# Patient Record
Sex: MALE | Race: WHITE | Employment: OTHER | URBAN - METROPOLITAN AREA
[De-identification: names, ages, dates, MRNs, and addresses within clinical notes are randomized per-mention and may not be internally consistent; named-entity substitution may affect disease eponyms.]

---

## 2016-09-19 LAB
CONTROL: NORMAL
HEMOCCULT STL QL: NORMAL

## 2017-01-11 PROBLEM — E55.9 VITAMIN D DEFICIENCY: Status: ACTIVE | Noted: 2017-01-11

## 2017-02-02 ENCOUNTER — TELEPHONE (OUTPATIENT)
Dept: INTERNAL MEDICINE CLINIC | Age: 77
End: 2017-02-02

## 2017-02-02 DIAGNOSIS — Z12.11 COLON CANCER SCREENING: ICD-10-CM

## 2017-02-02 PROCEDURE — 82274 ASSAY TEST FOR BLOOD FECAL: CPT | Performed by: INTERNAL MEDICINE

## 2017-02-08 PROBLEM — Z51.0 ENCOUNTER FOR ANTINEOPLASTIC RADIATION THERAPY: Status: ACTIVE | Noted: 2017-02-08

## 2017-03-09 RX ORDER — EZETIMIBE 10 MG/1
TABLET ORAL
Qty: 30 TABLET | Refills: 5 | Status: SHIPPED | OUTPATIENT
Start: 2017-03-09 | End: 2017-09-01 | Stop reason: SDUPTHER

## 2017-03-09 RX ORDER — ESCITALOPRAM OXALATE 10 MG/1
TABLET ORAL
Qty: 30 TABLET | OUTPATIENT
Start: 2017-03-09

## 2017-03-09 RX ORDER — ESCITALOPRAM OXALATE 10 MG/1
TABLET ORAL
Qty: 30 TABLET | Refills: 3 | Status: SHIPPED | OUTPATIENT
Start: 2017-03-09 | End: 2017-07-02 | Stop reason: SDUPTHER

## 2017-03-13 RX ORDER — ATORVASTATIN CALCIUM 80 MG/1
TABLET, FILM COATED ORAL
Qty: 30 TABLET | Refills: 5 | Status: SHIPPED | OUTPATIENT
Start: 2017-03-13 | End: 2017-09-01 | Stop reason: SDUPTHER

## 2017-03-20 ENCOUNTER — TELEPHONE (OUTPATIENT)
Dept: INTERNAL MEDICINE CLINIC | Age: 77
End: 2017-03-20

## 2017-03-20 ENCOUNTER — OFFICE VISIT (OUTPATIENT)
Dept: INTERNAL MEDICINE CLINIC | Age: 77
End: 2017-03-20

## 2017-03-20 VITALS
BODY MASS INDEX: 28.92 KG/M2 | DIASTOLIC BLOOD PRESSURE: 70 MMHG | SYSTOLIC BLOOD PRESSURE: 130 MMHG | HEIGHT: 70 IN | WEIGHT: 202 LBS | HEART RATE: 52 BPM

## 2017-03-20 DIAGNOSIS — F32.9 REACTIVE DEPRESSION: ICD-10-CM

## 2017-03-20 DIAGNOSIS — I10 ESSENTIAL HYPERTENSION: Primary | ICD-10-CM

## 2017-03-20 PROCEDURE — 4040F PNEUMOC VAC/ADMIN/RCVD: CPT | Performed by: INTERNAL MEDICINE

## 2017-03-20 PROCEDURE — G8427 DOCREV CUR MEDS BY ELIG CLIN: HCPCS | Performed by: INTERNAL MEDICINE

## 2017-03-20 PROCEDURE — 99213 OFFICE O/P EST LOW 20 MIN: CPT | Performed by: INTERNAL MEDICINE

## 2017-03-20 PROCEDURE — 1036F TOBACCO NON-USER: CPT | Performed by: INTERNAL MEDICINE

## 2017-03-20 PROCEDURE — 1123F ACP DISCUSS/DSCN MKR DOCD: CPT | Performed by: INTERNAL MEDICINE

## 2017-03-20 PROCEDURE — 3017F COLORECTAL CA SCREEN DOC REV: CPT | Performed by: INTERNAL MEDICINE

## 2017-03-20 PROCEDURE — G8420 CALC BMI NORM PARAMETERS: HCPCS | Performed by: INTERNAL MEDICINE

## 2017-03-20 PROCEDURE — G8484 FLU IMMUNIZE NO ADMIN: HCPCS | Performed by: INTERNAL MEDICINE

## 2017-03-20 ASSESSMENT — ENCOUNTER SYMPTOMS
GASTROINTESTINAL NEGATIVE: 1
TROUBLE SWALLOWING: 0
WHEEZING: 0
SHORTNESS OF BREATH: 0
CHEST TIGHTNESS: 0
SORE THROAT: 0
COUGH: 0

## 2017-04-19 PROBLEM — Z51.0 ENCOUNTER FOR ANTINEOPLASTIC RADIATION THERAPY: Status: RESOLVED | Noted: 2017-02-08 | Resolved: 2017-04-19

## 2017-06-26 ENCOUNTER — OFFICE VISIT (OUTPATIENT)
Dept: INTERNAL MEDICINE CLINIC | Age: 77
End: 2017-06-26

## 2017-06-26 VITALS
HEART RATE: 54 BPM | BODY MASS INDEX: 29.71 KG/M2 | WEIGHT: 200.6 LBS | DIASTOLIC BLOOD PRESSURE: 76 MMHG | HEIGHT: 69 IN | SYSTOLIC BLOOD PRESSURE: 130 MMHG | RESPIRATION RATE: 16 BRPM | OXYGEN SATURATION: 97 %

## 2017-06-26 DIAGNOSIS — M25.552 LEFT HIP PAIN: ICD-10-CM

## 2017-06-26 DIAGNOSIS — I10 ESSENTIAL HYPERTENSION: Primary | ICD-10-CM

## 2017-06-26 DIAGNOSIS — I25.10 CORONARY ARTERY DISEASE INVOLVING NATIVE CORONARY ARTERY OF NATIVE HEART WITHOUT ANGINA PECTORIS: ICD-10-CM

## 2017-06-26 DIAGNOSIS — E78.5 HYPERLIPIDEMIA LDL GOAL <70: ICD-10-CM

## 2017-06-26 DIAGNOSIS — G89.29 CHRONIC LEFT-SIDED LOW BACK PAIN WITHOUT SCIATICA: ICD-10-CM

## 2017-06-26 DIAGNOSIS — F32.9 REACTIVE DEPRESSION: ICD-10-CM

## 2017-06-26 DIAGNOSIS — M54.50 CHRONIC LEFT-SIDED LOW BACK PAIN WITHOUT SCIATICA: ICD-10-CM

## 2017-06-26 LAB
ALBUMIN SERPL-MCNC: 4.2 G/DL (ref 3.4–5)
ALP BLD-CCNC: 60 U/L (ref 40–129)
ALT SERPL-CCNC: 19 U/L (ref 10–40)
ANION GAP SERPL CALCULATED.3IONS-SCNC: 14 MMOL/L (ref 3–16)
AST SERPL-CCNC: 22 U/L (ref 15–37)
BASOPHILS ABSOLUTE: 0 K/UL (ref 0–0.2)
BASOPHILS RELATIVE PERCENT: 0.4 %
BILIRUB SERPL-MCNC: 0.5 MG/DL (ref 0–1)
BILIRUBIN DIRECT: <0.2 MG/DL (ref 0–0.3)
BILIRUBIN, INDIRECT: NORMAL MG/DL (ref 0–1)
BUN BLDV-MCNC: 25 MG/DL (ref 7–20)
CALCIUM SERPL-MCNC: 8.8 MG/DL (ref 8.3–10.6)
CHLORIDE BLD-SCNC: 102 MMOL/L (ref 99–110)
CHOLESTEROL, TOTAL: 147 MG/DL (ref 0–199)
CO2: 26 MMOL/L (ref 21–32)
CREAT SERPL-MCNC: 1.1 MG/DL (ref 0.8–1.3)
EOSINOPHILS ABSOLUTE: 0.2 K/UL (ref 0–0.6)
EOSINOPHILS RELATIVE PERCENT: 3.9 %
GFR AFRICAN AMERICAN: >60
GFR NON-AFRICAN AMERICAN: >60
GLUCOSE BLD-MCNC: 84 MG/DL (ref 70–99)
HCT VFR BLD CALC: 39.2 % (ref 40.5–52.5)
HDLC SERPL-MCNC: 47 MG/DL (ref 40–60)
HEMOGLOBIN: 13.4 G/DL (ref 13.5–17.5)
LDL CHOLESTEROL CALCULATED: 71 MG/DL
LYMPHOCYTES ABSOLUTE: 0.5 K/UL (ref 1–5.1)
LYMPHOCYTES RELATIVE PERCENT: 11.4 %
MCH RBC QN AUTO: 31.1 PG (ref 26–34)
MCHC RBC AUTO-ENTMCNC: 34.2 G/DL (ref 31–36)
MCV RBC AUTO: 90.9 FL (ref 80–100)
MONOCYTES ABSOLUTE: 0.7 K/UL (ref 0–1.3)
MONOCYTES RELATIVE PERCENT: 16.5 %
NEUTROPHILS ABSOLUTE: 2.8 K/UL (ref 1.7–7.7)
NEUTROPHILS RELATIVE PERCENT: 67.8 %
PDW BLD-RTO: 13.9 % (ref 12.4–15.4)
PLATELET # BLD: 132 K/UL (ref 135–450)
PMV BLD AUTO: 8.5 FL (ref 5–10.5)
POTASSIUM SERPL-SCNC: 4.4 MMOL/L (ref 3.5–5.1)
RBC # BLD: 4.32 M/UL (ref 4.2–5.9)
SODIUM BLD-SCNC: 142 MMOL/L (ref 136–145)
TOTAL PROTEIN: 6.9 G/DL (ref 6.4–8.2)
TRIGL SERPL-MCNC: 144 MG/DL (ref 0–150)
VLDLC SERPL CALC-MCNC: 29 MG/DL
WBC # BLD: 4.1 K/UL (ref 4–11)

## 2017-06-26 PROCEDURE — G8427 DOCREV CUR MEDS BY ELIG CLIN: HCPCS | Performed by: INTERNAL MEDICINE

## 2017-06-26 PROCEDURE — 1036F TOBACCO NON-USER: CPT | Performed by: INTERNAL MEDICINE

## 2017-06-26 PROCEDURE — 36415 COLL VENOUS BLD VENIPUNCTURE: CPT | Performed by: INTERNAL MEDICINE

## 2017-06-26 PROCEDURE — G8419 CALC BMI OUT NRM PARAM NOF/U: HCPCS | Performed by: INTERNAL MEDICINE

## 2017-06-26 PROCEDURE — 4040F PNEUMOC VAC/ADMIN/RCVD: CPT | Performed by: INTERNAL MEDICINE

## 2017-06-26 PROCEDURE — 3017F COLORECTAL CA SCREEN DOC REV: CPT | Performed by: INTERNAL MEDICINE

## 2017-06-26 PROCEDURE — 99214 OFFICE O/P EST MOD 30 MIN: CPT | Performed by: INTERNAL MEDICINE

## 2017-06-26 PROCEDURE — G8598 ASA/ANTIPLAT THER USED: HCPCS | Performed by: INTERNAL MEDICINE

## 2017-06-26 PROCEDURE — 1123F ACP DISCUSS/DSCN MKR DOCD: CPT | Performed by: INTERNAL MEDICINE

## 2017-06-26 RX ORDER — DICLOFENAC SODIUM 75 MG/1
75 TABLET, DELAYED RELEASE ORAL 2 TIMES DAILY
Qty: 60 TABLET | Refills: 0 | Status: SHIPPED | OUTPATIENT
Start: 2017-06-26 | End: 2017-07-10 | Stop reason: SDUPTHER

## 2017-06-26 ASSESSMENT — ENCOUNTER SYMPTOMS
COUGH: 0
SHORTNESS OF BREATH: 0
CHEST TIGHTNESS: 0
GASTROINTESTINAL NEGATIVE: 1

## 2017-06-27 ENCOUNTER — HOSPITAL ENCOUNTER (OUTPATIENT)
Dept: OTHER | Age: 77
Discharge: HOME | End: 2017-06-27
Attending: INTERNAL MEDICINE | Admitting: INTERNAL MEDICINE

## 2017-06-27 DIAGNOSIS — M25.552 LEFT HIP PAIN: ICD-10-CM

## 2017-06-27 DIAGNOSIS — G89.29 CHRONIC LEFT-SIDED LOW BACK PAIN WITHOUT SCIATICA: ICD-10-CM

## 2017-06-27 DIAGNOSIS — M54.50 CHRONIC LEFT-SIDED LOW BACK PAIN WITHOUT SCIATICA: ICD-10-CM

## 2017-07-03 RX ORDER — ESCITALOPRAM OXALATE 10 MG/1
TABLET ORAL
Qty: 30 TABLET | Refills: 5 | Status: SHIPPED | OUTPATIENT
Start: 2017-07-03 | End: 2017-12-29 | Stop reason: SDUPTHER

## 2017-07-10 ENCOUNTER — TELEPHONE (OUTPATIENT)
Dept: INTERNAL MEDICINE CLINIC | Age: 77
End: 2017-07-10

## 2017-07-10 DIAGNOSIS — M25.552 LEFT HIP PAIN: Primary | ICD-10-CM

## 2017-07-10 RX ORDER — DICLOFENAC SODIUM 75 MG/1
75 TABLET, DELAYED RELEASE ORAL PRN
Qty: 30 TABLET | Refills: 1 | Status: SHIPPED | OUTPATIENT
Start: 2017-07-10 | End: 2017-11-20 | Stop reason: SDUPTHER

## 2017-07-10 RX ORDER — ESCITALOPRAM OXALATE 10 MG/1
TABLET ORAL
Qty: 30 TABLET | Refills: 5 | Status: CANCELLED | OUTPATIENT
Start: 2017-07-10

## 2017-09-01 RX ORDER — EZETIMIBE 10 MG/1
TABLET ORAL
Qty: 30 TABLET | Refills: 5 | Status: SHIPPED | OUTPATIENT
Start: 2017-09-01 | End: 2018-03-12 | Stop reason: SDUPTHER

## 2017-09-01 RX ORDER — ATORVASTATIN CALCIUM 80 MG/1
TABLET, FILM COATED ORAL
Qty: 30 TABLET | Refills: 5 | Status: SHIPPED | OUTPATIENT
Start: 2017-09-01 | End: 2018-03-12 | Stop reason: SDUPTHER

## 2017-10-20 ENCOUNTER — OFFICE VISIT (OUTPATIENT)
Dept: INTERNAL MEDICINE CLINIC | Age: 77
End: 2017-10-20

## 2017-10-20 VITALS
WEIGHT: 203.4 LBS | HEIGHT: 69 IN | HEART RATE: 59 BPM | RESPIRATION RATE: 16 BRPM | SYSTOLIC BLOOD PRESSURE: 130 MMHG | DIASTOLIC BLOOD PRESSURE: 70 MMHG | OXYGEN SATURATION: 96 % | BODY MASS INDEX: 30.13 KG/M2

## 2017-10-20 DIAGNOSIS — I25.10 CORONARY ARTERY DISEASE INVOLVING NATIVE CORONARY ARTERY OF NATIVE HEART WITHOUT ANGINA PECTORIS: ICD-10-CM

## 2017-10-20 DIAGNOSIS — I10 ESSENTIAL HYPERTENSION: Primary | ICD-10-CM

## 2017-10-20 PROCEDURE — G8484 FLU IMMUNIZE NO ADMIN: HCPCS | Performed by: INTERNAL MEDICINE

## 2017-10-20 PROCEDURE — G8427 DOCREV CUR MEDS BY ELIG CLIN: HCPCS | Performed by: INTERNAL MEDICINE

## 2017-10-20 PROCEDURE — G8417 CALC BMI ABV UP PARAM F/U: HCPCS | Performed by: INTERNAL MEDICINE

## 2017-10-20 PROCEDURE — 1036F TOBACCO NON-USER: CPT | Performed by: INTERNAL MEDICINE

## 2017-10-20 PROCEDURE — 1123F ACP DISCUSS/DSCN MKR DOCD: CPT | Performed by: INTERNAL MEDICINE

## 2017-10-20 PROCEDURE — 4040F PNEUMOC VAC/ADMIN/RCVD: CPT | Performed by: INTERNAL MEDICINE

## 2017-10-20 PROCEDURE — G8598 ASA/ANTIPLAT THER USED: HCPCS | Performed by: INTERNAL MEDICINE

## 2017-10-20 PROCEDURE — 99213 OFFICE O/P EST LOW 20 MIN: CPT | Performed by: INTERNAL MEDICINE

## 2017-10-20 ASSESSMENT — ENCOUNTER SYMPTOMS
GASTROINTESTINAL NEGATIVE: 1
SHORTNESS OF BREATH: 0
WHEEZING: 0
TROUBLE SWALLOWING: 0
COUGH: 0
CHEST TIGHTNESS: 0

## 2017-11-06 ENCOUNTER — TELEPHONE (OUTPATIENT)
Dept: INTERNAL MEDICINE CLINIC | Age: 77
End: 2017-11-06

## 2017-11-06 RX ORDER — DICLOFENAC SODIUM 75 MG/1
75 TABLET, DELAYED RELEASE ORAL PRN
Qty: 30 TABLET | Status: CANCELLED | OUTPATIENT
Start: 2017-11-06

## 2017-11-06 NOTE — TELEPHONE ENCOUNTER
Ok to take after eating daily bit needs t monitor his kidney and liver function and watch for nay symptoms of GI upset. nausea pain burning and change in stool color etc

## 2017-11-21 ENCOUNTER — OFFICE VISIT (OUTPATIENT)
Dept: CARDIOLOGY CLINIC | Age: 77
End: 2017-11-21

## 2017-11-21 VITALS
HEART RATE: 51 BPM | DIASTOLIC BLOOD PRESSURE: 70 MMHG | SYSTOLIC BLOOD PRESSURE: 126 MMHG | BODY MASS INDEX: 29.72 KG/M2 | WEIGHT: 207.6 LBS | HEIGHT: 70 IN | OXYGEN SATURATION: 99 %

## 2017-11-21 DIAGNOSIS — I10 ESSENTIAL HYPERTENSION: ICD-10-CM

## 2017-11-21 DIAGNOSIS — E78.5 HYPERLIPIDEMIA LDL GOAL <70: ICD-10-CM

## 2017-11-21 DIAGNOSIS — C61 PROSTATE CARCINOMA (HCC): ICD-10-CM

## 2017-11-21 DIAGNOSIS — Z95.1 S/P CABG (CORONARY ARTERY BYPASS GRAFT): Primary | ICD-10-CM

## 2017-11-21 PROCEDURE — 93000 ELECTROCARDIOGRAM COMPLETE: CPT | Performed by: INTERNAL MEDICINE

## 2017-11-21 PROCEDURE — 1123F ACP DISCUSS/DSCN MKR DOCD: CPT | Performed by: INTERNAL MEDICINE

## 2017-11-21 PROCEDURE — 4040F PNEUMOC VAC/ADMIN/RCVD: CPT | Performed by: INTERNAL MEDICINE

## 2017-11-21 PROCEDURE — G8427 DOCREV CUR MEDS BY ELIG CLIN: HCPCS | Performed by: INTERNAL MEDICINE

## 2017-11-21 PROCEDURE — 1036F TOBACCO NON-USER: CPT | Performed by: INTERNAL MEDICINE

## 2017-11-21 PROCEDURE — G8484 FLU IMMUNIZE NO ADMIN: HCPCS | Performed by: INTERNAL MEDICINE

## 2017-11-21 PROCEDURE — 99214 OFFICE O/P EST MOD 30 MIN: CPT | Performed by: INTERNAL MEDICINE

## 2017-11-21 PROCEDURE — G8417 CALC BMI ABV UP PARAM F/U: HCPCS | Performed by: INTERNAL MEDICINE

## 2017-11-21 PROCEDURE — G8598 ASA/ANTIPLAT THER USED: HCPCS | Performed by: INTERNAL MEDICINE

## 2017-11-21 RX ORDER — DICLOFENAC SODIUM 75 MG/1
75 TABLET, DELAYED RELEASE ORAL PRN
Qty: 30 TABLET | Refills: 1 | Status: SHIPPED | OUTPATIENT
Start: 2017-11-21 | End: 2018-04-25

## 2017-11-21 RX ORDER — FOLIC ACID 0.8 MG
1 TABLET ORAL NIGHTLY
COMMUNITY

## 2017-11-21 NOTE — PROGRESS NOTES
also has an 80% mid lesion. 3. Normal left ventricular size and systolic function. Ejection fraction is 60%. ASSESSMENT AND PLAN:      CAD S/P CABGX4 12/30/15  Doing well  No angina  On Statin and ASA    Hypertension  Well controlled  Continue risk factor modifications    Hyperlipidemia  LDL goal <70   LDL 71 (6/26/17)  Continue statin and     Prostate CA  Managed by Dr. Yg Vicente    Follow up in 1 year. Alexander KIRK  11/21/2017

## 2017-12-29 RX ORDER — ESCITALOPRAM OXALATE 10 MG/1
TABLET ORAL
Qty: 30 TABLET | Refills: 3 | Status: SHIPPED | OUTPATIENT
Start: 2017-12-29 | End: 2018-04-25 | Stop reason: SDUPTHER

## 2018-01-10 ENCOUNTER — OFFICE VISIT (OUTPATIENT)
Dept: INTERNAL MEDICINE CLINIC | Age: 78
End: 2018-01-10

## 2018-01-10 VITALS — OXYGEN SATURATION: 97 % | HEART RATE: 70 BPM | SYSTOLIC BLOOD PRESSURE: 130 MMHG | DIASTOLIC BLOOD PRESSURE: 70 MMHG

## 2018-01-10 DIAGNOSIS — M25.552 LEFT HIP PAIN: Primary | ICD-10-CM

## 2018-01-10 DIAGNOSIS — R60.0 EDEMA OF RIGHT LOWER EXTREMITY: ICD-10-CM

## 2018-01-10 PROCEDURE — G8599 NO ASA/ANTIPLAT THER USE RNG: HCPCS | Performed by: INTERNAL MEDICINE

## 2018-01-10 PROCEDURE — G8484 FLU IMMUNIZE NO ADMIN: HCPCS | Performed by: INTERNAL MEDICINE

## 2018-01-10 PROCEDURE — G8428 CUR MEDS NOT DOCUMENT: HCPCS | Performed by: INTERNAL MEDICINE

## 2018-01-10 PROCEDURE — 1123F ACP DISCUSS/DSCN MKR DOCD: CPT | Performed by: INTERNAL MEDICINE

## 2018-01-10 PROCEDURE — 99213 OFFICE O/P EST LOW 20 MIN: CPT | Performed by: INTERNAL MEDICINE

## 2018-01-10 PROCEDURE — 1036F TOBACCO NON-USER: CPT | Performed by: INTERNAL MEDICINE

## 2018-01-10 PROCEDURE — 4040F PNEUMOC VAC/ADMIN/RCVD: CPT | Performed by: INTERNAL MEDICINE

## 2018-01-10 PROCEDURE — G8417 CALC BMI ABV UP PARAM F/U: HCPCS | Performed by: INTERNAL MEDICINE

## 2018-01-10 NOTE — PROGRESS NOTES
Subjective:      Patient ID: Jude Frazier is a 69 y.o. male. HPI  Diclofenac helped his pain initially and now taking once daily   Still has toothache like pain left hip ,hurts when walking and at nights  Has no back pain   Right leg is swollen and seems more during day and less at nights x 1 week and ha sno pain in leg   Swelling goes away when he gets up in am  Review of Systems    Objective:   Physical Exam   Constitutional: He is oriented to person, place, and time. No distress. Abdominal: Soft. Bowel sounds are normal. He exhibits no distension and no mass. There is no hepatosplenomegaly. There is no tenderness. Neurological: He is alert and oriented to person, place, and time. Nursing note and vitals reviewed. Has full rom left hip and has no tenderness and xray in 6/17 was negative for arthritis of hip  Has 1+ edema of right leg and thigh medially and is slightly tender in this area  Has no calf tenderness and tika's is negative  Assessment:      Edema of RLE r/.o dvt    Left hip pain   Plan: Will arrange for venous scan of right leg  See DR. Zeng for evaluation of left hip pain

## 2018-01-11 ENCOUNTER — HOSPITAL ENCOUNTER (OUTPATIENT)
Dept: VASCULAR LAB | Age: 78
Discharge: HOME | End: 2018-01-11
Attending: INTERNAL MEDICINE | Admitting: INTERNAL MEDICINE

## 2018-01-11 DIAGNOSIS — R60.0 LOCALIZED EDEMA: ICD-10-CM

## 2018-01-24 ENCOUNTER — OFFICE VISIT (OUTPATIENT)
Dept: INTERNAL MEDICINE CLINIC | Age: 78
End: 2018-01-24

## 2018-01-24 VITALS
BODY MASS INDEX: 29.78 KG/M2 | HEIGHT: 70 IN | RESPIRATION RATE: 16 BRPM | HEART RATE: 52 BPM | OXYGEN SATURATION: 98 % | DIASTOLIC BLOOD PRESSURE: 80 MMHG | SYSTOLIC BLOOD PRESSURE: 120 MMHG | WEIGHT: 208 LBS

## 2018-01-24 DIAGNOSIS — F32.9 REACTIVE DEPRESSION: ICD-10-CM

## 2018-01-24 DIAGNOSIS — I25.10 CORONARY ARTERY DISEASE INVOLVING NATIVE CORONARY ARTERY OF NATIVE HEART WITHOUT ANGINA PECTORIS: ICD-10-CM

## 2018-01-24 DIAGNOSIS — I10 ESSENTIAL HYPERTENSION: Primary | ICD-10-CM

## 2018-01-24 LAB
ANION GAP SERPL CALCULATED.3IONS-SCNC: 11 MMOL/L (ref 3–16)
BASOPHILS ABSOLUTE: 0 K/UL (ref 0–0.2)
BASOPHILS RELATIVE PERCENT: 0.5 %
BUN BLDV-MCNC: 29 MG/DL (ref 7–20)
CALCIUM SERPL-MCNC: 9.1 MG/DL (ref 8.3–10.6)
CHLORIDE BLD-SCNC: 102 MMOL/L (ref 99–110)
CO2: 28 MMOL/L (ref 21–32)
CREAT SERPL-MCNC: 1.1 MG/DL (ref 0.8–1.3)
EOSINOPHILS ABSOLUTE: 0.2 K/UL (ref 0–0.6)
EOSINOPHILS RELATIVE PERCENT: 3.5 %
GFR AFRICAN AMERICAN: >60
GFR NON-AFRICAN AMERICAN: >60
GLUCOSE BLD-MCNC: 94 MG/DL (ref 70–99)
HCT VFR BLD CALC: 38.6 % (ref 40.5–52.5)
HEMOGLOBIN: 13.1 G/DL (ref 13.5–17.5)
LYMPHOCYTES ABSOLUTE: 0.8 K/UL (ref 1–5.1)
LYMPHOCYTES RELATIVE PERCENT: 13.3 %
MCH RBC QN AUTO: 30.6 PG (ref 26–34)
MCHC RBC AUTO-ENTMCNC: 33.9 G/DL (ref 31–36)
MCV RBC AUTO: 90.3 FL (ref 80–100)
MONOCYTES ABSOLUTE: 0.5 K/UL (ref 0–1.3)
MONOCYTES RELATIVE PERCENT: 8.9 %
NEUTROPHILS ABSOLUTE: 4.4 K/UL (ref 1.7–7.7)
NEUTROPHILS RELATIVE PERCENT: 73.8 %
PDW BLD-RTO: 14.2 % (ref 12.4–15.4)
PLATELET # BLD: 147 K/UL (ref 135–450)
PMV BLD AUTO: 8.4 FL (ref 5–10.5)
POTASSIUM SERPL-SCNC: 4.9 MMOL/L (ref 3.5–5.1)
RBC # BLD: 4.27 M/UL (ref 4.2–5.9)
SODIUM BLD-SCNC: 141 MMOL/L (ref 136–145)
WBC # BLD: 6 K/UL (ref 4–11)

## 2018-01-24 PROCEDURE — 1123F ACP DISCUSS/DSCN MKR DOCD: CPT | Performed by: INTERNAL MEDICINE

## 2018-01-24 PROCEDURE — G8599 NO ASA/ANTIPLAT THER USE RNG: HCPCS | Performed by: INTERNAL MEDICINE

## 2018-01-24 PROCEDURE — 99213 OFFICE O/P EST LOW 20 MIN: CPT | Performed by: INTERNAL MEDICINE

## 2018-01-24 PROCEDURE — 1036F TOBACCO NON-USER: CPT | Performed by: INTERNAL MEDICINE

## 2018-01-24 PROCEDURE — 4040F PNEUMOC VAC/ADMIN/RCVD: CPT | Performed by: INTERNAL MEDICINE

## 2018-01-24 PROCEDURE — G8484 FLU IMMUNIZE NO ADMIN: HCPCS | Performed by: INTERNAL MEDICINE

## 2018-01-24 PROCEDURE — 36415 COLL VENOUS BLD VENIPUNCTURE: CPT | Performed by: INTERNAL MEDICINE

## 2018-01-24 PROCEDURE — G8427 DOCREV CUR MEDS BY ELIG CLIN: HCPCS | Performed by: INTERNAL MEDICINE

## 2018-01-24 PROCEDURE — G8417 CALC BMI ABV UP PARAM F/U: HCPCS | Performed by: INTERNAL MEDICINE

## 2018-01-24 ASSESSMENT — ENCOUNTER SYMPTOMS
CHEST TIGHTNESS: 0
SHORTNESS OF BREATH: 0
GASTROINTESTINAL NEGATIVE: 1
TROUBLE SWALLOWING: 0
COUGH: 0
WHEEZING: 0

## 2018-01-24 NOTE — PATIENT INSTRUCTIONS
Continue current medications  Does not want colonoscopy done  Advised hip bursitis exercises  See in  3 months

## 2018-01-25 ENCOUNTER — TELEPHONE (OUTPATIENT)
Dept: INTERNAL MEDICINE CLINIC | Age: 78
End: 2018-01-25

## 2018-01-25 NOTE — TELEPHONE ENCOUNTER
PLease correct AVS. It says patient is not taking Prosteon (from Dr. Bandar Serna) but he is still taking it for a while.

## 2018-01-26 ENCOUNTER — OFFICE VISIT (OUTPATIENT)
Dept: ORTHOPEDIC SURGERY | Age: 78
End: 2018-01-26

## 2018-01-26 VITALS
HEIGHT: 70 IN | RESPIRATION RATE: 16 BRPM | HEART RATE: 62 BPM | WEIGHT: 208 LBS | DIASTOLIC BLOOD PRESSURE: 69 MMHG | BODY MASS INDEX: 29.78 KG/M2 | SYSTOLIC BLOOD PRESSURE: 132 MMHG

## 2018-01-26 DIAGNOSIS — M25.552 HIP PAIN, LEFT: ICD-10-CM

## 2018-01-26 DIAGNOSIS — M70.62 GREATER TROCHANTERIC BURSITIS OF LEFT HIP: Primary | ICD-10-CM

## 2018-01-26 PROCEDURE — G8428 CUR MEDS NOT DOCUMENT: HCPCS | Performed by: ORTHOPAEDIC SURGERY

## 2018-01-26 PROCEDURE — G8417 CALC BMI ABV UP PARAM F/U: HCPCS | Performed by: ORTHOPAEDIC SURGERY

## 2018-01-26 PROCEDURE — G8484 FLU IMMUNIZE NO ADMIN: HCPCS | Performed by: ORTHOPAEDIC SURGERY

## 2018-01-26 PROCEDURE — 73502 X-RAY EXAM HIP UNI 2-3 VIEWS: CPT | Performed by: ORTHOPAEDIC SURGERY

## 2018-01-26 PROCEDURE — 4040F PNEUMOC VAC/ADMIN/RCVD: CPT | Performed by: ORTHOPAEDIC SURGERY

## 2018-01-26 PROCEDURE — 20610 DRAIN/INJ JOINT/BURSA W/O US: CPT | Performed by: ORTHOPAEDIC SURGERY

## 2018-01-26 PROCEDURE — 99203 OFFICE O/P NEW LOW 30 MIN: CPT | Performed by: ORTHOPAEDIC SURGERY

## 2018-03-12 RX ORDER — EZETIMIBE 10 MG/1
TABLET ORAL
Qty: 30 TABLET | Refills: 5 | Status: SHIPPED | OUTPATIENT
Start: 2018-03-12 | End: 2018-09-06 | Stop reason: SDUPTHER

## 2018-03-12 RX ORDER — ATORVASTATIN CALCIUM 80 MG/1
TABLET, FILM COATED ORAL
Qty: 30 TABLET | Refills: 5 | Status: SHIPPED | OUTPATIENT
Start: 2018-03-12 | End: 2018-09-06 | Stop reason: SDUPTHER

## 2018-03-19 ENCOUNTER — TELEPHONE (OUTPATIENT)
Dept: ORTHOPEDIC SURGERY | Age: 78
End: 2018-03-19

## 2018-03-19 DIAGNOSIS — M70.62 GREATER TROCHANTERIC BURSITIS OF LEFT HIP: Primary | ICD-10-CM

## 2018-03-19 RX ORDER — NAPROXEN 500 MG/1
500 TABLET ORAL 2 TIMES DAILY WITH MEALS
Qty: 60 TABLET | Refills: 0 | Status: SHIPPED | OUTPATIENT
Start: 2018-03-19 | End: 2019-12-04

## 2018-03-19 NOTE — TELEPHONE ENCOUNTER
Called and spoke to Zenaida. Explained to them that per [facility] note to start PT and Naprosyn PRN. PT ordered and in EPIC and Rx for naprosyn sent to pharmacy.

## 2018-03-23 ENCOUNTER — HOSPITAL ENCOUNTER (OUTPATIENT)
Dept: PHYSICAL THERAPY | Age: 78
Discharge: HOME | End: 2018-03-31
Admitting: ORTHOPAEDIC SURGERY

## 2018-03-26 ENCOUNTER — HOSPITAL ENCOUNTER (OUTPATIENT)
Dept: OTHER | Age: 78
Discharge: HOME | End: 2018-04-02
Attending: ORTHOPAEDIC SURGERY | Admitting: ORTHOPAEDIC SURGERY

## 2018-03-27 ENCOUNTER — HOSPITAL ENCOUNTER (OUTPATIENT)
Dept: PHYSICAL THERAPY | Age: 78
Discharge: HOME | End: 2018-03-28
Admitting: ORTHOPAEDIC SURGERY

## 2018-03-30 ENCOUNTER — HOSPITAL ENCOUNTER (OUTPATIENT)
Dept: PHYSICAL THERAPY | Age: 78
Discharge: HOME | End: 2018-03-31
Admitting: ORTHOPAEDIC SURGERY

## 2018-04-01 ENCOUNTER — HOSPITAL ENCOUNTER (OUTPATIENT)
Dept: OTHER | Age: 78
Discharge: HOME | End: 2018-04-20
Attending: ORTHOPAEDIC SURGERY | Admitting: ORTHOPAEDIC SURGERY

## 2018-04-02 ENCOUNTER — HOSPITAL ENCOUNTER (OUTPATIENT)
Dept: PHYSICAL THERAPY | Age: 78
Discharge: HOME | End: 2018-04-03
Admitting: ORTHOPAEDIC SURGERY

## 2018-04-02 NOTE — FLOWSHEET NOTE
Physical Therapy Daily Treatment Note  Date:  2018    Patient Name:  Vinicius Bishop    :  1940  MRN: [id number]    Restrictions/Precautions:    No sig fall risk  Pertinent Medical History: Additional Pertinent Hx: prostate CA,  CAD, GERD, HLD, HTN, reactive depression,  shingles. CABG    Medical/Treatment Diagnosis Information:  · Diagnosis: left greater trochanteric bursitis  · Treatment Diagnosis: Decreased functional mobility 2/2 left hip pain    Insurance/Certification information:  PT Insurance Information: Medicare  Physician Information:  Referring Practitioner: Niko Mcintyre of care signed (Y/N):  Sent at eval    Visit# / total visits:    Pain level: 0/10     G-Code (if applicable):visit 1  61  CI       History of Injury:  left hip pain on and off for years. This winter it got so bad he could not walk. To MD  received cortisone shot, had great relief,  In March began coming back,  Given naproxyn and referred to PT    Subjective:   No pain at present. Able to sleep on L side now. Objective:   TTP left ITB, greater trochanter,  + left Yan, tight piriformis, HS  3/30 -  No TTP left greater trochanter -  ITB remains tender    Exercise/Equipment Resistance/Repetitions Other comments        US 1.5 w/cm2 50%` Left greater troch  8 min sidely with pillow        STM to ITB 10 min Reports less tender   Piriformis stretch 1 min R/L each      ITB stretch in right sl Left leg extended over table   1 min Reviewed proper hip position with pt. HSS 20 sec x 3L      CP 12  Min post      HEP itb stm, stretches per above                Other Therapeutic Activities:  Pt was educated on PT POC, Diagnosis, Prognosis, pathomechanics as well as frequency and duration of scheduling future physical therapy appointments. Time was also taken on this day to answer all patient questions and participation in PT. Reviewed appointment policy in detail with patient and patient verbalized understanding.      Home Exercise Program:  Patient instructed in the following for HEP:   Patient verbalized/demonstrated understanding and was issued written HEP. Timed Code Treatment Minutes: 35      Total Treatment Minutes: 35    Treatment/Activity Tolerance:  [x] Patient tolerated treatment well [] Patient limited by fatigue  [] Patient limited by pain  [] Patient limited by other medical complications  [] Other:     Prognosis: [x] Good [] Fair  [] Poor    Goals:    Short term goals  Time Frame for Short term goals: 2 weeks  Short term goal 1: Indep with HEP of exercise and STM      Long term goals  Time Frame for Long term goals : Discharge  Long term goal 1: Decrease pain to 0-1 on VAS to allow pt to sleep and perform ADL wtihout restriction  Long term goal 2: Normalize LE flexibility to allow normal household activity including sitting, walking and lifting without restriction. Patient Requires Follow-up: [x] Yes  [] No    Plan:   [] Continue per plan of care [] Alter current plan (see comments)  [x] Plan of care initiated [] Hold pending MD visit [] Discharge    Plan for Next Session:  Asess itb,  If pt is painfree decrease numbers of visits.     Electronically signed by:  Rafa Beck,

## 2018-04-06 ENCOUNTER — HOSPITAL ENCOUNTER (OUTPATIENT)
Dept: PHYSICAL THERAPY | Age: 78
Discharge: HOME | End: 2018-04-07
Admitting: ORTHOPAEDIC SURGERY

## 2018-04-06 NOTE — FLOWSHEET NOTE
Physical Therapy Daily Treatment Note  Date:  2018    Patient Name:  Vandana King    :  1940  MRN: [id number]    Restrictions/Precautions:    No sig fall risk  Pertinent Medical History: Additional Pertinent Hx: prostate CA,  CAD, GERD, HLD, HTN, reactive depression,  shingles. CABG    Medical/Treatment Diagnosis Information:  · Diagnosis: left greater trochanteric bursitis  · Treatment Diagnosis: Decreased functional mobility 2/2 left hip pain    Insurance/Certification information:  PT Insurance Information: Medicare  Physician Information:  Referring Practitioner: Kenny Perez of care signed (Y/N):  Sent at eval    Visit# / total visits:     Pain level: 0/10     G-Code (if applicable):visit 1  61  CI       History of Injury:  left hip pain on and off for years. This winter it got so bad he could not walk. To MD  received cortisone shot, had great relief,  In March began coming back,  Given naproxyn and referred to PT    Subjective:   No pain at present. Able to sleep on L side now. . No limitations or pain    Objective:   No TTP ITB or greater trochanger. Exercise/Equipment Resistance/Repetitions Other comments        US 1.5 w/cm2 50%` Left greater troch  8 min sidely with pillow        STM to ITB 10 min Reports less tender   Piriformis stretch 1 min R/L each      ITB stretch in right sl Left leg extended over table   1 min Reviewed proper hip position with pt. HSS 20 sec x 3L        Other Therapeutic Activities:  Pt was educated on PT POC, Diagnosis, Prognosis, pathomechanics as well as frequency and duration of scheduling future physical therapy appointments. Time was also taken on this day to answer all patient questions and participation in PT. Reviewed appointment policy in detail with patient and patient verbalized understanding.      Home Exercise Program:  Patient instructed in the following for HEP:   Patient verbalized/demonstrated understanding and was issued written

## 2018-04-25 ENCOUNTER — OFFICE VISIT (OUTPATIENT)
Dept: INTERNAL MEDICINE CLINIC | Age: 78
End: 2018-04-25

## 2018-04-25 VITALS
HEART RATE: 55 BPM | BODY MASS INDEX: 29.63 KG/M2 | RESPIRATION RATE: 16 BRPM | SYSTOLIC BLOOD PRESSURE: 130 MMHG | HEIGHT: 70 IN | DIASTOLIC BLOOD PRESSURE: 70 MMHG | WEIGHT: 207 LBS | OXYGEN SATURATION: 98 %

## 2018-04-25 DIAGNOSIS — I10 ESSENTIAL HYPERTENSION: ICD-10-CM

## 2018-04-25 DIAGNOSIS — I25.10 CORONARY ARTERY DISEASE INVOLVING NATIVE CORONARY ARTERY OF NATIVE HEART WITHOUT ANGINA PECTORIS: Primary | ICD-10-CM

## 2018-04-25 PROCEDURE — G8427 DOCREV CUR MEDS BY ELIG CLIN: HCPCS | Performed by: INTERNAL MEDICINE

## 2018-04-25 PROCEDURE — 1036F TOBACCO NON-USER: CPT | Performed by: INTERNAL MEDICINE

## 2018-04-25 PROCEDURE — G8417 CALC BMI ABV UP PARAM F/U: HCPCS | Performed by: INTERNAL MEDICINE

## 2018-04-25 PROCEDURE — 99213 OFFICE O/P EST LOW 20 MIN: CPT | Performed by: INTERNAL MEDICINE

## 2018-04-25 PROCEDURE — 1123F ACP DISCUSS/DSCN MKR DOCD: CPT | Performed by: INTERNAL MEDICINE

## 2018-04-25 PROCEDURE — 4040F PNEUMOC VAC/ADMIN/RCVD: CPT | Performed by: INTERNAL MEDICINE

## 2018-04-25 PROCEDURE — G8599 NO ASA/ANTIPLAT THER USE RNG: HCPCS | Performed by: INTERNAL MEDICINE

## 2018-04-25 RX ORDER — ESCITALOPRAM OXALATE 10 MG/1
TABLET ORAL
Qty: 30 TABLET | Refills: 3 | Status: SHIPPED | OUTPATIENT
Start: 2018-04-25 | End: 2018-08-21 | Stop reason: SDUPTHER

## 2018-04-25 RX ORDER — VITAMIN B COMPLEX
1 CAPSULE ORAL DAILY
COMMUNITY
End: 2019-02-15

## 2018-04-25 ASSESSMENT — ENCOUNTER SYMPTOMS
COUGH: 0
CHEST TIGHTNESS: 0
SHORTNESS OF BREATH: 0
WHEEZING: 0
TROUBLE SWALLOWING: 0
GASTROINTESTINAL NEGATIVE: 1

## 2018-06-14 ENCOUNTER — TELEPHONE (OUTPATIENT)
Dept: INTERNAL MEDICINE CLINIC | Age: 78
End: 2018-06-14

## 2018-07-27 ENCOUNTER — OFFICE VISIT (OUTPATIENT)
Dept: INTERNAL MEDICINE CLINIC | Age: 78
End: 2018-07-27

## 2018-07-27 VITALS
DIASTOLIC BLOOD PRESSURE: 70 MMHG | BODY MASS INDEX: 29.89 KG/M2 | HEART RATE: 50 BPM | RESPIRATION RATE: 16 BRPM | OXYGEN SATURATION: 96 % | SYSTOLIC BLOOD PRESSURE: 120 MMHG | HEIGHT: 70 IN | WEIGHT: 208.8 LBS

## 2018-07-27 DIAGNOSIS — M25.561 ACUTE PAIN OF RIGHT KNEE: ICD-10-CM

## 2018-07-27 DIAGNOSIS — I25.10 CORONARY ARTERY DISEASE INVOLVING NATIVE CORONARY ARTERY OF NATIVE HEART WITHOUT ANGINA PECTORIS: ICD-10-CM

## 2018-07-27 DIAGNOSIS — I10 ESSENTIAL HYPERTENSION: Primary | ICD-10-CM

## 2018-07-27 PROCEDURE — 1123F ACP DISCUSS/DSCN MKR DOCD: CPT | Performed by: INTERNAL MEDICINE

## 2018-07-27 PROCEDURE — G8599 NO ASA/ANTIPLAT THER USE RNG: HCPCS | Performed by: INTERNAL MEDICINE

## 2018-07-27 PROCEDURE — G8417 CALC BMI ABV UP PARAM F/U: HCPCS | Performed by: INTERNAL MEDICINE

## 2018-07-27 PROCEDURE — 4040F PNEUMOC VAC/ADMIN/RCVD: CPT | Performed by: INTERNAL MEDICINE

## 2018-07-27 PROCEDURE — 1101F PT FALLS ASSESS-DOCD LE1/YR: CPT | Performed by: INTERNAL MEDICINE

## 2018-07-27 PROCEDURE — 3288F FALL RISK ASSESSMENT DOCD: CPT | Performed by: INTERNAL MEDICINE

## 2018-07-27 PROCEDURE — G8427 DOCREV CUR MEDS BY ELIG CLIN: HCPCS | Performed by: INTERNAL MEDICINE

## 2018-07-27 PROCEDURE — G8510 SCR DEP NEG, NO PLAN REQD: HCPCS | Performed by: INTERNAL MEDICINE

## 2018-07-27 PROCEDURE — 99214 OFFICE O/P EST MOD 30 MIN: CPT | Performed by: INTERNAL MEDICINE

## 2018-07-27 PROCEDURE — 1036F TOBACCO NON-USER: CPT | Performed by: INTERNAL MEDICINE

## 2018-07-27 ASSESSMENT — ENCOUNTER SYMPTOMS
GASTROINTESTINAL NEGATIVE: 1
WHEEZING: 0
CHEST TIGHTNESS: 0
COUGH: 0
SHORTNESS OF BREATH: 0

## 2018-07-27 ASSESSMENT — PATIENT HEALTH QUESTIONNAIRE - PHQ9
2. FEELING DOWN, DEPRESSED OR HOPELESS: 1
SUM OF ALL RESPONSES TO PHQ9 QUESTIONS 1 & 2: 1
1. LITTLE INTEREST OR PLEASURE IN DOING THINGS: 0
SUM OF ALL RESPONSES TO PHQ QUESTIONS 1-9: 1

## 2018-08-21 RX ORDER — ESCITALOPRAM OXALATE 10 MG/1
TABLET ORAL
Qty: 30 TABLET | Refills: 5 | Status: SHIPPED | OUTPATIENT
Start: 2018-08-21 | End: 2018-10-15 | Stop reason: SDUPTHER

## 2018-09-06 RX ORDER — ATORVASTATIN CALCIUM 80 MG/1
TABLET, FILM COATED ORAL
Qty: 30 TABLET | Refills: 5 | Status: SHIPPED | OUTPATIENT
Start: 2018-09-06 | End: 2019-03-07 | Stop reason: SDUPTHER

## 2018-09-06 RX ORDER — EZETIMIBE 10 MG/1
TABLET ORAL
Qty: 30 TABLET | Refills: 5 | Status: SHIPPED | OUTPATIENT
Start: 2018-09-06 | End: 2019-03-07 | Stop reason: SDUPTHER

## 2018-09-18 ENCOUNTER — TELEPHONE (OUTPATIENT)
Dept: INTERNAL MEDICINE CLINIC | Age: 78
End: 2018-09-18

## 2018-10-03 ENCOUNTER — OFFICE VISIT (OUTPATIENT)
Dept: ORTHOPEDIC SURGERY | Age: 78
End: 2018-10-03
Payer: MEDICARE

## 2018-10-03 VITALS
BODY MASS INDEX: 29.78 KG/M2 | DIASTOLIC BLOOD PRESSURE: 77 MMHG | SYSTOLIC BLOOD PRESSURE: 139 MMHG | WEIGHT: 208 LBS | HEART RATE: 57 BPM | HEIGHT: 70 IN

## 2018-10-03 DIAGNOSIS — M25.561 CHRONIC PAIN OF BOTH KNEES: ICD-10-CM

## 2018-10-03 DIAGNOSIS — M17.12 PRIMARY OSTEOARTHRITIS OF LEFT KNEE: Primary | ICD-10-CM

## 2018-10-03 DIAGNOSIS — G89.29 CHRONIC PAIN OF BOTH KNEES: ICD-10-CM

## 2018-10-03 DIAGNOSIS — M17.11 PRIMARY OSTEOARTHRITIS OF RIGHT KNEE: ICD-10-CM

## 2018-10-03 DIAGNOSIS — M25.562 CHRONIC PAIN OF BOTH KNEES: ICD-10-CM

## 2018-10-03 PROCEDURE — 20610 DRAIN/INJ JOINT/BURSA W/O US: CPT | Performed by: ORTHOPAEDIC SURGERY

## 2018-10-03 PROCEDURE — G8599 NO ASA/ANTIPLAT THER USE RNG: HCPCS | Performed by: ORTHOPAEDIC SURGERY

## 2018-10-03 PROCEDURE — 4040F PNEUMOC VAC/ADMIN/RCVD: CPT | Performed by: ORTHOPAEDIC SURGERY

## 2018-10-03 PROCEDURE — 99214 OFFICE O/P EST MOD 30 MIN: CPT | Performed by: ORTHOPAEDIC SURGERY

## 2018-10-03 PROCEDURE — 1123F ACP DISCUSS/DSCN MKR DOCD: CPT | Performed by: ORTHOPAEDIC SURGERY

## 2018-10-03 PROCEDURE — G8484 FLU IMMUNIZE NO ADMIN: HCPCS | Performed by: ORTHOPAEDIC SURGERY

## 2018-10-03 PROCEDURE — G8417 CALC BMI ABV UP PARAM F/U: HCPCS | Performed by: ORTHOPAEDIC SURGERY

## 2018-10-03 PROCEDURE — G8428 CUR MEDS NOT DOCUMENT: HCPCS | Performed by: ORTHOPAEDIC SURGERY

## 2018-10-03 PROCEDURE — 1101F PT FALLS ASSESS-DOCD LE1/YR: CPT | Performed by: ORTHOPAEDIC SURGERY

## 2018-10-03 PROCEDURE — 1036F TOBACCO NON-USER: CPT | Performed by: ORTHOPAEDIC SURGERY

## 2018-10-05 PROBLEM — M17.11 PRIMARY OSTEOARTHRITIS OF RIGHT KNEE: Status: ACTIVE | Noted: 2018-10-05

## 2018-10-05 PROBLEM — M17.12 PRIMARY OSTEOARTHRITIS OF LEFT KNEE: Status: ACTIVE | Noted: 2018-10-05

## 2018-10-15 RX ORDER — ESCITALOPRAM OXALATE 10 MG/1
TABLET ORAL
Qty: 90 TABLET | Refills: 1 | Status: SHIPPED | OUTPATIENT
Start: 2018-10-15 | End: 2018-11-02 | Stop reason: DRUGHIGH

## 2018-11-02 ENCOUNTER — OFFICE VISIT (OUTPATIENT)
Dept: INTERNAL MEDICINE CLINIC | Age: 78
End: 2018-11-02
Payer: MEDICARE

## 2018-11-02 VITALS
BODY MASS INDEX: 29.35 KG/M2 | HEART RATE: 57 BPM | DIASTOLIC BLOOD PRESSURE: 68 MMHG | OXYGEN SATURATION: 97 % | WEIGHT: 205 LBS | SYSTOLIC BLOOD PRESSURE: 138 MMHG | RESPIRATION RATE: 16 BRPM | HEIGHT: 70 IN

## 2018-11-02 DIAGNOSIS — I10 ESSENTIAL HYPERTENSION: Primary | ICD-10-CM

## 2018-11-02 DIAGNOSIS — F32.9 REACTIVE DEPRESSION: ICD-10-CM

## 2018-11-02 DIAGNOSIS — E78.5 HYPERLIPIDEMIA LDL GOAL <70: ICD-10-CM

## 2018-11-02 DIAGNOSIS — I25.10 CORONARY ARTERY DISEASE INVOLVING NATIVE CORONARY ARTERY OF NATIVE HEART WITHOUT ANGINA PECTORIS: ICD-10-CM

## 2018-11-02 PROCEDURE — 1036F TOBACCO NON-USER: CPT | Performed by: INTERNAL MEDICINE

## 2018-11-02 PROCEDURE — G8417 CALC BMI ABV UP PARAM F/U: HCPCS | Performed by: INTERNAL MEDICINE

## 2018-11-02 PROCEDURE — 99214 OFFICE O/P EST MOD 30 MIN: CPT | Performed by: INTERNAL MEDICINE

## 2018-11-02 PROCEDURE — G8427 DOCREV CUR MEDS BY ELIG CLIN: HCPCS | Performed by: INTERNAL MEDICINE

## 2018-11-02 PROCEDURE — 1123F ACP DISCUSS/DSCN MKR DOCD: CPT | Performed by: INTERNAL MEDICINE

## 2018-11-02 PROCEDURE — G8599 NO ASA/ANTIPLAT THER USE RNG: HCPCS | Performed by: INTERNAL MEDICINE

## 2018-11-02 PROCEDURE — 1101F PT FALLS ASSESS-DOCD LE1/YR: CPT | Performed by: INTERNAL MEDICINE

## 2018-11-02 PROCEDURE — 4040F PNEUMOC VAC/ADMIN/RCVD: CPT | Performed by: INTERNAL MEDICINE

## 2018-11-02 PROCEDURE — G8482 FLU IMMUNIZE ORDER/ADMIN: HCPCS | Performed by: INTERNAL MEDICINE

## 2018-11-02 RX ORDER — ESCITALOPRAM OXALATE 20 MG/1
20 TABLET ORAL DAILY
Qty: 90 TABLET | Refills: 1 | Status: SHIPPED | OUTPATIENT
Start: 2018-11-02 | End: 2019-04-29 | Stop reason: SDUPTHER

## 2018-11-02 ASSESSMENT — ENCOUNTER SYMPTOMS
COUGH: 0
GASTROINTESTINAL NEGATIVE: 1
SHORTNESS OF BREATH: 0
WHEEZING: 0
CHEST TIGHTNESS: 0

## 2018-11-06 ENCOUNTER — NURSE ONLY (OUTPATIENT)
Dept: INTERNAL MEDICINE CLINIC | Age: 78
End: 2018-11-06
Payer: MEDICARE

## 2018-11-06 DIAGNOSIS — I10 ESSENTIAL HYPERTENSION: ICD-10-CM

## 2018-11-06 DIAGNOSIS — E78.5 HYPERLIPIDEMIA LDL GOAL <70: ICD-10-CM

## 2018-11-06 LAB
ALBUMIN SERPL-MCNC: 4.3 G/DL (ref 3.4–5)
ALP BLD-CCNC: 72 U/L (ref 40–129)
ALT SERPL-CCNC: 18 U/L (ref 10–40)
ANION GAP SERPL CALCULATED.3IONS-SCNC: 11 MMOL/L (ref 3–16)
AST SERPL-CCNC: 21 U/L (ref 15–37)
BASOPHILS ABSOLUTE: 0 K/UL (ref 0–0.2)
BASOPHILS RELATIVE PERCENT: 0.5 %
BILIRUB SERPL-MCNC: 0.4 MG/DL (ref 0–1)
BILIRUBIN DIRECT: <0.2 MG/DL (ref 0–0.3)
BILIRUBIN, INDIRECT: NORMAL MG/DL (ref 0–1)
BUN BLDV-MCNC: 18 MG/DL (ref 7–20)
CALCIUM SERPL-MCNC: 9.2 MG/DL (ref 8.3–10.6)
CHLORIDE BLD-SCNC: 106 MMOL/L (ref 99–110)
CHOLESTEROL, TOTAL: 147 MG/DL (ref 0–199)
CO2: 28 MMOL/L (ref 21–32)
CREAT SERPL-MCNC: 1.2 MG/DL (ref 0.8–1.3)
EOSINOPHILS ABSOLUTE: 0.2 K/UL (ref 0–0.6)
EOSINOPHILS RELATIVE PERCENT: 2.7 %
GFR AFRICAN AMERICAN: >60
GFR NON-AFRICAN AMERICAN: 59
GLUCOSE BLD-MCNC: 85 MG/DL (ref 70–99)
HCT VFR BLD CALC: 43.1 % (ref 40.5–52.5)
HDLC SERPL-MCNC: 46 MG/DL (ref 40–60)
HEMOGLOBIN: 14.7 G/DL (ref 13.5–17.5)
LDL CHOLESTEROL CALCULATED: 82 MG/DL
LYMPHOCYTES ABSOLUTE: 1 K/UL (ref 1–5.1)
LYMPHOCYTES RELATIVE PERCENT: 17.8 %
MCH RBC QN AUTO: 30.7 PG (ref 26–34)
MCHC RBC AUTO-ENTMCNC: 34 G/DL (ref 31–36)
MCV RBC AUTO: 90.2 FL (ref 80–100)
MONOCYTES ABSOLUTE: 0.6 K/UL (ref 0–1.3)
MONOCYTES RELATIVE PERCENT: 10.5 %
NEUTROPHILS ABSOLUTE: 3.9 K/UL (ref 1.7–7.7)
NEUTROPHILS RELATIVE PERCENT: 68.5 %
PDW BLD-RTO: 14.5 % (ref 12.4–15.4)
PLATELET # BLD: 172 K/UL (ref 135–450)
PMV BLD AUTO: 8 FL (ref 5–10.5)
POTASSIUM SERPL-SCNC: 4.4 MMOL/L (ref 3.5–5.1)
RBC # BLD: 4.78 M/UL (ref 4.2–5.9)
SODIUM BLD-SCNC: 145 MMOL/L (ref 136–145)
TOTAL PROTEIN: 6.8 G/DL (ref 6.4–8.2)
TRIGL SERPL-MCNC: 96 MG/DL (ref 0–150)
VLDLC SERPL CALC-MCNC: 19 MG/DL
WBC # BLD: 5.7 K/UL (ref 4–11)

## 2018-11-06 PROCEDURE — 36415 COLL VENOUS BLD VENIPUNCTURE: CPT | Performed by: INTERNAL MEDICINE

## 2019-01-02 ENCOUNTER — OFFICE VISIT (OUTPATIENT)
Dept: ORTHOPEDIC SURGERY | Age: 79
End: 2019-01-02
Payer: MEDICARE

## 2019-01-02 VITALS — HEIGHT: 70 IN | RESPIRATION RATE: 16 BRPM | BODY MASS INDEX: 29.35 KG/M2 | WEIGHT: 205 LBS

## 2019-01-02 DIAGNOSIS — M17.11 PRIMARY OSTEOARTHRITIS OF RIGHT KNEE: Primary | ICD-10-CM

## 2019-01-02 DIAGNOSIS — M17.12 PRIMARY OSTEOARTHRITIS OF LEFT KNEE: ICD-10-CM

## 2019-01-02 PROCEDURE — 1101F PT FALLS ASSESS-DOCD LE1/YR: CPT | Performed by: NURSE PRACTITIONER

## 2019-01-02 PROCEDURE — 99213 OFFICE O/P EST LOW 20 MIN: CPT | Performed by: NURSE PRACTITIONER

## 2019-01-02 PROCEDURE — G8417 CALC BMI ABV UP PARAM F/U: HCPCS | Performed by: NURSE PRACTITIONER

## 2019-01-02 PROCEDURE — 1036F TOBACCO NON-USER: CPT | Performed by: NURSE PRACTITIONER

## 2019-01-02 PROCEDURE — G8599 NO ASA/ANTIPLAT THER USE RNG: HCPCS | Performed by: NURSE PRACTITIONER

## 2019-01-02 PROCEDURE — G8427 DOCREV CUR MEDS BY ELIG CLIN: HCPCS | Performed by: NURSE PRACTITIONER

## 2019-01-02 PROCEDURE — 1123F ACP DISCUSS/DSCN MKR DOCD: CPT | Performed by: NURSE PRACTITIONER

## 2019-01-02 PROCEDURE — 20610 DRAIN/INJ JOINT/BURSA W/O US: CPT | Performed by: NURSE PRACTITIONER

## 2019-01-02 PROCEDURE — G8482 FLU IMMUNIZE ORDER/ADMIN: HCPCS | Performed by: NURSE PRACTITIONER

## 2019-01-02 PROCEDURE — 4040F PNEUMOC VAC/ADMIN/RCVD: CPT | Performed by: NURSE PRACTITIONER

## 2019-01-03 ENCOUNTER — OFFICE VISIT (OUTPATIENT)
Dept: CARDIOLOGY CLINIC | Age: 79
End: 2019-01-03
Payer: MEDICARE

## 2019-01-03 VITALS
DIASTOLIC BLOOD PRESSURE: 64 MMHG | SYSTOLIC BLOOD PRESSURE: 130 MMHG | WEIGHT: 209 LBS | BODY MASS INDEX: 29.92 KG/M2 | HEIGHT: 70 IN | OXYGEN SATURATION: 97 % | HEART RATE: 65 BPM

## 2019-01-03 DIAGNOSIS — E78.5 HYPERLIPIDEMIA LDL GOAL <70: ICD-10-CM

## 2019-01-03 DIAGNOSIS — I10 ESSENTIAL HYPERTENSION: ICD-10-CM

## 2019-01-03 DIAGNOSIS — I25.10 CORONARY ARTERY DISEASE INVOLVING NATIVE CORONARY ARTERY OF NATIVE HEART WITHOUT ANGINA PECTORIS: Primary | ICD-10-CM

## 2019-01-03 PROCEDURE — 1036F TOBACCO NON-USER: CPT | Performed by: INTERNAL MEDICINE

## 2019-01-03 PROCEDURE — 1101F PT FALLS ASSESS-DOCD LE1/YR: CPT | Performed by: INTERNAL MEDICINE

## 2019-01-03 PROCEDURE — 1123F ACP DISCUSS/DSCN MKR DOCD: CPT | Performed by: INTERNAL MEDICINE

## 2019-01-03 PROCEDURE — G8482 FLU IMMUNIZE ORDER/ADMIN: HCPCS | Performed by: INTERNAL MEDICINE

## 2019-01-03 PROCEDURE — G8599 NO ASA/ANTIPLAT THER USE RNG: HCPCS | Performed by: INTERNAL MEDICINE

## 2019-01-03 PROCEDURE — 93000 ELECTROCARDIOGRAM COMPLETE: CPT | Performed by: INTERNAL MEDICINE

## 2019-01-03 PROCEDURE — 99214 OFFICE O/P EST MOD 30 MIN: CPT | Performed by: INTERNAL MEDICINE

## 2019-01-03 PROCEDURE — G8427 DOCREV CUR MEDS BY ELIG CLIN: HCPCS | Performed by: INTERNAL MEDICINE

## 2019-01-03 PROCEDURE — 4040F PNEUMOC VAC/ADMIN/RCVD: CPT | Performed by: INTERNAL MEDICINE

## 2019-01-03 PROCEDURE — G8417 CALC BMI ABV UP PARAM F/U: HCPCS | Performed by: INTERNAL MEDICINE

## 2019-02-15 ENCOUNTER — OFFICE VISIT (OUTPATIENT)
Dept: INTERNAL MEDICINE CLINIC | Age: 79
End: 2019-02-15
Payer: MEDICARE

## 2019-02-15 VITALS
HEIGHT: 70 IN | HEART RATE: 59 BPM | SYSTOLIC BLOOD PRESSURE: 130 MMHG | BODY MASS INDEX: 29.49 KG/M2 | DIASTOLIC BLOOD PRESSURE: 70 MMHG | WEIGHT: 206 LBS | RESPIRATION RATE: 16 BRPM | OXYGEN SATURATION: 99 %

## 2019-02-15 DIAGNOSIS — I10 ESSENTIAL HYPERTENSION: Primary | ICD-10-CM

## 2019-02-15 DIAGNOSIS — F32.9 REACTIVE DEPRESSION: ICD-10-CM

## 2019-02-15 PROCEDURE — G8427 DOCREV CUR MEDS BY ELIG CLIN: HCPCS | Performed by: INTERNAL MEDICINE

## 2019-02-15 PROCEDURE — 1036F TOBACCO NON-USER: CPT | Performed by: INTERNAL MEDICINE

## 2019-02-15 PROCEDURE — 1123F ACP DISCUSS/DSCN MKR DOCD: CPT | Performed by: INTERNAL MEDICINE

## 2019-02-15 PROCEDURE — G8482 FLU IMMUNIZE ORDER/ADMIN: HCPCS | Performed by: INTERNAL MEDICINE

## 2019-02-15 PROCEDURE — G8417 CALC BMI ABV UP PARAM F/U: HCPCS | Performed by: INTERNAL MEDICINE

## 2019-02-15 PROCEDURE — 99213 OFFICE O/P EST LOW 20 MIN: CPT | Performed by: INTERNAL MEDICINE

## 2019-02-15 PROCEDURE — 1101F PT FALLS ASSESS-DOCD LE1/YR: CPT | Performed by: INTERNAL MEDICINE

## 2019-02-15 PROCEDURE — G8599 NO ASA/ANTIPLAT THER USE RNG: HCPCS | Performed by: INTERNAL MEDICINE

## 2019-02-15 PROCEDURE — 4040F PNEUMOC VAC/ADMIN/RCVD: CPT | Performed by: INTERNAL MEDICINE

## 2019-02-15 ASSESSMENT — ENCOUNTER SYMPTOMS
GASTROINTESTINAL NEGATIVE: 1
SHORTNESS OF BREATH: 0
COUGH: 0
WHEEZING: 0
TROUBLE SWALLOWING: 0
CHEST TIGHTNESS: 0

## 2019-03-08 RX ORDER — ATORVASTATIN CALCIUM 80 MG/1
TABLET, FILM COATED ORAL
Qty: 30 TABLET | Refills: 5 | Status: SHIPPED | OUTPATIENT
Start: 2019-03-08 | End: 2019-09-03 | Stop reason: SDUPTHER

## 2019-03-08 RX ORDER — EZETIMIBE 10 MG/1
TABLET ORAL
Qty: 30 TABLET | Refills: 5 | Status: SHIPPED | OUTPATIENT
Start: 2019-03-08 | End: 2019-09-03 | Stop reason: SDUPTHER

## 2019-04-10 ENCOUNTER — OFFICE VISIT (OUTPATIENT)
Dept: ORTHOPEDIC SURGERY | Age: 79
End: 2019-04-10
Payer: MEDICARE

## 2019-04-10 VITALS
HEIGHT: 70 IN | WEIGHT: 206 LBS | DIASTOLIC BLOOD PRESSURE: 87 MMHG | HEART RATE: 57 BPM | BODY MASS INDEX: 29.49 KG/M2 | SYSTOLIC BLOOD PRESSURE: 159 MMHG

## 2019-04-10 DIAGNOSIS — M70.62 GREATER TROCHANTERIC BURSITIS OF LEFT HIP: ICD-10-CM

## 2019-04-10 DIAGNOSIS — M17.12 PRIMARY OSTEOARTHRITIS OF LEFT KNEE: ICD-10-CM

## 2019-04-10 DIAGNOSIS — M17.11 PRIMARY OSTEOARTHRITIS OF RIGHT KNEE: Primary | ICD-10-CM

## 2019-04-10 PROCEDURE — 4040F PNEUMOC VAC/ADMIN/RCVD: CPT | Performed by: ORTHOPAEDIC SURGERY

## 2019-04-10 PROCEDURE — G8599 NO ASA/ANTIPLAT THER USE RNG: HCPCS | Performed by: ORTHOPAEDIC SURGERY

## 2019-04-10 PROCEDURE — 20610 DRAIN/INJ JOINT/BURSA W/O US: CPT | Performed by: ORTHOPAEDIC SURGERY

## 2019-04-10 PROCEDURE — 1036F TOBACCO NON-USER: CPT | Performed by: ORTHOPAEDIC SURGERY

## 2019-04-10 PROCEDURE — G8427 DOCREV CUR MEDS BY ELIG CLIN: HCPCS | Performed by: ORTHOPAEDIC SURGERY

## 2019-04-10 PROCEDURE — G8417 CALC BMI ABV UP PARAM F/U: HCPCS | Performed by: ORTHOPAEDIC SURGERY

## 2019-04-10 PROCEDURE — 99213 OFFICE O/P EST LOW 20 MIN: CPT | Performed by: ORTHOPAEDIC SURGERY

## 2019-04-10 PROCEDURE — 1123F ACP DISCUSS/DSCN MKR DOCD: CPT | Performed by: ORTHOPAEDIC SURGERY

## 2019-04-14 NOTE — PROGRESS NOTES
CHIEF COMPLAINT: Bilateral knee pain/osteoarthritis. HISTORY:  Mr. Gina Gracia 69 y.o.  male presents today for f/u evaluation of bilateral knee pain which started to worsen over the years. He had bilateral knees cortisone injection on 1/2/2019 with good improvement. He is complaining of achy pain. The left knee pops and catches intermittently, every week or so and improves on its own. Rates pain a 3/10 VAS. Pain is increase with standing and walking and decrease with rest. Pain is sharp early in the morning with first few steps, dull achy pain by the end of the day. Alleviating factors: rest. No radiation and no numbness and tingling sensation. Treatment to date has been Aleve with good improvement. 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 He is unable to take NSAIDs for a long period of time. No other complaint. No h/o trauma or gout. He is well known to me for left hip bursitis and had a cortisone injection on 1/26/2018 with good improvement. He has been physical therapy stretching and strengthening exercises with improvement.     Past Medical History:   Diagnosis Date    Cancer [facility]) 11/2016    Prostates CA    Coronary artery disease 12/28/15    multi vessel CAD    GERD (gastroesophageal reflux disease)     History of blood transfusion     s/p left nephrectomy age 2years old    Hyperlipidemia LDL goal <70     Hypertension     Primary osteoarthritis of right knee 10/5/2018    Reactive depression 6/8/2016    Shingles 2012    torso, top of head       Past Surgical History:   Procedure Laterality Date    APPENDECTOMY      CARPAL TUNNEL RELEASE Bilateral     CORONARY ARTERY BYPASS GRAFT      ENDOSCOPY, COLON, DIAGNOSTIC      EGD with dilatation    EYE SURGERY Bilateral     catacts, bilateral and repeat    HEMORRHOID SURGERY      KIDNEY REMOVAL  @ 1944    pt thinks left kidney for disease    SHOULDER SURGERY Left     rotator cuff       Social History Socioeconomic History    Marital status:      Spouse name: Not on file    Number of children: Not on file    Years of education: Not on file    Highest education level: Not on file   Occupational History    Not on file   Social Needs    Financial resource strain: Not on file    Food insecurity:     Worry: Not on file     Inability: Not on file    Transportation needs:     Medical: Not on file     Non-medical: Not on file   Tobacco Use    Smoking status: Never Smoker    Smokeless tobacco: Never Used   Substance and Sexual Activity    Alcohol use: No    Drug use: No    Sexual activity: Not Currently   Lifestyle    Physical activity:     Days per week: Not on file     Minutes per session: Not on file    Stress: Not on file   Relationships    Social connections:     Talks on phone: Not on file     Gets together: Not on file     Attends Tenriism service: Not on file     Active member of club or organization: Not on file     Attends meetings of clubs or organizations: Not on file     Relationship status: Not on file    Intimate partner violence:     Fear of current or ex partner: Not on file     Emotionally abused: Not on file     Physically abused: Not on file     Forced sexual activity: Not on file   Other Topics Concern    Not on file   Social History Narrative    Not on file       Family History   Problem Relation Age of Onset    Other Mother     High Blood Pressure Mother     Heart Disease Father        Current Outpatient Medications on File Prior to Visit   Medication Sig Dispense Refill    ezetimibe (ZETIA) 10 MG tablet TAKE 1 TABLET BY MOUTH DAILY 30 tablet 5    atorvastatin (LIPITOR) 80 MG tablet TAKE 1 TABLET BY MOUTH DAILY 30 tablet 5    escitalopram (LEXAPRO) 20 MG tablet Take 1 tablet by mouth daily 90 tablet 1    Methylsulfonylmethane (MSM PO) Take by mouth      Cholecalciferol (VITAMIN D PO) Take 125 mcg by mouth      naproxen (NAPROSYN) 500 MG tablet Take 1 tablet by mouth 2 times daily (with meals) 60 tablet 0    Magnesium 500 MG CAPS Take by mouth daily      vitamin E 400 UNIT capsule Take 400 Units by mouth daily      aspirin 81 MG EC tablet Take 1 tablet by mouth daily 30 tablet 3    magnesium citrate solution Take by mouth once      Misc Natural Products (GLUCOSAMINE CHONDROITIN MSM PO) Take by mouth      docusate sodium (COLACE, DULCOLAX) 100 MG CAPS Take 100 mg by mouth 2 times daily as needed for Constipation 60 capsule 0    Omega-3 Fatty Acids (FISH OIL PO) Take by mouth      esomeprazole Magnesium (NEXIUM) 20 MG PACK Take 20 mg by mouth 2 times daily (before meals)        No current facility-administered medications on file prior to visit. Pertinent items are noted in HPI  Review of systems reviewed from Patient History Form dated on 1/26/2018 and available in the patient's chart under the Media tab. No change noted. PHYSICAL EXAMINATION:  Mr. Neymar Springer is a very pleasant 69 y.o.  male who presents today in no acute distress, awake, alert, and oriented. He is well dressed, nourished and  groomed. Patient with normal affect. Height is  5' 10\" (1.778 m), weight is 206 lb (93.4 kg), Body mass index is 29.56 kg/m². Resting respiratory rate is 16. Examination of the gait, showed that the patient walks heel-toe with a non-antalgic gait and no limp.  Examination of both knees showing full ROM, bilateral mild crepitus, tenderness on medial joint line, stable to varus and valgus stress. He has intact sensation and good pedal pulses. He has good strength in 2 planes, and has mild tenderness on deep palpation over the medial joint line. Knee reflex 1+ bilaterally. IMAGING:  Xray 3 views of the bilaterally knee was obtained 10/3/2018 in the office and reviewed. These demonstrate mild degenerative changes with narrowing of the joint space in the medial joint space compartment, subchondral sclerosis, and marginal osteophytosis. IMPRESSION: Bilateral knee DJD. PLAN: I discussed with the patient the treatment options including both surgical and non-surgical treatment. We recommended Quad exercises and stretching of the calf and hamstrings which was taught to the patient today. He will take NSAIDS PRN but to try to take as little as possible d/t h/o left nephrectomy. I believe he will benefit from cortisone injection bilateral knee, and he agreed to have. PROCEDURE:    With the patient's permission, and under sterile condition, the bilateral knee was prepared and draped with alcohol and injected with a mixture of 1 mL Kenalog 40mg and 4 mL of 1% lidocaine through the medial parapatellar port area. The patient tolerated the procedure well. A band-aid was applied and the patient was advised to ice the knee for 15-20 minutes to relieve any injection site related pain. He reported a good improvement immediatly after the injection. F/u in 3 months PRN, PT if needed. He understands that this may need surgery if the pain did not to resolve.        Darius Aggarwal MD

## 2019-04-30 RX ORDER — ESCITALOPRAM OXALATE 20 MG/1
20 TABLET ORAL DAILY
Qty: 90 TABLET | Refills: 0 | Status: SHIPPED | OUTPATIENT
Start: 2019-04-30 | End: 2019-07-28 | Stop reason: SDUPTHER

## 2019-05-16 ENCOUNTER — TELEPHONE (OUTPATIENT)
Dept: INTERNAL MEDICINE CLINIC | Age: 79
End: 2019-05-16

## 2019-05-16 NOTE — TELEPHONE ENCOUNTER
Attempted to contact patient on 5/16/2019. Result: left message on the patient's voicemail asking patient to return my call. Pre-Visit planning not completed.

## 2019-05-17 ENCOUNTER — OFFICE VISIT (OUTPATIENT)
Dept: INTERNAL MEDICINE CLINIC | Age: 79
End: 2019-05-17
Payer: MEDICARE

## 2019-05-17 VITALS
SYSTOLIC BLOOD PRESSURE: 110 MMHG | HEIGHT: 70 IN | DIASTOLIC BLOOD PRESSURE: 60 MMHG | HEART RATE: 98 BPM | RESPIRATION RATE: 16 BRPM | TEMPERATURE: 98.8 F | OXYGEN SATURATION: 98 % | WEIGHT: 197.2 LBS | BODY MASS INDEX: 28.23 KG/M2

## 2019-05-17 DIAGNOSIS — F32.9 REACTIVE DEPRESSION: ICD-10-CM

## 2019-05-17 DIAGNOSIS — R53.82 CHRONIC FATIGUE: ICD-10-CM

## 2019-05-17 DIAGNOSIS — I10 ESSENTIAL HYPERTENSION: Primary | ICD-10-CM

## 2019-05-17 LAB
T4 FREE: 1.1 NG/DL (ref 0.9–1.8)
TSH SERPL DL<=0.05 MIU/L-ACNC: 4.89 UIU/ML (ref 0.27–4.2)

## 2019-05-17 PROCEDURE — 4040F PNEUMOC VAC/ADMIN/RCVD: CPT | Performed by: INTERNAL MEDICINE

## 2019-05-17 PROCEDURE — 99213 OFFICE O/P EST LOW 20 MIN: CPT | Performed by: INTERNAL MEDICINE

## 2019-05-17 PROCEDURE — G8417 CALC BMI ABV UP PARAM F/U: HCPCS | Performed by: INTERNAL MEDICINE

## 2019-05-17 PROCEDURE — G8599 NO ASA/ANTIPLAT THER USE RNG: HCPCS | Performed by: INTERNAL MEDICINE

## 2019-05-17 PROCEDURE — 1123F ACP DISCUSS/DSCN MKR DOCD: CPT | Performed by: INTERNAL MEDICINE

## 2019-05-17 PROCEDURE — G8427 DOCREV CUR MEDS BY ELIG CLIN: HCPCS | Performed by: INTERNAL MEDICINE

## 2019-05-17 PROCEDURE — 1036F TOBACCO NON-USER: CPT | Performed by: INTERNAL MEDICINE

## 2019-05-17 PROCEDURE — 36415 COLL VENOUS BLD VENIPUNCTURE: CPT | Performed by: INTERNAL MEDICINE

## 2019-05-17 ASSESSMENT — ENCOUNTER SYMPTOMS
SHORTNESS OF BREATH: 0
CHEST TIGHTNESS: 0
GASTROINTESTINAL NEGATIVE: 1
COUGH: 0
WHEEZING: 0

## 2019-05-17 NOTE — PROGRESS NOTES
Subjective:      Patient ID: Rubin Hi is a 69 y.o. male. HPI  Feels tired and get 9-10 hours sleep nightly and feels sleepy during day and does not have lot of snoring now and used to do more before. Was once treated for hypothyroid when in [facility] for 4 years and not taken any since  Has no other symptoms has seasonal allergy symptoms and headaches with them  Not like hot or cold temps  Has no other cp gi or gu symptoms  Review of Systems   Constitutional: Negative for activity change, appetite change and unexpected weight change. Respiratory: Negative for cough, chest tightness, shortness of breath and wheezing. Cardiovascular: Negative for chest pain, palpitations and leg swelling. Gastrointestinal: Negative. Genitourinary: Negative. Neurological: Negative for dizziness, light-headedness and headaches. Psychiatric/Behavioral: Negative for dysphoric mood and sleep disturbance. The patient is not hyperactive. Objective:   Physical Exam   Constitutional: He is oriented to person, place, and time. No distress. HENT:   Nose: Nose normal.   Mouth/Throat: Oropharynx is clear and moist. No oropharyngeal exudate. Eyes: Pupils are equal, round, and reactive to light. Conjunctivae and EOM are normal. No scleral icterus. Neck: No JVD present. No thyromegaly present. Cardiovascular: Normal rate, regular rhythm, normal heart sounds and intact distal pulses. Exam reveals no gallop. No murmur heard. Pulmonary/Chest: Breath sounds normal. No respiratory distress. He has no wheezes. He has no rales. Musculoskeletal: He exhibits no edema. Lymphadenopathy:     He has no cervical adenopathy. Neurological: He is alert and oriented to person, place, and time. Psychiatric: He has a normal mood and affect. His behavior is normal. Judgment and thought content normal.   Nursing note and vitals reviewed.       Assessment:      htn controlled  Depression controlled    Chronic fatigue gasper be from sleep apnea and underlying hypothyroid state  Plan:      Offered sleep studies and not enthusiastic about it -he does Not think it is a problem    Will check thyroid tests  Continue current medications'  See in  3 months    Gwen Olguin MD

## 2019-05-17 NOTE — PATIENT INSTRUCTIONS
Offered sleep studies and not enthusiastic about it -he does Not think it is a problem    Will check thyroid tests  Continue current medications'  See in  3 months

## 2019-07-10 ENCOUNTER — OFFICE VISIT (OUTPATIENT)
Dept: ORTHOPEDIC SURGERY | Age: 79
End: 2019-07-10
Payer: MEDICARE

## 2019-07-10 VITALS — BODY MASS INDEX: 28.2 KG/M2 | RESPIRATION RATE: 16 BRPM | HEIGHT: 70 IN | HEART RATE: 60 BPM | WEIGHT: 197 LBS

## 2019-07-10 DIAGNOSIS — M17.11 PRIMARY OSTEOARTHRITIS OF RIGHT KNEE: Primary | ICD-10-CM

## 2019-07-10 DIAGNOSIS — M17.12 PRIMARY OSTEOARTHRITIS OF LEFT KNEE: ICD-10-CM

## 2019-07-10 PROCEDURE — 99213 OFFICE O/P EST LOW 20 MIN: CPT | Performed by: ORTHOPAEDIC SURGERY

## 2019-07-10 PROCEDURE — 20610 DRAIN/INJ JOINT/BURSA W/O US: CPT | Performed by: ORTHOPAEDIC SURGERY

## 2019-07-10 PROCEDURE — 1123F ACP DISCUSS/DSCN MKR DOCD: CPT | Performed by: ORTHOPAEDIC SURGERY

## 2019-07-10 PROCEDURE — G8427 DOCREV CUR MEDS BY ELIG CLIN: HCPCS | Performed by: ORTHOPAEDIC SURGERY

## 2019-07-10 PROCEDURE — 4040F PNEUMOC VAC/ADMIN/RCVD: CPT | Performed by: ORTHOPAEDIC SURGERY

## 2019-07-10 PROCEDURE — 1036F TOBACCO NON-USER: CPT | Performed by: ORTHOPAEDIC SURGERY

## 2019-07-10 PROCEDURE — G8599 NO ASA/ANTIPLAT THER USE RNG: HCPCS | Performed by: ORTHOPAEDIC SURGERY

## 2019-07-10 PROCEDURE — G8417 CALC BMI ABV UP PARAM F/U: HCPCS | Performed by: ORTHOPAEDIC SURGERY

## 2019-07-10 NOTE — PROGRESS NOTES
CHIEF COMPLAINT: Bilateral knee pain/osteoarthritis. HISTORY:  Mr. Omar Shelley 69 y.o.  male presents today for f/u evaluation of bilateral knee pain which started to worsen over the years. He had bilateral knees cortisone injection on 4/1/2019 with good improvement until 2 weeks ago. He is complaining of achy pain. The left knee pops and catches intermittently, every week or so and improves on its own. Rates pain a 3/10 VAS. Pain is increase with standing and walking and decrease with rest. Pain is sharp early in the morning with first few steps, dull achy pain by the end of the day. Alleviating factors: rest. No radiation and no numbness and tingling sensation. Treatment to date has been Aleve with good improvement. 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 He is unable to take NSAIDs for a long period of time. No other complaint. No h/o trauma or gout. He is well known to me for left hip bursitis and had a cortisone injection on 1/26/2018 with good improvement. He has been physical therapy stretching and strengthening exercises with improvement.     Past Medical History:   Diagnosis Date    Cancer [facility]) 11/2016    Prostates CA    Coronary artery disease 12/28/15    multi vessel CAD    GERD (gastroesophageal reflux disease)     History of blood transfusion     s/p left nephrectomy age 2years old    Hyperlipidemia LDL goal <70     Hypertension     Primary osteoarthritis of right knee 10/5/2018    Reactive depression 6/8/2016    Shingles 2012    torso, top of head       Past Surgical History:   Procedure Laterality Date    APPENDECTOMY      CARPAL TUNNEL RELEASE Bilateral     CORONARY ARTERY BYPASS GRAFT      ENDOSCOPY, COLON, DIAGNOSTIC      EGD with dilatation    EYE SURGERY Bilateral     catacts, bilateral and repeat    HEMORRHOID SURGERY      KIDNEY REMOVAL  @ 1944    pt thinks left kidney for disease    SHOULDER SURGERY Left     rotator cuff       Social mouth daily 30 tablet 3    Misc Natural Products (GLUCOSAMINE CHONDROITIN MSM PO) Take by mouth      docusate sodium (COLACE, DULCOLAX) 100 MG CAPS Take 100 mg by mouth 2 times daily as needed for Constipation 60 capsule 0    Omega-3 Fatty Acids (FISH OIL PO) Take by mouth      esomeprazole Magnesium (NEXIUM) 20 MG PACK Take 20 mg by mouth 2 times daily (before meals)       Methylsulfonylmethane (MSM PO) Take by mouth      naproxen (NAPROSYN) 500 MG tablet Take 1 tablet by mouth 2 times daily (with meals) 60 tablet 0    vitamin E 400 UNIT capsule Take 400 Units by mouth daily      magnesium citrate solution Take by mouth once       No current facility-administered medications on file prior to visit. Pertinent items are noted in HPI  Review of systems reviewed from Patient History Form dated on 1/26/2018 and available in the patient's chart under the Media tab. No change noted. PHYSICAL EXAMINATION:  Mr. Cornell Sahu is a very pleasant 69 y.o.  male who presents today in no acute distress, awake, alert, and oriented. He is well dressed, nourished and  groomed. Patient with normal affect. Height is  5' 10\" (1.778 m), weight is 197 lb (89.4 kg), Body mass index is 28.27 kg/m². Resting respiratory rate is 16. Examination of the gait, showed that the patient walks heel-toe with a non-antalgic gait and no limp.  Examination of both knees showing full ROM, bilateral mild crepitus, tenderness on medial joint line, stable to varus and valgus stress. He has intact sensation and good pedal pulses. He has good strength in 2 planes, and has mild tenderness on deep palpation over the medial joint line. Knee reflex 1+ bilaterally. IMAGING:  Xray 3 views of the bilaterally knee was obtained 10/3/2018 in the office and reviewed. These demonstrate mild degenerative changes with narrowing of the joint space in the medial joint space compartment, subchondral sclerosis, and marginal osteophytosis.

## 2019-07-15 ENCOUNTER — TELEPHONE (OUTPATIENT)
Dept: ORTHOPEDIC SURGERY | Age: 79
End: 2019-07-15

## 2019-07-29 RX ORDER — ESCITALOPRAM OXALATE 20 MG/1
20 TABLET ORAL DAILY
Qty: 90 TABLET | Refills: 1 | Status: SHIPPED | OUTPATIENT
Start: 2019-07-29 | End: 2020-01-27

## 2019-08-02 ENCOUNTER — OFFICE VISIT (OUTPATIENT)
Dept: ORTHOPEDIC SURGERY | Age: 79
End: 2019-08-02
Payer: MEDICARE

## 2019-08-02 VITALS — RESPIRATION RATE: 16 BRPM | WEIGHT: 197 LBS | BODY MASS INDEX: 28.2 KG/M2 | HEIGHT: 70 IN

## 2019-08-02 DIAGNOSIS — M17.12 PRIMARY OSTEOARTHRITIS OF LEFT KNEE: Primary | ICD-10-CM

## 2019-08-02 PROCEDURE — 1036F TOBACCO NON-USER: CPT | Performed by: ORTHOPAEDIC SURGERY

## 2019-08-02 PROCEDURE — 1123F ACP DISCUSS/DSCN MKR DOCD: CPT | Performed by: ORTHOPAEDIC SURGERY

## 2019-08-02 PROCEDURE — 4040F PNEUMOC VAC/ADMIN/RCVD: CPT | Performed by: ORTHOPAEDIC SURGERY

## 2019-08-02 PROCEDURE — 99213 OFFICE O/P EST LOW 20 MIN: CPT | Performed by: ORTHOPAEDIC SURGERY

## 2019-08-02 PROCEDURE — G8417 CALC BMI ABV UP PARAM F/U: HCPCS | Performed by: ORTHOPAEDIC SURGERY

## 2019-08-02 PROCEDURE — G8427 DOCREV CUR MEDS BY ELIG CLIN: HCPCS | Performed by: ORTHOPAEDIC SURGERY

## 2019-08-02 PROCEDURE — G8599 NO ASA/ANTIPLAT THER USE RNG: HCPCS | Performed by: ORTHOPAEDIC SURGERY

## 2019-08-02 RX ORDER — MELOXICAM 7.5 MG/1
7.5 TABLET ORAL DAILY
Qty: 30 TABLET | Refills: 0 | Status: SHIPPED | OUTPATIENT
Start: 2019-08-02 | End: 2019-09-03 | Stop reason: ALTCHOICE

## 2019-08-04 NOTE — PROGRESS NOTES
tablet Take 1 tablet by mouth 2 times daily (with meals) 60 tablet 0    Magnesium 500 MG CAPS Take by mouth daily      vitamin E 400 UNIT capsule Take 400 Units by mouth daily      aspirin 81 MG EC tablet Take 1 tablet by mouth daily 30 tablet 3    magnesium citrate solution Take by mouth once      Misc Natural Products (GLUCOSAMINE CHONDROITIN MSM PO) Take by mouth      docusate sodium (COLACE, DULCOLAX) 100 MG CAPS Take 100 mg by mouth 2 times daily as needed for Constipation 60 capsule 0    Omega-3 Fatty Acids (FISH OIL PO) Take by mouth      esomeprazole Magnesium (NEXIUM) 20 MG PACK Take 20 mg by mouth 2 times daily (before meals)        No current facility-administered medications on file prior to visit. Pertinent items are noted in HPI  Review of systems reviewed from Patient History Form dated on 1/26/2018 and available in the patient's chart under the Media tab. No change noted. PHYSICAL EXAMINATION:  Mr. Shyam Salinas is a very pleasant 67 y.o.  male who presents today in no acute distress, awake, alert, and oriented. He is well dressed, nourished and  groomed. Patient with normal affect. Height is  5' 10\" (1.778 m), weight is 197 lb (89.4 kg), Body mass index is 28.27 kg/m². Resting respiratory rate is 16. Examination of the gait, showed that the patient walks heel-toe with a non-antalgic gait and no limp.  Examination of both knees showing full ROM, bilateral mild crepitus, tenderness on medial joint line, stable to varus and valgus stress. He has intact sensation and good pedal pulses. He has good strength in 2 planes, and has mild tenderness on deep palpation over the medial joint line. Knee reflex 1+ bilaterally. IMAGING:  Xray 3 views of the left knee taken today in the office, and bilaterally knee 3 views was obtained 10/3/2018 in the office and reviewed.   These demonstrate mild degenerative changes with narrowing of the joint space in the medial joint space compartment, subchondral sclerosis, and marginal osteophytosis. IMPRESSION: Bilateral knee DJD. PLAN: I discussed with the patient the treatment options including both surgical and non-surgical treatment. We recommended Quad exercises and stretching of the calf and hamstrings which was taught to the patient today. He will take NSAIDS PRN but to try to take as little as possible d/t h/o left nephrectomy. I believe he will benefit from Mobic. F/u in 3 months PRN, PT if needed. He understands that this may need surgery if the pain did not to resolve.        Marva Pelaez MD

## 2019-08-30 ENCOUNTER — OFFICE VISIT (OUTPATIENT)
Dept: ORTHOPEDIC SURGERY | Age: 79
End: 2019-08-30
Payer: MEDICARE

## 2019-08-30 VITALS — HEIGHT: 70 IN | BODY MASS INDEX: 28.2 KG/M2 | WEIGHT: 197 LBS

## 2019-08-30 DIAGNOSIS — M17.11 PRIMARY OSTEOARTHRITIS OF RIGHT KNEE: ICD-10-CM

## 2019-08-30 DIAGNOSIS — M17.12 PRIMARY OSTEOARTHRITIS OF LEFT KNEE: Primary | ICD-10-CM

## 2019-08-30 PROCEDURE — 1036F TOBACCO NON-USER: CPT | Performed by: ORTHOPAEDIC SURGERY

## 2019-08-30 PROCEDURE — 1123F ACP DISCUSS/DSCN MKR DOCD: CPT | Performed by: ORTHOPAEDIC SURGERY

## 2019-08-30 PROCEDURE — G8427 DOCREV CUR MEDS BY ELIG CLIN: HCPCS | Performed by: ORTHOPAEDIC SURGERY

## 2019-08-30 PROCEDURE — 20610 DRAIN/INJ JOINT/BURSA W/O US: CPT | Performed by: ORTHOPAEDIC SURGERY

## 2019-08-30 PROCEDURE — G8599 NO ASA/ANTIPLAT THER USE RNG: HCPCS | Performed by: ORTHOPAEDIC SURGERY

## 2019-08-30 PROCEDURE — 4040F PNEUMOC VAC/ADMIN/RCVD: CPT | Performed by: ORTHOPAEDIC SURGERY

## 2019-08-30 PROCEDURE — 99214 OFFICE O/P EST MOD 30 MIN: CPT | Performed by: ORTHOPAEDIC SURGERY

## 2019-08-30 PROCEDURE — G8417 CALC BMI ABV UP PARAM F/U: HCPCS | Performed by: ORTHOPAEDIC SURGERY

## 2019-08-30 NOTE — PROGRESS NOTES
office, and bilaterally knee 3 views was obtained 10/3/2018 in the office and reviewed. These demonstrate mild degenerative changes with narrowing of the joint space in the medial joint space compartment, subchondral sclerosis, and marginal osteophytosis. IMPRESSION: Bilateral knee DJD. PLAN: I discussed with the patient the treatment options including both surgical and non-surgical treatment. We recommended Quad exercises and stretching of the calf and hamstrings which was taught to the patient today. He will take NSAIDS PRN but to try to take as little as possible d/t h/o left nephrectomy. I believe he will benefit from Mobic. I discussed with him that I think he would benefit from Synvisc injections and he agreed to proceed. PROCEDURE:    With the patient's permission, and under sterile condition, the left knee was prepared and draped with alcohol and injected with Synvisc through the medial parapatellar port area. The patient tolerated the procedure well. A band-aid was applied and the patient was advised to ice the knee for 15-20 minutes to relieve any injection site related pain. He reported a good improvement immediatly after the injection. F/u in 1 week for Synvisc #2.       Jenna Guaman MD

## 2019-09-03 ENCOUNTER — OFFICE VISIT (OUTPATIENT)
Dept: INTERNAL MEDICINE CLINIC | Age: 79
End: 2019-09-03
Payer: MEDICARE

## 2019-09-03 VITALS
BODY MASS INDEX: 27.92 KG/M2 | SYSTOLIC BLOOD PRESSURE: 130 MMHG | DIASTOLIC BLOOD PRESSURE: 70 MMHG | HEART RATE: 62 BPM | HEIGHT: 70 IN | OXYGEN SATURATION: 95 % | WEIGHT: 195 LBS

## 2019-09-03 DIAGNOSIS — I25.10 CORONARY ARTERY DISEASE INVOLVING NATIVE CORONARY ARTERY OF NATIVE HEART WITHOUT ANGINA PECTORIS: ICD-10-CM

## 2019-09-03 DIAGNOSIS — I10 ESSENTIAL HYPERTENSION: Primary | ICD-10-CM

## 2019-09-03 DIAGNOSIS — F32.9 REACTIVE DEPRESSION: ICD-10-CM

## 2019-09-03 PROCEDURE — 1036F TOBACCO NON-USER: CPT | Performed by: INTERNAL MEDICINE

## 2019-09-03 PROCEDURE — G8427 DOCREV CUR MEDS BY ELIG CLIN: HCPCS | Performed by: INTERNAL MEDICINE

## 2019-09-03 PROCEDURE — 1123F ACP DISCUSS/DSCN MKR DOCD: CPT | Performed by: INTERNAL MEDICINE

## 2019-09-03 PROCEDURE — 99213 OFFICE O/P EST LOW 20 MIN: CPT | Performed by: INTERNAL MEDICINE

## 2019-09-03 PROCEDURE — 3288F FALL RISK ASSESSMENT DOCD: CPT | Performed by: INTERNAL MEDICINE

## 2019-09-03 PROCEDURE — G8417 CALC BMI ABV UP PARAM F/U: HCPCS | Performed by: INTERNAL MEDICINE

## 2019-09-03 PROCEDURE — 4040F PNEUMOC VAC/ADMIN/RCVD: CPT | Performed by: INTERNAL MEDICINE

## 2019-09-03 PROCEDURE — G8599 NO ASA/ANTIPLAT THER USE RNG: HCPCS | Performed by: INTERNAL MEDICINE

## 2019-09-03 RX ORDER — ATORVASTATIN CALCIUM 80 MG/1
TABLET, FILM COATED ORAL
Qty: 30 TABLET | Refills: 5 | Status: SHIPPED | OUTPATIENT
Start: 2019-09-03 | End: 2020-03-03

## 2019-09-03 RX ORDER — EZETIMIBE 10 MG/1
TABLET ORAL
Qty: 30 TABLET | Refills: 5 | Status: SHIPPED | OUTPATIENT
Start: 2019-09-03 | End: 2020-03-03

## 2019-09-03 ASSESSMENT — ENCOUNTER SYMPTOMS
WHEEZING: 0
CHEST TIGHTNESS: 0
SHORTNESS OF BREATH: 0
GASTROINTESTINAL NEGATIVE: 1
COUGH: 0

## 2019-09-03 NOTE — TELEPHONE ENCOUNTER
Last ov 08/30/2019  Future Appointments   Date Time Provider Leonie Silva   9/3/2019  1:15 PM Shilpa Falcon [facility]   9/6/2019 10:00 AM MD Pankaj Partida [facility]   9/13/2019 10:15 AM MD Pankaj Godoy [facility]   10/11/2019 10:30 AM MD Pankaj Partida [facility]   1/2/2020 10:00 AM Jing Hernandez MD [facility]

## 2019-09-06 ENCOUNTER — OFFICE VISIT (OUTPATIENT)
Dept: ORTHOPEDIC SURGERY | Age: 79
End: 2019-09-06
Payer: MEDICARE

## 2019-09-06 VITALS — HEIGHT: 70 IN | WEIGHT: 195 LBS | BODY MASS INDEX: 27.92 KG/M2

## 2019-09-06 DIAGNOSIS — M17.12 PRIMARY OSTEOARTHRITIS OF LEFT KNEE: Primary | ICD-10-CM

## 2019-09-06 PROCEDURE — 20610 DRAIN/INJ JOINT/BURSA W/O US: CPT | Performed by: ORTHOPAEDIC SURGERY

## 2019-09-06 PROCEDURE — 1123F ACP DISCUSS/DSCN MKR DOCD: CPT | Performed by: ORTHOPAEDIC SURGERY

## 2019-09-06 PROCEDURE — 99213 OFFICE O/P EST LOW 20 MIN: CPT | Performed by: ORTHOPAEDIC SURGERY

## 2019-09-06 PROCEDURE — G8427 DOCREV CUR MEDS BY ELIG CLIN: HCPCS | Performed by: ORTHOPAEDIC SURGERY

## 2019-09-06 PROCEDURE — G8599 NO ASA/ANTIPLAT THER USE RNG: HCPCS | Performed by: ORTHOPAEDIC SURGERY

## 2019-09-06 PROCEDURE — 1036F TOBACCO NON-USER: CPT | Performed by: ORTHOPAEDIC SURGERY

## 2019-09-06 PROCEDURE — 4040F PNEUMOC VAC/ADMIN/RCVD: CPT | Performed by: ORTHOPAEDIC SURGERY

## 2019-09-06 PROCEDURE — G8417 CALC BMI ABV UP PARAM F/U: HCPCS | Performed by: ORTHOPAEDIC SURGERY

## 2019-09-13 ENCOUNTER — OFFICE VISIT (OUTPATIENT)
Dept: ORTHOPEDIC SURGERY | Age: 79
End: 2019-09-13
Payer: MEDICARE

## 2019-09-13 VITALS — WEIGHT: 195 LBS | HEIGHT: 70 IN | BODY MASS INDEX: 27.92 KG/M2

## 2019-09-13 DIAGNOSIS — M17.12 PRIMARY OSTEOARTHRITIS OF LEFT KNEE: Primary | ICD-10-CM

## 2019-09-13 PROCEDURE — 4040F PNEUMOC VAC/ADMIN/RCVD: CPT | Performed by: ORTHOPAEDIC SURGERY

## 2019-09-13 PROCEDURE — 20610 DRAIN/INJ JOINT/BURSA W/O US: CPT | Performed by: ORTHOPAEDIC SURGERY

## 2019-09-13 PROCEDURE — 99213 OFFICE O/P EST LOW 20 MIN: CPT | Performed by: ORTHOPAEDIC SURGERY

## 2019-09-13 PROCEDURE — 1036F TOBACCO NON-USER: CPT | Performed by: ORTHOPAEDIC SURGERY

## 2019-09-13 PROCEDURE — G8428 CUR MEDS NOT DOCUMENT: HCPCS | Performed by: ORTHOPAEDIC SURGERY

## 2019-09-13 PROCEDURE — G8599 NO ASA/ANTIPLAT THER USE RNG: HCPCS | Performed by: ORTHOPAEDIC SURGERY

## 2019-09-13 PROCEDURE — G8417 CALC BMI ABV UP PARAM F/U: HCPCS | Performed by: ORTHOPAEDIC SURGERY

## 2019-09-13 PROCEDURE — 1123F ACP DISCUSS/DSCN MKR DOCD: CPT | Performed by: ORTHOPAEDIC SURGERY

## 2019-09-14 NOTE — PROGRESS NOTES
CHIEF COMPLAINT: Bilateral knee pain/osteoarthritis. HISTORY:  Mr. Matt Curran 67 y.o.  male presents today for f/u evaluation of left knee pain which started to worsen over the years. He had bilateral knees cortisone injection on 7/10/2019 with good improvement right side but not the left knee. He reported good improvement after the Synvisc injection. He is complaining of achy pain. The left knee pops and catches intermittently, every week or so and improves on its own. Rates pain a 2/10 VAS. Pain is increase with standing and walking and decrease with rest. Pain is sharp early in the morning with first few steps, dull achy pain by the end of the day. Alleviating factors: rest. No radiation and no numbness and tingling sensation. Treatment to date has been Aleve with good improvement. 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 He is unable to take NSAIDs for a long period of time. No other complaint. No h/o trauma or gout. He is well known to me for left hip bursitis and had a cortisone injection on 1/26/2018 with good improvement. He has been physical therapy stretching and strengthening exercises with improvement.     Past Medical History:   Diagnosis Date    Cancer [facility]) 11/2016    Prostates CA    Coronary artery disease 12/28/15    multi vessel CAD    GERD (gastroesophageal reflux disease)     History of blood transfusion     s/p left nephrectomy age 2years old    Hyperlipidemia LDL goal <70     Hypertension     Primary osteoarthritis of right knee 10/5/2018    Reactive depression 6/8/2016    Shingles 2012    torso, top of head       Past Surgical History:   Procedure Laterality Date    APPENDECTOMY      CARPAL TUNNEL RELEASE Bilateral     CORONARY ARTERY BYPASS GRAFT      ENDOSCOPY, COLON, DIAGNOSTIC      EGD with dilatation    EYE SURGERY Bilateral     catacts, bilateral and repeat    HEMORRHOID SURGERY      KIDNEY REMOVAL  @ 1944    pt thinks left kidney for disease    SHOULDER SURGERY Left     rotator cuff       Social History     Socioeconomic History    Marital status:      Spouse name: Not on file    Number of children: Not on file    Years of education: Not on file    Highest education level: Not on file   Occupational History    Not on file   Social Needs    Financial resource strain: Not on file    Food insecurity:     Worry: Not on file     Inability: Not on file    Transportation needs:     Medical: Not on file     Non-medical: Not on file   Tobacco Use    Smoking status: Never Smoker    Smokeless tobacco: Never Used   Substance and Sexual Activity    Alcohol use: No    Drug use: No    Sexual activity: Not Currently   Lifestyle    Physical activity:     Days per week: Not on file     Minutes per session: Not on file    Stress: Not on file   Relationships    Social connections:     Talks on phone: Not on file     Gets together: Not on file     Attends Gnosticism service: Not on file     Active member of club or organization: Not on file     Attends meetings of clubs or organizations: Not on file     Relationship status: Not on file    Intimate partner violence:     Fear of current or ex partner: Not on file     Emotionally abused: Not on file     Physically abused: Not on file     Forced sexual activity: Not on file   Other Topics Concern    Not on file   Social History Narrative    Not on file       Family History   Problem Relation Age of Onset    Other Mother     High Blood Pressure Mother     Heart Disease Father        Current Outpatient Medications on File Prior to Visit   Medication Sig Dispense Refill    atorvastatin (LIPITOR) 80 MG tablet TAKE 1 TABLET BY MOUTH DAILY 30 tablet 5    ezetimibe (ZETIA) 10 MG tablet TAKE 1 TABLET BY MOUTH DAILY 30 tablet 5    escitalopram (LEXAPRO) 20 MG tablet TAKE 1 TABLET BY MOUTH DAILY 90 tablet 1    Cholecalciferol (VITAMIN D PO) Take 125 mcg by mouth      naproxen (NAPROSYN) 500 MG discussed with the patient the treatment options including both surgical and non-surgical treatment. We recommended Quad exercises and stretching of the calf and hamstrings which was taught to the patient today. He will take NSAIDS PRN but to try to take as little as possible d/t h/o left nephrectomy. I believe he will benefit from Mobic. I discussed with him that I think he would benefit from Synvisc injections and he agreed to proceed. PROCEDURE:    With the patient's permission, and under sterile condition, the left knee was prepared and draped with alcohol and injected with the 3rd Synvisc through the medial parapatellar port area. The patient tolerated the procedure well. A band-aid was applied and the patient was advised to ice the knee for 15-20 minutes to relieve any injection site related pain. He reported a good improvement immediatly after the injection. F/u in 3-4 months.       Daphnie Rosado MD

## 2019-09-14 NOTE — PROGRESS NOTES
SURGERY      KIDNEY REMOVAL  @ 66    pt thinks left kidney for disease    SHOULDER SURGERY Left     rotator cuff       Social History     Socioeconomic History    Marital status:      Spouse name: Not on file    Number of children: Not on file    Years of education: Not on file    Highest education level: Not on file   Occupational History    Not on file   Social Needs    Financial resource strain: Not on file    Food insecurity:     Worry: Not on file     Inability: Not on file    Transportation needs:     Medical: Not on file     Non-medical: Not on file   Tobacco Use    Smoking status: Never Smoker    Smokeless tobacco: Never Used   Substance and Sexual Activity    Alcohol use: No    Drug use: No    Sexual activity: Not Currently   Lifestyle    Physical activity:     Days per week: Not on file     Minutes per session: Not on file    Stress: Not on file   Relationships    Social connections:     Talks on phone: Not on file     Gets together: Not on file     Attends Restoration service: Not on file     Active member of club or organization: Not on file     Attends meetings of clubs or organizations: Not on file     Relationship status: Not on file    Intimate partner violence:     Fear of current or ex partner: Not on file     Emotionally abused: Not on file     Physically abused: Not on file     Forced sexual activity: Not on file   Other Topics Concern    Not on file   Social History Narrative    Not on file       Family History   Problem Relation Age of Onset    Other Mother     High Blood Pressure Mother     Heart Disease Father        Current Outpatient Medications on File Prior to Visit   Medication Sig Dispense Refill    atorvastatin (LIPITOR) 80 MG tablet TAKE 1 TABLET BY MOUTH DAILY 30 tablet 5    ezetimibe (ZETIA) 10 MG tablet TAKE 1 TABLET BY MOUTH DAILY 30 tablet 5    escitalopram (LEXAPRO) 20 MG tablet TAKE 1 TABLET BY MOUTH DAILY 90 tablet 1    Cholecalciferol

## 2019-09-17 ENCOUNTER — NURSE ONLY (OUTPATIENT)
Dept: INTERNAL MEDICINE CLINIC | Age: 79
End: 2019-09-17
Payer: MEDICARE

## 2019-09-17 DIAGNOSIS — Z12.11 ENCOUNTER FOR FIT (FECAL IMMUNOCHEMICAL TEST) SCREENING: Primary | ICD-10-CM

## 2019-09-17 LAB
CONTROL: NORMAL
HEMOCCULT STL QL: POSITIVE

## 2019-09-17 PROCEDURE — 82274 ASSAY TEST FOR BLOOD FECAL: CPT | Performed by: INTERNAL MEDICINE

## 2019-09-18 DIAGNOSIS — K92.1 BLOOD IN STOOL: Primary | ICD-10-CM

## 2019-10-01 ENCOUNTER — TELEPHONE (OUTPATIENT)
Dept: CARDIOLOGY CLINIC | Age: 79
End: 2019-10-01

## 2019-10-04 RX ORDER — MULTIVIT-MIN/IRON/FOLIC ACID/K 18-600-40
1 CAPSULE ORAL DAILY
COMMUNITY

## 2019-10-07 ENCOUNTER — ANESTHESIA EVENT (OUTPATIENT)
Dept: ENDOSCOPY | Age: 79
End: 2019-10-07
Payer: MEDICARE

## 2019-10-08 ENCOUNTER — ANESTHESIA (OUTPATIENT)
Dept: ENDOSCOPY | Age: 79
End: 2019-10-08
Payer: MEDICARE

## 2019-10-08 ENCOUNTER — HOSPITAL ENCOUNTER (OUTPATIENT)
Age: 79
Setting detail: OUTPATIENT SURGERY
Discharge: HOME | End: 2019-10-08
Attending: INTERNAL MEDICINE | Admitting: INTERNAL MEDICINE
Payer: MEDICARE

## 2019-10-08 VITALS
DIASTOLIC BLOOD PRESSURE: 76 MMHG | SYSTOLIC BLOOD PRESSURE: 156 MMHG | OXYGEN SATURATION: 97 % | RESPIRATION RATE: 14 BRPM

## 2019-10-08 VITALS
TEMPERATURE: 96.9 F | DIASTOLIC BLOOD PRESSURE: 62 MMHG | SYSTOLIC BLOOD PRESSURE: 144 MMHG | OXYGEN SATURATION: 98 % | HEART RATE: 47 BPM | HEIGHT: 70 IN | RESPIRATION RATE: 20 BRPM | BODY MASS INDEX: 27.92 KG/M2 | WEIGHT: 195 LBS

## 2019-10-08 DIAGNOSIS — K92.1 BLOOD IN STOOL: ICD-10-CM

## 2019-10-08 PROCEDURE — 3609010600 HC COLONOSCOPY POLYPECTOMY SNARE/COLD BIOPSY: Performed by: INTERNAL MEDICINE

## 2019-10-08 PROCEDURE — 3700000001 HC ADD 15 MINUTES (ANESTHESIA): Performed by: INTERNAL MEDICINE

## 2019-10-08 PROCEDURE — 88305 TISSUE EXAM BY PATHOLOGIST: CPT

## 2019-10-08 PROCEDURE — 7100000011 HC PHASE II RECOVERY - ADDTL 15 MIN: Performed by: INTERNAL MEDICINE

## 2019-10-08 PROCEDURE — 2500000003 HC RX 250 WO HCPCS: Performed by: NURSE ANESTHETIST, CERTIFIED REGISTERED

## 2019-10-08 PROCEDURE — 3700000000 HC ANESTHESIA ATTENDED CARE: Performed by: INTERNAL MEDICINE

## 2019-10-08 PROCEDURE — 7100000010 HC PHASE II RECOVERY - FIRST 15 MIN: Performed by: INTERNAL MEDICINE

## 2019-10-08 PROCEDURE — 7100000000 HC PACU RECOVERY - FIRST 15 MIN: Performed by: INTERNAL MEDICINE

## 2019-10-08 PROCEDURE — 2580000003 HC RX 258: Performed by: ANESTHESIOLOGY

## 2019-10-08 PROCEDURE — 2709999900 HC NON-CHARGEABLE SUPPLY: Performed by: INTERNAL MEDICINE

## 2019-10-08 PROCEDURE — 7100000001 HC PACU RECOVERY - ADDTL 15 MIN: Performed by: INTERNAL MEDICINE

## 2019-10-08 PROCEDURE — 6360000002 HC RX W HCPCS: Performed by: NURSE ANESTHETIST, CERTIFIED REGISTERED

## 2019-10-08 PROCEDURE — 2580000003 HC RX 258: Performed by: NURSE ANESTHETIST, CERTIFIED REGISTERED

## 2019-10-08 RX ORDER — PROPOFOL 10 MG/ML
INJECTION, EMULSION INTRAVENOUS PRN
Status: DISCONTINUED | OUTPATIENT
Start: 2019-10-08 | End: 2019-10-08 | Stop reason: SDUPTHER

## 2019-10-08 RX ORDER — SODIUM CHLORIDE 0.9 % (FLUSH) 0.9 %
10 SYRINGE (ML) INJECTION EVERY 12 HOURS SCHEDULED
Status: DISCONTINUED | OUTPATIENT
Start: 2019-10-08 | End: 2019-10-08 | Stop reason: HOSPADM

## 2019-10-08 RX ORDER — PROPOFOL 10 MG/ML
INJECTION, EMULSION INTRAVENOUS CONTINUOUS PRN
Status: DISCONTINUED | OUTPATIENT
Start: 2019-10-08 | End: 2019-10-08 | Stop reason: SDUPTHER

## 2019-10-08 RX ORDER — ONDANSETRON 2 MG/ML
4 INJECTION INTRAMUSCULAR; INTRAVENOUS
Status: DISCONTINUED | OUTPATIENT
Start: 2019-10-08 | End: 2019-10-08 | Stop reason: HOSPADM

## 2019-10-08 RX ORDER — SODIUM CHLORIDE 9 MG/ML
INJECTION, SOLUTION INTRAVENOUS CONTINUOUS PRN
Status: DISCONTINUED | OUTPATIENT
Start: 2019-10-08 | End: 2019-10-08 | Stop reason: SDUPTHER

## 2019-10-08 RX ORDER — LIDOCAINE HYDROCHLORIDE 20 MG/ML
INJECTION, SOLUTION EPIDURAL; INFILTRATION; INTRACAUDAL; PERINEURAL PRN
Status: DISCONTINUED | OUTPATIENT
Start: 2019-10-08 | End: 2019-10-08 | Stop reason: SDUPTHER

## 2019-10-08 RX ORDER — SODIUM CHLORIDE 0.9 % (FLUSH) 0.9 %
10 SYRINGE (ML) INJECTION PRN
Status: DISCONTINUED | OUTPATIENT
Start: 2019-10-08 | End: 2019-10-08 | Stop reason: HOSPADM

## 2019-10-08 RX ORDER — SODIUM CHLORIDE 9 MG/ML
INJECTION, SOLUTION INTRAVENOUS CONTINUOUS
Status: DISCONTINUED | OUTPATIENT
Start: 2019-10-08 | End: 2019-10-08 | Stop reason: HOSPADM

## 2019-10-08 RX ADMIN — PROPOFOL 150 MCG/KG/MIN: 10 INJECTION, EMULSION INTRAVENOUS at 07:30

## 2019-10-08 RX ADMIN — SODIUM CHLORIDE: 9 INJECTION, SOLUTION INTRAVENOUS at 07:23

## 2019-10-08 RX ADMIN — SODIUM CHLORIDE: 9 INJECTION, SOLUTION INTRAVENOUS at 06:49

## 2019-10-08 RX ADMIN — PROPOFOL 130 MG: 10 INJECTION, EMULSION INTRAVENOUS at 07:30

## 2019-10-08 RX ADMIN — LIDOCAINE HYDROCHLORIDE 100 MG: 20 INJECTION, SOLUTION EPIDURAL; INFILTRATION; INTRACAUDAL; PERINEURAL at 07:30

## 2019-10-08 ASSESSMENT — PULMONARY FUNCTION TESTS
PIF_VALUE: 0
PIF_VALUE: 1
PIF_VALUE: 0
PIF_VALUE: 0
PIF_VALUE: 1
PIF_VALUE: 1
PIF_VALUE: 0
PIF_VALUE: 1
PIF_VALUE: 0

## 2019-10-08 ASSESSMENT — ENCOUNTER SYMPTOMS: SHORTNESS OF BREATH: 0

## 2019-10-08 ASSESSMENT — PAIN SCALES - GENERAL
PAINLEVEL_OUTOF10: 0
PAINLEVEL_OUTOF10: 0

## 2019-10-08 ASSESSMENT — PAIN - FUNCTIONAL ASSESSMENT: PAIN_FUNCTIONAL_ASSESSMENT: 0-10

## 2019-10-11 ENCOUNTER — OFFICE VISIT (OUTPATIENT)
Dept: ORTHOPEDIC SURGERY | Age: 79
End: 2019-10-11
Payer: MEDICARE

## 2019-10-11 VITALS
DIASTOLIC BLOOD PRESSURE: 77 MMHG | SYSTOLIC BLOOD PRESSURE: 130 MMHG | BODY MASS INDEX: 27.06 KG/M2 | HEART RATE: 60 BPM | HEIGHT: 70 IN | WEIGHT: 189 LBS

## 2019-10-11 DIAGNOSIS — M17.11 PRIMARY OSTEOARTHRITIS OF RIGHT KNEE: ICD-10-CM

## 2019-10-11 DIAGNOSIS — M17.12 PRIMARY OSTEOARTHRITIS OF LEFT KNEE: Primary | ICD-10-CM

## 2019-10-11 PROCEDURE — G8599 NO ASA/ANTIPLAT THER USE RNG: HCPCS | Performed by: ORTHOPAEDIC SURGERY

## 2019-10-11 PROCEDURE — G8417 CALC BMI ABV UP PARAM F/U: HCPCS | Performed by: ORTHOPAEDIC SURGERY

## 2019-10-11 PROCEDURE — 4040F PNEUMOC VAC/ADMIN/RCVD: CPT | Performed by: ORTHOPAEDIC SURGERY

## 2019-10-11 PROCEDURE — G8484 FLU IMMUNIZE NO ADMIN: HCPCS | Performed by: ORTHOPAEDIC SURGERY

## 2019-10-11 PROCEDURE — 99214 OFFICE O/P EST MOD 30 MIN: CPT | Performed by: ORTHOPAEDIC SURGERY

## 2019-10-11 PROCEDURE — 1123F ACP DISCUSS/DSCN MKR DOCD: CPT | Performed by: ORTHOPAEDIC SURGERY

## 2019-10-11 PROCEDURE — 1036F TOBACCO NON-USER: CPT | Performed by: ORTHOPAEDIC SURGERY

## 2019-10-11 PROCEDURE — G8428 CUR MEDS NOT DOCUMENT: HCPCS | Performed by: ORTHOPAEDIC SURGERY

## 2019-10-11 PROCEDURE — 20610 DRAIN/INJ JOINT/BURSA W/O US: CPT | Performed by: ORTHOPAEDIC SURGERY

## 2019-11-12 ENCOUNTER — HOSPITAL ENCOUNTER (OUTPATIENT)
Dept: PHYSICAL THERAPY | Age: 79
Setting detail: THERAPIES SERIES
Discharge: HOME | End: 2019-11-12
Payer: MEDICARE

## 2019-11-12 PROCEDURE — 97035 APP MDLTY 1+ULTRASOUND EA 15: CPT

## 2019-11-12 PROCEDURE — 97161 PT EVAL LOW COMPLEX 20 MIN: CPT

## 2019-11-12 PROCEDURE — 97110 THERAPEUTIC EXERCISES: CPT

## 2019-11-14 ENCOUNTER — HOSPITAL ENCOUNTER (OUTPATIENT)
Dept: PHYSICAL THERAPY | Age: 79
Setting detail: THERAPIES SERIES
Discharge: HOME | End: 2019-11-14
Payer: MEDICARE

## 2019-11-14 PROCEDURE — 97035 APP MDLTY 1+ULTRASOUND EA 15: CPT

## 2019-11-14 PROCEDURE — 97110 THERAPEUTIC EXERCISES: CPT

## 2019-11-15 ENCOUNTER — HOSPITAL ENCOUNTER (OUTPATIENT)
Dept: PHYSICAL THERAPY | Age: 79
Setting detail: THERAPIES SERIES
Discharge: HOME | End: 2019-11-15
Payer: MEDICARE

## 2019-11-19 ENCOUNTER — HOSPITAL ENCOUNTER (OUTPATIENT)
Dept: PHYSICAL THERAPY | Age: 79
Setting detail: THERAPIES SERIES
Discharge: HOME | End: 2019-11-19
Payer: MEDICARE

## 2019-11-19 PROCEDURE — 97110 THERAPEUTIC EXERCISES: CPT

## 2019-11-19 PROCEDURE — 97035 APP MDLTY 1+ULTRASOUND EA 15: CPT

## 2019-11-20 ENCOUNTER — TELEPHONE (OUTPATIENT)
Dept: INTERNAL MEDICINE CLINIC | Age: 79
End: 2019-11-20

## 2019-11-20 DIAGNOSIS — E78.5 HYPERLIPIDEMIA LDL GOAL <70: ICD-10-CM

## 2019-11-20 DIAGNOSIS — I10 ESSENTIAL HYPERTENSION: Primary | ICD-10-CM

## 2019-11-21 ENCOUNTER — HOSPITAL ENCOUNTER (OUTPATIENT)
Dept: PHYSICAL THERAPY | Age: 79
Setting detail: THERAPIES SERIES
Discharge: HOME | End: 2019-11-21
Payer: MEDICARE

## 2019-11-21 ENCOUNTER — NURSE ONLY (OUTPATIENT)
Dept: INTERNAL MEDICINE CLINIC | Age: 79
End: 2019-11-21
Payer: MEDICARE

## 2019-11-21 DIAGNOSIS — E78.5 HYPERLIPIDEMIA LDL GOAL <70: ICD-10-CM

## 2019-11-21 DIAGNOSIS — I10 ESSENTIAL HYPERTENSION: ICD-10-CM

## 2019-11-21 LAB
ALBUMIN SERPL-MCNC: 4.2 G/DL (ref 3.4–5)
ALP BLD-CCNC: 94 U/L (ref 40–129)
ALT SERPL-CCNC: 16 U/L (ref 10–40)
ANION GAP SERPL CALCULATED.3IONS-SCNC: 13 MMOL/L (ref 3–16)
AST SERPL-CCNC: 19 U/L (ref 15–37)
BASOPHILS ABSOLUTE: 0 K/UL (ref 0–0.2)
BASOPHILS RELATIVE PERCENT: 0.5 %
BILIRUB SERPL-MCNC: 0.5 MG/DL (ref 0–1)
BILIRUBIN DIRECT: <0.2 MG/DL (ref 0–0.3)
BILIRUBIN, INDIRECT: NORMAL MG/DL (ref 0–1)
BUN BLDV-MCNC: 18 MG/DL (ref 7–20)
CALCIUM SERPL-MCNC: 9.1 MG/DL (ref 8.3–10.6)
CHLORIDE BLD-SCNC: 105 MMOL/L (ref 99–110)
CHOLESTEROL, TOTAL: 145 MG/DL (ref 0–199)
CO2: 24 MMOL/L (ref 21–32)
CREAT SERPL-MCNC: 1.1 MG/DL (ref 0.8–1.3)
EOSINOPHILS ABSOLUTE: 0.1 K/UL (ref 0–0.6)
EOSINOPHILS RELATIVE PERCENT: 2.1 %
GFR AFRICAN AMERICAN: >60
GFR NON-AFRICAN AMERICAN: >60
GLUCOSE BLD-MCNC: 78 MG/DL (ref 70–99)
HCT VFR BLD CALC: 43.6 % (ref 40.5–52.5)
HDLC SERPL-MCNC: 45 MG/DL (ref 40–60)
HEMOGLOBIN: 14.6 G/DL (ref 13.5–17.5)
LDL CHOLESTEROL CALCULATED: 71 MG/DL
LYMPHOCYTES ABSOLUTE: 0.9 K/UL (ref 1–5.1)
LYMPHOCYTES RELATIVE PERCENT: 15.8 %
MCH RBC QN AUTO: 30.8 PG (ref 26–34)
MCHC RBC AUTO-ENTMCNC: 33.6 G/DL (ref 31–36)
MCV RBC AUTO: 91.9 FL (ref 80–100)
MONOCYTES ABSOLUTE: 0.6 K/UL (ref 0–1.3)
MONOCYTES RELATIVE PERCENT: 9.9 %
NEUTROPHILS ABSOLUTE: 4.1 K/UL (ref 1.7–7.7)
NEUTROPHILS RELATIVE PERCENT: 71.7 %
PDW BLD-RTO: 14.1 % (ref 12.4–15.4)
PLATELET # BLD: 189 K/UL (ref 135–450)
PMV BLD AUTO: 8.7 FL (ref 5–10.5)
POTASSIUM SERPL-SCNC: 4.2 MMOL/L (ref 3.5–5.1)
RBC # BLD: 4.74 M/UL (ref 4.2–5.9)
SODIUM BLD-SCNC: 142 MMOL/L (ref 136–145)
T4 FREE: 1.1 NG/DL (ref 0.9–1.8)
TOTAL PROTEIN: 6.6 G/DL (ref 6.4–8.2)
TRIGL SERPL-MCNC: 147 MG/DL (ref 0–150)
TSH REFLEX: 6.96 UIU/ML (ref 0.27–4.2)
VLDLC SERPL CALC-MCNC: 29 MG/DL
WBC # BLD: 5.8 K/UL (ref 4–11)

## 2019-11-21 PROCEDURE — 36415 COLL VENOUS BLD VENIPUNCTURE: CPT | Performed by: INTERNAL MEDICINE

## 2019-11-21 PROCEDURE — 97110 THERAPEUTIC EXERCISES: CPT

## 2019-11-21 PROCEDURE — 97035 APP MDLTY 1+ULTRASOUND EA 15: CPT

## 2019-11-21 RX ORDER — LEVOTHYROXINE SODIUM 0.05 MG/1
50 TABLET ORAL DAILY
Qty: 90 TABLET | Refills: 1 | Status: SHIPPED | OUTPATIENT
Start: 2019-11-21 | End: 2020-05-15

## 2019-11-22 ENCOUNTER — HOSPITAL ENCOUNTER (OUTPATIENT)
Dept: PHYSICAL THERAPY | Age: 79
Setting detail: THERAPIES SERIES
Discharge: HOME | End: 2019-11-22
Payer: MEDICARE

## 2019-11-22 PROCEDURE — 97035 APP MDLTY 1+ULTRASOUND EA 15: CPT

## 2019-11-22 PROCEDURE — 97110 THERAPEUTIC EXERCISES: CPT

## 2019-11-25 ENCOUNTER — HOSPITAL ENCOUNTER (OUTPATIENT)
Dept: PHYSICAL THERAPY | Age: 79
Setting detail: THERAPIES SERIES
Discharge: HOME | End: 2019-11-25
Payer: MEDICARE

## 2019-11-25 PROCEDURE — 97110 THERAPEUTIC EXERCISES: CPT

## 2019-11-26 ENCOUNTER — HOSPITAL ENCOUNTER (OUTPATIENT)
Dept: PHYSICAL THERAPY | Age: 79
Setting detail: THERAPIES SERIES
Discharge: HOME | End: 2019-11-26
Payer: MEDICARE

## 2019-11-26 PROCEDURE — 97035 APP MDLTY 1+ULTRASOUND EA 15: CPT

## 2019-11-26 PROCEDURE — 97110 THERAPEUTIC EXERCISES: CPT

## 2019-12-03 ENCOUNTER — HOSPITAL ENCOUNTER (OUTPATIENT)
Dept: PHYSICAL THERAPY | Age: 79
Setting detail: THERAPIES SERIES
Discharge: HOME | End: 2019-12-03
Payer: MEDICARE

## 2019-12-03 PROCEDURE — 97035 APP MDLTY 1+ULTRASOUND EA 15: CPT

## 2019-12-03 PROCEDURE — 97110 THERAPEUTIC EXERCISES: CPT

## 2019-12-04 ENCOUNTER — OFFICE VISIT (OUTPATIENT)
Dept: INTERNAL MEDICINE CLINIC | Age: 79
End: 2019-12-04
Payer: MEDICARE

## 2019-12-04 VITALS
BODY MASS INDEX: 27.92 KG/M2 | WEIGHT: 195 LBS | HEART RATE: 62 BPM | DIASTOLIC BLOOD PRESSURE: 70 MMHG | SYSTOLIC BLOOD PRESSURE: 130 MMHG | HEIGHT: 70 IN | OXYGEN SATURATION: 94 %

## 2019-12-04 DIAGNOSIS — I10 ESSENTIAL HYPERTENSION: Primary | ICD-10-CM

## 2019-12-04 DIAGNOSIS — K21.9 GASTROESOPHAGEAL REFLUX DISEASE WITHOUT ESOPHAGITIS: ICD-10-CM

## 2019-12-04 DIAGNOSIS — I25.10 CORONARY ARTERY DISEASE INVOLVING NATIVE CORONARY ARTERY OF NATIVE HEART WITHOUT ANGINA PECTORIS: ICD-10-CM

## 2019-12-04 DIAGNOSIS — E03.9 ACQUIRED HYPOTHYROIDISM: ICD-10-CM

## 2019-12-04 PROCEDURE — 4040F PNEUMOC VAC/ADMIN/RCVD: CPT | Performed by: INTERNAL MEDICINE

## 2019-12-04 PROCEDURE — G8427 DOCREV CUR MEDS BY ELIG CLIN: HCPCS | Performed by: INTERNAL MEDICINE

## 2019-12-04 PROCEDURE — G8417 CALC BMI ABV UP PARAM F/U: HCPCS | Performed by: INTERNAL MEDICINE

## 2019-12-04 PROCEDURE — 1123F ACP DISCUSS/DSCN MKR DOCD: CPT | Performed by: INTERNAL MEDICINE

## 2019-12-04 PROCEDURE — G8599 NO ASA/ANTIPLAT THER USE RNG: HCPCS | Performed by: INTERNAL MEDICINE

## 2019-12-04 PROCEDURE — G8482 FLU IMMUNIZE ORDER/ADMIN: HCPCS | Performed by: INTERNAL MEDICINE

## 2019-12-04 PROCEDURE — 1036F TOBACCO NON-USER: CPT | Performed by: INTERNAL MEDICINE

## 2019-12-04 PROCEDURE — 99213 OFFICE O/P EST LOW 20 MIN: CPT | Performed by: INTERNAL MEDICINE

## 2019-12-05 ENCOUNTER — HOSPITAL ENCOUNTER (OUTPATIENT)
Dept: PHYSICAL THERAPY | Age: 79
Setting detail: THERAPIES SERIES
Discharge: HOME | End: 2019-12-05
Payer: MEDICARE

## 2019-12-05 PROCEDURE — 97110 THERAPEUTIC EXERCISES: CPT

## 2019-12-05 PROCEDURE — 97035 APP MDLTY 1+ULTRASOUND EA 15: CPT

## 2019-12-06 ENCOUNTER — HOSPITAL ENCOUNTER (OUTPATIENT)
Dept: PHYSICAL THERAPY | Age: 79
Setting detail: THERAPIES SERIES
Discharge: HOME | End: 2019-12-06
Payer: MEDICARE

## 2019-12-06 PROCEDURE — 97110 THERAPEUTIC EXERCISES: CPT

## 2019-12-06 PROCEDURE — 97035 APP MDLTY 1+ULTRASOUND EA 15: CPT

## 2019-12-10 ENCOUNTER — HOSPITAL ENCOUNTER (OUTPATIENT)
Dept: PHYSICAL THERAPY | Age: 79
Setting detail: THERAPIES SERIES
Discharge: HOME | End: 2019-12-10
Payer: MEDICARE

## 2019-12-10 PROCEDURE — 97035 APP MDLTY 1+ULTRASOUND EA 15: CPT

## 2019-12-10 PROCEDURE — 97110 THERAPEUTIC EXERCISES: CPT

## 2019-12-20 ENCOUNTER — OFFICE VISIT (OUTPATIENT)
Dept: ORTHOPEDIC SURGERY | Age: 79
End: 2019-12-20
Payer: MEDICARE

## 2019-12-20 VITALS — BODY MASS INDEX: 27.92 KG/M2 | HEART RATE: 60 BPM | WEIGHT: 195 LBS | RESPIRATION RATE: 16 BRPM | HEIGHT: 70 IN

## 2019-12-20 DIAGNOSIS — M17.12 PRIMARY OSTEOARTHRITIS OF LEFT KNEE: Primary | ICD-10-CM

## 2019-12-20 PROCEDURE — 4040F PNEUMOC VAC/ADMIN/RCVD: CPT | Performed by: ORTHOPAEDIC SURGERY

## 2019-12-20 PROCEDURE — G8427 DOCREV CUR MEDS BY ELIG CLIN: HCPCS | Performed by: ORTHOPAEDIC SURGERY

## 2019-12-20 PROCEDURE — G8417 CALC BMI ABV UP PARAM F/U: HCPCS | Performed by: ORTHOPAEDIC SURGERY

## 2019-12-20 PROCEDURE — G8482 FLU IMMUNIZE ORDER/ADMIN: HCPCS | Performed by: ORTHOPAEDIC SURGERY

## 2019-12-20 PROCEDURE — G8599 NO ASA/ANTIPLAT THER USE RNG: HCPCS | Performed by: ORTHOPAEDIC SURGERY

## 2019-12-20 PROCEDURE — 99213 OFFICE O/P EST LOW 20 MIN: CPT | Performed by: ORTHOPAEDIC SURGERY

## 2019-12-20 PROCEDURE — 1123F ACP DISCUSS/DSCN MKR DOCD: CPT | Performed by: ORTHOPAEDIC SURGERY

## 2019-12-20 PROCEDURE — 1036F TOBACCO NON-USER: CPT | Performed by: ORTHOPAEDIC SURGERY

## 2020-01-02 ENCOUNTER — OFFICE VISIT (OUTPATIENT)
Dept: CARDIOLOGY CLINIC | Age: 80
End: 2020-01-02
Payer: MEDICARE

## 2020-01-02 VITALS
HEART RATE: 55 BPM | OXYGEN SATURATION: 98 % | HEIGHT: 70 IN | DIASTOLIC BLOOD PRESSURE: 62 MMHG | WEIGHT: 196 LBS | BODY MASS INDEX: 28.06 KG/M2 | SYSTOLIC BLOOD PRESSURE: 140 MMHG

## 2020-01-02 PROCEDURE — G8427 DOCREV CUR MEDS BY ELIG CLIN: HCPCS | Performed by: INTERNAL MEDICINE

## 2020-01-02 PROCEDURE — 99213 OFFICE O/P EST LOW 20 MIN: CPT | Performed by: INTERNAL MEDICINE

## 2020-01-02 PROCEDURE — 1123F ACP DISCUSS/DSCN MKR DOCD: CPT | Performed by: INTERNAL MEDICINE

## 2020-01-02 PROCEDURE — 93000 ELECTROCARDIOGRAM COMPLETE: CPT | Performed by: INTERNAL MEDICINE

## 2020-01-02 PROCEDURE — 4040F PNEUMOC VAC/ADMIN/RCVD: CPT | Performed by: INTERNAL MEDICINE

## 2020-01-02 PROCEDURE — G8482 FLU IMMUNIZE ORDER/ADMIN: HCPCS | Performed by: INTERNAL MEDICINE

## 2020-01-02 PROCEDURE — G8417 CALC BMI ABV UP PARAM F/U: HCPCS | Performed by: INTERNAL MEDICINE

## 2020-01-02 PROCEDURE — 1036F TOBACCO NON-USER: CPT | Performed by: INTERNAL MEDICINE

## 2020-01-02 RX ORDER — LISINOPRIL 20 MG/1
20 TABLET ORAL DAILY
Qty: 30 TABLET | Refills: 11 | Status: SHIPPED
Start: 2020-01-02 | End: 2020-01-02 | Stop reason: SDUPTHER

## 2020-01-02 RX ORDER — LISINOPRIL 20 MG/1
20 TABLET ORAL DAILY
Qty: 30 TABLET | Refills: 11 | Status: SHIPPED | OUTPATIENT
Start: 2020-01-02 | End: 2020-01-02 | Stop reason: SDUPTHER

## 2020-01-02 RX ORDER — LISINOPRIL 5 MG/1
5 TABLET ORAL DAILY
Qty: 30 TABLET | Refills: 11 | Status: SHIPPED | OUTPATIENT
Start: 2020-01-02 | End: 2020-01-02 | Stop reason: DRUGHIGH

## 2020-01-02 NOTE — PROGRESS NOTES
60%.       ASSESSMENT AND PLAN:      Preop risk assessment   No active chest pain in daily life  Had Bypass 5 yrs ago  No changes on ECG  May proceed with knee surgery    CAD S/P CABGX4 12/30/15  Doing well  No reports of angina   On Statin and ASA  No BB due to bradycardia     Hypertension  Slightly elevated  Will add Lisinopril 20 mg daily     Hyperlipidemia  LDL goal <70   LDL 71 (11/2019)   Continue statin therapy     Prostate CA  Managed by Dr. Marisela Talamantes    Follow up in 1 year. Christine Jeffries M.D  1/2/2020     This note was scribed in the presence of Dr. Christine Jeffries MD by Tripp Haynes    Physician Attestation:  The scribes documentation has been prepared under my direction and personally reviewed by me in its entirety. I confirm that the note above accurately reflects all work, treatment, procedures, and medical decision making performed by me.

## 2020-01-03 RX ORDER — LISINOPRIL 20 MG/1
20 TABLET ORAL DAILY
Qty: 30 TABLET | Refills: 11 | Status: SHIPPED | OUTPATIENT
Start: 2020-01-03 | End: 2020-02-26

## 2020-01-13 ENCOUNTER — OFFICE VISIT (OUTPATIENT)
Dept: ORTHOPEDIC SURGERY | Age: 80
End: 2020-01-13
Payer: MEDICARE

## 2020-01-13 ENCOUNTER — TELEPHONE (OUTPATIENT)
Dept: ORTHOPEDIC SURGERY | Age: 80
End: 2020-01-13

## 2020-01-13 VITALS
HEART RATE: 59 BPM | TEMPERATURE: 97.3 F | BODY MASS INDEX: 28.06 KG/M2 | DIASTOLIC BLOOD PRESSURE: 62 MMHG | SYSTOLIC BLOOD PRESSURE: 105 MMHG | HEIGHT: 70 IN | WEIGHT: 196 LBS

## 2020-01-13 DIAGNOSIS — M17.12 PRIMARY OSTEOARTHRITIS OF LEFT KNEE: ICD-10-CM

## 2020-01-13 LAB
ALBUMIN SERPL-MCNC: 4.4 G/DL (ref 3.4–5)
APTT: 32.6 SEC (ref 24.2–36.2)
BILIRUBIN URINE: NEGATIVE
BLOOD, URINE: NEGATIVE
CLARITY: CLEAR
COLOR: YELLOW
EPITHELIAL CELLS, UA: 0 /HPF (ref 0–5)
GLUCOSE URINE: NEGATIVE MG/DL
HYALINE CASTS: 0 /LPF (ref 0–8)
INR BLD: 1 (ref 0.86–1.14)
KETONES, URINE: NEGATIVE MG/DL
LEUKOCYTE ESTERASE, URINE: NEGATIVE
MICROSCOPIC EXAMINATION: YES
NITRITE, URINE: NEGATIVE
PH UA: 5.5 (ref 5–8)
PROTEIN UA: 30 MG/DL
PROTHROMBIN TIME: 11.6 SEC (ref 10–13.2)
RBC UA: 1 /HPF (ref 0–4)
SPECIFIC GRAVITY UA: 1.02 (ref 1–1.03)
TRANSFERRIN: 250 MG/DL (ref 200–360)
URINE REFLEX TO CULTURE: ABNORMAL
URINE TYPE: ABNORMAL
UROBILINOGEN, URINE: 0.2 E.U./DL
VITAMIN D 25-HYDROXY: 43.4 NG/ML
WBC UA: 1 /HPF (ref 0–5)

## 2020-01-13 PROCEDURE — G8427 DOCREV CUR MEDS BY ELIG CLIN: HCPCS | Performed by: ORTHOPAEDIC SURGERY

## 2020-01-13 PROCEDURE — 1036F TOBACCO NON-USER: CPT | Performed by: ORTHOPAEDIC SURGERY

## 2020-01-13 PROCEDURE — 1123F ACP DISCUSS/DSCN MKR DOCD: CPT | Performed by: ORTHOPAEDIC SURGERY

## 2020-01-13 PROCEDURE — 99214 OFFICE O/P EST MOD 30 MIN: CPT | Performed by: ORTHOPAEDIC SURGERY

## 2020-01-13 PROCEDURE — G8482 FLU IMMUNIZE ORDER/ADMIN: HCPCS | Performed by: ORTHOPAEDIC SURGERY

## 2020-01-13 PROCEDURE — 4040F PNEUMOC VAC/ADMIN/RCVD: CPT | Performed by: ORTHOPAEDIC SURGERY

## 2020-01-13 PROCEDURE — G8417 CALC BMI ABV UP PARAM F/U: HCPCS | Performed by: ORTHOPAEDIC SURGERY

## 2020-01-13 PROCEDURE — 20610 DRAIN/INJ JOINT/BURSA W/O US: CPT | Performed by: ORTHOPAEDIC SURGERY

## 2020-01-13 NOTE — PROGRESS NOTES
RAPT  RISK ASSESSMENT and PREDICTION TOOL    Name: Nicholas Eastman  YOB: 1940  Surgeon: Dr Ro Loyd         Value Score    1). What is your age group? 50 - 65 years  = 2      66 - 75 years = 1     > 75 years = 0       Your score = 0   2). Gender? Male = 2     Female = 1       Your score = 2   3). How far on average can you walk? Two blocks or more (+/- rest) = 2    (a block is 200 ysxcee=244 ft)  1 - 2 blocks (+/- rest) = 1     Housebound (most of time) = 0       Your score = 2   4). Which gait aid do you use? None = 2    (more often than not) Single-point stick = 1     Crutches/walker = 0       Your score = 2   5). Do you use community supports? None or one per week = 1    (home help, meals on wheels, district nursing) Two or more per week = 0       Your score = 1   6). Will you live with someone who can care for you after your operation? yes = 3     no = 0       Your score = 3    Your Total Score (out of 12) = 10       Key: Destination at discharge from acute care predicted by score.   Score < 6  = extended inpatient rehabilitation  Score 6 - 9  = additional intervention to discharge directly home (Rehab in the home)  Score > 9  = directly home      Patient's Preference Prediction Score Agreed destination   home 10 yes   Nicholas Eastman  Date: 1/13/20

## 2020-01-13 NOTE — LETTER
[facility]  Surgery Scheduling Form:  [address]:                                                                                                                Patient Name:  Fara Mcdonald  Patient :  1940   Patient SS#:      Patient Phone:  [phone] (home)                            Patient Address:  [address] [place] [address]    PCP:  Martin Way MD  Insurance:    Payor/Plan Subscr  Sex Relation Sub. Ins. ID Effective Group Num   1. Lelia E 1940 Male  [id number] 1/1/15                                    [address] [address]   2. [address] 1940 Male  [id number] 1/1/15 [id number]                                   [address]     DIAGNOSIS & PROCEDURE:                                                                                              Diagnosis:     Left arthritis knee   M17.12  Operation:  Left Total Knee Replacement robotic assisted  Location:  [place] [address]  Surgeon:  Kaern Blue MD    SCHEDULING INFORMATION:                                                                                         .  Surgeon's Scheduling Instruction:  elective    Requested Date:  3/11/20 OR Time:   Patient Arrival Time:      OR Time Required:  2 hours  Anesthesia:  Choice  Equipment:  Styrker ROSEMARIE, standard  Mini C-Arm:  No   Standard C-Arm:  No  Status:  Same day admit  PAT Required:  Yes  Comments: NO femoral nerve block   ALLERGIES:Demerol hcl [meperidine]; Pcn [penicillins]; and Tramadol                     Hong Daily MD      20 11:04 AM  BILLING INFORMATION:                                                                                                     Procedure:       CPT Code Modifier            With Rosa Yates, [address]  H&P AT:       PCP  JAMA Amezcua 94 During shared medical appointments, you will hear about other participants health issues and personal information. As a matter of trust, it is your duty to keep everything you hear confidential.  Nothing that identifies a participant in any way (including job, ethnicity, Rastafarian, etc.) can be shared outside of this group setting. I have read and agree to the following statements:        · I agree to meet with a group of patients and my doctor. I understand that I have the choice to be seen by my physician in this group or individually and that I may leave the group visit anytime I feel uncomfortable. · I understand that discussions will occur regarding individual identifiable health information during the shared medical appointment and I will maintain the confidentiality of [facility] health information or other information heard during the appointment. · I am committed to maintaining this confidentiality even if I am no longer participating in shared medical appointments. · I understand that the information I share with the physician and staff will be heard by the other participants and there is a possibility that the information disclosed in the shared medical appointment may be re-disclosed by other participants. I have been notified of this potential disclosure and I voluntarily agree to participate in the shared medical appointment. · I know that I dont have to share any personal information with the group or health care providers unless, I choose to do so. · Like any doctors appointment, I agree to be responsible for the bill and / or co-payment associated with this physician appointment. Shared Medical Appointment              THIS SUPPLEMENTAL NOTICE SUPPLEMENTS AND DOES NOT REPLACE THE [facility] CONSENT, PATIENT RESPONSIBILITY AND NOTICE OF PRIVACY PRACTICE.         Juan José Rivera    1940 Patients Signature: ____________________________________________________         DATE: ____/____/___      Rojas Pillow / Support Persons Signature (if applicable) _________________________     DATE: __/__/__    **Each person will be asked to sign this commitment before each Shared Medical Appointment. Thank You ! SURGERY SCHEDULING TIMES                                                                                                                                                      Juan José Rivera                                                                                       1940                                                                           SURGERY DATE: ___/___/___                                                                      SURGERY TIME:  ___:___ AM/PM                                                                      ARRIVAL TIME:   ___:___  AM/PM                                                                                                                        **PLEASE REPORT TO THE                                                       INFORMATION DESK IN THE MAIN LOBBY                                                          OF THE HOSPITAL.         [facility]                           JET INFO     PT NAME:  Deonan Rosado              Patient Phone:  [phone] (home)                                           1940    PCP:  PCP:  Radha Gu MD                APPT DATE:  ___/___/___      DATE:  20        H&P __________    LABS: _________                      NEEDED:  __________    EKG:   _________                     NEEDED:  __________      JET DATE:   20      SURG DATE:   3/11/20

## 2020-01-14 LAB
ESTIMATED AVERAGE GLUCOSE: 102.5 MG/DL
HBA1C MFR BLD: 5.2 %

## 2020-01-16 ENCOUNTER — HOSPITAL ENCOUNTER (OUTPATIENT)
Dept: PHYSICAL THERAPY | Age: 80
Setting detail: THERAPIES SERIES
Discharge: HOME | End: 2020-01-16
Payer: MEDICARE

## 2020-01-16 PROCEDURE — 97161 PT EVAL LOW COMPLEX 20 MIN: CPT

## 2020-01-16 PROCEDURE — 97530 THERAPEUTIC ACTIVITIES: CPT

## 2020-01-16 NOTE — PROGRESS NOTES
TOTAL JOINT - PREHAB ASSESSMENT FORM    Date:  2020    Patient Name:  Bernadette Barrientos    :  1940  CNJ:[id number]    Restrictions/Precautions: Position Activity Restriction  Other position/activity restrictions: low fall risk     Pertinent Medical History: Additional Pertinent Hx: CAD, HTN, prostate cancer in remisison, CT sx B, L shoulder sx     Medical/Treatment Diagnosis Information:  · Diagnosis: OA L knee; prehab    · Treatment Diagnosis: L knee pain, dec strength    Insurance/Certification information:  PT Insurance Information: Medicare  Physician Information:  Referring Practitioner: Dr. Ash Bonds:    [] Total Hip Replacement [] Right  [] Left  DOS: 3/11/20  [] Not yet scheduled  [x] Total Knee Replacement [] Right  [x] Left    [x] Prehab Eval  [] Prehab D/C  [] Post - OP Visit             Weeks from sx [] Post-op D/C    SUBJECTIVE:    Chart Reviewed: Yes  Patient assessed for rehabilitation services?: Yes  Referral Date : 20  Onset Date: 10/01/19  Subjective  Subjective: Pt notes knees began hurting worse in October. Had PT through November which helped some, but pain gradually returned. Pt notes L knee pain 7-8/10 with all ADLS. He is currently scheduled for L TKR 3/11/20. DME ASSESSMENT:   Current available equipment:    [] Std. Maximino Millard       [] Rolling walker      [] 4 wheeled walker     [] Ashutosh [place]     [x] Straight cane     [] Crutches   [] Reacher            [] Sock Aid              [] Shower chair            [] Leg          [] Long handle shoe horn   [] Other:     Equipment needed at discharge from hospital:   [] Std. Walker       [x] Rolling walker      [] 4 wheeled walker     [] Ashutosh      [] Straight cane     [] Crutches   [x] Reacher            [x] Sock Aid              [x] Shower chair            [x] Leg          [x] Long handle shoe horn   [] Other:                     SOCIAL ASSESSMENT:  1.  Will you live with someone who can care for you after surgery? [x] Yes  [] No  2. Where is the bathroom located in your post-surgery place of residence? [x] 1st Floor [] 2nd Floor  3. Where is the bedroom located in your post-surgery place of residence? [x] 1st Floor [] 2nd Floor  4. Do you use community supports (home help, meals-on-wheels, district nurse)? [x] None or 1 per week  [] 2 or more per week  5. How many stairs will you have to climb to get in to your place of residence? [x] Less than 5  [] More than 5  6. Have you had a fall in the past year? [] Yes  [x] No     Notes:  Pt live in a ranch style house and has a chair lift on the basement stairs, so he doesn't have to do any stairs. Available PT Visits:  Not limited      FUNCTIONAL ASSESSMENT:   1. Do you use ambulatory aids? [] None [x] Single Point Cane  [] Crutches/Walker/Wheelchair  2. How far on average can you walk? [] 2 Blocks or more  [x] 1-2 Blocks  [] House bound most of the time  3. What is your physical activity level? [] Highly Active [] Active  [x] Somewhat active  [] Sedentary     Knee AROM (degrees) R L   Flexion (in supine) 125 125   Extension (use \"-\" to denote deficit) (long sitting, resting) 0 0     MMT (lbs) R L   Knee Extension (peak in seated) 30 28   Hip Abduction (peak in side lying) 30 30     Functional Testing (shoes on) Results   Timed Up and Go (TUG)                  (rounded seconds) 13   30 Second Sit-Stand Test                  (completed repetitions) 10   6 Minute Walk Test  (meters) 85     Standing Balance  (shoes on, rounded to the nearest seconds, 10 second max)     1. Stand with your feet side by side:   Time: 10          (sec)  2. Place the instep of one foot so it      Is touching the big toe of the surgical  Time:10      Foot (surgical foot in back)     (sec)     3. Place surgical foot behind so the toes  Time: 10      Are touching the heal of the other foot.   (sec)    4.  Stand on surgical foot    Time:10          (sec) FILL IN THIS BOX FOR PREHAB PATIENTS ONLY              [x] Home  SOCIAL RISK:            [x] Low      [] Medium     [] High       EXPECTED DISCHARGE  [] Home w/ Home Health Care   FUNCTIONAL RISK:   [x] Low      [] Medium     [] High                DISPOSITION:         [] Home w/ Outpatient PT                                                                                                                      [] SNF / Inpatient Rehab       ASSESSMENT:     Conditions Requiring Skilled Therapeutic Intervention  Body structures, Functions, Activity limitations: Decreased functional mobility , Decreased high-level IADLs, Decreased ADL status, Decreased endurance, Decreased strength, Increased pain  Assessment: prior level of function: pt able to walk the dog, stand for craft shows without inc pain: DX: OA B knee (L>R)  Treatment Diagnosis: L knee pain, dec strength  Prognosis: Good  Decision Making: Low Complexity  History: see PMH  Exam: see eval  Clinical Presentation: stable   REQUIRES PT FOLLOW UP: No(hep issued )  Decision Making: Decision Making: Low Complexity    Goals:    Short term goals  Time Frame for Short term goals: 1 visit   Short term goal 1: pt independent with hep         Plan:  Plan  Times per week: 1 session for PREHAB measurements and hep  Current Treatment Recommendations: Home Exercise Program      Signature:  Micky Boyer, [address]

## 2020-01-16 NOTE — FLOWSHEET NOTE
strength, flexibility, endurance, ROM for improvements with self-care, mobility, lifting and ambulation. Neuromuscular Re-Education  [] (84431) Provided verbal/tactile cueing for activities to restore or maintain balance, coordination, kinesthetic sense, posture, motor skill, proprioception for self-care, mobility, lifting, and ambulation. Therapeutic Activities:    [x] (70477) Provided verbal/tactile cueing to address functional limitations related to loss of mobility, strength, balance, and coordination. Gait Training:  [] (12859) Provided training and instruction to the patient for proper postural muscle recruitment and positioning with ambulation re-education     Home Exercise Program:    [x] (14901) Reviewed/Progressed HEP activities related to strengthening, flexibility, endurance, ROM for functional self-care, mobility, lifting and ambulation   [] (25680) Reviewed/Progressed HEP activities related to improving balance, coordination, kinesthetic sense, posture, motor skill, proprioception for self-care, mobility, lifting, and ambulation      Manual Treatments:  MFR / STM / Oscillations-Mobs:  G-I, II, III, IV / Manipulation / MLD  [] (49694) Provided manual therapy to mobilize  soft tissue/joints/fluid for the purpose of modulating pain, promoting relaxation, increasing ROM, reducing/eliminating soft tissue swelling/inflammation/restriction, improving soft tissue extensibility and allowing for proper ROM for normal function with self- care, mobility, lifting and ambulation.       Timed Code Treatment Minutes: 15   Total Treatment Minutes: 30     [x] EVAL (LOW) 53553   [] EVAL (MOD) 02429   [] EVAL (HIGH) 65019   [] RE-EVAL   [] TE (12512) x     [] Aquatic (30189) x  [] NMR (98954)   x  [] Aquatic Group (66004) x  [] Manual (59868) x    [] Ultrasound (59892) x  [x] TA (15010) x 1  [] Mech Traction (12646)  [] Ionto (98180)           [] ES (un) (11116):   [] Vasopump (38067) [] Other: Assessment:  [] Patient tolerated treatment well [] Patient limited by fatigue  [x] Patient limited by pain  [] Patient limited by other medical complications  [] Other:     Prognosis: [x] Good [] Fair  [] Poor    Goals:    Short term goals  Time Frame for Short term goals: 1 visit   Short term goal 1: pt independent with hep             Patient Requires Follow-up: [x] Yes  [] No    Plan:   [] Continue per plan of care [] Alter current plan (see comments)  [] Plan of care initiated [] Hold pending MD visit [x] Discharge    Plan for Next Session:  D/c with hep     Electronically signed by:  Ambika Pearce [address] [place] [address]

## 2020-01-16 NOTE — PROGRESS NOTES
Physical Therapy  Initial Assessment  Date: 2020  Patient Name: Sudarshan Murillo  MRN: [id number]  : 1940     Treatment Diagnosis: L knee pain, dec strength    Restrictions  Position Activity Restriction  Other position/activity restrictions: low fall risk     Subjective   General  Chart Reviewed: Yes  Patient assessed for rehabilitation services?: Yes  Additional Pertinent Hx: CAD, HTN, prostate cancer in remisison, CT sx B, L shoulder sx   Family / Caregiver Present: No  Referring Practitioner: Dr. Amelie Nascimento  Referral Date : 20  Diagnosis: OA L knee; prehab    General Comment  Comments: icing as needed; Aleve when pain is extreme   PT Visit Information  Onset Date: 10/01/19  PT Insurance Information: Medicare  Total # of Visits Approved: 1  Total # of Visits to Date: 1  Subjective  Subjective: Pt notes knees began hurting worse in October. Had of PT through November which helped some, but pain gradually returned. Pt notes L knee pain 7-8/10 with all ADLS. He is currently scheduled for L TKR 3/11/20. 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

## 2020-01-20 NOTE — PROGRESS NOTES
Patient is a 62-year-old male we follow for bilateral knee arthritis. He has had significant amount of preoperative conservative management of his left knee including physical therapy anti-inflammatories and both cortisone and Visco supplementation injections. None of these have given him any long-term relief and he is here for further discussion and possible options of surgery. Patient has known bilateral degenerative osteoarthritis with degenerative findings seen on x-rays in the past.  Complains of pain and loss of range of motion of both right and left knee left knee being worse. He has had no history of injury or surgery to the left knee. The patient is not diabetic does not smoke and currently is not taking any oral narcotics. The patient has neck and back problems along with a very positive family history for degenerative arthritis. This patient has had open heart surgery and I believe he is not on any obvious blood thinners at this point in time except for low-dose aspirin. Patient Active Problem List   Diagnosis    Chest pain on exertion    Chest pain    Essential hypertension    S/P CABG (coronary artery bypass graft)    Hyperlipidemia LDL goal <70    Reactive depression    Prostate carcinoma ([facility] [address]    Vitamin D deficiency    Left hip pain    Chronic left-sided low back pain without sciatica    Coronary artery disease    Edema of right lower extremity    Greater trochanteric bursitis of left hip    Acute pain of right knee    Primary osteoarthritis of right knee    Primary osteoarthritis of left knee    Chronic fatigue    Acquired hypothyroidism    Gastroesophageal reflux disease without esophagitis     Prior to Visit Medications    Medication Sig Taking?  Authorizing Provider   lisinopril (PRINIVIL;ZESTRIL) 20 MG tablet Take 1 tablet by mouth daily  Cara Gonzalez MD   levothyroxine (SYNTHROID) 50 MCG tablet Take 1 tablet by mouth daily  Delma Mata MD   Cholecalciferol (VITAMIN D) 2000 units CAPS capsule Take 1 capsule by mouth daily  Historical Provider, MD   atorvastatin (LIPITOR) 80 MG tablet TAKE 1 TABLET BY MOUTH DAILY  Patient taking differently: Take 80 mg by mouth nightly   Isabela Martinez MD   ezetimibe (ZETIA) 10 MG tablet TAKE 1 TABLET BY MOUTH DAILY  Patient taking differently: Take 10 mg by mouth nightly   Isabela Martinez MD   escitalopram (LEXAPRO) 20 MG tablet TAKE 1 TABLET BY MOUTH DAILY  Isabela Martinez MD   Magnesium 500 MG CAPS Take 1 capsule by mouth nightly   Historical Provider, MD   aspirin 81 MG EC tablet Take 1 tablet by mouth daily  Raina Willard MD   Misc Natural Products (GLUCOSAMINE CHONDROITIN MSM PO) Take 1 tablet by mouth daily   Historical Provider, MD   docusate sodium (COLACE, DULCOLAX) 100 MG CAPS Take 100 mg by mouth 2 times daily as needed for Constipation  Bailee Librado, APRN - CNP   esomeprazole Magnesium (NEXIUM) 20 MG PACK Take 20 mg by mouth 2 times daily (before meals)   Historical Provider, MD     Physical examination 71-year-old male oriented x3 temperature is 97.3. Examination of his back shows reduced range of motion but no specific signs of tenderness or gross instability or sepsis. Motor exam to the left lower extremity shows quadriceps hamstrings hip abductors abductors foot plantar dorsiflexors are intact 4-5 over 5. Sensation and perfusion are intact to the left foot and ankle. There is no numbness or tingling noted in the left lower extremity. Deep tendon reflexes are 0 at the knee and 0 at the ankle in the left lower extremity. No other obvious cutaneous lesions lymphedema or cellulitic processes are noted in the left lower extremity. Examination of the right knee shows obvious swelling tenderness medially and fullness posterior. Loss of full extension by a few degrees flexion to 125-130 degrees is seen. Examination of the left knee shows medial joint line tenderness also fullness and tenderness posteriorly.   He

## 2020-01-27 RX ORDER — ESCITALOPRAM OXALATE 20 MG/1
20 TABLET ORAL DAILY
Qty: 90 TABLET | Refills: 1 | Status: SHIPPED | OUTPATIENT
Start: 2020-01-27 | End: 2020-06-08

## 2020-02-19 ENCOUNTER — HOSPITAL ENCOUNTER (OUTPATIENT)
Dept: CT IMAGING | Age: 80
Discharge: HOME | End: 2020-02-19
Payer: MEDICARE

## 2020-02-19 PROCEDURE — 73700 CT LOWER EXTREMITY W/O DYE: CPT

## 2020-02-24 ENCOUNTER — PREP FOR PROCEDURE (OUTPATIENT)
Dept: ORTHOPEDICS UNIT | Age: 80
End: 2020-02-24

## 2020-02-25 ENCOUNTER — TELEPHONE (OUTPATIENT)
Dept: ORTHOPEDIC SURGERY | Age: 80
End: 2020-02-25

## 2020-02-25 ENCOUNTER — PREP FOR PROCEDURE (OUTPATIENT)
Dept: ORTHOPEDIC SURGERY | Age: 80
End: 2020-02-25

## 2020-02-25 ENCOUNTER — HOSPITAL ENCOUNTER (OUTPATIENT)
Dept: PREADMISSION TESTING | Age: 80
Discharge: HOME | End: 2020-02-29
Payer: MEDICARE

## 2020-02-25 LAB
ABO/RH: NORMAL
ALBUMIN SERPL-MCNC: 4.4 G/DL (ref 3.4–5)
ANION GAP SERPL CALCULATED.3IONS-SCNC: 10 MMOL/L (ref 3–16)
ANTIBODY SCREEN: NORMAL
APTT: 32.4 SEC (ref 24.2–36.2)
BASOPHILS ABSOLUTE: 0 K/UL (ref 0–0.2)
BASOPHILS RELATIVE PERCENT: 0.5 %
BILIRUBIN URINE: NEGATIVE
BLOOD, URINE: NEGATIVE
BUN BLDV-MCNC: 31 MG/DL (ref 7–20)
CALCIUM SERPL-MCNC: 9.5 MG/DL (ref 8.3–10.6)
CHLORIDE BLD-SCNC: 104 MMOL/L (ref 99–110)
CLARITY: CLEAR
CO2: 26 MMOL/L (ref 21–32)
COLOR: YELLOW
CREAT SERPL-MCNC: 1.5 MG/DL (ref 0.8–1.3)
EOSINOPHILS ABSOLUTE: 0.1 K/UL (ref 0–0.6)
EOSINOPHILS RELATIVE PERCENT: 2.5 %
ESTIMATED AVERAGE GLUCOSE: 99.7 MG/DL
GFR AFRICAN AMERICAN: 55
GFR NON-AFRICAN AMERICAN: 45
GLUCOSE BLD-MCNC: 86 MG/DL (ref 70–99)
GLUCOSE URINE: NEGATIVE MG/DL
HBA1C MFR BLD: 5.1 %
HCT VFR BLD CALC: 44.2 % (ref 40.5–52.5)
HEMOGLOBIN: 15 G/DL (ref 13.5–17.5)
INR BLD: 0.99 (ref 0.86–1.14)
KETONES, URINE: NEGATIVE MG/DL
LEUKOCYTE ESTERASE, URINE: NEGATIVE
LYMPHOCYTES ABSOLUTE: 0.8 K/UL (ref 1–5.1)
LYMPHOCYTES RELATIVE PERCENT: 14 %
MCH RBC QN AUTO: 30.7 PG (ref 26–34)
MCHC RBC AUTO-ENTMCNC: 34 G/DL (ref 31–36)
MCV RBC AUTO: 90.2 FL (ref 80–100)
MICROSCOPIC EXAMINATION: NORMAL
MONOCYTES ABSOLUTE: 0.6 K/UL (ref 0–1.3)
MONOCYTES RELATIVE PERCENT: 10.7 %
NEUTROPHILS ABSOLUTE: 4.2 K/UL (ref 1.7–7.7)
NEUTROPHILS RELATIVE PERCENT: 72.3 %
NITRITE, URINE: NEGATIVE
PDW BLD-RTO: 13.8 % (ref 12.4–15.4)
PH UA: 5.5 (ref 5–8)
PLATELET # BLD: 154 K/UL (ref 135–450)
PMV BLD AUTO: 8.4 FL (ref 5–10.5)
POTASSIUM SERPL-SCNC: 5.1 MMOL/L (ref 3.5–5.1)
PREALBUMIN: 33.9 MG/DL (ref 20–40)
PROTEIN UA: NEGATIVE MG/DL
PROTHROMBIN TIME: 11.5 SEC (ref 10–13.2)
RBC # BLD: 4.89 M/UL (ref 4.2–5.9)
SEDIMENTATION RATE, ERYTHROCYTE: 11 MM/HR (ref 0–20)
SODIUM BLD-SCNC: 140 MMOL/L (ref 136–145)
SPECIFIC GRAVITY UA: 1.02 (ref 1–1.03)
URINE REFLEX TO CULTURE: NORMAL
URINE TYPE: NORMAL
UROBILINOGEN, URINE: 0.2 E.U./DL
VITAMIN D 25-HYDROXY: 44.7 NG/ML
WBC # BLD: 5.9 K/UL (ref 4–11)

## 2020-02-25 PROCEDURE — 85025 COMPLETE CBC W/AUTO DIFF WBC: CPT

## 2020-02-25 PROCEDURE — 86850 RBC ANTIBODY SCREEN: CPT

## 2020-02-25 PROCEDURE — 80048 BASIC METABOLIC PNL TOTAL CA: CPT

## 2020-02-25 PROCEDURE — 84134 ASSAY OF PREALBUMIN: CPT

## 2020-02-25 PROCEDURE — 82306 VITAMIN D 25 HYDROXY: CPT

## 2020-02-25 PROCEDURE — 82040 ASSAY OF SERUM ALBUMIN: CPT

## 2020-02-25 PROCEDURE — 85610 PROTHROMBIN TIME: CPT

## 2020-02-25 PROCEDURE — 81003 URINALYSIS AUTO W/O SCOPE: CPT

## 2020-02-25 PROCEDURE — 87641 MR-STAPH DNA AMP PROBE: CPT

## 2020-02-25 PROCEDURE — 83036 HEMOGLOBIN GLYCOSYLATED A1C: CPT

## 2020-02-25 PROCEDURE — 86901 BLOOD TYPING SEROLOGIC RH(D): CPT

## 2020-02-25 PROCEDURE — 86900 BLOOD TYPING SEROLOGIC ABO: CPT

## 2020-02-25 PROCEDURE — 85730 THROMBOPLASTIN TIME PARTIAL: CPT

## 2020-02-25 PROCEDURE — 85652 RBC SED RATE AUTOMATED: CPT

## 2020-02-25 RX ORDER — OXYCODONE HYDROCHLORIDE 5 MG/1
10 TABLET ORAL ONCE
Status: CANCELLED | OUTPATIENT
Start: 2020-02-25 | End: 2020-02-25

## 2020-02-25 RX ORDER — CLINDAMYCIN PHOSPHATE 900 MG/50ML
900 INJECTION INTRAVENOUS
Status: CANCELLED | OUTPATIENT
Start: 2020-02-25 | End: 2020-02-25

## 2020-02-25 RX ORDER — CELECOXIB 200 MG/1
200 CAPSULE ORAL ONCE
Status: CANCELLED | OUTPATIENT
Start: 2020-02-25 | End: 2020-02-25

## 2020-02-25 NOTE — PROGRESS NOTES
Pt. Attended JET class on 2/25/2020. Pt. Verified surgery for Total Knee replacement and received patient information and educational JET folder. Interviews completed by PT, OT, Case management and PAT. Labs and Tests completed as ordered/necessary. Pt. Given instructions on Pre-operative Showering techniques and the use of anti-septic 3 days before surgery. Anti-septic bottle given to patient to take home. Pt. States no further questions or concerns.

## 2020-02-26 ENCOUNTER — OFFICE VISIT (OUTPATIENT)
Dept: INTERNAL MEDICINE CLINIC | Age: 80
End: 2020-02-26
Payer: MEDICARE

## 2020-02-26 ENCOUNTER — TELEPHONE (OUTPATIENT)
Dept: ORTHOPEDIC SURGERY | Age: 80
End: 2020-02-26

## 2020-02-26 VITALS
SYSTOLIC BLOOD PRESSURE: 120 MMHG | BODY MASS INDEX: 28.95 KG/M2 | RESPIRATION RATE: 10 BRPM | HEIGHT: 68 IN | WEIGHT: 191 LBS | DIASTOLIC BLOOD PRESSURE: 66 MMHG | OXYGEN SATURATION: 98 % | HEART RATE: 58 BPM | TEMPERATURE: 98.5 F

## 2020-02-26 PROBLEM — Z01.818 PREOP EXAMINATION: Status: ACTIVE | Noted: 2020-02-26

## 2020-02-26 LAB
MRSA SCREEN RT-PCR: ABNORMAL
ORGANISM: ABNORMAL

## 2020-02-26 PROCEDURE — G8417 CALC BMI ABV UP PARAM F/U: HCPCS | Performed by: INTERNAL MEDICINE

## 2020-02-26 PROCEDURE — G8427 DOCREV CUR MEDS BY ELIG CLIN: HCPCS | Performed by: INTERNAL MEDICINE

## 2020-02-26 PROCEDURE — 99214 OFFICE O/P EST MOD 30 MIN: CPT | Performed by: INTERNAL MEDICINE

## 2020-02-26 PROCEDURE — G8482 FLU IMMUNIZE ORDER/ADMIN: HCPCS | Performed by: INTERNAL MEDICINE

## 2020-02-26 RX ORDER — LISINOPRIL 10 MG/1
10 TABLET ORAL DAILY
Qty: 30 TABLET | Refills: 5 | Status: SHIPPED | OUTPATIENT
Start: 2020-02-26 | End: 2020-02-26

## 2020-02-26 RX ORDER — LISINOPRIL 10 MG/1
10 TABLET ORAL DAILY
Qty: 90 TABLET | Refills: 1 | Status: SHIPPED | OUTPATIENT
Start: 2020-02-26 | End: 2020-09-30

## 2020-02-26 RX ORDER — LISINOPRIL 20 MG/1
10 TABLET ORAL DAILY
Qty: 1 TABLET | Refills: 0
Start: 2020-02-26 | End: 2020-02-26 | Stop reason: DRUGHIGH

## 2020-02-26 ASSESSMENT — ENCOUNTER SYMPTOMS
COUGH: 0
RHINORRHEA: 0
CHEST TIGHTNESS: 0
TROUBLE SWALLOWING: 0
SHORTNESS OF BREATH: 0
WHEEZING: 0
BACK PAIN: 0
SORE THROAT: 0
GASTROINTESTINAL NEGATIVE: 1

## 2020-02-27 ENCOUNTER — TELEPHONE (OUTPATIENT)
Dept: ORTHOPEDIC SURGERY | Age: 80
End: 2020-02-27

## 2020-02-27 ENCOUNTER — TELEPHONE (OUTPATIENT)
Dept: ORTHOPEDICS UNIT | Age: 80
End: 2020-02-27

## 2020-02-27 NOTE — TELEPHONE ENCOUNTER
Attempted to contact patient. Left voicemail for patient stating purpose and call back number.    Teddy Noel  Orthopedic Nurse Navigator  Phone number: [phone]  Future Appointments   Date Time Provider Leonie Silva   3/5/2020  1:00 PM MD Alok Gonsales   3/6/2020 10:15 AM Shilpa Corbett   3/11/2020  8:35 AM MD Alok Gonsales   3/26/2020 10:15 AM MD Alok Gonsales       Electronically signed by Ramana José RN on 2/27/2020 at 2:49 PM

## 2020-03-03 RX ORDER — EZETIMIBE 10 MG/1
TABLET ORAL
Qty: 30 TABLET | Refills: 5 | Status: SHIPPED | OUTPATIENT
Start: 2020-03-03 | End: 2020-09-08

## 2020-03-03 RX ORDER — ATORVASTATIN CALCIUM 80 MG/1
TABLET, FILM COATED ORAL
Qty: 30 TABLET | Refills: 5 | Status: SHIPPED | OUTPATIENT
Start: 2020-03-03 | End: 2020-09-01

## 2020-03-03 NOTE — PROGRESS NOTES
ORTHOPAEDIC NURSE NAVIGATOR SUMMARY NOTE      Anticipated Date of Surgery: 3/11/20    Using [facility]? No, Are they Registered:  no   If No, why not? Pt Declined    Attended Pre-Op Education Class: yes   If No, why not? PCP: Opal Stratton MD   Phone #: [phone]    Date of PCP Visit for H&P:2/26/20 and 3/6/20    Any Noted Concerns from PCP prior to surgery:  No   If Yes, what concerns?:        IS THE PATIENT IN A PAIN MANAGEMENT PROGRAM?:   No         Review of Past Medical History Reveals History of:      Critical Lab Values:   Hgb/Hct:   Hemoglobin (g/dL)   Date Value   02/25/2020 15.0   /  Hematocrit (%)   Date Value   02/25/2020 44.2      HgbA1C:    Lab Results   Component Value Date    LABA1C 5.1 02/25/2020    LABA1C 5.2 01/13/2020    LABA1C 5.0 12/28/2015      Albumin:    Lab Results   Component Value Date    LABALBU 4.4 02/25/2020      BUN/Cr:   BUN (mg/dL)   Date Value   02/25/2020 31 (H)   /  CREATININE (mg/dL)   Date Value   02/25/2020 1.5 (H)      BMI:    BMI Readings from Last 1 Encounters:   02/26/20 29.47 kg/m²        Coronary Artery Disease/HTN/CHF History: Yes  CAD, bypass, HTN    Cardiologist: Dr Angel Humphries    Cardiac Clearance Necessary: Yes    Date of Cardiac Clearance Appt: 1/2/20    On Plavix? No,  If YES, when will they stop taking?     Final Cardiac Recommendations:may proceed with knee surgery   -On any anticoagulation-aspirin 81mg daily       Diabetes History: No    Most Recent HgbA1C: 5.2    PCP or Endocrine Recommendations: N/A    Nutritionist/Dietician Consult Scheduled: N/A    Final Plan For Diabetic Control: N/A   Pulmonary: COPD/Emphysema/ Use of home oxygen: no     Alcohol use:no           DVT Risk Stratification:  Unknown      Vascular Consult Ordered:  NA    Date of Vascular Appt:     Hematology/Oncology Consult Ordered:  NA - prostate     Date of Hematology/Oncology Appt:     Final Recommendation For DVT Prophylaxis:   -Smoking history or use of estrogen-no         BMI

## 2020-03-03 NOTE — TELEPHONE ENCOUNTER
Last ov 2/26/20  Future Appointments   Date Time Provider Leonie Hacketti   3/5/2020  1:00 PM MD Amor Mitchell [facility]   3/6/2020 10:15 AM Justin Mason [facility]   3/11/2020  8:35 AM MD Amor Mitchell [facility]   3/26/2020 10:15 AM MD Amor Mitchell [facility]

## 2020-03-05 ENCOUNTER — OFFICE VISIT (OUTPATIENT)
Dept: ORTHOPEDIC SURGERY | Age: 80
End: 2020-03-05

## 2020-03-05 PROCEDURE — 99999 PR OFFICE/OUTPT VISIT,PROCEDURE ONLY: CPT | Performed by: ORTHOPAEDIC SURGERY

## 2020-03-06 ENCOUNTER — OFFICE VISIT (OUTPATIENT)
Dept: INTERNAL MEDICINE CLINIC | Age: 80
End: 2020-03-06
Payer: MEDICARE

## 2020-03-06 VITALS
TEMPERATURE: 97.7 F | HEIGHT: 68 IN | OXYGEN SATURATION: 96 % | BODY MASS INDEX: 29.1 KG/M2 | HEART RATE: 60 BPM | RESPIRATION RATE: 16 BRPM | DIASTOLIC BLOOD PRESSURE: 70 MMHG | SYSTOLIC BLOOD PRESSURE: 122 MMHG | WEIGHT: 192 LBS

## 2020-03-06 LAB
ANION GAP SERPL CALCULATED.3IONS-SCNC: 13 MMOL/L (ref 3–16)
BUN BLDV-MCNC: 21 MG/DL (ref 7–20)
CALCIUM SERPL-MCNC: 8.9 MG/DL (ref 8.3–10.6)
CHLORIDE BLD-SCNC: 108 MMOL/L (ref 99–110)
CO2: 24 MMOL/L (ref 21–32)
CREAT SERPL-MCNC: 1.3 MG/DL (ref 0.8–1.3)
GFR AFRICAN AMERICAN: >60
GFR NON-AFRICAN AMERICAN: 53
GLUCOSE BLD-MCNC: 92 MG/DL (ref 70–99)
POTASSIUM SERPL-SCNC: 4.3 MMOL/L (ref 3.5–5.1)
SODIUM BLD-SCNC: 145 MMOL/L (ref 136–145)

## 2020-03-06 PROCEDURE — 36415 COLL VENOUS BLD VENIPUNCTURE: CPT | Performed by: INTERNAL MEDICINE

## 2020-03-06 PROCEDURE — 1036F TOBACCO NON-USER: CPT | Performed by: INTERNAL MEDICINE

## 2020-03-06 PROCEDURE — G8427 DOCREV CUR MEDS BY ELIG CLIN: HCPCS | Performed by: INTERNAL MEDICINE

## 2020-03-06 PROCEDURE — 4040F PNEUMOC VAC/ADMIN/RCVD: CPT | Performed by: INTERNAL MEDICINE

## 2020-03-06 PROCEDURE — G8482 FLU IMMUNIZE ORDER/ADMIN: HCPCS | Performed by: INTERNAL MEDICINE

## 2020-03-06 PROCEDURE — G8417 CALC BMI ABV UP PARAM F/U: HCPCS | Performed by: INTERNAL MEDICINE

## 2020-03-06 PROCEDURE — 99213 OFFICE O/P EST LOW 20 MIN: CPT | Performed by: INTERNAL MEDICINE

## 2020-03-06 PROCEDURE — 1123F ACP DISCUSS/DSCN MKR DOCD: CPT | Performed by: INTERNAL MEDICINE

## 2020-03-06 ASSESSMENT — ENCOUNTER SYMPTOMS
CHEST TIGHTNESS: 0
COUGH: 0
TROUBLE SWALLOWING: 0
WHEEZING: 0
SHORTNESS OF BREATH: 0
GASTROINTESTINAL NEGATIVE: 1

## 2020-03-09 RX ORDER — POLYETHYLENE GLYCOL 3350 17 G/17G
17 POWDER, FOR SOLUTION ORAL DAILY PRN
COMMUNITY
End: 2022-09-28

## 2020-03-09 NOTE — PROGRESS NOTES
C-Difficile admission screening and protocol:     * Admitted with diarrhea?no     *Prior history of C-Diff. In last 3 months? no     *Antibiotic use in the past 6-8 weeks? no     *Prior hospitalization or nursing home in the last month?    no

## 2020-03-10 ENCOUNTER — ANESTHESIA EVENT (OUTPATIENT)
Dept: OPERATING ROOM | Age: 80
DRG: 470 | End: 2020-03-10
Payer: MEDICARE

## 2020-03-11 ENCOUNTER — HOSPITAL ENCOUNTER (INPATIENT)
Age: 80
LOS: 1 days | Discharge: HOME HEALTH | DRG: 470 | End: 2020-03-12
Attending: ORTHOPAEDIC SURGERY | Admitting: ORTHOPAEDIC SURGERY
Payer: MEDICARE

## 2020-03-11 ENCOUNTER — APPOINTMENT (OUTPATIENT)
Dept: GENERAL RADIOLOGY | Age: 80
DRG: 470 | End: 2020-03-11
Attending: ORTHOPAEDIC SURGERY
Payer: MEDICARE

## 2020-03-11 ENCOUNTER — ANESTHESIA (OUTPATIENT)
Dept: OPERATING ROOM | Age: 80
DRG: 470 | End: 2020-03-11
Payer: MEDICARE

## 2020-03-11 VITALS
TEMPERATURE: 98.6 F | RESPIRATION RATE: 1 BRPM | SYSTOLIC BLOOD PRESSURE: 106 MMHG | DIASTOLIC BLOOD PRESSURE: 52 MMHG | OXYGEN SATURATION: 94 %

## 2020-03-11 PROBLEM — M17.12 ARTHRITIS OF LEFT KNEE: Status: ACTIVE | Noted: 2020-03-11

## 2020-03-11 LAB
ABO/RH: NORMAL
ANTIBODY SCREEN: NORMAL
GLUCOSE BLD-MCNC: 203 MG/DL (ref 70–99)
GLUCOSE BLD-MCNC: 83 MG/DL (ref 70–99)
MRSA SCREEN RT-PCR: NORMAL
PERFORMED ON: ABNORMAL
PERFORMED ON: NORMAL

## 2020-03-11 PROCEDURE — 3700000001 HC ADD 15 MINUTES (ANESTHESIA): Performed by: ORTHOPAEDIC SURGERY

## 2020-03-11 PROCEDURE — 6370000000 HC RX 637 (ALT 250 FOR IP): Performed by: ORTHOPAEDIC SURGERY

## 2020-03-11 PROCEDURE — 2580000003 HC RX 258: Performed by: ANESTHESIOLOGY

## 2020-03-11 PROCEDURE — 6360000002 HC RX W HCPCS: Performed by: ORTHOPAEDIC SURGERY

## 2020-03-11 PROCEDURE — 97116 GAIT TRAINING THERAPY: CPT

## 2020-03-11 PROCEDURE — C1713 ANCHOR/SCREW BN/BN,TIS/BN: HCPCS | Performed by: ORTHOPAEDIC SURGERY

## 2020-03-11 PROCEDURE — 2709999900 HC NON-CHARGEABLE SUPPLY: Performed by: ORTHOPAEDIC SURGERY

## 2020-03-11 PROCEDURE — C1776 JOINT DEVICE (IMPLANTABLE): HCPCS | Performed by: ORTHOPAEDIC SURGERY

## 2020-03-11 PROCEDURE — 86850 RBC ANTIBODY SCREEN: CPT

## 2020-03-11 PROCEDURE — 3700000000 HC ANESTHESIA ATTENDED CARE: Performed by: ORTHOPAEDIC SURGERY

## 2020-03-11 PROCEDURE — 97530 THERAPEUTIC ACTIVITIES: CPT

## 2020-03-11 PROCEDURE — 6360000002 HC RX W HCPCS: Performed by: NURSE ANESTHETIST, CERTIFIED REGISTERED

## 2020-03-11 PROCEDURE — 2580000003 HC RX 258: Performed by: ORTHOPAEDIC SURGERY

## 2020-03-11 PROCEDURE — 73560 X-RAY EXAM OF KNEE 1 OR 2: CPT

## 2020-03-11 PROCEDURE — 7100000001 HC PACU RECOVERY - ADDTL 15 MIN: Performed by: ORTHOPAEDIC SURGERY

## 2020-03-11 PROCEDURE — 0SRD0JA REPLACEMENT OF LEFT KNEE JOINT WITH SYNTHETIC SUBSTITUTE, UNCEMENTED, OPEN APPROACH: ICD-10-PCS | Performed by: ORTHOPAEDIC SURGERY

## 2020-03-11 PROCEDURE — 88305 TISSUE EXAM BY PATHOLOGIST: CPT

## 2020-03-11 PROCEDURE — 97161 PT EVAL LOW COMPLEX 20 MIN: CPT

## 2020-03-11 PROCEDURE — 86900 BLOOD TYPING SEROLOGIC ABO: CPT

## 2020-03-11 PROCEDURE — 88311 DECALCIFY TISSUE: CPT

## 2020-03-11 PROCEDURE — 2500000003 HC RX 250 WO HCPCS: Performed by: ORTHOPAEDIC SURGERY

## 2020-03-11 PROCEDURE — 87641 MR-STAPH DNA AMP PROBE: CPT

## 2020-03-11 PROCEDURE — 1200000000 HC SEMI PRIVATE

## 2020-03-11 PROCEDURE — 8E0Y0CZ ROBOTIC ASSISTED PROCEDURE OF LOWER EXTREMITY, OPEN APPROACH: ICD-10-PCS | Performed by: ORTHOPAEDIC SURGERY

## 2020-03-11 PROCEDURE — 3600000005 HC SURGERY LEVEL 5 BASE: Performed by: ORTHOPAEDIC SURGERY

## 2020-03-11 PROCEDURE — 2500000003 HC RX 250 WO HCPCS: Performed by: NURSE ANESTHETIST, CERTIFIED REGISTERED

## 2020-03-11 PROCEDURE — 3600000015 HC SURGERY LEVEL 5 ADDTL 15MIN: Performed by: ORTHOPAEDIC SURGERY

## 2020-03-11 PROCEDURE — 86901 BLOOD TYPING SEROLOGIC RH(D): CPT

## 2020-03-11 PROCEDURE — 7100000000 HC PACU RECOVERY - FIRST 15 MIN: Performed by: ORTHOPAEDIC SURGERY

## 2020-03-11 PROCEDURE — 2720000010 HC SURG SUPPLY STERILE: Performed by: ORTHOPAEDIC SURGERY

## 2020-03-11 DEVICE — IMPLANT PAT DIA36MM THK10MM X3 SYMMETRIC TRIATHLON: Type: IMPLANTABLE DEVICE | Site: KNEE | Status: FUNCTIONAL

## 2020-03-11 DEVICE — IMPLANTABLE DEVICE: Type: IMPLANTABLE DEVICE | Site: KNEE | Status: FUNCTIONAL

## 2020-03-11 DEVICE — CEMENT BNE 40GM HI VISC RADPQ FOR REV SURG: Type: IMPLANTABLE DEVICE | Site: KNEE | Status: FUNCTIONAL

## 2020-03-11 DEVICE — INSERT TIB CS 5 12 MM ARTC POST KNEE BEAR TECHNOLOGY X3: Type: IMPLANTABLE DEVICE | Site: KNEE | Status: FUNCTIONAL

## 2020-03-11 DEVICE — BASEPLATE TIB SZ 5 KNEE TRITANIUM CEM PRI LO PROF TRIATHLON: Type: IMPLANTABLE DEVICE | Site: KNEE | Status: FUNCTIONAL

## 2020-03-11 RX ORDER — LIDOCAINE HYDROCHLORIDE 20 MG/ML
INJECTION, SOLUTION EPIDURAL; INFILTRATION; INTRACAUDAL; PERINEURAL PRN
Status: DISCONTINUED | OUTPATIENT
Start: 2020-03-11 | End: 2020-03-11 | Stop reason: SDUPTHER

## 2020-03-11 RX ORDER — ATORVASTATIN CALCIUM 80 MG/1
80 TABLET, FILM COATED ORAL NIGHTLY
Status: DISCONTINUED | OUTPATIENT
Start: 2020-03-11 | End: 2020-03-12 | Stop reason: HOSPADM

## 2020-03-11 RX ORDER — ONDANSETRON 2 MG/ML
INJECTION INTRAMUSCULAR; INTRAVENOUS PRN
Status: DISCONTINUED | OUTPATIENT
Start: 2020-03-11 | End: 2020-03-11 | Stop reason: SDUPTHER

## 2020-03-11 RX ORDER — OXYCODONE HYDROCHLORIDE AND ACETAMINOPHEN 5; 325 MG/1; MG/1
1 TABLET ORAL PRN
Status: DISCONTINUED | OUTPATIENT
Start: 2020-03-11 | End: 2020-03-11 | Stop reason: HOSPADM

## 2020-03-11 RX ORDER — SODIUM CHLORIDE 0.9 % (FLUSH) 0.9 %
10 SYRINGE (ML) INJECTION PRN
Status: DISCONTINUED | OUTPATIENT
Start: 2020-03-11 | End: 2020-03-12 | Stop reason: HOSPADM

## 2020-03-11 RX ORDER — DEXTROSE MONOHYDRATE 50 MG/ML
100 INJECTION, SOLUTION INTRAVENOUS PRN
Status: DISCONTINUED | OUTPATIENT
Start: 2020-03-11 | End: 2020-03-12 | Stop reason: HOSPADM

## 2020-03-11 RX ORDER — ACETAMINOPHEN 325 MG/1
650 TABLET ORAL EVERY 4 HOURS PRN
Status: DISCONTINUED | OUTPATIENT
Start: 2020-03-11 | End: 2020-03-12 | Stop reason: HOSPADM

## 2020-03-11 RX ORDER — SODIUM CHLORIDE 0.9 % (FLUSH) 0.9 %
10 SYRINGE (ML) INJECTION PRN
Status: DISCONTINUED | OUTPATIENT
Start: 2020-03-11 | End: 2020-03-11 | Stop reason: HOSPADM

## 2020-03-11 RX ORDER — BUPIVACAINE HYDROCHLORIDE 7.5 MG/ML
INJECTION, SOLUTION INTRASPINAL PRN
Status: DISCONTINUED | OUTPATIENT
Start: 2020-03-11 | End: 2020-03-11 | Stop reason: SDUPTHER

## 2020-03-11 RX ORDER — PROPOFOL 10 MG/ML
INJECTION, EMULSION INTRAVENOUS CONTINUOUS PRN
Status: DISCONTINUED | OUTPATIENT
Start: 2020-03-11 | End: 2020-03-11 | Stop reason: SDUPTHER

## 2020-03-11 RX ORDER — SENNA AND DOCUSATE SODIUM 50; 8.6 MG/1; MG/1
1 TABLET, FILM COATED ORAL 2 TIMES DAILY
Status: DISCONTINUED | OUTPATIENT
Start: 2020-03-11 | End: 2020-03-12 | Stop reason: HOSPADM

## 2020-03-11 RX ORDER — ESOMEPRAZOLE MAGNESIUM 20 MG/1
20 FOR SUSPENSION ORAL
Status: DISCONTINUED | OUTPATIENT
Start: 2020-03-11 | End: 2020-03-11 | Stop reason: CLARIF

## 2020-03-11 RX ORDER — FENTANYL CITRATE 50 UG/ML
INJECTION, SOLUTION INTRAMUSCULAR; INTRAVENOUS PRN
Status: DISCONTINUED | OUTPATIENT
Start: 2020-03-11 | End: 2020-03-11 | Stop reason: SDUPTHER

## 2020-03-11 RX ORDER — OXYCODONE HYDROCHLORIDE AND ACETAMINOPHEN 5; 325 MG/1; MG/1
2 TABLET ORAL PRN
Status: DISCONTINUED | OUTPATIENT
Start: 2020-03-11 | End: 2020-03-11 | Stop reason: HOSPADM

## 2020-03-11 RX ORDER — FENTANYL CITRATE 50 UG/ML
25 INJECTION, SOLUTION INTRAMUSCULAR; INTRAVENOUS EVERY 5 MIN PRN
Status: DISCONTINUED | OUTPATIENT
Start: 2020-03-11 | End: 2020-03-11 | Stop reason: HOSPADM

## 2020-03-11 RX ORDER — ESCITALOPRAM OXALATE 10 MG/1
20 TABLET ORAL DAILY
Status: DISCONTINUED | OUTPATIENT
Start: 2020-03-12 | End: 2020-03-12 | Stop reason: HOSPADM

## 2020-03-11 RX ORDER — DEXAMETHASONE SODIUM PHOSPHATE 4 MG/ML
INJECTION, SOLUTION INTRA-ARTICULAR; INTRALESIONAL; INTRAMUSCULAR; INTRAVENOUS; SOFT TISSUE PRN
Status: DISCONTINUED | OUTPATIENT
Start: 2020-03-11 | End: 2020-03-11 | Stop reason: SDUPTHER

## 2020-03-11 RX ORDER — CELECOXIB 200 MG/1
200 CAPSULE ORAL ONCE
Status: COMPLETED | OUTPATIENT
Start: 2020-03-11 | End: 2020-03-11

## 2020-03-11 RX ORDER — SODIUM CHLORIDE 450 MG/100ML
INJECTION, SOLUTION INTRAVENOUS CONTINUOUS
Status: DISCONTINUED | OUTPATIENT
Start: 2020-03-11 | End: 2020-03-12 | Stop reason: HOSPADM

## 2020-03-11 RX ORDER — DEXTROSE MONOHYDRATE 25 G/50ML
12.5 INJECTION, SOLUTION INTRAVENOUS PRN
Status: DISCONTINUED | OUTPATIENT
Start: 2020-03-11 | End: 2020-03-12 | Stop reason: HOSPADM

## 2020-03-11 RX ORDER — OXYCODONE HYDROCHLORIDE 5 MG/1
10 TABLET ORAL ONCE
Status: COMPLETED | OUTPATIENT
Start: 2020-03-11 | End: 2020-03-11

## 2020-03-11 RX ORDER — CLINDAMYCIN PHOSPHATE 900 MG/50ML
900 INJECTION INTRAVENOUS
Status: COMPLETED | OUTPATIENT
Start: 2020-03-11 | End: 2020-03-11

## 2020-03-11 RX ORDER — TRANEXAMIC ACID 100 MG/ML
INJECTION, SOLUTION INTRAVENOUS
Status: COMPLETED | OUTPATIENT
Start: 2020-03-11 | End: 2020-03-11

## 2020-03-11 RX ORDER — OXYCODONE HYDROCHLORIDE 5 MG/1
5 TABLET ORAL EVERY 4 HOURS PRN
Status: DISCONTINUED | OUTPATIENT
Start: 2020-03-11 | End: 2020-03-12 | Stop reason: HOSPADM

## 2020-03-11 RX ORDER — EZETIMIBE 10 MG/1
10 TABLET ORAL NIGHTLY
Status: DISCONTINUED | OUTPATIENT
Start: 2020-03-11 | End: 2020-03-12 | Stop reason: HOSPADM

## 2020-03-11 RX ORDER — PANTOPRAZOLE SODIUM 40 MG/1
40 TABLET, DELAYED RELEASE ORAL
Status: DISCONTINUED | OUTPATIENT
Start: 2020-03-11 | End: 2020-03-12 | Stop reason: HOSPADM

## 2020-03-11 RX ORDER — POLYETHYLENE GLYCOL 3350 17 G/17G
17 POWDER, FOR SOLUTION ORAL DAILY PRN
Status: DISCONTINUED | OUTPATIENT
Start: 2020-03-11 | End: 2020-03-12 | Stop reason: HOSPADM

## 2020-03-11 RX ORDER — SODIUM CHLORIDE 0.9 % (FLUSH) 0.9 %
10 SYRINGE (ML) INJECTION EVERY 12 HOURS SCHEDULED
Status: DISCONTINUED | OUTPATIENT
Start: 2020-03-11 | End: 2020-03-12 | Stop reason: HOSPADM

## 2020-03-11 RX ORDER — EPHEDRINE SULFATE/0.9% NACL/PF 50 MG/5 ML
SYRINGE (ML) INTRAVENOUS PRN
Status: DISCONTINUED | OUTPATIENT
Start: 2020-03-11 | End: 2020-03-11 | Stop reason: SDUPTHER

## 2020-03-11 RX ORDER — ONDANSETRON 2 MG/ML
4 INJECTION INTRAMUSCULAR; INTRAVENOUS
Status: DISCONTINUED | OUTPATIENT
Start: 2020-03-11 | End: 2020-03-11 | Stop reason: HOSPADM

## 2020-03-11 RX ORDER — OXYCODONE HYDROCHLORIDE 10 MG/1
10 TABLET ORAL EVERY 4 HOURS PRN
Status: DISCONTINUED | OUTPATIENT
Start: 2020-03-11 | End: 2020-03-12 | Stop reason: HOSPADM

## 2020-03-11 RX ORDER — ONDANSETRON 2 MG/ML
4 INJECTION INTRAMUSCULAR; INTRAVENOUS EVERY 6 HOURS PRN
Status: DISCONTINUED | OUTPATIENT
Start: 2020-03-11 | End: 2020-03-12 | Stop reason: HOSPADM

## 2020-03-11 RX ORDER — DOCUSATE SODIUM 100 MG/1
100 CAPSULE, LIQUID FILLED ORAL 2 TIMES DAILY PRN
Status: DISCONTINUED | OUTPATIENT
Start: 2020-03-11 | End: 2020-03-12 | Stop reason: HOSPADM

## 2020-03-11 RX ORDER — LEVOTHYROXINE SODIUM 0.05 MG/1
50 TABLET ORAL DAILY
Status: DISCONTINUED | OUTPATIENT
Start: 2020-03-12 | End: 2020-03-12 | Stop reason: HOSPADM

## 2020-03-11 RX ORDER — SODIUM CHLORIDE 0.9 % (FLUSH) 0.9 %
10 SYRINGE (ML) INJECTION EVERY 12 HOURS SCHEDULED
Status: DISCONTINUED | OUTPATIENT
Start: 2020-03-11 | End: 2020-03-11 | Stop reason: HOSPADM

## 2020-03-11 RX ORDER — PROPOFOL 10 MG/ML
INJECTION, EMULSION INTRAVENOUS PRN
Status: DISCONTINUED | OUTPATIENT
Start: 2020-03-11 | End: 2020-03-11 | Stop reason: SDUPTHER

## 2020-03-11 RX ORDER — INSULIN GLARGINE 100 [IU]/ML
0.25 INJECTION, SOLUTION SUBCUTANEOUS NIGHTLY
Status: DISCONTINUED | OUTPATIENT
Start: 2020-03-11 | End: 2020-03-11

## 2020-03-11 RX ORDER — VANCOMYCIN HYDROCHLORIDE 1 G/20ML
INJECTION, POWDER, LYOPHILIZED, FOR SOLUTION INTRAVENOUS
Status: COMPLETED | OUTPATIENT
Start: 2020-03-11 | End: 2020-03-11

## 2020-03-11 RX ORDER — NICOTINE POLACRILEX 4 MG
15 LOZENGE BUCCAL PRN
Status: DISCONTINUED | OUTPATIENT
Start: 2020-03-11 | End: 2020-03-12 | Stop reason: HOSPADM

## 2020-03-11 RX ORDER — CEFAZOLIN SODIUM 1 G/3ML
INJECTION, POWDER, FOR SOLUTION INTRAMUSCULAR; INTRAVENOUS PRN
Status: DISCONTINUED | OUTPATIENT
Start: 2020-03-11 | End: 2020-03-11 | Stop reason: SDUPTHER

## 2020-03-11 RX ORDER — LISINOPRIL 10 MG/1
10 TABLET ORAL NIGHTLY
Status: DISCONTINUED | OUTPATIENT
Start: 2020-03-11 | End: 2020-03-12 | Stop reason: HOSPADM

## 2020-03-11 RX ORDER — SODIUM CHLORIDE 9 MG/ML
INJECTION, SOLUTION INTRAVENOUS CONTINUOUS
Status: DISCONTINUED | OUTPATIENT
Start: 2020-03-11 | End: 2020-03-11

## 2020-03-11 RX ORDER — MORPHINE SULFATE 2 MG/ML
2 INJECTION, SOLUTION INTRAMUSCULAR; INTRAVENOUS
Status: DISCONTINUED | OUTPATIENT
Start: 2020-03-11 | End: 2020-03-12 | Stop reason: HOSPADM

## 2020-03-11 RX ADMIN — LIDOCAINE HYDROCHLORIDE 100 MG: 20 INJECTION, SOLUTION EPIDURAL; INFILTRATION; INTRACAUDAL; PERINEURAL at 11:19

## 2020-03-11 RX ADMIN — EZETIMIBE 10 MG: 10 TABLET ORAL at 21:45

## 2020-03-11 RX ADMIN — PROPOFOL 150 MCG/KG/MIN: 10 INJECTION, EMULSION INTRAVENOUS at 11:19

## 2020-03-11 RX ADMIN — CLINDAMYCIN PHOSPHATE 900 MG: 18 INJECTION, SOLUTION INTRAMUSCULAR; INTRAVENOUS at 11:08

## 2020-03-11 RX ADMIN — Medication 10 MG: at 11:45

## 2020-03-11 RX ADMIN — CELECOXIB 200 MG: 200 CAPSULE ORAL at 09:30

## 2020-03-11 RX ADMIN — Medication 1500 MG: at 09:30

## 2020-03-11 RX ADMIN — FENTANYL CITRATE 35 MCG: 50 INJECTION INTRAMUSCULAR; INTRAVENOUS at 11:49

## 2020-03-11 RX ADMIN — OXYCODONE HYDROCHLORIDE 10 MG: 10 TABLET ORAL at 16:30

## 2020-03-11 RX ADMIN — CEFAZOLIN SODIUM 2 MG: 1 INJECTION, POWDER, FOR SOLUTION INTRAMUSCULAR; INTRAVENOUS at 11:25

## 2020-03-11 RX ADMIN — DEXAMETHASONE SODIUM PHOSPHATE 4 MG: 4 INJECTION, SOLUTION INTRAMUSCULAR; INTRAVENOUS at 11:20

## 2020-03-11 RX ADMIN — ONDANSETRON 4 MG: 2 INJECTION INTRAMUSCULAR; INTRAVENOUS at 12:19

## 2020-03-11 RX ADMIN — SODIUM CHLORIDE: 4.5 INJECTION, SOLUTION INTRAVENOUS at 16:31

## 2020-03-11 RX ADMIN — PANTOPRAZOLE SODIUM 40 MG: 40 TABLET, DELAYED RELEASE ORAL at 16:30

## 2020-03-11 RX ADMIN — Medication 5 MG: at 12:06

## 2020-03-11 RX ADMIN — Medication 15 MG: at 11:26

## 2020-03-11 RX ADMIN — PROPOFOL 100 MG: 10 INJECTION, EMULSION INTRAVENOUS at 11:19

## 2020-03-11 RX ADMIN — FENTANYL CITRATE 25 MCG: 50 INJECTION INTRAMUSCULAR; INTRAVENOUS at 11:32

## 2020-03-11 RX ADMIN — OXYCODONE 10 MG: 5 TABLET ORAL at 09:30

## 2020-03-11 RX ADMIN — Medication 10 MG: at 12:29

## 2020-03-11 RX ADMIN — BUPIVACAINE HYDROCHLORIDE IN DEXTROSE 1.5 ML: 7.5 INJECTION, SOLUTION SUBARACHNOID at 11:16

## 2020-03-11 RX ADMIN — SENNOSIDES AND DOCUSATE SODIUM 1 TABLET: 8.6; 5 TABLET ORAL at 21:45

## 2020-03-11 RX ADMIN — FENTANYL CITRATE 25 MCG: 50 INJECTION INTRAMUSCULAR; INTRAVENOUS at 11:44

## 2020-03-11 RX ADMIN — INSULIN LISPRO 1 UNITS: 100 INJECTION, SOLUTION INTRAVENOUS; SUBCUTANEOUS at 21:46

## 2020-03-11 RX ADMIN — Medication 10 MG: at 11:55

## 2020-03-11 RX ADMIN — LISINOPRIL 10 MG: 10 TABLET ORAL at 21:45

## 2020-03-11 RX ADMIN — CEFAZOLIN 2 G: 10 INJECTION, POWDER, FOR SOLUTION INTRAVENOUS; PARENTERAL at 19:40

## 2020-03-11 RX ADMIN — ONDANSETRON 4 MG: 2 INJECTION INTRAMUSCULAR; INTRAVENOUS at 16:56

## 2020-03-11 RX ADMIN — MUPIROCIN: 20 OINTMENT TOPICAL at 21:49

## 2020-03-11 RX ADMIN — SODIUM CHLORIDE: 9 INJECTION, SOLUTION INTRAVENOUS at 09:30

## 2020-03-11 RX ADMIN — ATORVASTATIN CALCIUM 80 MG: 80 TABLET, FILM COATED ORAL at 21:45

## 2020-03-11 RX ADMIN — FENTANYL CITRATE 15 MCG: 50 INJECTION INTRAMUSCULAR; INTRAVENOUS at 11:16

## 2020-03-11 ASSESSMENT — PULMONARY FUNCTION TESTS
PIF_VALUE: 0
PIF_VALUE: 0
PIF_VALUE: 1
PIF_VALUE: 0
PIF_VALUE: 1
PIF_VALUE: 1
PIF_VALUE: 0
PIF_VALUE: 1
PIF_VALUE: 0
PIF_VALUE: 1
PIF_VALUE: 1
PIF_VALUE: 0
PIF_VALUE: 0
PIF_VALUE: 1
PIF_VALUE: 0
PIF_VALUE: 1
PIF_VALUE: 0
PIF_VALUE: 1
PIF_VALUE: 1
PIF_VALUE: 0
PIF_VALUE: 1
PIF_VALUE: 0
PIF_VALUE: 0
PIF_VALUE: 1
PIF_VALUE: 1
PIF_VALUE: 0
PIF_VALUE: 1
PIF_VALUE: 0
PIF_VALUE: 1
PIF_VALUE: 0
PIF_VALUE: 1
PIF_VALUE: 0
PIF_VALUE: 1
PIF_VALUE: 0
PIF_VALUE: 1
PIF_VALUE: 1
PIF_VALUE: 0
PIF_VALUE: 1
PIF_VALUE: 0
PIF_VALUE: 0
PIF_VALUE: 1
PIF_VALUE: 0
PIF_VALUE: 1
PIF_VALUE: 1
PIF_VALUE: 0
PIF_VALUE: 1
PIF_VALUE: 0
PIF_VALUE: 1
PIF_VALUE: 0
PIF_VALUE: 1
PIF_VALUE: 0
PIF_VALUE: 1

## 2020-03-11 ASSESSMENT — PAIN SCALES - GENERAL
PAINLEVEL_OUTOF10: 7
PAINLEVEL_OUTOF10: 0
PAINLEVEL_OUTOF10: 6
PAINLEVEL_OUTOF10: 0
PAINLEVEL_OUTOF10: 6

## 2020-03-11 ASSESSMENT — PAIN DESCRIPTION - FREQUENCY
FREQUENCY: CONTINUOUS

## 2020-03-11 ASSESSMENT — PAIN DESCRIPTION - ORIENTATION
ORIENTATION: LEFT
ORIENTATION: LEFT
ORIENTATION: POSTERIOR

## 2020-03-11 ASSESSMENT — PAIN DESCRIPTION - DESCRIPTORS
DESCRIPTORS: ACHING;CONSTANT
DESCRIPTORS: CONSTANT
DESCRIPTORS: ACHING;CONSTANT

## 2020-03-11 ASSESSMENT — PAIN DESCRIPTION - LOCATION
LOCATION: KNEE
LOCATION: KNEE
LOCATION: NECK

## 2020-03-11 ASSESSMENT — PAIN DESCRIPTION - PAIN TYPE
TYPE: SURGICAL PAIN
TYPE: SURGICAL PAIN

## 2020-03-11 ASSESSMENT — PAIN DESCRIPTION - ONSET
ONSET: ON-GOING
ONSET: ON-GOING

## 2020-03-11 ASSESSMENT — ENCOUNTER SYMPTOMS: SHORTNESS OF BREATH: 0

## 2020-03-11 ASSESSMENT — PAIN DESCRIPTION - PROGRESSION
CLINICAL_PROGRESSION: NOT CHANGED
CLINICAL_PROGRESSION: NOT CHANGED

## 2020-03-11 ASSESSMENT — PAIN - FUNCTIONAL ASSESSMENT: PAIN_FUNCTIONAL_ASSESSMENT: 0-10

## 2020-03-11 NOTE — PROGRESS NOTES
Pt arrived to floor from PACU at 1530 via bed. Pt oriented to room, call light, policies and procedures, the menu and ordering. Call light within reach. Bed in lowest position, bed alarm on, and wheels locked. Pt verbalized understanding. No complaints, questions or concerns at this time.  Electronically signed by Chrissie Sotelo RN on 3/11/2020 at 5:03 PM

## 2020-03-11 NOTE — ANESTHESIA PRE PROCEDURE
Department of Anesthesiology  Preprocedure Note       Name:  Sonia Bradley   Age:  67 y.o.  :  1940                                          MRN:  [id number]         Date:  3/11/2020      Surgeon: Rosenane Ceja):  Crystal Gama MD    Procedure: ROBOTIC ASSISTED LEFT TOTAL KNEE REPLACEMENT (Left )    Medications prior to admission:   Prior to Admission medications    Medication Sig Start Date End Date Taking? Authorizing Provider   polyethylene glycol (GLYCOLAX) packet Take 17 g by mouth daily as needed for Constipation    Historical Provider, MD   atorvastatin (LIPITOR) 80 MG tablet TAKE 1 TABLET BY MOUTH DAILY  Patient taking differently: nightly  3/3/20   Destiney Hughes MD   ezetimibe (Lidia Gaston) 10 MG tablet TAKE 1 TABLET BY MOUTH DAILY  Patient taking differently: nightly  3/3/20   Destiney Hughes MD   lisinopril (PRINIVIL;ZESTRIL) 10 MG tablet TAKE 1 TABLET BY MOUTH DAILY  Patient taking differently: Take 10 mg by mouth nightly  20   Destiney Hughes MD   mupirocin [facility] NASAL) 2 % nasal ointment Take by Nasal route 2 times daily.  20   Crystal Gama MD   escitalopram (LEXAPRO) 20 MG tablet TAKE 1 TABLET BY MOUTH DAILY  Patient taking differently: Take 10 mg by mouth 2 times daily  20   Destiney Hughes MD   levothyroxine (SYNTHROID) 50 MCG tablet Take 1 tablet by mouth daily 19   Destiney Hughes MD   Cholecalciferol (VITAMIN D) 2000 units CAPS capsule Take 1 capsule by mouth daily    Historical Provider, MD   Magnesium 500 MG CAPS Take 1 capsule by mouth nightly     Historical Provider, MD   aspirin 81 MG EC tablet Take 1 tablet by mouth daily 16   Teddy García MD   Misc Natural Products (GLUCOSAMINE CHONDROITIN MSM PO) Take 1 tablet by mouth daily     Historical Provider, MD   docusate sodium (COLACE, DULCOLAX) 100 MG CAPS Take 100 mg by mouth 2 times daily as needed for Constipation 16   Angel Phillips, APRN - CNP   esomeprazole Magnesium (NEXIUM) 20 MG PACK Take 20 mg by mouth 2 times daily (before meals)     Historical Provider, MD       Current medications:    No current facility-administered medications for this visit. No current outpatient medications on file. Facility-Administered Medications Ordered in Other Visits   Medication Dose Route Frequency Provider Last Rate Last Dose    0.9 % sodium chloride infusion   Intravenous Continuous Stacey Quevedo MD        sodium chloride flush 0.9 % injection 10 mL  10 mL Intravenous 2 times per day Stacey Quevedo MD        sodium chloride flush 0.9 % injection 10 mL  10 mL Intravenous PRN Stacey Quevedo MD        celecoxib (CELEBREX) capsule 200 mg  200 mg Oral Once Jenni Mchugh MD        clindamycin (CLEOCIN) 900 mg in dextrose 5 % 50 mL IVPB  900 mg Intravenous On Call to OR Jenni Mchugh MD        oxyCODONE (ROXICODONE) immediate release tablet 10 mg  10 mg Oral Once Jenni Mchugh MD        vancomycin (VANCOCIN) 1500 mg in dextrose 5 % 250 mL IVPB  1,500 mg Intravenous On Call to 73 Tonye Eleazar Adams MD           Allergies: Allergies   Allergen Reactions    Demerol Hcl [Meperidine]      Uncontrollable shaking    Pcn [Penicillins]      Unknown.  As child @ 2years old \"almost killed me\"    Tramadol Nausea And Vomiting       Problem List:    Patient Active Problem List   Diagnosis Code    Chest pain on exertion R07.9    Chest pain R07.9    Essential hypertension I10    S/P CABG (coronary artery bypass graft) Z95.1    Hyperlipidemia LDL goal <70 E78.5    Reactive depression F32.9    Prostate carcinoma ([facility] [address] C61    Vitamin D deficiency E55.9    Left hip pain M25.552    Chronic left-sided low back pain without sciatica M54.5, G89.29    Coronary artery disease I25.10    Edema of right lower extremity R60.0    Greater trochanteric bursitis of left hip M70.62    Acute pain of right knee M25.561    Primary osteoarthritis of right knee M17.11    Primary osteoarthritis of left knee M17.12    Chronic fatigue R53.82    Acquired hypothyroidism E03.9    Gastroesophageal reflux disease without esophagitis K21.9    Preop examination Z01.818       Past Medical History:        Diagnosis Date    Anxiety     Cancer ([address] 11/2016    Prostates CA    Coronary artery disease 12/28/15    multi vessel CAD    GERD (gastroesophageal reflux disease)     History of blood transfusion     s/p left nephrectomy age 2years old    Hyperlipidemia LDL goal <70     Hypertension     IBS (irritable bowel syndrome)     Migraine     Primary osteoarthritis of right knee 10/5/2018    Reactive depression 6/8/2016    Shingles 2012    torso, top of head    Thyroid disease     Wears dentures        Past Surgical History:        Procedure Laterality Date    APPENDECTOMY      CARPAL TUNNEL RELEASE Bilateral     COLONOSCOPY N/A 10/8/2019    COLONOSCOPY POLYPECTOMY SNARE/COLD BIOPSY performed by Pradeep Pineda MD at [address]  2015    x`s 4 vessel    ENDOSCOPY, COLON, DIAGNOSTIC      EGD with dilatation    EYE SURGERY Bilateral     catacts, bilateral and repeat    HEMORRHOID SURGERY      KIDNEY REMOVAL  @ 1944    pt thinks left kidney for disease    SHOULDER SURGERY Left     rotator cuff       Social History:    Social History     Tobacco Use    Smoking status: Never Smoker    Smokeless tobacco: Never Used   Substance Use Topics    Alcohol use: No                                Counseling given: Not Answered      Vital Signs (Current): There were no vitals filed for this visit.                                            BP Readings from Last 3 Encounters:   03/11/20 (!) 139/102   03/06/20 122/70   02/26/20 120/66       NPO Status:                                                                                 BMI:   Wt Readings from Last 3 Encounters:   03/11/20 188 lb 15 oz (85.7 kg)   03/06/20 192 lb (87.1 kg)   02/26/20 191 lb (86.6 kg)     There is no height or weight on file to calculate BMI.    CBC:   Lab Results   Component Value Date    WBC 5.9 02/25/2020    RBC 4.89 02/25/2020    HGB 15.0 02/25/2020    HCT 44.2 02/25/2020    MCV 90.2 02/25/2020    RDW 13.8 02/25/2020     02/25/2020       CMP:   Lab Results   Component Value Date     03/06/2020    K 4.3 03/06/2020     03/06/2020    CO2 24 03/06/2020    BUN 21 03/06/2020    CREATININE 1.3 03/06/2020    GFRAA >60 03/06/2020    LABGLOM 53 03/06/2020    GLUCOSE 92 03/06/2020    PROT 6.6 11/21/2019    CALCIUM 8.9 03/06/2020    BILITOT 0.5 11/21/2019    ALKPHOS 94 11/21/2019    AST 19 11/21/2019    ALT 16 11/21/2019       POC Tests: No results for input(s): POCGLU, POCNA, POCK, POCCL, POCBUN, POCHEMO, POCHCT in the last 72 hours.     Coags:   Lab Results   Component Value Date    PROTIME 11.5 02/25/2020    INR 0.99 02/25/2020    APTT 32.4 02/25/2020       HCG (If Applicable): No results found for: Bryce Constantino, HCG, HCGQUANT     ABGs:   Lab Results   Component Value Date    PHART 7.416 12/30/2015    PO2ART 92 12/30/2015    YFB3TFF 38 12/30/2015    VRT5CPZ 24.7 12/30/2015    BEART 0 12/30/2015    H5YVPAGD 97 12/30/2015        Type & Screen (If Applicable):  No results found for: [facility]    Anesthesia Evaluation  Patient summary reviewed no history of anesthetic complications:   Airway: Mallampati: II  TM distance: >3 FB   Neck ROM: full  Mouth opening: > = 3 FB Dental:    (+) upper dentures and lower dentures      Pulmonary: breath sounds clear to auscultation      (-) COPD, asthma, shortness of breath, recent URI and sleep apnea                           Cardiovascular:    (+) hypertension:, CAD:, CABG/stent:, hyperlipidemia    (-) murmur      Rhythm: regular  Rate: normal                    Neuro/Psych:   (+) headaches:, psychiatric history:depression/anxiety    (-) seizures, neuromuscular disease and TIA           GI/Hepatic/Renal:   (+) GERD: well controlled, renal disease (one kidney):

## 2020-03-11 NOTE — PROGRESS NOTES
Static: Fair(with RW)  Standing - Dynamic: Fair(with RW)      Plan   Plan  Times per week: 2-3  Current Treatment Recommendations: Functional Mobility Training, Transfer Training, Stair training, Safety Education & Training, Gait Training, Pain Management  Safety Devices  Type of devices: Nurse notified, Chair alarm in place, Patient at risk for falls, Call light within reach, Gait belt, Left in chair    AM-PAC Score  AM-PAC Inpatient Mobility Raw Score : 19 (03/11/20 1719)  AM-PAC Inpatient T-Scale Score : 45.44 (03/11/20 1719)  Mobility Inpatient CMS 0-100% Score: 41.77 (03/11/20 1719)  Mobility Inpatient CMS G-Code Modifier : CK (03/11/20 1719)     Goals  Short term goals  Time Frame for Short term goals: until d/c   Short term goal 1: Pt will perform all bed mobility with supervision  Short term goal 2: Pt will perform all transfers with SBA with RW  Short term goal 3: Pt will ambulate 48' with SBA with RW  Patient Goals   Patient goals : \"to return home\"      Therapy Time   Individual Concurrent Group Co-treatment   Time In 1630         Time Out 1710         Minutes 40            [address] [facility], Student Physical Therapy  I attest that I was present for and made a skilled & mindful clinical judgement during the evaluation and/or treatment of this patient on 3/11/2020  Electronically signed by Paige Bassett, PT [id number] on 3/11/2020 at 5:56 PM

## 2020-03-11 NOTE — BRIEF OP NOTE
Brief Postoperative Note  ______________________________________________________________    Patient: Zhang Ramirez  YOB: 1940  MRN: [id number]  Date of Procedure: 3/11/2020    Pre-Op Diagnosis: LEFT ARTHRITIS KNEE    Post-Op Diagnosis: Same       Procedure(s):  ROBOTIC ASSISTED LEFT TOTAL KNEE REPLACEMENT    Anesthesia: Monitor Anesthesia Care, Spinal    Surgeon(s):  Millicent Robert MD    Assistant: Francisco    Estimated Blood Loss (mL): less than 50     Complications: None    Specimens:   ID Type Source Tests Collected by Time Destination   A : A) BONE LEFT KNEE Bone Joint, Knee SURGICAL PATHOLOGY Millicent Robert MD 3/11/2020 1147        Implants:  Implant Name Type Inv.  Item Serial No.  Lot No. LRB No. Used   CEMENT BONE R 1X40 US Cement CEMENT BONE R 1X40 US  [employer] [id number] Left 2   IMPL KNEE PATELLA Knee IMPL KNEE PATELLA  CHASITY: ORTHOPAEDICS [id number] Left 1   IMPL KNEE TIB BASEPLT PRIME SZ 5 Knee IMPL KNEE TIB BASEPLT PRIME SZ 5  [employer]: ORTHOPAEDICS [id number] Left 1   IMPL KNEE FEM COMP CMNTD SZ 5 Knee IMPL KNEE FEM COMP CMNTD SZ 5  [employer]: ORTHOPAEDICS [id number] Left 1   IMPL KNEE TIB INSRT POSTR SZ 5 12MM Knee IMPL KNEE TIB INSRT POSTR SZ 5 12MM  [employer]: ORTHOPAEDICS [id number] Left 1         Drains: * No LDAs found *    Findings: OA L knee    Millicent Robert MD  Date: 3/11/2020  Time: 12:28 PM

## 2020-03-11 NOTE — PROGRESS NOTES
4 Eyes Admission Assessment     I agree as the admission nurse that 2 RN's have performed a thorough Head to Toe Skin Assessment on the patient. ALL assessment sites listed below have been assessed on admission. Areas assessed by both nurses:   [x]   Head, Face, and Ears   [x]   Shoulders, Back, and Chest  [x]   Arms, Elbows, and Hands   [x]   Coccyx, Sacrum, and Ischum  [x]   Legs, Feet, and Heels        Does the Patient have Skin Breakdown?   No          Robin Prevention initiated:  NA   Wound Care Orders initiated:  NA      WOC nurse consulted for Pressure Injury (Stage 3,4, Unstageable, DTI, NWPT, and Complex wounds):  NA      Nurse 1 eSignature: Electronically signed by Sandy Crowe RN on 3/11/20 at 4:53 PM EDT    **SHARE this note so that the co-signing nurse is able to place an eSignature**    Nurse 2 eSignature: Electronically signed by Chi Ayala RN on 3/11/20 at 4:54 PM EDT

## 2020-03-11 NOTE — PROGRESS NOTES
Pt alert and oriented, BP elevated. Pt denies pain while resting in bed. Pt's wife at bedside. Pt will remove dentures when OR team comes, belongings and ice bucket will go to PACU. Full ROM and sensation reported in BLE.

## 2020-03-11 NOTE — H&P
Preoperative H&P Update     The patient's History and Physical in the medical record dated 3/11/20 was reviewed by me today. I reviewed the HPI, medications, allergies, reason for surgery, diagnosis and treatment plan and there has been no change. The patient was evaluated by me today. Prior to Visit Medications    Medication Sig Taking? Authorizing Provider   polyethylene glycol (GLYCOLAX) packet Take 17 g by mouth daily as needed for Constipation Yes Historical Provider, MD   atorvastatin (LIPITOR) 80 MG tablet TAKE 1 TABLET BY MOUTH DAILY  Patient taking differently: nightly  Yes Uzair Olivier MD   ezetimibe (ZETIA) 10 MG tablet TAKE 1 TABLET BY MOUTH DAILY  Patient taking differently: nightly  Yes Uzair Olivier MD   lisinopril (PRINIVIL;ZESTRIL) 10 MG tablet TAKE 1 TABLET BY MOUTH DAILY  Patient taking differently: Take 10 mg by mouth nightly  Yes Uzair Olivier MD   mupirocin [facility] NASAL) 2 % nasal ointment Take by Nasal route 2 times daily.  Yes Jalil Vernon MD   escitalopram (LEXAPRO) 20 MG tablet TAKE 1 TABLET BY MOUTH DAILY  Patient taking differently: Take 10 mg by mouth 2 times daily  Yes Uzair Olivier MD   levothyroxine (SYNTHROID) 50 MCG tablet Take 1 tablet by mouth daily Yes Uzair Olivier MD   Cholecalciferol (VITAMIN D) 2000 units CAPS capsule Take 1 capsule by mouth daily Yes Historical Provider, MD   Magnesium 500 MG CAPS Take 1 capsule by mouth nightly  Yes Historical Provider, MD   aspirin 81 MG EC tablet Take 1 tablet by mouth daily Yes Dawna Monteiro MD   Misc Natural Products (GLUCOSAMINE CHONDROITIN MSM PO) Take 1 tablet by mouth daily  Yes Historical Provider, MD   docusate sodium (COLACE, DULCOLAX) 100 MG CAPS Take 100 mg by mouth 2 times daily as needed for Constipation Yes Montana Bae, APRN - CNP   esomeprazole Magnesium (NEXIUM) 20 MG PACK Take 20 mg by mouth 2 times daily (before meals)  Yes Historical Provider, MD        Physical exam findings for this

## 2020-03-12 VITALS
OXYGEN SATURATION: 99 % | DIASTOLIC BLOOD PRESSURE: 59 MMHG | HEART RATE: 60 BPM | WEIGHT: 191.8 LBS | BODY MASS INDEX: 29.07 KG/M2 | RESPIRATION RATE: 16 BRPM | SYSTOLIC BLOOD PRESSURE: 125 MMHG | TEMPERATURE: 98.6 F | HEIGHT: 68 IN

## 2020-03-12 LAB
ESTIMATED AVERAGE GLUCOSE: 102.5 MG/DL
GLUCOSE BLD-MCNC: 116 MG/DL (ref 70–99)
GLUCOSE BLD-MCNC: 132 MG/DL (ref 70–99)
HBA1C MFR BLD: 5.2 %
HCT VFR BLD CALC: 34 % (ref 40.5–52.5)
HEMOGLOBIN: 11.7 G/DL (ref 13.5–17.5)
PERFORMED ON: ABNORMAL
PERFORMED ON: ABNORMAL

## 2020-03-12 PROCEDURE — 85014 HEMATOCRIT: CPT

## 2020-03-12 PROCEDURE — 83036 HEMOGLOBIN GLYCOSYLATED A1C: CPT

## 2020-03-12 PROCEDURE — 36415 COLL VENOUS BLD VENIPUNCTURE: CPT

## 2020-03-12 PROCEDURE — 85018 HEMOGLOBIN: CPT

## 2020-03-12 PROCEDURE — 97116 GAIT TRAINING THERAPY: CPT

## 2020-03-12 PROCEDURE — 6370000000 HC RX 637 (ALT 250 FOR IP): Performed by: ORTHOPAEDIC SURGERY

## 2020-03-12 PROCEDURE — 97535 SELF CARE MNGMENT TRAINING: CPT

## 2020-03-12 PROCEDURE — 94760 N-INVAS EAR/PLS OXIMETRY 1: CPT

## 2020-03-12 PROCEDURE — 6360000002 HC RX W HCPCS: Performed by: ORTHOPAEDIC SURGERY

## 2020-03-12 PROCEDURE — 97530 THERAPEUTIC ACTIVITIES: CPT

## 2020-03-12 PROCEDURE — 2580000003 HC RX 258: Performed by: ORTHOPAEDIC SURGERY

## 2020-03-12 PROCEDURE — 97166 OT EVAL MOD COMPLEX 45 MIN: CPT

## 2020-03-12 PROCEDURE — 99232 SBSQ HOSP IP/OBS MODERATE 35: CPT | Performed by: INTERNAL MEDICINE

## 2020-03-12 PROCEDURE — 97110 THERAPEUTIC EXERCISES: CPT

## 2020-03-12 RX ORDER — ASPIRIN 81 MG/1
81 TABLET ORAL DAILY
Qty: 28 TABLET | Refills: 3 | Status: SHIPPED | OUTPATIENT
Start: 2020-03-12 | End: 2020-09-09

## 2020-03-12 RX ORDER — OXYCODONE HYDROCHLORIDE 5 MG/1
5 TABLET ORAL EVERY 4 HOURS PRN
Qty: 30 TABLET | Refills: 0 | Status: SHIPPED | OUTPATIENT
Start: 2020-03-12 | End: 2020-03-18 | Stop reason: SDUPTHER

## 2020-03-12 RX ADMIN — CEFAZOLIN 2 G: 10 INJECTION, POWDER, FOR SOLUTION INTRAVENOUS; PARENTERAL at 03:14

## 2020-03-12 RX ADMIN — OXYCODONE HYDROCHLORIDE 10 MG: 10 TABLET ORAL at 08:38

## 2020-03-12 RX ADMIN — ENOXAPARIN SODIUM 30 MG: 30 INJECTION SUBCUTANEOUS at 08:37

## 2020-03-12 RX ADMIN — SENNOSIDES AND DOCUSATE SODIUM 1 TABLET: 8.6; 5 TABLET ORAL at 08:38

## 2020-03-12 RX ADMIN — ESCITALOPRAM OXALATE 20 MG: 10 TABLET ORAL at 08:37

## 2020-03-12 RX ADMIN — Medication 10 ML: at 08:39

## 2020-03-12 RX ADMIN — OXYCODONE HYDROCHLORIDE 10 MG: 10 TABLET ORAL at 03:14

## 2020-03-12 RX ADMIN — PANTOPRAZOLE SODIUM 40 MG: 40 TABLET, DELAYED RELEASE ORAL at 06:45

## 2020-03-12 RX ADMIN — LEVOTHYROXINE SODIUM 50 MCG: 50 TABLET ORAL at 06:45

## 2020-03-12 ASSESSMENT — PAIN DESCRIPTION - LOCATION
LOCATION: KNEE

## 2020-03-12 ASSESSMENT — PAIN DESCRIPTION - PAIN TYPE
TYPE: SURGICAL PAIN

## 2020-03-12 ASSESSMENT — PAIN DESCRIPTION - ORIENTATION
ORIENTATION: LEFT

## 2020-03-12 ASSESSMENT — PAIN DESCRIPTION - FREQUENCY
FREQUENCY: INTERMITTENT

## 2020-03-12 ASSESSMENT — PAIN SCALES - GENERAL
PAINLEVEL_OUTOF10: 0
PAINLEVEL_OUTOF10: 3
PAINLEVEL_OUTOF10: 8
PAINLEVEL_OUTOF10: 7

## 2020-03-12 ASSESSMENT — PAIN DESCRIPTION - ONSET
ONSET: PROGRESSIVE
ONSET: ON-GOING
ONSET: ON-GOING

## 2020-03-12 ASSESSMENT — PAIN DESCRIPTION - PROGRESSION
CLINICAL_PROGRESSION: GRADUALLY IMPROVING
CLINICAL_PROGRESSION: GRADUALLY WORSENING

## 2020-03-12 ASSESSMENT — ENCOUNTER SYMPTOMS
SHORTNESS OF BREATH: 0
WHEEZING: 0
TROUBLE SWALLOWING: 0
ABDOMINAL PAIN: 0
NAUSEA: 0
COUGH: 0
VOMITING: 0
CHEST TIGHTNESS: 0

## 2020-03-12 ASSESSMENT — PAIN DESCRIPTION - DESCRIPTORS
DESCRIPTORS: ACHING

## 2020-03-12 ASSESSMENT — PAIN - FUNCTIONAL ASSESSMENT
PAIN_FUNCTIONAL_ASSESSMENT: PREVENTS OR INTERFERES SOME ACTIVE ACTIVITIES AND ADLS
PAIN_FUNCTIONAL_ASSESSMENT: PREVENTS OR INTERFERES SOME ACTIVE ACTIVITIES AND ADLS

## 2020-03-12 NOTE — PROGRESS NOTES
CLINICAL PHARMACY NOTE: MEDS TO tu.nr Select Patient?: No  Total # of Prescriptions Filled: 2   The following medications were delivered to the patient:  Current Discharge Medication List      START taking these medications    Details   oxyCODONE (ROXICODONE) 5 MG immediate release tablet Take 1 tablet by mouth every 4 hours as needed for Pain for up to 7 days.   Qty: 30 tablet, Refills: 0    Comments: Reduce doses taken as pain becomes manageable  Associated Diagnoses: Arthritis of left knee         ·   · Aspirin 81mg  Total # of Interventions Completed: 1  Time Spent (min): 30    Additional Documentation:

## 2020-03-12 NOTE — PROGRESS NOTES
Pt resting quietly in bed with eyes closed, resp easy and unlabored. IVF infusing without difficulty per orders. No s/s of acute distress noted at this time. Appears comfortable. Call light is within reach. Will continue to monitor.

## 2020-03-12 NOTE — PLAN OF CARE
Problem: Falls - Risk of:  Goal: Will remain free from falls  Description: Will remain free from falls  3/12/2020 0739 by Kathy Riggs RN  Outcome: Ongoing  Note: Patient remains free from falls during this shift. Fall precautions remain in place. Patient educated on the need to implement call light use prior to ambulation. Will continue to monitor and assess. Problem: Falls - Risk of:  Goal: Absence of physical injury  Description: Absence of physical injury  3/12/2020 0739 by Kathy Riggs RN  Outcome: Ongoing  Note: Patient remains free from physical harm during this shift. Will continue to monitor and assess patient. Problem: Cardiac:  Goal: Ability to maintain vital signs within normal range will improve  Description: Ability to maintain vital signs within normal range will improve  3/12/2020 0739 by Kathy Riggs RN  Outcome: Ongoing  Note: VSS during this shift. Will continue to monitor and assess      Problem: Discharge Planning:  Goal: Discharged to appropriate level of care  Description: Discharged to appropriate level of care  3/12/2020 0739 by Kathy Riggs RN  Outcome: Ongoing  Note: Patient anticipates d/c home today. Will continue to update patient with information regarding d/c this shift      Problem: Mobility - Impaired:  Goal: Mobility will improve  Description: Mobility will improve  3/12/2020 0739 by Kathy Riggs RN  Outcome: Ongoing  Note: Patient ambulates x1 with a walker during this shift. Will continue to promote ambulation      Problem: Infection - Surgical Site:  Goal: Will show no infection signs and symptoms  Description: Will show no infection signs and symptoms  3/12/2020 0739 by Kathy Riggs RN  Outcome: Ongoing  Note: Patient remains free from new signs and symptoms of infection during this shift. Infection prevention measures are in place. Will continue to monitor for alterations in patient condition throughout the shift.         Problem: Pain - Acute:  Goal: Pain level will decrease  Description: Pain level will decrease  3/12/2020 0739 by Chris Murray RN  Outcome: Ongoing  Note: Pain managed with pharmacologic and non-pharmacologic interventions during this shift. Will continue to monitor and assess for needs and change in patient condition.

## 2020-03-12 NOTE — PROGRESS NOTES
Pt is resting in bed quietly. Assessment completed and charted in flow sheet. PM medication given without any difficulty, see EMAR. All questions and concerns answered. No other needs voiced at this time. Call light is within reach. Will continue to monitor.    Electronically signed by Bonny Morel RN on 3/12/2020 at 6:03 AM

## 2020-03-12 NOTE — PROGRESS NOTES
Tenderness: There is no abdominal tenderness. Musculoskeletal:         General: No swelling. Right lower leg: No edema. Left lower leg: No edema. Lymphadenopathy:      Cervical: No cervical adenopathy. Neurological:      Mental Status: He is alert and oriented to person, place, and time.            CBC:   Recent Labs     03/12/20  0531   HGB 11.7*           Assessment/Plan:    Active Hospital Problems    Diagnosis Date Noted    Arthritis of left knee [M17.12] 03/11/2020    Gastroesophageal reflux disease without esophagitis [K21.9]-no symptoms 12/04/2019    Reactive depression [F32.9]-stable 06/08/2016    Essential hypertension [I10]-controlled     Coronary artery disease [I25.10]-has no anginal symptoms 12/28/2015   Encouraged tot take pain medications  Continue current medications  May be going home today        Electronically signed by Mary He MD on 3/12/2020 at 8:07 AM

## 2020-03-12 NOTE — PROGRESS NOTES
Occupational Therapy   Occupational Therapy Initial Assessment  Date: 3/12/2020   Patient Name: Leigh Smith  MRN: [id number]     : 1940    Date of Service: 3/12/2020    Assessment: Pt is 67 y.o. M who presents with arthritis of L knee. Pt is s/p L TKA and is WBAT. PTA pt lives with wife in one story house with stair lift to enter. Pt reports independence in self-care tasks, shares homemaking responsibilities, and functional mobility with cane. Currently pt presents with ROM/strength in [facility] for self-care tasks and transfers. Pt completed bed mobility with SBA and sit <> stand transfers CGA. Pt completed functional mobility with RW with CGA - tolerated only short distance d/t nausea/sweating. Pt required mod/max A for LB ADLs and SBA for UB ADLs. Anticipate pt to require mod A for LB ADLs. Anticipate pt safe to d/c home with / supervision/assist from wife and home health OT. Discharge Recommendations:  24 hour supervision or assist, Home with Home health OT  OT Equipment Recommendations  Equipment Needed: Yes  Mobility Devices: [place]: [address]: LEVEL 3 SAFETY     - Initial home health evaluation to occur within 24-48 hours, in patient home   - Therapy evaluations in home within 24-48 hours of discharge; including DME and home safety   - Frontload therapy 5 days, then 3x a week   - Therapy to evaluate if patient has [address] needs for personal care   -  evaluation within 24-48 hours, includes evaluation of resources and insurance to determine [place], [place], LTC, and Medicaid options       Assessment   Performance deficits / Impairments: Decreased strength;Decreased functional mobility ; Decreased endurance;Decreased ADL status; Decreased balance  Assessment: Pt is 67 y.o. M who presents with arthritis of L knee. Pt is s/p L TKA and is WBAT. PTA pt lives with wife in one story house with stair lift to enter.  Pt reports independence in self-care tasks, shares homemaking responsibilities, and functional mobility with cane. Currently pt presents with ROM/strength in [facility] for self-care tasks and transfers. Pt completed bed mobility with SBA and sit <> stand transfers CGA. Pt completed functional mobility with RW with CGA - tolerated only short distance d/t nausea/sweating. Pt required mod/max A for LB ADLs and SBA for UB ADLs. Anticipate pt to require mod A for LB ADLs. Anticipate pt safe to d/c home with 24/7 supervision/assist from wife and home health OT. Prognosis: Fair;Good  Decision Making: Medium Complexity  History: PMH: CAD, CABG, Kidney removal   Exam: ADLs, transfers, func mob, bed mob  Assistance / Modification: CGA/min A for mobility, min/mod A for ADLs   OT Education: OT Role;Precautions;Plan of Care;ADL Adaptive Strategies;Transfer Training  Barriers to Learning: Pain   REQUIRES OT FOLLOW UP: Yes  Activity Tolerance  Activity Tolerance: Patient limited by pain; Patient limited by fatigue  Activity Tolerance: Pt with significant pain at this time - required increased time with all activity, rest breaks d/t nausea and heavy sweating. Reports he has not really had anything to eat but also has no appetite. Safety Devices  Safety Devices in place: Yes  Type of devices: Left in chair;Call light within reach;Gait belt;Nurse notified           Patient Diagnosis(es): The encounter diagnosis was Arthritis of left knee. has a past medical history of Anxiety, Cancer ([facility] [address] Coronary artery disease, GERD (gastroesophageal reflux disease), History of blood transfusion, Hyperlipidemia LDL goal <70, Hypertension, IBS (irritable bowel syndrome), Migraine, Primary osteoarthritis of right knee, Reactive depression, Shingles, Thyroid disease, and Wears dentures. has a past surgical history that includes Kidney removal (@ 1944); Appendectomy; shoulder surgery (Left); Carpal tunnel release (Bilateral); Hemorrhoid surgery;  Endoscopy, colon, diagnostic; Coronary artery bypass graft (2015); eye surgery (Bilateral); and Colonoscopy (N/A, 10/8/2019). Restrictions  Restrictions/Precautions  Restrictions/Precautions: Weight Bearing, Fall Risk  Lower Extremity Weight Bearing Restrictions  Left Lower Extremity Weight Bearing: Weight Bearing As Tolerated    Subjective   General  Chart Reviewed: Yes  Patient assessed for rehabilitation services?: Yes  Additional Pertinent Hx: Pt is 67 y.o. M who presents with arthritis of L knee. Pt is s/p L TKA and is WBAT. PMH: CAD, CABG, Kidney removal   Family / Caregiver Present: Yes(Wife)  Referring Practitioner: Heinz Najjar, MD  Diagnosis: Arthritis of L knee  Subjective  Subjective: Pt met bedside, agreeable for therapy evaluation and mobility however reporting significant pain, little sleep, and no appetite.    Patient Currently in Pain: (Pt sweating and wincing in significant pain with movement)  Pain Assessment  Pain Assessment: 0-10  Pain Level: 7  Patient's Stated Pain Goal: No pain  Pain Type: Surgical pain  Pain Location: Knee  Pain Orientation: Left  Pain Descriptors: Aching  Pain Frequency: Intermittent  Pain Onset: Progressive  Clinical Progression: Gradually worsening  Functional Pain Assessment: Prevents or interferes some active activities and ADLs  Non-Pharmaceutical Pain Intervention(s): Rest;Cold applied  Multiple Pain Sites: No  Vital Signs  Level of Consciousness: Alert  Patient Currently in Pain: (Pt sweating and wincing in significant pain with movement)     Social/Functional History  Social/Functional History  Lives With: Spouse  Type of Home: House  Home Layout: One level, Laundry in basement, Able to Live on Main level with bedroom/bathroom  Home Access: (chair lift from basement (havent used yet))  Bathroom Shower/Tub: Tub/Shower unit, Doors, Shower chair with back  [employer]: Standard(vanity nearby)  [employer]: Grab bars in shower, Hand-held shower, Grab bars around [facility] Out 0930         Minutes 40         Timed Code Treatment Minutes: 25 Minutes(15 min eval )     If pt is discharged prior to next OT session, this note will serve as the discharge summary.     Elly Alegre, BCM/O#[id number]

## 2020-03-12 NOTE — PLAN OF CARE
Problem: Falls - Risk of:  Goal: Will remain free from falls  Description: Will remain free from falls  3/12/2020 0335 by Ruby Curling, RN  Outcome: Ongoing  3/11/2020 1703 by Karoline Kincaid RN  Outcome: Ongoing  Note: Fall risk assessment completed. Fall precautions in place. Call light within reach. Pt educated on calling for assistance before getting up. Walkway free of clutter. Will continue to monitor. Goal: Absence of physical injury  Description: Absence of physical injury  3/12/2020 0335 by Ruby Curling, RN  Outcome: Ongoing  3/11/2020 1703 by Karoline Kincaid RN  Outcome: Ongoing  Note: Pt is free of injury. No injury noted. Fall precautions in place. Call light within reach. Will monitor. Problem: Cardiac:  Goal: Ability to maintain vital signs within normal range will improve  Description: Ability to maintain vital signs within normal range will improve  3/12/2020 0335 by Ruby Curling, RN  Outcome: Ongoing  3/11/2020 1703 by Karoline Kincaid RN  Outcome: Ongoing  Note: Pt maintaining vital signs within normal limits. Will monitor patient, labs and vitals. Problem: Discharge Planning:  Goal: Discharged to appropriate level of care  Description: Discharged to appropriate level of care  3/12/2020 0335 by Ruby Curling, RN  Outcome: Ongoing  3/11/2020 1703 by Karoline Kincaid RN  Outcome: Ongoing  Note: Pt will be discharged to appropriate level of care. Plan is to discharge to home. Continues with PT/OT. SW and medical team are following. Problem: Mobility - Impaired:  Goal: Mobility will improve  Description: Mobility will improve  3/12/2020 0335 by Ruby Curling, RN  Outcome: Ongoing  3/11/2020 1703 by Karoline Kincaid RN  Outcome: Ongoing  Note: Pt mobility is limited r/t being POD #0. PT and OT working with patient.       Problem: Infection - Surgical Site:  Goal: Will show no infection signs and symptoms  Description: Will show no infection signs and symptoms  3/12/2020 0335 by Ruby Curling, RN  Outcome: Ongoing  3/11/2020 1703 by Sandy Crowe RN  Outcome: Ongoing  Note: Pt shows no signs or symptoms of infection. Will monitor patient, labs, vitals and dressing. Problem: Pain - Acute:  Goal: Pain level will decrease  Description: Pain level will decrease  3/12/2020 0335 by Louis Hoffman RN  Outcome: Ongoing  3/11/2020 1703 by Sandy Crowe RN  Outcome: Ongoing  Note: Pt assessed for pain. Pt in pain and assessed with 0-10 pain rating scale. Pt given prescribed analgesic for pain. (See eMar) Pt satisfied with pain relief thus far. Will reassess and continue to monitor.

## 2020-03-12 NOTE — OP NOTE
[address] [place]                                 OPERATIVE REPORT    PATIENT NAME: Rolf Sherwood                       :        1940  MED REC NO:   [id number]                          ROOM:       3126  ACCOUNT NO:   [de-identified]                           ADMIT DATE: 2020  PROVIDER:     Mary Jo Fair MD      DATE OF PROCEDURE:  2020    PREOPERATIVE DIAGNOSIS:  Tricompartmental degenerative osteoarthritis of  the left knee. POSTOPERATIVE DIAGNOSIS:  Tricompartmental degenerative osteoarthritis  of the left knee. OPERATION PERFORMED:  Left robotic-assisted total knee arthroplasty. SURGEON:  Mary Jo Fair MD    ASSISTANT:  NEHA Veras    ESTIMATED BLOOD LOSS:  Less than 50 mL at the time of surgery. INDICATIONS:  The patient is a 67-year-old male who presented with  degenerative osteoarthritis of the left knee. He was treated  conservatively, however, none of these conservative managements gave him  any long-term relief. He is here for total knee replacement. OPERATIVE PROCEDURE:  The patient was brought to the operating room. Once anesthetic was obtained and intravenous antibiotics delivered, his  left foot and leg were prepped and draped in sterile fashion. The leg  was exsanguinated. Tourniquet was placed to 300 mmHg on the thigh. Anterior incision was made. Skin and subcutaneous tissue were divided  down to the extensor mechanism. Medial parapatellar arthrotomy was  performed. The patella was everted, measured, and cut to accept a 36-mm  patella. The knee then was fully exposed and two distal femoral pins, two  proximal tibial pins along with their robotic aerials were placed. A  distal tracker for the femur and a proximal tracker for the tibia were  also placed.   The knee then was registered robotically with the third  handheld aerial.  Once this registration of the knee was complete then,  the knee was taken through a range of motion and stressed. This showed  that he had significant tightness in varus. The implant was virtually  manipulated on the screen to balance the flexion and extension gaps. Once this was done then, the robotic cutting arm was brought into the  field and in the order of posterior condylar cut, anterior condylar cut  of the femur, anterior chamfer cut of the femur, proximal tibial cut,  posterior chamfer cut, and distal femoral condylar cut were made at that  time. All bony fragments were removed. The knee then was reconstructed  to accept a #5 tibia, #5 femur cruciate-retaining, 36-mm patella and a  12-mm tibial tray insert. The knee came to full extension, excellent  flexion, good overall stability, and the patella tracked well. Lateral  release was not performed. All trial implants were removed. The ends of the bones were irrigated  off and dried. Cement was mixed on the back table. Then, in the order  of tibia, femur and patella, the #5 tibia, the #5 femur, and then the  36-mm all-poly three-peg patella were cemented into place. After the  cement had hardened, a trial reduction with the 12-mm tibial tray insert  and then the real 12 was placed. The knee came to full extension,  excellent flexion, good overall stability, and the patella tracked well. The knee was irrigated out. Hemostasis was obtained. The knee was  injected with 100 mL of ropivacaine, morphine, and methylprednisolone. It was bathed for 5 minutes with 3 gm of tranexamic acid and then  finally lavaged with aqueous iodine. There was again 1 gm of vancomycin  placed in the intra-articular space secondary to this patient being MRSA  positive. The wound was closed in layers. The patient tolerated the  procedure well. A dressing was applied and the patient was brought to  recovery room in stable condition.         Ant Perry MD    D: 03/11/2020 12:37:39

## 2020-03-12 NOTE — DISCHARGE INSTR - COC
after surgery    Patient's personal belongings (please select all that are sent with patient):  Glasses, Dentures upper and lower    RN SIGNATURE:  Electronically signed by Pamela Meadows RN on 3/12/20 at 8:58 AM EDT    PHYSICIAN SECTION    Prognosis: Good    Condition at Discharge: Stable    Rehab Potential (if transferring to Rehab): Good    Physician Certification: I certify the above orders, information, and transfer of Eulalio Curiel is necessary for the continuing treatment of the diagnosis listed and that he requires {Admit to Appropriate Level of Care:81110} for {GREATER/LESS:774274642} 30 days. Update Admission H&P: No change in H&P    PHYSICIAN SIGNATURE:  {Esignature:[id number]}    CASE MANAGEMENT/SOCIAL WORK SECTION    Inpatient Status Date: ***    isinger Readmission Risk Assessment Score:    Discharging to Facility/ Agency   · Name:  [facility]    · Address: [address] [place] [address], [address] [place] [address], [place] [address]  · Phone: [phone]  · Fax: [phone]    / signature: {Esignature:[id number]}    Followup with Dr Hermes Enriquez 2 weeks after surgery in office   [phone]    DISCHARGE INSTRUCTIONS FOR TOTAL KNEE REPLACEMENT  Activity:   Elevate your leg if swelling occurs in your ankle. Use elastic wraps/hose until swelling decreases.  Continue the exercise program as prescribed by physical therapists.  Take frequent walks.  Use walker, crutches, or cane with weight bearing instructions as indicated by the physical therapists.  Take rest periods often. Elevate leg during rest period. Wound Care:   Cover the wound with a sterile gauze dressing and change daily as long as there is drainage.  Do not scrub wound. Pat it dry with a soft towel.  Dont apply any lotions or creams to your wound.  Check the incision every day for redness, swelling, or increase in drainage. Diet:   You can resume your normal diet.   There are no limits on your diet due to your surgery.  Pain pills and activity changes may lead to constipation. To prevent this, use prune juice or bran cereals liberally. You may need to use a laxative such as Dulcolax, Senokot, or Milk of Magnesia.  Drink plenty of fluids. Medications:   Take pain pills if needed to maintain comfort.  Never drive while taking pain medicine.  Avoid over the counter medications until checking with your doctor.  Resume previous medications as instructed by your doctor. You will be on aspirin or Eliquis  for 14 days only. Stay off other anti-inflammatory medications (except Celebrex)  Call Your Doctor If:  [place] Amado You have increased pain not controlled by medications.  Excessive swelling in your ankle.  You develop numbness, tingling, or decreased movement.  You have a fever greater than 100 degrees for a day or over 101 degrees at any one time.  Your wound becomes more reddened, starts draining, or opens.  If you fall. You have any questions about your recovery. Inform your family doctor/dentist or any other doctor who cares for you in the future that you have a joint replacement. You may need antibiotics for dental or surgical procedures if there is any evidence of infection present. ? If you have required the use of insulin to control your blood sugar after surgery, follow up with your family doctor. ? Call your surgeons office to schedule your appointment to be seen after surgery. ? Make your appointment to continue physical therapy per doctors orders. ?  Smoking cessation assistance can be obtained from your family doctor or by calling [place] @ [phone]    _______________________________   _____   _______________________  ____                Patient Signature              Date      Witness                               Date

## 2020-03-13 ENCOUNTER — TELEPHONE (OUTPATIENT)
Dept: PHARMACY | Facility: CLINIC | Age: 80
End: 2020-03-13

## 2020-03-13 PROCEDURE — 1111F DSCHRG MED/CURRENT MED MERGE: CPT

## 2020-03-18 ENCOUNTER — TELEPHONE (OUTPATIENT)
Dept: ORTHOPEDICS UNIT | Age: 80
End: 2020-03-18

## 2020-03-18 ENCOUNTER — TELEPHONE (OUTPATIENT)
Dept: ORTHOPEDIC SURGERY | Age: 80
End: 2020-03-18

## 2020-03-18 RX ORDER — OXYCODONE HYDROCHLORIDE 5 MG/1
5 TABLET ORAL EVERY 4 HOURS PRN
Qty: 30 TABLET | Refills: 0 | Status: SHIPPED | OUTPATIENT
Start: 2020-03-18 | End: 2020-05-12 | Stop reason: SDUPTHER

## 2020-03-18 NOTE — TELEPHONE ENCOUNTER
Spoke with patient regarding post discharge from hospital.    Incision status: No drainage, odor, or redness noted per patient    Edema/Swelling/Teds: edema improving, wearing teds    Pain level and status: \"moderate\" , tolerable with pain medication    Use of pain medications: yes ; Patient stated they are taking their pain medication oxycodone as prescribed. Use of ice therapy: yes     Blood thinner: aspirin ; Verified with patient that they are taking their anticoagulant as prescribed twice a day. Bowels: no constipation currently, educated patient to use laxative prn, patient verbalized understanding. Home Care Agency active: yes ; Claims already worked with occupational and physical therapists . Outpatient therapy: n/a    Do you have all of your medications: yes    Changes in medications: no    Ortho Vitals: n/a      No other questions/concerns at this time. Encouraged patient to call Orthopedic Nurse [id number] Ellyn Huber or Orthopedic office if has any questions/concerns.       Follow up appointments:    Future Appointments   Date Time Provider Leonie Silva   3/26/2020 10:15 AM Uzair Echeverria MD [facility]   3/30/2020  9:30 AM [place] Freistatt, PT [facility] [place] [facility]   4/1/2020 10:30 AM Tarry Hardy, PTA [facility] [place] [facility]   4/3/2020  9:30 AM [place] Freistatt, PT [facility] [place] [facility]   4/6/2020 10:30 AM [place] Freistatt, PT [facility] [place] [facility]   4/8/2020  9:30 AM Tarry Hardy, PTA [facility] [place] [facility]   4/9/2020 11:00 AM Cash Nevarez, PTA [facility] [place] [facility]   4/13/2020 11:00 AM [place] Freistatt, PT [facility] [place] [facility]   4/15/2020 10:00 AM Tarry Hardy, PTA [facility] [place] [facility]   4/17/2020 11:00 AM [place] Freistatt, PT [facility] [place] [facility]   4/20/2020 10:30 AM Tarry Hardy, PTA [facility] [place] [facility]   4/22/2020 10:30 AM Tarry Hardy, PTA [facility] [place] [facility]   4/24/2020 10:00 AM [place] Freistatt, PT [facility] [place] [facility]   6/8/2020 11:00 AM Alberto Echeverria MD [facility]     Electronically signed by Yesica Felton RN on 3/18/2020 at 2:18 PM

## 2020-03-24 ENCOUNTER — TELEPHONE (OUTPATIENT)
Dept: ORTHOPEDIC SURGERY | Age: 80
End: 2020-03-24

## 2020-03-24 RX ORDER — TIZANIDINE 4 MG/1
4 TABLET ORAL 3 TIMES DAILY
Qty: 60 TABLET | Refills: 0 | Status: SHIPPED | OUTPATIENT
Start: 2020-03-24 | End: 2020-05-12

## 2020-03-26 ENCOUNTER — OFFICE VISIT (OUTPATIENT)
Dept: ORTHOPEDIC SURGERY | Age: 80
End: 2020-03-26

## 2020-03-26 VITALS — TEMPERATURE: 97.9 F

## 2020-03-26 PROCEDURE — 99024 POSTOP FOLLOW-UP VISIT: CPT | Performed by: ORTHOPAEDIC SURGERY

## 2020-03-26 NOTE — PROGRESS NOTES
This dictation was done with Xoopit dictation and may contain mechanical errors related to translation.   Temperature 97.9 °F (36.6 °C), temperature source Temporal.

## 2020-03-27 PROBLEM — Z01.818 PREOP EXAMINATION: Status: RESOLVED | Noted: 2020-02-26 | Resolved: 2020-03-27

## 2020-03-27 NOTE — PROGRESS NOTES
Patient is a 52-year-old male status post left total knee arthroplasty performed 3/11/2020. He is here for his first postoperative evaluation. Patient is complaining of pain but feels like he is doing well. Physical examination 52-year-old male oriented x3 temperature is 97.9. Examination of the left knee shows a stable healing anterior wound. No obvious signs of deep sepsis or cellulitis noted. Range of motion -5 degrees of full extension to 85 degrees of flexion with no signs of instability noted. Distal neurovascular examinations intact to the left foot and ankle. X-rays obtained AP lateral and patellofemoral view of the left knee show status post cemented left total knee arthroplasty. Stable overall orientation and alignment with no signs of obvious instability or acute failure noted. Good patellofemoral mechanics are seen and no other obvious fractures tumors or dislocations are seen on these x-rays. Impression 52-year-old male stable status post total knee arthroplasty. Plan follow-up in 2 weeks with either a virtual appointment or in office appointment. This will be for our range of motion and wound check.

## 2020-03-30 ENCOUNTER — HOSPITAL ENCOUNTER (OUTPATIENT)
Dept: PHYSICAL THERAPY | Age: 80
Setting detail: THERAPIES SERIES
End: 2020-03-30
Payer: MEDICARE

## 2020-03-31 ENCOUNTER — HOSPITAL ENCOUNTER (OUTPATIENT)
Dept: PHYSICAL THERAPY | Age: 80
Setting detail: THERAPIES SERIES
Discharge: HOME | End: 2020-03-31
Payer: MEDICARE

## 2020-03-31 PROCEDURE — 97110 THERAPEUTIC EXERCISES: CPT

## 2020-03-31 PROCEDURE — 97161 PT EVAL LOW COMPLEX 20 MIN: CPT

## 2020-03-31 PROCEDURE — G0283 ELEC STIM OTHER THAN WOUND: HCPCS

## 2020-03-31 ASSESSMENT — PAIN DESCRIPTION - ORIENTATION: ORIENTATION: LEFT

## 2020-03-31 ASSESSMENT — PAIN - FUNCTIONAL ASSESSMENT: PAIN_FUNCTIONAL_ASSESSMENT: PREVENTS OR INTERFERES WITH ALL ACTIVE AND SOME PASSIVE ACTIVITIES

## 2020-03-31 ASSESSMENT — PAIN DESCRIPTION - LOCATION: LOCATION: KNEE

## 2020-03-31 ASSESSMENT — PAIN DESCRIPTION - DESCRIPTORS: DESCRIPTORS: ACHING;SORE;THROBBING

## 2020-03-31 NOTE — PROGRESS NOTES
Physical Therapy  Initial Assessment  Date: 3/31/2020  Patient Name: Radha Andre  MRN: [id number]  : 1940     Treatment Diagnosis: Decreased mobilty, strength    Restrictions  Restrictions/Precautions  Restrictions/Precautions: Fall Risk(low: denies any history of frequent falls. )    Subjective   General  Chart Reviewed: Yes  Patient assessed for rehabilitation services?: Yes  Additional Pertinent Hx: CAD, HTN, prostate cancer in remisison, CT sx B, L shoulder sx   Referring Practitioner: Ana Velarde MD  Referral Date : 20  Diagnosis: L TKR  PT Visit Information  Onset Date: 20  PT Insurance Information: Medicare  Total # of Visits Approved: 1  Total # of Visits to Date: 18  Subjective  Subjective: Patient underwent L TKR on  without complications. Patient was discharged to home with home PT. Home PT endend last week. Patient notes he is walking with a rolling walker at this time. Pain Screening  Patient Currently in Pain: Yes  Pain Assessment  Pain Assessment: 0-10  Pain Level: (Pain 0/10 at rest, 4-5/10 with activity)  Patient's Stated Pain Goal: No pain(Pt goal: \"get back to normal\")  Pain Location: Knee  Pain Orientation: Left  Pain Descriptors: Aching;Sore; Throbbing  Functional Pain Assessment: Prevents or interferes with all active and some passive activities  Non-Pharmaceutical Pain Intervention(s): Rest;Cold applied  Vital Signs  Patient Currently in Pain: Yes    Objective     Observation/Palpation  Palpation: min TTP medial joint line  Observation: Patient ambulates with rolling walker in clinic. Patient demonstrates bent knee posture with minimal knee motion during gait.    Edema: moderate levels throughout L LE  Scar: incision healing with no signs of infection    PROM LLE (degrees)  LLE PROM: (L knee PROM: +12-82)  AROM LLE (degrees)  LLE AROM : (L knee AROM: +16-80)    Strength RLE  Strength RLE: WNL  Strength LLE  Strength LLE: Exception  L Hip Flexion: 3/5  L Hip ABduction: 3/5  L Knee Flexion: 3/5  L Knee Extension: 3-/5  L Ankle Dorsiflexion: 4/5  L Ankle Plantar Flexion: 3+/5     Additional Measures  Flexibility: L hamstrings/gastroc= mod tightness noted  Sensation  Overall Sensation Status: WNL    Assessment   Conditions Requiring Skilled Therapeutic Intervention  Body structures, Functions, Activity limitations: Decreased functional mobility ; Decreased high-level IADLs;Decreased ADL status; Decreased endurance;Decreased strength; Increased pain;Decreased ROM  Assessment: Patient reports to PT with s/s consistent with L TKR post-op recovery which are limiting functional activities and would benefit from skilled PT at this time. 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)  Treatment Diagnosis: Decreased mobilty, strength  Prognosis: Excellent  Decision Making: Low Complexity  REQUIRES PT FOLLOW UP: Yes  Activity Tolerance  Activity Tolerance: Patient limited by pain         Plan   Plan  Times per week: 2-3x/week  Plan weeks: 6 weeks  Current Treatment Recommendations: Strengthening, ROM, Gait Training, Pain Management, Balance Training, Modalities, Manual Therapy - Soft Tissue Mobilization, Home Exercise Program    OutComes Score  LEFS Total Score: 26 (03/31/20 1038)    Goals  Short term goals  Time Frame for Short term goals: 3 weeks  Short term goal 1: Patient will be independent with HEP for prevention of reinjury. Short term goal 2: Increase knee PROM to +8-110 for ease with tranfers. Short term goal 3: Patient will report 30% improvement with PT for ease with ADLs. Long term goals  Time Frame for Long term goals : at discharge  Long term goal 1: Increase knee PROM to +5-115 for ease with transfers. Long term goal 2: Increase LE strength to 4+/5 for ease with stairs. Long term goal 3: Patient will report 70% improvement with pain for ease with ADLs. Long term goal 4: Patient will ambulate in community without AD demonstrating good gait pattern.    Patient Goals Patient goals : \"get back to normal\"     Therapy Time   Individual   Time In 1000   Time Out 1100   Minutes 60   Timed Code Treatment Minutes: 101 Marianne Carrasco, [address] [facility] , OMT-C,  [id number]

## 2020-04-01 ENCOUNTER — APPOINTMENT (OUTPATIENT)
Dept: PHYSICAL THERAPY | Age: 80
End: 2020-04-01
Payer: MEDICARE

## 2020-04-02 ENCOUNTER — HOSPITAL ENCOUNTER (OUTPATIENT)
Dept: PHYSICAL THERAPY | Age: 80
Setting detail: THERAPIES SERIES
Discharge: HOME | End: 2020-04-02
Payer: MEDICARE

## 2020-04-02 PROCEDURE — G0283 ELEC STIM OTHER THAN WOUND: HCPCS

## 2020-04-02 PROCEDURE — 97110 THERAPEUTIC EXERCISES: CPT

## 2020-04-02 NOTE — FLOWSHEET NOTE
transfers. Long term goal 2: Increase LE strength to 4+/5 for ease with stairs. Long term goal 3: Patient will report 70% improvement with pain for ease with ADLs. Long term goal 4: Patient will ambulate in community without AD demonstrating good gait pattern. Patient Requires Follow-up: [x] Yes  [] No    Plan:   [x] Continue per plan of care [] Alter current plan (see comments)  [] Plan of care initiated [] Hold pending MD visit [] Discharge    Plan for Next Session:  Add above as stated.  PUSH ROM    Electronically signed by:  Brenda Bundy, [address] [facility], OMT-C, [id number]

## 2020-04-03 ENCOUNTER — APPOINTMENT (OUTPATIENT)
Dept: PHYSICAL THERAPY | Age: 80
End: 2020-04-03
Payer: MEDICARE

## 2020-04-06 ENCOUNTER — HOSPITAL ENCOUNTER (OUTPATIENT)
Dept: PHYSICAL THERAPY | Age: 80
Setting detail: THERAPIES SERIES
Discharge: HOME | End: 2020-04-06
Payer: MEDICARE

## 2020-04-06 ENCOUNTER — APPOINTMENT (OUTPATIENT)
Dept: PHYSICAL THERAPY | Age: 80
End: 2020-04-06
Payer: MEDICARE

## 2020-04-06 PROCEDURE — G0283 ELEC STIM OTHER THAN WOUND: HCPCS

## 2020-04-06 PROCEDURE — 97110 THERAPEUTIC EXERCISES: CPT

## 2020-04-06 NOTE — FLOWSHEET NOTE
instruction to the patient for proper postural muscle recruitment and positioning with ambulation re-education     Home Exercise Program:    [x] (02478) Reviewed/Progressed HEP activities related to strengthening, flexibility, endurance, ROM for functional self-care, mobility, lifting and ambulation   [] (37344) Reviewed/Progressed HEP activities related to improving balance, coordination, kinesthetic sense, posture, motor skill, proprioception for self-care, mobility, lifting, and ambulation      Manual Treatments:  MFR / STM / Oscillations-Mobs:  G-I, II, III, IV / Manipulation / MLD  [] (84344) Provided manual therapy to mobilize  soft tissue/joints/fluid for the purpose of modulating pain, promoting relaxation, increasing ROM, reducing/eliminating soft tissue swelling/inflammation/restriction, improving soft tissue extensibility and allowing for proper ROM for normal function with self- care, mobility, lifting and ambulation. Timed Code Treatment Minutes: 45   Total Treatment Minutes: 60     [] EVAL (LOW) 21760   [] EVAL (MOD) 06103   [] EVAL (HIGH) 09619   [] RE-EVAL   [x] TE (17764) x 3  [] Aquatic (03550) x  [] NMR (93102)   x  [] Aquatic Group (45605) x  [] Manual (10456) x    [] Ultrasound (12237) x  [] TA (15886) x  [] Mech Traction (32477)  [] Ionto (63387)           [x] ES (un) (73211):   [] Vasopump (23040)  [] Other:      Assessment:  [] Patient tolerated treatment well [] Patient limited by fatigue  [x] Patient limited by pain  [] Patient limited by other medical complications  [] Other:     Prognosis: [x] Good [] Fair  [] Poor    Goals:    Short term goals  Time Frame for Short term goals: 3 weeks  Short term goal 1: Patient will be independent with HEP for prevention of reinjury. Short term goal 2: Increase knee PROM to +8-110 for ease with tranfers. Short term goal 3: Patient will report 30% improvement with PT for ease with ADLs.        Long term goals  Time Frame for Long term goals : at

## 2020-04-07 ENCOUNTER — APPOINTMENT (OUTPATIENT)
Dept: PHYSICAL THERAPY | Age: 80
End: 2020-04-07
Payer: MEDICARE

## 2020-04-08 ENCOUNTER — APPOINTMENT (OUTPATIENT)
Dept: PHYSICAL THERAPY | Age: 80
End: 2020-04-08
Payer: MEDICARE

## 2020-04-09 ENCOUNTER — HOSPITAL ENCOUNTER (OUTPATIENT)
Dept: PHYSICAL THERAPY | Age: 80
Setting detail: THERAPIES SERIES
Discharge: HOME | End: 2020-04-09
Payer: MEDICARE

## 2020-04-09 ENCOUNTER — APPOINTMENT (OUTPATIENT)
Dept: PHYSICAL THERAPY | Age: 80
End: 2020-04-09
Payer: MEDICARE

## 2020-04-09 ENCOUNTER — VIRTUAL VISIT (OUTPATIENT)
Dept: ORTHOPEDIC SURGERY | Age: 80
End: 2020-04-09

## 2020-04-09 PROCEDURE — G0283 ELEC STIM OTHER THAN WOUND: HCPCS

## 2020-04-09 PROCEDURE — 97110 THERAPEUTIC EXERCISES: CPT

## 2020-04-09 PROCEDURE — 99024 POSTOP FOLLOW-UP VISIT: CPT | Performed by: ORTHOPAEDIC SURGERY

## 2020-04-09 NOTE — PROGRESS NOTES
if this call serves to triage the patient into an appointment for the relevant concern      Sarah Mcneil

## 2020-04-09 NOTE — FLOWSHEET NOTE
Physical Therapy Daily Treatment Note  Date:  2020    Patient Name:  Dylon Montoya    :  1940  MRN: [id number]    Restrictions/Precautions: Fall Risk(low: denies any history of frequent falls. )    Pertinent Medical History:  CAD, HTN, prostate cancer in remisison, CT sx B, L shoulder sx     Medical/Treatment Diagnosis Information:  · Diagnosis: L TKR  · Treatment Diagnosis: Decreased mobilty, strength    Insurance/Certification information:  PT Insurance Information: Medicare  Physician Information:  Referring Practitioner: Rebecca Bassett MD  Plan of care signed (Y/N):  Sent for cosign 3/31 (received)    Visit# / total visits:    Pain level: 1-2/10     Functional Outcomes Measure: at eval  Test: LEFS  Score: 26    History of Injury:  Patient underwent L TKR on 3/63/08 without complications. Patient was discharged to home with home PT. Home PT endend last week. Patient notes he is walking with a rolling walker at this time. Subjective:  Spoke with MD on the phone this morning, he seemed pleased with his progress at this point, being 4 weeks post -op. Objective:   Observation:   · Palpation: min TTP medial joint line  · Observation: Patient ambulates with rolling walker in clinic. Patient demonstrates bent knee posture with minimal knee motion during gait.    · Edema: moderate levels throughout L LE  · Scar: incision healing with no signs of infection   Test measurements:    ROM:  Date           Knee Flexion       Knee Extension    Active Passive Active Passive    Eval 3/31 80 82 Lacking 16  degrees Lacking 12 degrees            Strength:  Date Hip flexion Hip abduction Quad Hamstring Ankle DF Ankle PF   Eval 3/31 3/5 3/5 3-/5 3/5 4/5 3+/5                Exercises:  Exercise/Equipment Resistance/Repetitions Other comments     L TKR on 3/11/20   Rec Bike    x5 mins   Partial ROM - work towards full ROM   HS stool stretch Standing 3x20 sec     Knee flexion stretch on ERMI On/off x 3 min gastroc incline 3x20 sec     Mini squats     Stand HR     Stand hip abd                  ext 0# 10x L/R  0# 10x L/R    Step up fwd               lateral Step without riser 10x L  Step without riser x 10 L    Step down Step without riser x 10     SLB L  add   Seated FAQ 10# 1x10 L >R    Seated tband HS curls [place] 2x10 L    Leg press 40# 2 x 10 L>R              Long sit knee ext stretch 3x20 sec     SLR flexion 0# 1x10 L Try inc reps/add wt   SAQ 0# 2x10 L Try add 1#   heelslides with strap for overpressure  Used blue strap   extentionater      X 3 min  Pt controlled    Knee PROM flex/ext 10x each  4/6  -5 - 96   Pat mobs  X 10 each    IFC with CP x15 mins L knee      Other Therapeutic Activities:  Pt was educated on PT POC, Diagnosis, Prognosis, pathomechanics as well as frequency and duration of scheduling future physical therapy appointments. Time was also taken on this day to answer all patient questions and participation in PT. Reviewed appointment policy in detail with patient and patient verbalized understanding. Home Exercise Program: Patient was instructed in the following for HEP: knee flex step stretch, stand HS stretch, gastroc incline, mini squats, stand HR, stand hip abd, FAQ, SLR flex, SL abd, bridge, abd, long sit ext stretch, prone knee ext stretch, and supine ext stretch. Patient verbalized/demonstrated understanding and was issued written handout. Charges: Therapeutic Exercise:  [x] (58308) Provided verbal/tactile cueing for activities to restore or maintain strength, flexibility, endurance, ROM for improvements with self-care, mobility, lifting and ambulation. Neuromuscular Re-Education  [] (16644) Provided verbal/tactile cueing for activities to restore or maintain balance, coordination, kinesthetic sense, posture, motor skill, proprioception for self-care, mobility, lifting, and ambulation.      Therapeutic Activities:    [] (57616) Provided verbal/tactile cueing to address functional limitations related to loss of mobility, strength, balance, and coordination. Gait Training:  [] (71385) Provided training and instruction to the patient for proper postural muscle recruitment and positioning with ambulation re-education     Home Exercise Program:    [x] (54012) Reviewed/Progressed HEP activities related to strengthening, flexibility, endurance, ROM for functional self-care, mobility, lifting and ambulation   [] (00526) Reviewed/Progressed HEP activities related to improving balance, coordination, kinesthetic sense, posture, motor skill, proprioception for self-care, mobility, lifting, and ambulation      Manual Treatments:  MFR / STM / Oscillations-Mobs:  G-I, II, III, IV / Manipulation / MLD  [] (75271) Provided manual therapy to mobilize  soft tissue/joints/fluid for the purpose of modulating pain, promoting relaxation, increasing ROM, reducing/eliminating soft tissue swelling/inflammation/restriction, improving soft tissue extensibility and allowing for proper ROM for normal function with self- care, mobility, lifting and ambulation. Timed Code Treatment Minutes: 45   Total Treatment Minutes: 60     [] EVAL (LOW) 66291   [] EVAL (MOD) 25018   [] EVAL (HIGH) 42079   [] RE-EVAL   [x] TE (20971) x 3  [] Aquatic (69680) x  [] NMR (40583)   x  [] Aquatic Group (05701) x  [] Manual (80484) x    [] Ultrasound (83794) x  [] TA (32832) x  [] Mech Traction (37197)  [] Ionto (14067)           [x] ES (un) (23053):   [] Vasopump (90714)  [] Other:      Assessment:  [] Patient tolerated treatment well [] Patient limited by fatigue  [x] Patient limited by pain  [] Patient limited by other medical complications  [] Other:     Prognosis: [x] Good [] Fair  [] Poor    Goals:    Short term goals  Time Frame for Short term goals: 3 weeks  Short term goal 1: Patient will be independent with HEP for prevention of reinjury. Short term goal 2:  Increase knee PROM to +8-110 for ease with cristiano. Short term goal 3: Patient will report 30% improvement with PT for ease with ADLs. Long term goals  Time Frame for Long term goals : at discharge  Long term goal 1: Increase knee PROM to +5-115 for ease with transfers. Long term goal 2: Increase LE strength to 4+/5 for ease with stairs. Long term goal 3: Patient will report 70% improvement with pain for ease with ADLs. Long term goal 4: Patient will ambulate in community without AD demonstrating good gait pattern. Patient Requires Follow-up: [x] Yes  [] No    Plan:   [x] Continue per plan of care [] Alter current plan (see comments)  [] Plan of care initiated [] Hold pending MD visit [] Discharge    Plan for Next Session:  Add above as stated.  PUSH ROM    Electronically signed by:  Radha Finch, ,[id number]

## 2020-04-13 ENCOUNTER — APPOINTMENT (OUTPATIENT)
Dept: PHYSICAL THERAPY | Age: 80
End: 2020-04-13
Payer: MEDICARE

## 2020-04-13 ENCOUNTER — HOSPITAL ENCOUNTER (OUTPATIENT)
Dept: PHYSICAL THERAPY | Age: 80
Setting detail: THERAPIES SERIES
Discharge: HOME | End: 2020-04-13
Payer: MEDICARE

## 2020-04-13 PROCEDURE — G0283 ELEC STIM OTHER THAN WOUND: HCPCS

## 2020-04-13 PROCEDURE — 97110 THERAPEUTIC EXERCISES: CPT

## 2020-04-13 NOTE — FLOWSHEET NOTE
stretch Standing 3x20 sec     Knee flexion stretch on ERMI On/off x 3 min 4/13 ROM 90   gastroc incline 3x20 sec     Mini squats     Stand HR     Stand hip abd                  ext 1# 10x L/R  1# 10x L/R    Step up fwd               lateral Step without riser 10x L  Step without riser x 10 L Try 6\" with all steps    Step down Step without riser x 10     SLB L 3 x 10 sec     Seated FAQ 10# 1x10 L >R    Seated HS curls 20# 1 x 10 L>R    Leg press 40# 2 x 10 L>R         Seated AP knee mobs 2 x 10 with distraction    Long sit knee ext stretch 3x20 sec     SLR flexion 0# 2x10 L    SAQ 1# 2x10 L    heelslides with strap for overpressure  Used blue strap   extentionater      X 3-4 min  Pt controlled    Knee PROM flex/ext 10x each  4/6  -3 - 99   Pat mobs  X 10 each    IFC with CP Ankle propped up on half bolster for 2-3 min during estim set up prep    x15 mins L knee      Other Therapeutic Activities:  Pt was educated on PT POC, Diagnosis, Prognosis, pathomechanics as well as frequency and duration of scheduling future physical therapy appointments. Time was also taken on this day to answer all patient questions and participation in PT. Reviewed appointment policy in detail with patient and patient verbalized understanding. Home Exercise Program: Patient was instructed in the following for HEP: knee flex step stretch, stand HS stretch, gastroc incline, mini squats, stand HR, stand hip abd, FAQ, SLR flex, SL abd, bridge, abd, long sit ext stretch, prone knee ext stretch, and supine ext stretch. Patient verbalized/demonstrated understanding and was issued written handout. Charges: Therapeutic Exercise:  [x] (50818) Provided verbal/tactile cueing for activities to restore or maintain strength, flexibility, endurance, ROM for improvements with self-care, mobility, lifting and ambulation.     Neuromuscular Re-Education  [] (20246) Provided verbal/tactile cueing for activities to restore or maintain balance, goals  Time Frame for Short term goals: 3 weeks  Short term goal 1: Patient will be independent with HEP for prevention of reinjury. Short term goal 2: Increase knee PROM to +8-110 for ease with tranfers. Short term goal 3: Patient will report 30% improvement with PT for ease with ADLs. Long term goals  Time Frame for Long term goals : at discharge  Long term goal 1: Increase knee PROM to +5-115 for ease with transfers. Long term goal 2: Increase LE strength to 4+/5 for ease with stairs. Long term goal 3: Patient will report 70% improvement with pain for ease with ADLs. Long term goal 4: Patient will ambulate in community without AD demonstrating good gait pattern. Patient Requires Follow-up: [x] Yes  [] No    Plan:   [x] Continue per plan of care [] Alter current plan (see comments)  [] Plan of care initiated [] Hold pending MD visit [] Discharge    Plan for Next Session:  Add above as stated.  PUSH ROM    Electronically signed by:  MELLISA Arana,[id number]

## 2020-04-15 ENCOUNTER — APPOINTMENT (OUTPATIENT)
Dept: PHYSICAL THERAPY | Age: 80
End: 2020-04-15
Payer: MEDICARE

## 2020-04-16 ENCOUNTER — HOSPITAL ENCOUNTER (OUTPATIENT)
Dept: PHYSICAL THERAPY | Age: 80
Setting detail: THERAPIES SERIES
Discharge: HOME | End: 2020-04-16
Payer: MEDICARE

## 2020-04-16 PROCEDURE — G0283 ELEC STIM OTHER THAN WOUND: HCPCS

## 2020-04-16 PROCEDURE — 97110 THERAPEUTIC EXERCISES: CPT

## 2020-04-16 NOTE — FLOWSHEET NOTE
Physical Therapy Daily Treatment Note  Date:  2020    Patient Name:  Ashlee Bales    :  1940  MRN: [id number]    Restrictions/Precautions: Fall Risk(low: denies any history of frequent falls. )    Pertinent Medical History:  CAD, HTN, prostate cancer in remisison, CT sx B, L shoulder sx     Medical/Treatment Diagnosis Information:  · Diagnosis: L TKR  · Treatment Diagnosis: Decreased mobilty, strength    Insurance/Certification information:  PT Insurance Information: Medicare  Physician Information:  Referring Practitioner: Sara Ashley MD  Plan of care signed (Y/N):  Sent for cosign 3/31 (received)    Visit# / total visits:    Pain level: 1/10     Functional Outcomes Measure: at eval  Test: LEFS  Score: 26    History of Injury:  Patient underwent L TKR on 3/06/48 without complications. Patient was discharged to home with home PT. Home PT endend last week. Patient notes he is walking with a rolling walker at this time. Subjective:  Pt notes felt really good yesterday, so he took a 45 min walk and was doing more steps than usual, but he had a lot of pain keeping him from sleep last night. Pain is only \"annoying\" today. Objective:   Observation:   · Palpation: min TTP medial joint line  · Observation: Patient ambulates with rolling walker in clinic. Patient demonstrates bent knee posture with minimal knee motion during gait.    · Edema: moderate levels throughout L LE  · Scar: incision healing with no signs of infection   Test measurements:    ROM:  Date           Knee Flexion       Knee Extension    Active Passive Active Passive    Eval 3/31 80 82 Lacking 16  degrees Lacking 12 degrees            Strength:  Date Hip flexion Hip abduction Quad Hamstring Ankle DF Ankle PF   Eval 3/31 35 3/5 3-/5 3/5 4/5 3+/5                Exercises:  Exercise/Equipment Resistance/Repetitions Other comments     L TKR on 3/11/20   Rec Bike #14    x5 mins   Partial ROM --> full ROM   HS stool stretch Standing 3x20 sec     Knee flexion stretch on ERMI On/off x 3 min 4/13 ROM 90 (encourage longer holding)   gastroc incline 3x20 sec     Mini squats     Stand HR     Stand hip abd                  ext 1# 10x L/R  1# 10x L/R    Step up fwd               lateral Step with 1 riser 10x L  Step with 1 riser x 10 L    Step down Step with 1 riser x 10     SLB L 3 x 10 sec     Seated FAQ 10# 1x10 L >R In reps   Seated HS curls 20# 3 x 10 L>R Inc wt to 25 or 30#   Leg press 40# 2 x 10 L>R         Seated AP knee mobs 2 x 10 with distraction    Long sit knee ext stretch 3x20 sec     SLR flexion 0# 2x10 L    SAQ 1# 2x10 L    heelslides with strap for overpressure  Used blue strap   extentionater      X 3-4 min  Pt controlled    Knee PROM flex/ext 10x each  4/13  -3 - 99   Pat mobs  X 10 each    IFC with CP Ankle propped up on half bolster for 2-3 min during estim set up prep    x15 mins L knee      Other Therapeutic Activities:  Pt was educated on PT POC, Diagnosis, Prognosis, pathomechanics as well as frequency and duration of scheduling future physical therapy appointments. Time was also taken on this day to answer all patient questions and participation in PT. Reviewed appointment policy in detail with patient and patient verbalized understanding. Home Exercise Program: Patient was instructed in the following for HEP: knee flex step stretch, stand HS stretch, gastroc incline, mini squats, stand HR, stand hip abd, FAQ, SLR flex, SL abd, bridge, abd, long sit ext stretch, prone knee ext stretch, and supine ext stretch. Patient verbalized/demonstrated understanding and was issued written handout. Charges: Therapeutic Exercise:  [x] (84754) Provided verbal/tactile cueing for activities to restore or maintain strength, flexibility, endurance, ROM for improvements with self-care, mobility, lifting and ambulation.     Neuromuscular Re-Education  [] (25119) Provided verbal/tactile cueing for activities to restore or maintain balance, coordination, kinesthetic sense, posture, motor skill, proprioception for self-care, mobility, lifting, and ambulation. Therapeutic Activities:    [] (67376) Provided verbal/tactile cueing to address functional limitations related to loss of mobility, strength, balance, and coordination. Gait Training:  [] (10377) Provided training and instruction to the patient for proper postural muscle recruitment and positioning with ambulation re-education     Home Exercise Program:    [x] (86211) Reviewed/Progressed HEP activities related to strengthening, flexibility, endurance, ROM for functional self-care, mobility, lifting and ambulation   [] (28887) Reviewed/Progressed HEP activities related to improving balance, coordination, kinesthetic sense, posture, motor skill, proprioception for self-care, mobility, lifting, and ambulation      Manual Treatments:  MFR / STM / Oscillations-Mobs:  G-I, II, III, IV / Manipulation / MLD  [] (41345) Provided manual therapy to mobilize  soft tissue/joints/fluid for the purpose of modulating pain, promoting relaxation, increasing ROM, reducing/eliminating soft tissue swelling/inflammation/restriction, improving soft tissue extensibility and allowing for proper ROM for normal function with self- care, mobility, lifting and ambulation.       Timed Code Treatment Minutes: 45   Total Treatment Minutes: 60     [] EVAL (LOW) 00370   [] EVAL (MOD) 45509   [] EVAL (HIGH) 61702   [] RE-EVAL   [x] TE (96268) x 3  [] Aquatic (41651) x  [] NMR (67378)   x  [] Aquatic Group (51791) x  [] Manual (82996) x    [] Ultrasound (11674) x  [] TA (49226) x  [] Mech Traction (59587)  [] Ionto (68194)           [x] ES (un) (02143):   [] Vasopump (33228)  [] Other:      Assessment:  [] Patient tolerated treatment well [] Patient limited by fatigue  [x] Patient limited by pain  [] Patient limited by other medical complications  [] Other:     Prognosis: [x] Good [] Fair  [] Poor    Goals:

## 2020-04-17 ENCOUNTER — APPOINTMENT (OUTPATIENT)
Dept: PHYSICAL THERAPY | Age: 80
End: 2020-04-17
Payer: MEDICARE

## 2020-04-20 ENCOUNTER — APPOINTMENT (OUTPATIENT)
Dept: PHYSICAL THERAPY | Age: 80
End: 2020-04-20
Payer: MEDICARE

## 2020-04-20 ENCOUNTER — HOSPITAL ENCOUNTER (OUTPATIENT)
Dept: PHYSICAL THERAPY | Age: 80
Setting detail: THERAPIES SERIES
Discharge: HOME | End: 2020-04-20
Payer: MEDICARE

## 2020-04-20 PROCEDURE — 97110 THERAPEUTIC EXERCISES: CPT

## 2020-04-20 PROCEDURE — G0283 ELEC STIM OTHER THAN WOUND: HCPCS

## 2020-04-20 NOTE — FLOWSHEET NOTE
Physical Therapy Daily Treatment Note  Date:  2020    Patient Name:  Jamie Corrales    :  1940  MRN: [id number]    Restrictions/Precautions: Fall Risk(low: denies any history of frequent falls. )    Pertinent Medical History:  CAD, HTN, prostate cancer in remisison, CT sx B, L shoulder sx     Medical/Treatment Diagnosis Information:  · Diagnosis: L TKR  · Treatment Diagnosis: Decreased mobilty, strength    Insurance/Certification information:  PT Insurance Information: Medicare  Physician Information:  Referring Practitioner: Celso Laboy MD  Plan of care signed (Y/N):  Sent for cosign 3/31 (received)    Visit# / total visits:    Pain level: 3/10     Functional Outcomes Measure: at eval  Test: LEFS  Score: 26    History of Injury:  Patient underwent L TKR on  without complications. Patient was discharged to home with home PT. Home PT endend last week. Patient notes he is walking with a rolling walker at this time. Subjective:  Sore and stiff today. He went to sit down on a foot stool yesterday and didn't think about how low to the ground it was and it really pulled/strained the knee. Objective:   Observation:   · Palpation: min TTP medial joint line  · Observation: Patient ambulates with rolling walker in clinic. Patient demonstrates bent knee posture with minimal knee motion during gait.    · Edema: moderate levels throughout L LE  · Scar: incision healing with no signs of infection   Test measurements:    ROM:  Date           Knee Flexion       Knee Extension    Active Passive Active Passive    Eval 3/31 80 82 Lacking 16  degrees Lacking 12 degrees            Strength:  Date Hip flexion Hip abduction Quad Hamstring Ankle DF Ankle PF   Eval 3/31 35 3/5 3-/5 3/5 4/5 3+/5                Exercises:  Exercise/Equipment Resistance/Repetitions Other comments     L TKR on 3/11/20   Rec Bike #14    x5 mins   Partial ROM --> full ROM   HS stool stretch Standing 3x20 sec     Knee Fair  [] Poor    Goals:    Short term goals  Time Frame for Short term goals: 3 weeks  Short term goal 1: Patient will be independent with HEP for prevention of reinjury. Short term goal 2: Increase knee PROM to +8-110 for ease with tranfers. Short term goal 3: Patient will report 30% improvement with PT for ease with ADLs. Long term goals  Time Frame for Long term goals : at discharge  Long term goal 1: Increase knee PROM to +5-115 for ease with transfers. Long term goal 2: Increase LE strength to 4+/5 for ease with stairs. Long term goal 3: Patient will report 70% improvement with pain for ease with ADLs. Long term goal 4: Patient will ambulate in community without AD demonstrating good gait pattern. Patient Requires Follow-up: [x] Yes  [] No    Plan:   [x] Continue per plan of care [] Alter current plan (see comments)  [] Plan of care initiated [] Hold pending MD visit [] Discharge    Plan for Next Session:  Add above as stated.  PUSH ROM    Electronically signed by:  Radha Finch, [id number]

## 2020-04-22 ENCOUNTER — TELEPHONE (OUTPATIENT)
Dept: ORTHOPEDIC SURGERY | Age: 80
End: 2020-04-22

## 2020-04-22 ENCOUNTER — APPOINTMENT (OUTPATIENT)
Dept: PHYSICAL THERAPY | Age: 80
End: 2020-04-22
Payer: MEDICARE

## 2020-04-22 NOTE — TELEPHONE ENCOUNTER
PATIENT'S WIFE IS CALLING REQUESTING A SYNVISC INJECTION FOR PATIENT'S RIGHT KNEE.  [place] [id number] [phone]

## 2020-04-23 ENCOUNTER — HOSPITAL ENCOUNTER (OUTPATIENT)
Dept: PHYSICAL THERAPY | Age: 80
Setting detail: THERAPIES SERIES
Discharge: HOME | End: 2020-04-23
Payer: MEDICARE

## 2020-04-23 ENCOUNTER — TELEPHONE (OUTPATIENT)
Dept: ORTHOPEDIC SURGERY | Age: 80
End: 2020-04-23

## 2020-04-23 ENCOUNTER — OFFICE VISIT (OUTPATIENT)
Dept: ORTHOPEDIC SURGERY | Age: 80
End: 2020-04-23
Payer: MEDICARE

## 2020-04-23 VITALS — TEMPERATURE: 97.4 F

## 2020-04-23 DIAGNOSIS — M25.561 ACUTE PAIN OF RIGHT KNEE: Primary | ICD-10-CM

## 2020-04-23 PROCEDURE — 1123F ACP DISCUSS/DSCN MKR DOCD: CPT | Performed by: PHYSICIAN ASSISTANT

## 2020-04-23 PROCEDURE — 99213 OFFICE O/P EST LOW 20 MIN: CPT | Performed by: PHYSICIAN ASSISTANT

## 2020-04-23 PROCEDURE — G8427 DOCREV CUR MEDS BY ELIG CLIN: HCPCS | Performed by: PHYSICIAN ASSISTANT

## 2020-04-23 PROCEDURE — G8417 CALC BMI ABV UP PARAM F/U: HCPCS | Performed by: PHYSICIAN ASSISTANT

## 2020-04-23 PROCEDURE — 97110 THERAPEUTIC EXERCISES: CPT

## 2020-04-23 PROCEDURE — 4040F PNEUMOC VAC/ADMIN/RCVD: CPT | Performed by: PHYSICIAN ASSISTANT

## 2020-04-23 PROCEDURE — 20610 DRAIN/INJ JOINT/BURSA W/O US: CPT | Performed by: PHYSICIAN ASSISTANT

## 2020-04-23 PROCEDURE — 1036F TOBACCO NON-USER: CPT | Performed by: PHYSICIAN ASSISTANT

## 2020-04-23 PROCEDURE — G0283 ELEC STIM OTHER THAN WOUND: HCPCS

## 2020-04-23 RX ORDER — TRAMADOL HYDROCHLORIDE 50 MG/1
50 TABLET ORAL EVERY 4 HOURS PRN
Qty: 30 TABLET | Refills: 0 | Status: SHIPPED | OUTPATIENT
Start: 2020-04-23 | End: 2020-04-28

## 2020-04-23 RX ORDER — HYDROCODONE BITARTRATE AND ACETAMINOPHEN 5; 325 MG/1; MG/1
1 TABLET ORAL EVERY 6 HOURS PRN
Qty: 28 TABLET | Refills: 0 | Status: SHIPPED | OUTPATIENT
Start: 2020-04-23 | End: 2020-05-13 | Stop reason: SDUPTHER

## 2020-04-23 NOTE — FLOWSHEET NOTE
Physical Therapy Daily Treatment Note  Date:  2020    Patient Name:  Jamie Corrales    :  1940  MRN: [id number]    Restrictions/Precautions: Fall Risk(low: denies any history of frequent falls. )    Pertinent Medical History:  CAD, HTN, prostate cancer in remisison, CT sx B, L shoulder sx     Medical/Treatment Diagnosis Information:  · Diagnosis: L TKR  · Treatment Diagnosis: Decreased mobilty, strength    Insurance/Certification information:  PT Insurance Information: Medicare  Physician Information:  Referring Practitioner: Celso Laboy MD  Plan of care signed (Y/N):  Sent for cosign 3/31 (received)    Visit# / total visits:   ( is last scheduled appt)  Pain level: 2/10     Functional Outcomes Measure: at eval  Test: LEFS  Score: 26    History of Injury:  Patient underwent L TKR on  without complications. Patient was discharged to home with home PT. Home PT endend last week. Patient notes he is walking with a rolling walker at this time. Subjective:  Very stiff, not exactly painful. He will work it and get it very loose and after only 15 min of sitting, it is so stiff again. Pt notes saw MD for R knee pain and prescribed Tramadol for help with sleeping. Objective:   Observation:   · Palpation: min TTP medial joint line  · Observation: Patient ambulates with rolling walker in clinic. Patient demonstrates bent knee posture with minimal knee motion during gait.    · Edema: moderate levels throughout L LE  · Scar: incision healing with no signs of infection   Test measurements:    ROM:  Date           Knee Flexion       Knee Extension    Active Passive Active Passive    Eval 3/31 80 82 Lacking 16  degrees Lacking 12 degrees            Strength:  Date Hip flexion Hip abduction Quad Hamstring Ankle DF Ankle PF   Eval 3/31 3/ 3/5 3-/5 3/5 4/ 3+/5                Exercises:  Exercise/Equipment Resistance/Repetitions Other comments     L TKR on 3/11/20   Rec Bike #14    x5 Patient limited by fatigue  [x] Patient limited by pain  [] Patient limited by other medical complications  [] Other:     Prognosis: [x] Good [] Fair  [] Poor    Goals:    Short term goals  Time Frame for Short term goals: 3 weeks  Short term goal 1: Patient will be independent with HEP for prevention of reinjury. Short term goal 2: Increase knee PROM to +8-110 for ease with tranfers. Short term goal 3: Patient will report 30% improvement with PT for ease with ADLs. Long term goals  Time Frame for Long term goals : at discharge  Long term goal 1: Increase knee PROM to +5-115 for ease with transfers. Long term goal 2: Increase LE strength to 4+/5 for ease with stairs. Long term goal 3: Patient will report 70% improvement with pain for ease with ADLs. Long term goal 4: Patient will ambulate in community without AD demonstrating good gait pattern. Patient Requires Follow-up: [x] Yes  [] No    Plan:   [x] Continue per plan of care [] Alter current plan (see comments)  [] Plan of care initiated [] Hold pending MD visit [] Discharge    Plan for Next Session:  Add above as stated.  PUSH ROM    Electronically signed by:  HERBERT Worley,[id number]

## 2020-04-23 NOTE — PROGRESS NOTES
Reason for Call:  Other call back    Detailed comments: Patients MONSTER Vasquez would like to speak to   about a note on his Letter head for Information for  Competency determination for government benefits    Phone Number Patient can be reached at: [phone] (Christina-MONSTER)    Best Time: anytime    Can we leave a detailed message on this number? YES    Call taken on 2/16/2017 at 2:12 PM by Jhonatan Bernard       This dictation was done with [employer] dictation and may contain mechanical errors related to translation. The review of systems was currently provided by the patient and reviewed with the medical assistant at today's visit. Please see media. Subjective:  Dianne Ladd is a 67 y.o. who is here complaining pain in his right knee. He has a history of left total knee replacement and is doing well on that side. His right knee has started bothering him earlier this year but over the last 2 weeks has had increased swelling and soreness he comes here for evaluation and treatment. He was sent for standing AP lateral sunrise view of his right knee.       Patient Active Problem List   Diagnosis    Chest pain on exertion    Chest pain    Essential hypertension    S/P CABG (coronary artery bypass graft)    Hyperlipidemia LDL goal <70    Reactive depression    Prostate carcinoma ([address]    Vitamin D deficiency    Left hip pain    Chronic left-sided low back pain without sciatica    Coronary artery disease    Edema of right lower extremity    Greater trochanteric bursitis of left hip    Acute pain of right knee    Primary osteoarthritis of right knee    Primary osteoarthritis of left knee    Chronic fatigue    Acquired hypothyroidism    Gastroesophageal reflux disease without esophagitis    Arthritis of left knee           Current Outpatient Medications on File Prior to Visit   Medication Sig Dispense Refill    esomeprazole (NEXIUM) 20 MG delayed release capsule Take 20 mg by mouth 2 times daily      aspirin EC 81 MG EC tablet Take 1 tablet by mouth daily for 14 days Take twice a day for 14 days after knee surgery then resume daily dose 28 tablet 3    polyethylene glycol (GLYCOLAX) packet Take 17 g by mouth daily as needed for Constipation      atorvastatin (LIPITOR) 80 MG tablet TAKE 1 TABLET BY MOUTH DAILY 30 tablet 5    ezetimibe (ZETIA) 10 MG tablet TAKE 1 TABLET BY MOUTH DAILY 30 tablet 5    the patient      Assessment:  Right knee osteoarthritis    Plan:  During today's visit, there was approximately 15 minutes of face-to-face discussion in regards to the patient's current condition and treatment options. More than 50 % of the time was counseling and coordination of care. With his consent he was injected with 1 cc Kenalog and 2 cc of Marcaine into the right knee joint he tolerated it well he is an excellent candidate for Euflexxa    This patient has a well documented history of osteoarthritis in their knee. They have trialed Nsaid medications, physical therapy exercises and steroid injections. They continue to have pain, swelling and dysfunction. At this junction, hyalgen injections are advisable.  I gave them literature and discussed the risks and benefits with them and would like to seek pre-authorization for        PROCEDURE NOTE:   He will do a home exercise program had a cortisone shot and we will look to get him approved for the halogen      They will schedule a follow up in 2 to 4 weeks

## 2020-04-24 ENCOUNTER — APPOINTMENT (OUTPATIENT)
Dept: PHYSICAL THERAPY | Age: 80
End: 2020-04-24
Payer: MEDICARE

## 2020-04-27 ENCOUNTER — HOSPITAL ENCOUNTER (OUTPATIENT)
Dept: PHYSICAL THERAPY | Age: 80
Setting detail: THERAPIES SERIES
Discharge: HOME | End: 2020-04-27
Payer: MEDICARE

## 2020-04-27 PROCEDURE — G0283 ELEC STIM OTHER THAN WOUND: HCPCS

## 2020-04-27 PROCEDURE — 97110 THERAPEUTIC EXERCISES: CPT

## 2020-04-30 ENCOUNTER — HOSPITAL ENCOUNTER (OUTPATIENT)
Dept: PHYSICAL THERAPY | Age: 80
Setting detail: THERAPIES SERIES
Discharge: HOME | End: 2020-04-30
Payer: MEDICARE

## 2020-04-30 PROCEDURE — 97110 THERAPEUTIC EXERCISES: CPT

## 2020-04-30 PROCEDURE — G0283 ELEC STIM OTHER THAN WOUND: HCPCS

## 2020-04-30 NOTE — FLOWSHEET NOTE
Physical Therapy Daily Treatment Note  Date:  2020    Patient Name:  Priyanka Bullard    :  1940  MRN: [id number]    Restrictions/Precautions: Fall Risk(low: denies any history of frequent falls. )    Pertinent Medical History:  CAD, HTN, prostate cancer in remisison, CT sx B, L shoulder sx     Medical/Treatment Diagnosis Information:  · Diagnosis: L TKR  · Treatment Diagnosis: Decreased mobilty, strength    Insurance/Certification information:  PT Insurance Information: Medicare  Physician Information:  Referring Practitioner: Chon Fuentes MD  Plan of care signed (Y/N):  Sent for cosign 3/31 (received)    Visit# / total visits:  10/12 ( is last scheduled appt)  Pain level: 1-2/10     Functional Outcomes Measure: at eval  Test: LEFS  Score: 26    History of Injury:  Patient underwent L TKR on 3/73/21 without complications. Patient was discharged to home with home PT. Home PT endend last week. Patient notes he is walking with a rolling walker at this time. Subjective:  Very stiff today with cool, rainy weather. \"It won't bend back very far. \" Pt notes did a lot of activity yesterday also (\"working in garage and up on a ladder\") and was sore last night also. Objective:   Observation:   · Palpation: min TTP medial joint line  · Observation: Patient ambulates with rolling walker in clinic. Patient demonstrates bent knee posture with minimal knee motion during gait.    · Edema: moderate levels throughout L LE  · Scar: incision healing with no signs of infection   Test measurements:    ROM:  Date           Knee Flexion       Knee Extension    Active Passive Active Passive    Eval 3/31 80 82 Lacking 16  degrees Lacking 12 degrees            Strength:  Date Hip flexion Hip abduction Quad Hamstring Ankle DF Ankle PF   Eval 3/31 3/5 3/5 3-/5 3/5 4/ 3+/5                Exercises:  Exercise/Equipment Resistance/Repetitions Other comments     L TKR on 3/11/20   Rec Bike #14    x5 mins Inc seat Assessment:  [x] Patient tolerated treatment well [] Patient limited by fatigue  [] Patient limited by pain  [] Patient limited by other medical complications  [x] Other: functional assessment: pt amb to PT without AD     Prognosis: [x] Good [] Fair  [] Poor    Goals:    Short term goals  Time Frame for Short term goals: 3 weeks  Short term goal 1: Patient will be independent with HEP for prevention of reinjury. Short term goal 2: Increase knee PROM to +8-110 for ease with tranfers. Short term goal 3: Patient will report 30% improvement with PT for ease with ADLs. Long term goals  Time Frame for Long term goals : at discharge  Long term goal 1: Increase knee PROM to +5-115 for ease with transfers. Long term goal 2: Increase LE strength to 4+/5 for ease with stairs. Long term goal 3: Patient will report 70% improvement with pain for ease with ADLs. Long term goal 4: Patient will ambulate in community without AD demonstrating good gait pattern. Patient Requires Follow-up: [x] Yes  [] No    Plan:   [x] Continue per plan of care [] Alter current plan (see comments)  [] Plan of care initiated [] Hold pending MD visit [] Discharge    Plan for Next Session:  Add above as stated.  PUSH ROM    Electronically signed by:  CANDIDO Appiah,[id number]

## 2020-05-04 ENCOUNTER — HOSPITAL ENCOUNTER (OUTPATIENT)
Dept: PHYSICAL THERAPY | Age: 80
Setting detail: THERAPIES SERIES
Discharge: HOME | End: 2020-05-04
Payer: MEDICARE

## 2020-05-04 PROCEDURE — G0283 ELEC STIM OTHER THAN WOUND: HCPCS

## 2020-05-04 PROCEDURE — 97110 THERAPEUTIC EXERCISES: CPT

## 2020-05-04 NOTE — FLOWSHEET NOTE
Physical Therapy Daily Treatment Note  Date:  2020    Patient Name:  Risa Davila    :  1940  MRN: [id number]    Restrictions/Precautions: Fall Risk(low: denies any history of frequent falls. )    Pertinent Medical History:  CAD, HTN, prostate cancer in remisison, CT sx B, L shoulder sx     Medical/Treatment Diagnosis Information:  · Diagnosis: L TKR  · Treatment Diagnosis: Decreased mobilty, strength    Insurance/Certification information:  PT Insurance Information: Medicare  Physician Information:  Referring Practitioner: Primo Sparrow MD  Plan of care signed (Y/N):  Sent for cosign 3/31 (received)    Visit# / total visits:   ( is last scheduled appt)  Pain level: 1-10     Functional Outcomes Measure: at eval  Test: LEFS  Score: 26    History of Injury:  Patient underwent L TKR on  without complications. Patient was discharged to home with home PT. Home PT endend last week. Patient notes he is walking with a rolling walker at this time. Subjective:  Cont to be very stiff. He was able to push his ROM further than usual yesterday and feels soreness is from doing that. PT edu pt to be sure he is not overdoing activities at home that could be causing increased edema. Progress Note (20)  * Pain with typical ADLs 0/10  * ROM 0-103  * pt doing hep 3 x day and has his wife pushing on his leg also  * 85-90% overall improvement with pain and ADLS  * pt able to go up steps reciprocally, but only can go down a few at a time  * pt able to amb without AD now  * pt cont to struggle gaining flexion ROM in knee    Objective:   Observation:   · Palpation: min TTP medial joint line  · Observation: Patient ambulates with rolling walker in clinic. Patient demonstrates bent knee posture with minimal knee motion during gait.    · Edema: moderate levels throughout L LE  · Scar: incision healing with no signs of infection   Test measurements:    ROM:  Date           Knee Flexion Knee Extension    Active Passive Active Passive    Eval 3/31 80 82 Lacking 16  degrees Lacking 12 degrees            Strength:  Date Hip flexion Hip abduction Quad Hamstring Ankle DF Ankle PF   Eval 3/31 3/5 3/5 3-/5 3/5 4/5 3+/5                Exercises:  Exercise/Equipment Resistance/Repetitions Other comments     L TKR on 3/11/20   Rec Bike #13    X 6 mins Inc seat to 12 after a few minutes    HS stool stretch Standing 3x20 sec     Knee flexion stretch on ERMI On/off x 3 min 5/4  (encourage longer holding)   gastroc incline 3x20 sec     Mini squats     Stand HR     Stand hip abd                  ext 2# 15x L/R  2# 15x L/R    Step up fwd               lateral Step with 1 riser 15x L  Step with 1 riser x 15 L    Step down Step with 1 riser x 15     SLB L on airex  2 x 30 sec     Tandem stance on airex X 60 sec L/R    TKE with cable column Plate # 3 1 x 10     Mini lunge  add        Seated FAQ 15# 2x10 L     Seated HS curls 30# 2 x 10 L    Leg press 45# 3 x 10 L     TKE with cable column Plate #3  3 W19         Rec bike to loosen up before PROM X 2-3 min at seat 12-11    Seated AP knee mobs 2 x 10 with distraction    Prone knee flexion X 10 x 10 sec hold     Long sit knee ext stretch X 1 min     SLR flexion 1# 3x10 L    SAQ 1# 3x10 L    heelslides with strap for overpressure     extentionater   Knee PROM flex/ext Supine 10x each  5/7  0 - 103   Pat mobs  X 10 each    STM X 3- 5 min to lat knee and inf incision     IFC with CP Ankle propped up on half bolster for 2-3 min during estim set up prep    x15 mins L knee      Other Therapeutic Activities:  Pt was educated on PT POC, Diagnosis, Prognosis, pathomechanics as well as frequency and duration of scheduling future physical therapy appointments. Time was also taken on this day to answer all patient questions and participation in PT. Reviewed appointment policy in detail with patient and patient verbalized understanding.      Home Exercise Program: Patient was instructed in the following for HEP: knee flex step stretch, stand HS stretch, gastroc incline, mini squats, stand HR, stand hip abd, FAQ, SLR flex, SL abd, bridge, abd, long sit ext stretch, prone knee ext stretch, and supine ext stretch. Patient verbalized/demonstrated understanding and was issued written handout. Charges: Therapeutic Exercise:  [x] (92745) Provided verbal/tactile cueing for activities to restore or maintain strength, flexibility, endurance, ROM for improvements with self-care, mobility, lifting and ambulation. Neuromuscular Re-Education  [] (25032) Provided verbal/tactile cueing for activities to restore or maintain balance, coordination, kinesthetic sense, posture, motor skill, proprioception for self-care, mobility, lifting, and ambulation. Therapeutic Activities:    [] (97429) Provided verbal/tactile cueing to address functional limitations related to loss of mobility, strength, balance, and coordination.      Gait Training:  [] (28916) Provided training and instruction to the patient for proper postural muscle recruitment and positioning with ambulation re-education     Home Exercise Program:    [x] (24611) Reviewed/Progressed HEP activities related to strengthening, flexibility, endurance, ROM for functional self-care, mobility, lifting and ambulation   [] (26353) Reviewed/Progressed HEP activities related to improving balance, coordination, kinesthetic sense, posture, motor skill, proprioception for self-care, mobility, lifting, and ambulation      Manual Treatments:  MFR / STM / Oscillations-Mobs:  G-I, II, III, IV / Manipulation / MLD  [] (27183) Provided manual therapy to mobilize  soft tissue/joints/fluid for the purpose of modulating pain, promoting relaxation, increasing ROM, reducing/eliminating soft tissue swelling/inflammation/restriction, improving soft tissue extensibility and allowing for proper ROM for normal function with self- care, mobility, lifting and

## 2020-05-07 ENCOUNTER — OFFICE VISIT (OUTPATIENT)
Dept: ORTHOPEDIC SURGERY | Age: 80
End: 2020-05-07

## 2020-05-07 ENCOUNTER — HOSPITAL ENCOUNTER (OUTPATIENT)
Dept: PHYSICAL THERAPY | Age: 80
Setting detail: THERAPIES SERIES
Discharge: HOME | End: 2020-05-07
Payer: MEDICARE

## 2020-05-07 VITALS — TEMPERATURE: 97.8 F

## 2020-05-07 PROCEDURE — G0283 ELEC STIM OTHER THAN WOUND: HCPCS

## 2020-05-07 PROCEDURE — 99024 POSTOP FOLLOW-UP VISIT: CPT | Performed by: ORTHOPAEDIC SURGERY

## 2020-05-07 PROCEDURE — 97110 THERAPEUTIC EXERCISES: CPT

## 2020-05-07 NOTE — FLOWSHEET NOTE
16  degrees Lacking 12 degrees            Strength:  Date Hip flexion Hip abduction Quad Hamstring Ankle DF Ankle PF   Eval 3/31 3/5 3/5 3-/5 3/5 4/5 3+/5                Exercises:  Exercise/Equipment Resistance/Repetitions Other comments     L TKR on 3/11/20   Rec Bike #13    X 5 mins Inc seat to 11 after a few minutes    HS stool stretch Standing 3x20 sec     Knee flexion stretch on ERMI On/off x 3 min 5/4  (encourage longer holding)   gastroc incline 3x20 sec     Mini squats     Stand HR     Stand hip abd                  ext 2# 15x L/R  2# 15x L/R    Step up fwd               lateral 8\" x 5 L  8\" x 5 L    Step down 8\" x 5    SLB L on airex  2 x 30 sec     Tandem stance on airex X 60 sec L/R    Mini lunge  add        Seated FAQ 15# 2x10 L     Seated HS curls 30# 2 x 15 L    Leg press 45# 3 x 10 L     TKE with cable column Plate #3  3 L47         Rec bike to loosen up before PROM X 3 min at seat 12-10    Seated AP knee mobs 2 x 10 with distraction    Prone knee flexion X 10 x 10 sec hold     Long sit knee ext stretch X 1 min     SLR flexion 2# 2x10 L    SAQ 2# 2x10 L    heelslides with strap for overpressure     extentionater   Knee PROM flex/ext Supine 10x each  5/7  0 - 105   Pat mobs  X 10 each    STM     IFC with CP Ankle propped up on half bolster for 2-3 min during estim set up prep    x15 mins L knee      Other Therapeutic Activities:  Pt was educated on PT POC, Diagnosis, Prognosis, pathomechanics as well as frequency and duration of scheduling future physical therapy appointments. Time was also taken on this day to answer all patient questions and participation in PT. Reviewed appointment policy in detail with patient and patient verbalized understanding.      Home Exercise Program: Patient was instructed in the following for HEP: knee flex step stretch, stand HS stretch, gastroc incline, mini squats, stand HR, stand hip abd, FAQ, SLR flex, SL abd, bridge, abd, long sit ext stretch, prone knee ext (65729)   x  [] Aquatic Group (65861) x  [] Manual (96685) x    [] Ultrasound (75619) x  [] TA (82820) x  [] Mech Traction (69385)  [] Ionto (30576)           [x] ES (un) (48079):   [] Vasopump (70608)  [] Other:      Assessment:  [x] Patient tolerated treatment well [] Patient limited by fatigue  [] Patient limited by pain  [] Patient limited by other medical complications  [x] Other: functional assessment: pt amb to PT without AD  4/30 to present - Pt independent with Rec bike warm ups and seat increases and flexionater stretches     Prognosis: [x] Good [] Fair  [] Poor    Goals:    Short term goals  Time Frame for Short term goals: 3 weeks  Short term goal 1: Patient will be independent with HEP for prevention of reinjury. Short term goal 2: Increase knee PROM to +8-110 for ease with tranfers. Short term goal 3: Patient will report 30% improvement with PT for ease with ADLs. Long term goals  Time Frame for Long term goals : at discharge  Long term goal 1: Increase knee PROM to +5-115 for ease with transfers. Long term goal 2: Increase LE strength to 4+/5 for ease with stairs. Long term goal 3: Patient will report 70% improvement with pain for ease with ADLs. Long term goal 4: Patient will ambulate in community without AD demonstrating good gait pattern. Patient Requires Follow-up: [x] Yes  [] No    Plan:   [x] Continue per plan of care [] Alter current plan (see comments)  [] Plan of care initiated [] Hold pending MD visit [] Discharge    Plan for Next Session:  Add above as stated. Cont PT x 2 x wk x 3 wks.  PUSH ROM     Electronically signed by:  JARAD Mercado,[id number]

## 2020-05-12 ENCOUNTER — TELEPHONE (OUTPATIENT)
Dept: ORTHOPEDIC SURGERY | Age: 80
End: 2020-05-12

## 2020-05-12 ENCOUNTER — HOSPITAL ENCOUNTER (OUTPATIENT)
Dept: PHYSICAL THERAPY | Age: 80
Setting detail: THERAPIES SERIES
Discharge: HOME | End: 2020-05-12
Payer: MEDICARE

## 2020-05-12 PROCEDURE — 97110 THERAPEUTIC EXERCISES: CPT

## 2020-05-12 PROCEDURE — G0283 ELEC STIM OTHER THAN WOUND: HCPCS

## 2020-05-12 RX ORDER — TIZANIDINE 4 MG/1
TABLET ORAL
Qty: 60 TABLET | Refills: 0 | Status: SHIPPED | OUTPATIENT
Start: 2020-05-12 | End: 2020-09-09 | Stop reason: ALTCHOICE

## 2020-05-12 RX ORDER — OXYCODONE HYDROCHLORIDE 5 MG/1
5 TABLET ORAL EVERY 6 HOURS PRN
Qty: 28 TABLET | Refills: 0 | Status: SHIPPED | OUTPATIENT
Start: 2020-05-12 | End: 2020-05-13 | Stop reason: SINTOL

## 2020-05-12 NOTE — TELEPHONE ENCOUNTER
PATIENT'S WIFE CALLING RE MEDICATION REFILL FOR HYDROCODONE.   WIFE STATES HE CAN'T TAKE THE OXYCODONE.    [phone]

## 2020-05-14 ENCOUNTER — HOSPITAL ENCOUNTER (OUTPATIENT)
Dept: PHYSICAL THERAPY | Age: 80
Setting detail: THERAPIES SERIES
Discharge: HOME | End: 2020-05-14
Payer: MEDICARE

## 2020-05-14 PROCEDURE — G0283 ELEC STIM OTHER THAN WOUND: HCPCS

## 2020-05-14 PROCEDURE — 97110 THERAPEUTIC EXERCISES: CPT

## 2020-05-14 RX ORDER — HYDROCODONE BITARTRATE AND ACETAMINOPHEN 5; 325 MG/1; MG/1
1 TABLET ORAL EVERY 8 HOURS PRN
Qty: 21 TABLET | Refills: 0 | Status: SHIPPED | OUTPATIENT
Start: 2020-05-14 | End: 2020-05-21

## 2020-05-15 RX ORDER — LEVOTHYROXINE SODIUM 0.05 MG/1
50 TABLET ORAL DAILY
Qty: 90 TABLET | Refills: 1 | Status: SHIPPED | OUTPATIENT
Start: 2020-05-15 | End: 2020-11-11

## 2020-05-15 NOTE — TELEPHONE ENCOUNTER
Last office visit:  03/06/2020    Future Appointments   Date Time Provider Leonie Silva   5/19/2020 10:00 AM Sharon Rosario, PT [place] [facility]   5/21/2020 12:00 PM Marilee Minaya [facility]   5/26/2020 10:00 AM Sharon Rosario, PT [place] [facility]   5/28/2020 10:00 AM Sharon Rosario, PT [place] [facility]   6/8/2020 11:00 AM Shilpa Whitaker [facility]   6/18/2020 10:00 AM Berneice Severs, MD [facility]

## 2020-05-19 ENCOUNTER — HOSPITAL ENCOUNTER (OUTPATIENT)
Dept: PHYSICAL THERAPY | Age: 80
Setting detail: THERAPIES SERIES
Discharge: HOME | End: 2020-05-19
Payer: MEDICARE

## 2020-05-19 PROCEDURE — 97110 THERAPEUTIC EXERCISES: CPT

## 2020-05-19 PROCEDURE — G0283 ELEC STIM OTHER THAN WOUND: HCPCS

## 2020-05-19 NOTE — FLOWSHEET NOTE
Physical Therapy Daily Treatment Note  Date:  2020    Patient Name:  Harry Fuentes    :  1940  MRN: [id number]    Restrictions/Precautions: Fall Risk(low: denies any history of frequent falls. )    Pertinent Medical History:  CAD, HTN, prostate cancer in remisison, CT sx B, L shoulder sx     Medical/Treatment Diagnosis Information:  · Diagnosis: L TKR  · Treatment Diagnosis: Decreased mobilty, strength    Insurance/Certification information:  PT Insurance Information: Medicare  Physician Information:  Referring Practitioner: Katina Willis MD  Plan of care signed (Y/N):  Sent for cosign 3/31 (received)    Visit# / total visits:  15/18  (Prehab)  Pain level: 1/10     Functional Outcomes Measure: at eval  Test: LEFS  Score: 26    History of Injury:  Patient underwent L TKR on  without complications. Patient was discharged to home with home PT. Home PT endend last week. Patient notes he is walking with a rolling walker at this time. Subjective:  Cont to be stiff with the rainy weather, but felt great over the weekend. Progress Note (20)  * Pain with typical ADLs 0/10  * ROM 0-103  * pt doing hep 3 x day and has his wife pushing on his leg also  * 85-90% overall improvement with pain and ADLS  * pt able to go up steps reciprocally, but only can go down a few at a time  * pt able to amb without AD now  * pt cont to struggle gaining flexion ROM in knee    Objective:   Observation:   · Palpation: min TTP medial joint line  · Observation: Patient ambulates with rolling walker in clinic. Patient demonstrates bent knee posture with minimal knee motion during gait.    · Edema: moderate levels throughout L LE  · Scar: incision healing with no signs of infection   Test measurements:    ROM:  Date           Knee Flexion       Knee Extension    Active Passive Active Passive    Eval 3/31 80 82 Lacking 16  degrees Lacking 12 degrees            Strength:  Date Hip flexion Hip abduction Quad Hamstring Ankle DF Ankle PF   Eval 3/31 3/5 3/5 3-/5 3/5 4/5 3+/5                Exercises:  Exercise/Equipment Resistance/Repetitions Other comments     L TKR on 3/11/20   Rec Bike #13    X 6 mins Inc seat to 9 after a few minutes    HS stool stretch Standing 3x20 sec     Knee flexion stretch on ERMI On/off x 3 min 5/12  (encourage longer holding)   gastroc incline 3x20 sec     Mini squats     Stand HR     Stand hip abd                  ext 3# 20x L/R  3# 20x L/R    Step up fwd               lateral 8\" x 10 L  8\" x 10 L    Step down 8\" x 10    SLB L on airex  2 x 30 sec     Tandem stance on airex X 60 sec L/R    Mini lunge  Reviewed tech x 1 - pt did this just prior to PT session         Seated FAQ 15# 2x10 L     Seated HS curls 30# 2 x 15 L    Leg press 45# 3 x 10 L     TKE with cable column Plate #3  3 V73         Rec bike to loosen up before PROM  In use   Seated AP knee mobs 2 x 10 with distraction    Prone knee flexion     Long sit knee ext stretch X 1 min     SLR flexion 2# 2x10 L Hep    SAQ 2# 2x10 L Hep    heelslides with strap for overpressure     extentionater   Knee PROM flex/ext Supine 10x each  5/19  0 - 108 with pt doing heel slide with OP   Pat mobs  X 10 each   STM    IFC with CP     x15 mins L knee      Other Therapeutic Activities:  Pt was educated on PT POC, Diagnosis, Prognosis, pathomechanics as well as frequency and duration of scheduling future physical therapy appointments. Time was also taken on this day to answer all patient questions and participation in PT. Reviewed appointment policy in detail with patient and patient verbalized understanding. Home Exercise Program: Patient was instructed in the following for HEP: knee flex step stretch, stand HS stretch, gastroc incline, mini squats, stand HR, stand hip abd, FAQ, SLR flex, SL abd, bridge, abd, long sit ext stretch, prone knee ext stretch, and supine ext stretch.  Patient verbalized/demonstrated understanding and was issued () x  [] [facility] Traction (72389)  [] Ionto (41987)           [x] ES (un) (07443):   [] Vasopump (06891)  [] Other:      Assessment:  [x] Patient tolerated treatment well [] Patient limited by fatigue  [] Patient limited by pain  [] Patient limited by other medical complications  [x] Other: functional assessment: pt amb to PT without AD  4/30 to present - Pt independent with Rec bike warm ups and seat increases and flexionater stretches     Prognosis: [x] Good [] Fair  [] Poor    Goals:    Short term goals  Time Frame for Short term goals: 3 weeks  Short term goal 1: Patient will be independent with HEP for prevention of reinjury. Short term goal 2: Increase knee PROM to +8-110 for ease with tranfers. Short term goal 3: Patient will report 30% improvement with PT for ease with ADLs. Long term goals  Time Frame for Long term goals : at discharge  Long term goal 1: Increase knee PROM to +5-115 for ease with transfers. Long term goal 2: Increase LE strength to 4+/5 for ease with stairs. Long term goal 3: Patient will report 70% improvement with pain for ease with ADLs. Long term goal 4: Patient will ambulate in community without AD demonstrating good gait pattern. Patient Requires Follow-up: [x] Yes  [] No    Plan:   [x] Continue per plan of care [] Alter current plan (see comments)  [] Plan of care initiated [] Hold pending MD visit [] Discharge    Plan for Next Session:  Add above as stated. Cont PT x 2 x wk x 3 wks.  PUSH ROM     Electronically signed by:  Kacie Rosenberg DC,[id number]

## 2020-05-21 ENCOUNTER — HOSPITAL ENCOUNTER (OUTPATIENT)
Dept: PHYSICAL THERAPY | Age: 80
Setting detail: THERAPIES SERIES
Discharge: HOME | End: 2020-05-21
Payer: MEDICARE

## 2020-05-21 PROCEDURE — G0283 ELEC STIM OTHER THAN WOUND: HCPCS

## 2020-05-21 PROCEDURE — 97110 THERAPEUTIC EXERCISES: CPT

## 2020-05-21 NOTE — FLOWSHEET NOTE
Physical Therapy Daily Treatment Note  Date:  2020    Patient Name:  Linda Fernandez    :  1940  MRN: [id number]    Restrictions/Precautions: Fall Risk(low: denies any history of frequent falls. )    Pertinent Medical History:  CAD, HTN, prostate cancer in remisison, CT sx B, L shoulder sx     Medical/Treatment Diagnosis Information:  · Diagnosis: L TKR  · Treatment Diagnosis: Decreased mobilty, strength    Insurance/Certification information:  PT Insurance Information: Medicare  Physician Information:  Referring Practitioner: Christiano Dangelo MD  Plan of care signed (Y/N):  Sent for cosign 3/31 (received)    Visit# / total visits:    (Prehab)  Pain level: 1/10     Functional Outcomes Measure: at eval  Test: LEFS  Score: 26    History of Injury:  Patient underwent L TKR on  without complications. Patient was discharged to home with home PT. Home PT endend last week. Patient notes he is walking with a rolling walker at this time. Subjective:  \"stiff\"     Progress Note (20)  * Pain with typical ADLs 0/10  * ROM 0-103  * pt doing hep 3 x day and has his wife pushing on his leg also  * 85-90% overall improvement with pain and ADLS  * pt able to go up steps reciprocally, but only can go down a few at a time  * pt able to amb without AD now  * pt cont to struggle gaining flexion ROM in knee    Objective:   Observation:   · Palpation: min TTP medial joint line  · Observation: Patient ambulates with rolling walker in clinic. Patient demonstrates bent knee posture with minimal knee motion during gait.    · Edema: moderate levels throughout L LE  · Scar: incision healing with no signs of infection   Test measurements:    ROM:  Date           Knee Flexion       Knee Extension    Active Passive Active Passive    Eval 3/31 80 82 Lacking 16  degrees Lacking 12 degrees            Strength:  Date Hip flexion Hip abduction Quad Hamstring Ankle DF Ankle PF   Eval 3/31 3/5 3/ 3-/5 3/5 4/5 3+/5 Exercises:  Exercise/Equipment Resistance/Repetitions Other comments     L TKR on 3/11/20   Rec Bike #13    X 6 mins Inc seat to 9 after a few minutes    HS stool stretch Standing 3x20 sec     Knee flexion stretch on ERMI On/off x 3 min 5/12  (encourage longer holding)   gastroc incline 3x20 sec     Mini squats     Stand HR     Stand hip abd                  ext 3# 20x L/R  3# 20x L/R    Step up fwd               lateral 8\" x 10 L  8\" x 10 L    Step down 8\" x 10    SLB L on airex  2 x 30 sec     Tandem stance on airex X 60 sec L/R    Mini lunge  Reviewed tech x 1 - pt did this just prior to PT session         Seated FAQ 15# 2x10 L     Seated HS curls 30# 2 x 15 L    Leg press 45# 3 x 10 L     TKE with cable column Plate #3  3 I29         Rec bike to loosen up before PROM X 3 min at seat 9    Seated AP knee mobs 2 x 10 with distraction    Prone knee flexion     Long sit knee ext stretch X 1 min     SLR flexion 2# 2x10 L Hep    SAQ 2# 2x10 L Hep    heelslides with strap for overpressure     extentionater   Knee PROM flex/ext Supine 10x each  5/21  0 - 105 with pt doing heel slide with OP   Pat mobs  X 10 each   STM    IFC with CP     x15 mins L knee      Other Therapeutic Activities:  Pt was educated on PT POC, Diagnosis, Prognosis, pathomechanics as well as frequency and duration of scheduling future physical therapy appointments. Time was also taken on this day to answer all patient questions and participation in PT. Reviewed appointment policy in detail with patient and patient verbalized understanding. Home Exercise Program: Patient was instructed in the following for HEP: knee flex step stretch, stand HS stretch, gastroc incline, mini squats, stand HR, stand hip abd, FAQ, SLR flex, SL abd, bridge, abd, long sit ext stretch, prone knee ext stretch, and supine ext stretch. Patient verbalized/demonstrated understanding and was issued written handout. Charges:     Therapeutic Exercise:  [x] (27896) Provided verbal/tactile cueing for activities to restore or maintain strength, flexibility, endurance, ROM for improvements with self-care, mobility, lifting and ambulation. Neuromuscular Re-Education  [] (42356) Provided verbal/tactile cueing for activities to restore or maintain balance, coordination, kinesthetic sense, posture, motor skill, proprioception for self-care, mobility, lifting, and ambulation. Therapeutic Activities:    [] (74218) Provided verbal/tactile cueing to address functional limitations related to loss of mobility, strength, balance, and coordination. Gait Training:  [] (83201) Provided training and instruction to the patient for proper postural muscle recruitment and positioning with ambulation re-education     Home Exercise Program:    [x] (25767) Reviewed/Progressed HEP activities related to strengthening, flexibility, endurance, ROM for functional self-care, mobility, lifting and ambulation   [] (71763) Reviewed/Progressed HEP activities related to improving balance, coordination, kinesthetic sense, posture, motor skill, proprioception for self-care, mobility, lifting, and ambulation      Manual Treatments:  MFR / STM / Oscillations-Mobs:  G-I, II, III, IV / Manipulation / MLD  [] (27728) Provided manual therapy to mobilize  soft tissue/joints/fluid for the purpose of modulating pain, promoting relaxation, increasing ROM, reducing/eliminating soft tissue swelling/inflammation/restriction, improving soft tissue extensibility and allowing for proper ROM for normal function with self- care, mobility, lifting and ambulation.       Timed Code Treatment Minutes: 45   Total Treatment Minutes: 60     [] EVAL (LOW) 88967   [] EVAL (MOD) 13658   [] EVAL (HIGH) 94722   [] RE-EVAL   [x] TE (89794) x 3  [] Aquatic (97515) x  [] NMR (11421)   x  [] Aquatic Group (17007) x  [] Manual (15107) x    [] Ultrasound (72375) x  [] TA (65494) x  [] Mech Traction (86868)  [] Ionto

## 2020-05-26 ENCOUNTER — HOSPITAL ENCOUNTER (OUTPATIENT)
Dept: PHYSICAL THERAPY | Age: 80
Setting detail: THERAPIES SERIES
Discharge: HOME | End: 2020-05-26
Payer: MEDICARE

## 2020-05-26 PROCEDURE — G0283 ELEC STIM OTHER THAN WOUND: HCPCS

## 2020-05-26 PROCEDURE — 97110 THERAPEUTIC EXERCISES: CPT

## 2020-05-26 NOTE — FLOWSHEET NOTE
infection   Test measurements:    ROM:  Date           Knee Flexion       Knee Extension    Active Passive Active Passive    Eval 3/31 80 82 Lacking 16  degrees Lacking 12 degrees            Strength:  Date Hip flexion Hip abduction Quad Hamstring Ankle DF Ankle PF   Eval 3/31 3/5 3/5 3-/5 3/5 4/5 3+/5                Exercises:  Exercise/Equipment Resistance/Repetitions Other comments     L TKR on 3/11/20   Rec Bike #13    X 6 mins Inc seat to 9 after a few minutes    HS str on step 3x20 sec     Knee flexion stretch on step 5 x 20 sec     gastroc incline 3x20 sec     Mini squats     Stand HR     Stand hip abd                  ext 3# 20x L/R  3# 20x L/R    Step up fwd               lateral 8\" x 10 L  8\" x 10 L    Step down 8\" x 10    SLB L on airex  2 x 30 sec     Tandem stance on airex X 60 sec L/R    Mini lunge  Reviewed tech x 1 - pt did this just prior to PT session         Seated FAQ Reynolds TB 2 x 10     Seated HS curls Reynolds TB 2 x 10     Leg press 45# 3 x 10 L     TKE with cable column Plate #2  3 O95         Rec bike to loosen up before PROM     Seated AP knee mobs 2 x 10 with distraction    Prone knee flexion X 10 x 10 sec hold     Long sit knee ext stretch     SLR flexion 2# 2x10 L Hep    SAQ 2# 2x10 L Hep    heelslides with strap for overpressure     extentionater   Knee PROM flex/ext Supine 10x each  5/26 0 - 106    Pat mobs  X 10 each   STM    IFC with CP     x15 mins L knee      Other Therapeutic Activities:  Pt was educated on PT POC, Diagnosis, Prognosis, pathomechanics as well as frequency and duration of scheduling future physical therapy appointments. Time was also taken on this day to answer all patient questions and participation in PT. Reviewed appointment policy in detail with patient and patient verbalized understanding.      Home Exercise Program: Patient was instructed in the following for HEP: knee flex step stretch, stand HS stretch, gastroc incline, mini squats, stand HR, stand hip abd, FAQ, SLR flex, SL abd, bridge, abd, long sit ext stretch, prone knee ext stretch, and supine ext stretch. Patient verbalized/demonstrated understanding and was issued written handout. Charges: Therapeutic Exercise:  [x] (85747) Provided verbal/tactile cueing for activities to restore or maintain strength, flexibility, endurance, ROM for improvements with self-care, mobility, lifting and ambulation. Neuromuscular Re-Education  [] (80386) Provided verbal/tactile cueing for activities to restore or maintain balance, coordination, kinesthetic sense, posture, motor skill, proprioception for self-care, mobility, lifting, and ambulation. Therapeutic Activities:    [] (53615) Provided verbal/tactile cueing to address functional limitations related to loss of mobility, strength, balance, and coordination. Gait Training:  [] (33401) Provided training and instruction to the patient for proper postural muscle recruitment and positioning with ambulation re-education     Home Exercise Program:    [x] (46186) Reviewed/Progressed HEP activities related to strengthening, flexibility, endurance, ROM for functional self-care, mobility, lifting and ambulation   [] (97198) Reviewed/Progressed HEP activities related to improving balance, coordination, kinesthetic sense, posture, motor skill, proprioception for self-care, mobility, lifting, and ambulation      Manual Treatments:  MFR / STM / Oscillations-Mobs:  G-I, II, III, IV / Manipulation / MLD  [] (05486) Provided manual therapy to mobilize  soft tissue/joints/fluid for the purpose of modulating pain, promoting relaxation, increasing ROM, reducing/eliminating soft tissue swelling/inflammation/restriction, improving soft tissue extensibility and allowing for proper ROM for normal function with self- care, mobility, lifting and ambulation.       Timed Code Treatment Minutes: 50   Total Treatment Minutes: 65     [] EVAL (LOW) 83141   [] EVAL (MOD) 56502   [] EVAL (HIGH) 25801 [] RE-EVAL   [x] TE (17220) x 3  [] Aquatic (96629) x  [] NMR (09637)   x  [] Aquatic Group (76405) x  [] Manual (52744) x    [] Ultrasound (79767) x  [] TA (44977) x  [] Mech Traction (36487)  [] Ionto (92261)           [x] ES (un) (89145):   [] Vasopump (55051)  [] Other:      Assessment:  [x] Patient tolerated treatment well [] Patient limited by fatigue  [] Patient limited by pain  [] Patient limited by other medical complications  [x] Other: functional assessment: \"less spasms in leg\"     Prognosis: [x] Good [] Fair  [] Poor    Goals:    Short term goals  Time Frame for Short term goals: 3 weeks  Short term goal 1: Patient will be independent with HEP for prevention of reinjury. Short term goal 2: Increase knee PROM to +8-110 for ease with tranfers. Short term goal 3: Patient will report 30% improvement with PT for ease with ADLs. Long term goals  Time Frame for Long term goals : at discharge  Long term goal 1: Increase knee PROM to +5-115 for ease with transfers. Long term goal 2: Increase LE strength to 4+/5 for ease with stairs. Long term goal 3: Patient will report 70% improvement with pain for ease with ADLs. Long term goal 4: Patient will ambulate in community without AD demonstrating good gait pattern.       Patient Requires Follow-up: [x] Yes  [] No    Plan:   [x] Continue per plan of care [] Alter current plan (see comments)  [] Plan of care initiated [] Hold pending MD visit [] Discharge    Plan for Next Session:  Pt wants to try to finish after next session to cont with hep independently   Electronically signed by:  BENJAMIN Mccall,[id number]

## 2020-05-28 ENCOUNTER — HOSPITAL ENCOUNTER (OUTPATIENT)
Dept: PHYSICAL THERAPY | Age: 80
Setting detail: THERAPIES SERIES
Discharge: HOME | End: 2020-05-28
Payer: MEDICARE

## 2020-05-28 PROCEDURE — G0283 ELEC STIM OTHER THAN WOUND: HCPCS

## 2020-05-28 PROCEDURE — 97530 THERAPEUTIC ACTIVITIES: CPT

## 2020-05-28 PROCEDURE — 97110 THERAPEUTIC EXERCISES: CPT

## 2020-05-28 NOTE — PROGRESS NOTES
Home Health Care   FUNCTIONAL RISK:   [x] Low      [] Medium     [] High                DISPOSITION:         [] Home w/ Outpatient PT                                                                                                                      [] SNF / Inpatient Rehab                Plan:  Plan  Pt has completed post op PT and is ready to cont with  Home Exercise Program independently       Signature:  Amalia Muse, [id number] Rick Valentine

## 2020-06-08 ENCOUNTER — VIRTUAL VISIT (OUTPATIENT)
Dept: INTERNAL MEDICINE CLINIC | Age: 80
End: 2020-06-08
Payer: MEDICARE

## 2020-06-08 PROCEDURE — 99442 PR PHYS/QHP TELEPHONE EVALUATION 11-20 MIN: CPT | Performed by: INTERNAL MEDICINE

## 2020-06-08 RX ORDER — ESCITALOPRAM OXALATE 20 MG/1
10 TABLET ORAL DAILY
Qty: 1 TABLET | Refills: 0
Start: 2020-06-08 | End: 2020-08-10

## 2020-06-08 ASSESSMENT — ENCOUNTER SYMPTOMS
SHORTNESS OF BREATH: 0
WHEEZING: 0
COUGH: 0
GASTROINTESTINAL NEGATIVE: 1
TROUBLE SWALLOWING: 0
CHEST TIGHTNESS: 0

## 2020-06-18 ENCOUNTER — OFFICE VISIT (OUTPATIENT)
Dept: ORTHOPEDIC SURGERY | Age: 80
End: 2020-06-18
Payer: MEDICARE

## 2020-06-18 VITALS — TEMPERATURE: 97.2 F

## 2020-06-18 PROBLEM — Z96.652 HISTORY OF TOTAL KNEE REPLACEMENT, LEFT: Status: ACTIVE | Noted: 2020-06-18

## 2020-06-18 PROCEDURE — 1123F ACP DISCUSS/DSCN MKR DOCD: CPT | Performed by: PHYSICIAN ASSISTANT

## 2020-06-18 PROCEDURE — 99212 OFFICE O/P EST SF 10 MIN: CPT | Performed by: PHYSICIAN ASSISTANT

## 2020-06-18 PROCEDURE — G8417 CALC BMI ABV UP PARAM F/U: HCPCS | Performed by: PHYSICIAN ASSISTANT

## 2020-06-18 PROCEDURE — 4040F PNEUMOC VAC/ADMIN/RCVD: CPT | Performed by: PHYSICIAN ASSISTANT

## 2020-06-18 PROCEDURE — 1036F TOBACCO NON-USER: CPT | Performed by: PHYSICIAN ASSISTANT

## 2020-06-18 PROCEDURE — G8427 DOCREV CUR MEDS BY ELIG CLIN: HCPCS | Performed by: PHYSICIAN ASSISTANT

## 2020-07-30 ENCOUNTER — OFFICE VISIT (OUTPATIENT)
Dept: ORTHOPEDIC SURGERY | Age: 80
End: 2020-07-30
Payer: MEDICARE

## 2020-07-30 VITALS — WEIGHT: 191 LBS | RESPIRATION RATE: 16 BRPM | HEIGHT: 67 IN | TEMPERATURE: 97.3 F | BODY MASS INDEX: 29.98 KG/M2

## 2020-07-30 PROCEDURE — 1036F TOBACCO NON-USER: CPT | Performed by: PHYSICIAN ASSISTANT

## 2020-07-30 PROCEDURE — G8427 DOCREV CUR MEDS BY ELIG CLIN: HCPCS | Performed by: PHYSICIAN ASSISTANT

## 2020-07-30 PROCEDURE — G8417 CALC BMI ABV UP PARAM F/U: HCPCS | Performed by: PHYSICIAN ASSISTANT

## 2020-07-30 PROCEDURE — 99213 OFFICE O/P EST LOW 20 MIN: CPT | Performed by: PHYSICIAN ASSISTANT

## 2020-07-30 PROCEDURE — 1123F ACP DISCUSS/DSCN MKR DOCD: CPT | Performed by: PHYSICIAN ASSISTANT

## 2020-07-30 PROCEDURE — 4040F PNEUMOC VAC/ADMIN/RCVD: CPT | Performed by: PHYSICIAN ASSISTANT

## 2020-07-30 PROCEDURE — 20610 DRAIN/INJ JOINT/BURSA W/O US: CPT | Performed by: PHYSICIAN ASSISTANT

## 2020-08-06 ENCOUNTER — OFFICE VISIT (OUTPATIENT)
Dept: ORTHOPEDIC SURGERY | Age: 80
End: 2020-08-06
Payer: MEDICARE

## 2020-08-06 VITALS — BODY MASS INDEX: 29.98 KG/M2 | WEIGHT: 191 LBS | TEMPERATURE: 97.3 F | HEIGHT: 67 IN

## 2020-08-06 PROCEDURE — 4040F PNEUMOC VAC/ADMIN/RCVD: CPT | Performed by: PHYSICIAN ASSISTANT

## 2020-08-06 PROCEDURE — 1036F TOBACCO NON-USER: CPT | Performed by: PHYSICIAN ASSISTANT

## 2020-08-06 PROCEDURE — 1123F ACP DISCUSS/DSCN MKR DOCD: CPT | Performed by: PHYSICIAN ASSISTANT

## 2020-08-06 PROCEDURE — 20610 DRAIN/INJ JOINT/BURSA W/O US: CPT | Performed by: PHYSICIAN ASSISTANT

## 2020-08-06 PROCEDURE — G8417 CALC BMI ABV UP PARAM F/U: HCPCS | Performed by: PHYSICIAN ASSISTANT

## 2020-08-06 PROCEDURE — 99213 OFFICE O/P EST LOW 20 MIN: CPT | Performed by: PHYSICIAN ASSISTANT

## 2020-08-06 PROCEDURE — G8427 DOCREV CUR MEDS BY ELIG CLIN: HCPCS | Performed by: PHYSICIAN ASSISTANT

## 2020-08-08 NOTE — PROGRESS NOTES
This dictation was done with [employer] dictation and may contain mechanical errors related to translation. The review of systems was currently provided by the patient and reviewed with the medical assistant at today's visit. Please see media. Subjective:  Alo Schofield is a 79 y.o. who is here complaining of pain in his right knee. He had a previous cortisone shot on 4/23/2020 which provided a lot of relief.   His pain has returned he has 3 out of 10 pain      Patient Active Problem List   Diagnosis    Chest pain on exertion    Chest pain    Essential hypertension    S/P CABG (coronary artery bypass graft)    Hyperlipidemia LDL goal <70    Reactive depression    Prostate carcinoma ([facility] [address]    Vitamin D deficiency    Left hip pain    Chronic left-sided low back pain without sciatica    Coronary artery disease    Edema of right lower extremity    Greater trochanteric bursitis of left hip    Acute pain of right knee    Primary osteoarthritis of right knee    Primary osteoarthritis of left knee    Chronic fatigue    Acquired hypothyroidism    Gastroesophageal reflux disease without esophagitis    Arthritis of left knee    History of total knee replacement, left-3.11.2020           Current Outpatient Medications on File Prior to Visit   Medication Sig Dispense Refill    escitalopram (LEXAPRO) 20 MG tablet Take 0.5 tablets by mouth daily 1 tablet 0    levothyroxine (SYNTHROID) 50 MCG tablet TAKE 1 TABLET BY MOUTH DAILY 90 tablet 1    tiZANidine (ZANAFLEX) 4 MG tablet TAKE 1 TABLET BY MOUTH THREE TIMES DAILY FOR 20 DAYS 60 tablet 0    esomeprazole (NEXIUM) 20 MG delayed release capsule Take 20 mg by mouth 2 times daily      aspirin EC 81 MG EC tablet Take 1 tablet by mouth daily for 14 days Take twice a day for 14 days after knee surgery then resume daily dose 28 tablet 3    polyethylene glycol (GLYCOLAX) packet Take 17 g by mouth daily as needed for Constipation      atorvastatin (LIPITOR) 80 MG tablet TAKE 1 TABLET BY MOUTH DAILY 30 tablet 5    ezetimibe (ZETIA) 10 MG tablet TAKE 1 TABLET BY MOUTH DAILY 30 tablet 5    lisinopril (PRINIVIL;ZESTRIL) 10 MG tablet TAKE 1 TABLET BY MOUTH DAILY 90 tablet 1    Cholecalciferol (VITAMIN D) 2000 units CAPS capsule Take 1 capsule by mouth daily      Magnesium 500 MG CAPS Take 1 capsule by mouth nightly       Misc Natural Products (GLUCOSAMINE CHONDROITIN MSM PO) Take 1 tablet by mouth daily       docusate sodium (COLACE, DULCOLAX) 100 MG CAPS Take 100 mg by mouth 2 times daily as needed for Constipation 60 capsule 0     No current facility-administered medications on file prior to visit. Objective:   Temperature 97.3 °F (36.3 °C), temperature source Temporal, resp. rate 16, height 5' 7\" (1.702 m), weight 191 lb (86.6 kg). His examination today shows right knee osteoarthritis. He has swelling soreness and crepitus during flexion extension through the patellofemoral joint. .  He has medial joint line pain but negative for Selina negative for Lockman. He has good distal pulses and good dorsiflexion plantarflexion strength. Neuro exam grossly intact both lower extremities. Intact sensation to light touch. Motor exam 4+ to 5/5 in all major motor groups. Negative Oquendo's sign. Skin is warm, dry and intact with out erythema or significant increased temperature around the knee joint(s). There are no cutaneous lesions or lymphadenopathy present. X-RAYS:  No x-rays taken      Assessment:  Right knee osteoarthritis    Plan:  During today's visit, there was approximately 15 minutes of face-to-face discussion in regards to the patient's current condition and treatment options. More than 50 % of the time was counseling and coordination of care.       The plan at this point is to do a cortisone shot today we will try to get the approval for Euflexxa to see if we can get a better overall result and further treatment will based on how he responds to these injections      PROCEDURE NOTE:  After a discussion of the multiple options, they consented to a cortisone shot. 1 ml of 40mg/ml Kenalog and 2 ml's of 0.25%Marcaine were injected into the right knee joint space. The leg was slightly flexed and injected just lateral to the patella tendon to under the patella. This was done with sterile technique and they tolerated it well. They will schedule a follow up in 1 to 2 weeks    This patient has a well documented history of osteoarthritis in their knee. They have trialed Nsaid medications, physical therapy exercises and steroid injections. They continue to have pain, swelling and dysfunction. At this junction, hyalgen injections are advisable.  I gave them literature and discussed the risks and benefits with them and would like to seek pre-authorization for

## 2020-08-10 RX ORDER — ESCITALOPRAM OXALATE 20 MG/1
TABLET ORAL
Qty: 90 TABLET | Refills: 1 | Status: SHIPPED | OUTPATIENT
Start: 2020-08-10 | End: 2020-09-09

## 2020-08-13 ENCOUNTER — OFFICE VISIT (OUTPATIENT)
Dept: ORTHOPEDIC SURGERY | Age: 80
End: 2020-08-13
Payer: MEDICARE

## 2020-08-13 VITALS — TEMPERATURE: 98.2 F

## 2020-08-13 PROCEDURE — 20610 DRAIN/INJ JOINT/BURSA W/O US: CPT | Performed by: PHYSICIAN ASSISTANT

## 2020-08-13 PROCEDURE — 99999 PR OFFICE/OUTPT VISIT,PROCEDURE ONLY: CPT | Performed by: PHYSICIAN ASSISTANT

## 2020-08-14 NOTE — PROGRESS NOTES
Subjective:    He  is here for Euflexxa injection #2 for the  right knee. Objective:   Temperature 98.2 °F (36.8 °C), temperature source Temporal.    There is a mild joint effusion noted of the right knee. There is mild pain with range of motion testing. There is no significant  instability noted. Assessment:    ICD-10-CM    1. Primary osteoarthritis of right knee  M17.11 TX ARTHROCENTESIS ASPIR&/INJ MAJOR JT/BURSA W/O US     EUFLEXXA INJECTION       Plan:  Proceed with injection #2 in the series of 3 injections. PROCEDURE NOTE:  PRE-PROCEDURE DIAGNOSIS: DJD knee  POST-PROCEDURE DIAGNOSIS: DJD knee  With the patient's permission, his right knee was prepped in standard sterile fashion with  Alcohol. The prefilled injection of the preferred visco supplementation ( Euflexxa 20 mg/ 2 ML ) was injected into the  lateral joint space compartment without difficulty. The patient tolerated the procedure(s) well without difficulty. A band-aid was applied. POST-PROCEDURE INSTRUCTIONS GIVEN TO PATIENT:   He was advised to ice the knee for 15-20 minutes to relieve any injection site related pain. Decrease activity for the next 24 to 48 hours. May use prescription or OTC pain relievers as needed    FOLLOW-UP:   As directed or call or return to clinic if these symptoms worsen or fail to improve as anticipated. If at any time you are concerned you may contact the office for further instructions or care.

## 2020-08-20 ENCOUNTER — OFFICE VISIT (OUTPATIENT)
Dept: ORTHOPEDIC SURGERY | Age: 80
End: 2020-08-20
Payer: MEDICARE

## 2020-08-20 VITALS — TEMPERATURE: 97.3 F

## 2020-08-20 PROCEDURE — 20610 DRAIN/INJ JOINT/BURSA W/O US: CPT | Performed by: PHYSICIAN ASSISTANT

## 2020-08-20 PROCEDURE — 99999 PR OFFICE/OUTPT VISIT,PROCEDURE ONLY: CPT | Performed by: PHYSICIAN ASSISTANT

## 2020-08-20 NOTE — PROGRESS NOTES
Subjective:    He  is here for Euflexxa injection #3 for the  right knee. Objective:   Temperature 97.3 °F (36.3 °C), temperature source Temporal.    There is a mild joint effusion noted of the right knee. There is mild pain with range of motion testing. There is no significant  instability noted. Assessment:    ICD-10-CM    1. Primary osteoarthritis of right knee  M17.11 NV ARTHROCENTESIS ASPIR&/INJ MAJOR JT/BURSA W/O US     EUFLEXXA INJECTION       Plan:  Proceed with injection #3 in the series of 3 injections. PROCEDURE NOTE:  PRE-PROCEDURE DIAGNOSIS: DJD knee  POST-PROCEDURE DIAGNOSIS: DJD knee  With the patient's permission, his right knee was prepped in standard sterile fashion with  Alcohol. The prefilled injection of the preferred visco supplementation ( Euflexxa 20 mg/ 2 ML ) was injected into the  lateral joint space compartment without difficulty. The patient tolerated the procedure(s) well without difficulty. A band-aid was applied. POST-PROCEDURE INSTRUCTIONS GIVEN TO PATIENT:   He was advised to ice the knee for 15-20 minutes to relieve any injection site related pain. Decrease activity for the next 24 to 48 hours. May use prescription or OTC pain relievers as needed    FOLLOW-UP:   As directed or call or return to clinic if these symptoms worsen or fail to improve as anticipated. If at any time you are concerned you may contact the office for further instructions or care.

## 2020-09-01 RX ORDER — ATORVASTATIN CALCIUM 80 MG/1
TABLET, FILM COATED ORAL
Qty: 30 TABLET | Refills: 5 | Status: SHIPPED | OUTPATIENT
Start: 2020-09-01 | End: 2021-01-04

## 2020-09-01 NOTE — TELEPHONE ENCOUNTER
Last office visit: 06/08/2020      Future Appointments   Date Time Provider Leonie Shira   9/9/2020  1:00 PM Shilpa Krishna [facility]   10/1/2020 10:30 AM MD Lynda Zheng   12/21/2020 10:00 AM MD Lynda Zheng

## 2020-09-08 RX ORDER — EZETIMIBE 10 MG/1
TABLET ORAL
Qty: 30 TABLET | Refills: 5 | Status: SHIPPED | OUTPATIENT
Start: 2020-09-08 | End: 2021-03-01

## 2020-09-08 NOTE — TELEPHONE ENCOUNTER
Last office visit:06/08/2020    Future Appointments   Date Time Provider Leonie Shira   9/9/2020  1:00 PM Shilpa Mendoza [facility]   10/1/2020 10:30 AM MD Braulio Craven [facility]   12/21/2020 10:00 AM MD Braulio Craven [facility]

## 2020-09-09 ENCOUNTER — OFFICE VISIT (OUTPATIENT)
Dept: INTERNAL MEDICINE CLINIC | Age: 80
End: 2020-09-09
Payer: MEDICARE

## 2020-09-09 VITALS
RESPIRATION RATE: 18 BRPM | BODY MASS INDEX: 29.91 KG/M2 | SYSTOLIC BLOOD PRESSURE: 120 MMHG | TEMPERATURE: 97.7 F | HEART RATE: 53 BPM | DIASTOLIC BLOOD PRESSURE: 80 MMHG | WEIGHT: 191 LBS | OXYGEN SATURATION: 97 %

## 2020-09-09 PROCEDURE — 1036F TOBACCO NON-USER: CPT | Performed by: INTERNAL MEDICINE

## 2020-09-09 PROCEDURE — 1123F ACP DISCUSS/DSCN MKR DOCD: CPT | Performed by: INTERNAL MEDICINE

## 2020-09-09 PROCEDURE — 4040F PNEUMOC VAC/ADMIN/RCVD: CPT | Performed by: INTERNAL MEDICINE

## 2020-09-09 PROCEDURE — 99213 OFFICE O/P EST LOW 20 MIN: CPT | Performed by: INTERNAL MEDICINE

## 2020-09-09 PROCEDURE — G8427 DOCREV CUR MEDS BY ELIG CLIN: HCPCS | Performed by: INTERNAL MEDICINE

## 2020-09-09 PROCEDURE — G8417 CALC BMI ABV UP PARAM F/U: HCPCS | Performed by: INTERNAL MEDICINE

## 2020-09-09 RX ORDER — ASPIRIN 81 MG/1
81 TABLET ORAL DAILY
Status: ON HOLD | COMMUNITY
End: 2021-12-15 | Stop reason: SDUPTHER

## 2020-09-09 RX ORDER — ESCITALOPRAM OXALATE 20 MG/1
TABLET ORAL
Qty: 90 TABLET | Refills: 1
Start: 2020-09-09 | End: 2021-02-01

## 2020-09-09 ASSESSMENT — ENCOUNTER SYMPTOMS
WHEEZING: 0
GASTROINTESTINAL NEGATIVE: 1
CHEST TIGHTNESS: 0
SHORTNESS OF BREATH: 0
COUGH: 0

## 2020-09-09 NOTE — PROGRESS NOTES
Subjective:      Patient ID: Rahat Grossman is a 79 y.o. male. HPI  Feels well and ha sno new symptoms  Has no symptoms with activity  Has no other cp gi or gu symptoms  Ha sno heart burns with nexium  Review of Systems   Constitutional: Negative for activity change, appetite change and unexpected weight change. Respiratory: Negative for cough, chest tightness, shortness of breath and wheezing. Cardiovascular: Negative for chest pain, palpitations and leg swelling. Gastrointestinal: Negative. Genitourinary: Negative. Neurological: Negative for dizziness, light-headedness and headaches. Objective:   Physical Exam  Vitals signs and nursing note reviewed. Constitutional:       General: He is not in acute distress. Eyes:      General: No scleral icterus. Extraocular Movements: Extraocular movements intact. Conjunctiva/sclera: Conjunctivae normal.      Pupils: Pupils are equal, round, and reactive to light. Neck:      Thyroid: No thyromegaly. Vascular: No carotid bruit or JVD. Cardiovascular:      Rate and Rhythm: Normal rate and regular rhythm. Pulses: Normal pulses. Heart sounds: Normal heart sounds. No murmur. No gallop. Pulmonary:      Effort: No respiratory distress. Breath sounds: Normal breath sounds. No wheezing, rhonchi or rales. Musculoskeletal:      Right lower leg: No edema. Left lower leg: No edema. Lymphadenopathy:      Cervical: No cervical adenopathy. Neurological:      Mental Status: He is alert and oriented to person, place, and time. Assessment:    htn controlled  CAD stable  GERD controlled  Hypothyroid stable    lexapro 20 mg ,1/2 tablet 2 times daily is not making hm sleepy  Plan:      Continue current medications  See  in 3 months.         Cedric Deluna MD

## 2020-09-30 NOTE — TELEPHONE ENCOUNTER
Sp to wife. States pt was sgiven 10 mg then tried 5 to see if it still made him tired. It did. So pt went back to and is currently taking the 10 mg daily.

## 2020-09-30 NOTE — TELEPHONE ENCOUNTER
Last office visit:09/09/2020    Future Appointments   Date Time Provider Leonie Hacketti   10/1/2020 10:30 AM MD Regan Bowen [facility]   12/9/2020 11:15 AM Shilpa Tripp [facility]   12/21/2020 10:00 AM MD Regan Bowen [facility]   1/4/2021 11:00 AM Surinder Cormier MD [facility]

## 2020-10-01 ENCOUNTER — OFFICE VISIT (OUTPATIENT)
Dept: ORTHOPEDIC SURGERY | Age: 80
End: 2020-10-01
Payer: MEDICARE

## 2020-10-01 VITALS — TEMPERATURE: 97.2 F

## 2020-10-01 PROCEDURE — 99213 OFFICE O/P EST LOW 20 MIN: CPT | Performed by: ORTHOPAEDIC SURGERY

## 2020-10-01 RX ORDER — LISINOPRIL 10 MG/1
10 TABLET ORAL DAILY
Qty: 90 TABLET | Refills: 1 | Status: SHIPPED | OUTPATIENT
Start: 2020-10-01 | End: 2021-03-29

## 2020-10-01 NOTE — PROGRESS NOTES
[facility]  Office Visit    Chief Complaint: Right knee pain    HPI:  Sierra Jim is a 79 y.o. who is here for follow-up of right knee pain. He had a steroid injection in the end of July which did not really help much. Therefore he had Euflexxa done at the end of August.  He started have good relief of symptoms about a week ago. His knee is feeling quite well today. However, before that time his pain was quite severe. He has a history of left total knee arthroplasty done earlier this year by Dr. Christiane Byrd. His left knee feels well. He is currently having minimal symptoms in the right knee and is happy with the injection at this time. Patient Active Problem List   Diagnosis    Chest pain on exertion    Chest pain    Essential hypertension    S/P CABG (coronary artery bypass graft)    Hyperlipidemia LDL goal <70    Reactive depression    Prostate carcinoma ([facility] [address]    Vitamin D deficiency    Left hip pain    Chronic left-sided low back pain without sciatica    Coronary artery disease    Edema of right lower extremity    Greater trochanteric bursitis of left hip    Acute pain of right knee    Primary osteoarthritis of right knee    Primary osteoarthritis of left knee    Chronic fatigue    Acquired hypothyroidism    Gastroesophageal reflux disease without esophagitis    Arthritis of left knee    History of total knee replacement, left-3.11.2020       ROS:  Constitutional: denies fever, chills, weight loss  MSK: denies pain in other joints, muscle aches  Neurological: denies numbness, tingling, weakness    Exam:  Temperature 97.2 °F (36.2 °C), temperature source Temporal.    Appearance: sitting in exam room chair, appears to be in no acute distress, awake and alert  Resp: unlabored breathing on room air  Skin: warm, dry and intact with out erythema or significant increased temperature  Neuro: grossly intact both lower extremities. Intact sensation to light touch. Motor exam 4+ to 5/5 in all major motor groups. MSK: Right leg -active knee range of motion 0 to 130 degrees. No knee effusion. Crepitance with active knee range of motion. Imaging:  None    Assessment:  Right knee osteoarthritis   History of left total knee arthroplasty    Plan:  He has recently obtained good relief of symptoms with the Euflexxa injection. We will continue using NSAIDs as needed for the pain and come in for injections as needed. He is not interested in total knee arthroplasty at this time as he continues to recover from the left side done earlier this year. We will see him back in 6 to 8 weeks or sooner if needed to reassess his right knee pain. This dictation was done with [employer] dictation and may contain mechanical errors related to translation.

## 2020-11-03 PROBLEM — R07.9 CHEST PAIN: Status: RESOLVED | Noted: 2020-11-03 | Resolved: 2020-11-03

## 2020-11-11 RX ORDER — LEVOTHYROXINE SODIUM 0.05 MG/1
TABLET ORAL
Qty: 90 TABLET | Refills: 1 | Status: SHIPPED | OUTPATIENT
Start: 2020-11-11 | End: 2021-05-11

## 2020-11-19 ENCOUNTER — OFFICE VISIT (OUTPATIENT)
Dept: ORTHOPEDIC SURGERY | Age: 80
End: 2020-11-19
Payer: MEDICARE

## 2020-11-19 VITALS — BODY MASS INDEX: 29.98 KG/M2 | TEMPERATURE: 97.2 F | HEIGHT: 67 IN | WEIGHT: 191 LBS

## 2020-11-19 PROCEDURE — 99213 OFFICE O/P EST LOW 20 MIN: CPT | Performed by: ORTHOPAEDIC SURGERY

## 2020-11-19 PROCEDURE — 20610 DRAIN/INJ JOINT/BURSA W/O US: CPT | Performed by: ORTHOPAEDIC SURGERY

## 2020-11-19 NOTE — PROGRESS NOTES
[facility]  Office Visit    Chief Complaint: Follow-up status post left total knee arthroplasty. Follow-up for right knee pain. HPI:  Jose Ricketts is a 79 y.o. who is here for follow-up of both knees. He underwent left total knee arthroplasty on March 11, 2020 with Dr. Abraham Phillips. This knee is doing very well he has no complaints. His right knee is starting to hurt intermittently more often at this point. He had a Euflexxa series of injections back in August of this year. He now has intermittent pain mostly on the medial side of his knee. His knee will occasionally pop. He does wear a knee brace that helps with this. Aleve and Tylenol have also been helping. Patient Active Problem List   Diagnosis    Chest pain on exertion    Essential hypertension    S/P CABG (coronary artery bypass graft)    Hyperlipidemia LDL goal <70    Reactive depression    Prostate carcinoma ([facility] [address]    Vitamin D deficiency    Left hip pain    Chronic left-sided low back pain without sciatica    Coronary artery disease    Edema of right lower extremity    Greater trochanteric bursitis of left hip    Acute pain of right knee    Primary osteoarthritis of right knee    Primary osteoarthritis of left knee    Chronic fatigue    Acquired hypothyroidism    Gastroesophageal reflux disease without esophagitis    Arthritis of left knee    History of total knee replacement, left-3.11.2020       ROS:  Constitutional: denies fever, chills, weight loss  MSK: denies pain in other joints, muscle aches  Neurological: denies numbness, tingling, weakness    Exam:  Temperature 97.2 °F (36.2 °C), temperature source Infrared, height 5' 7\" (1.702 m), weight 191 lb (86.6 kg).     Appearance: sitting in exam room chair, appears to be in no acute distress, awake and alert  Resp: unlabored breathing on room air  Skin: warm, dry and intact with out erythema or significant increased temperature  Neuro: grossly intact both lower extremities. Intact sensation to light touch. Motor exam 4+ to 5/5 in all major motor groups. MSK: RLE - Examination reveals that range of motion is 0 to 130 degrees. There is varus deformity, positive crepitus, positive joint line tenderness, positive Baker's cyst, antalgic gait. Neurologically, plantar flexion and dorsiflexion is intact. Pulses palpable distally. 5/5 strength. LLE - Examination demonstrates no gross deformity. Skin is unremarkable. Range of motion 0 to 130 degrees. The knee is stable to varus and valgus stress and stable to anterior and posterior drawer sign. Sensation is intact light touch. There is brisk capillary refill. Dorsalis pedis and posterior tibial pulses are intact. There is 5/5 muscle strength in all muscle groups. Imaging:  3 views the left knee were performed and reviewed today and show total knee arthroplasty prosthesis in place with no signs of osteolysis, loosening, fracture, dislocation. Prior right knee x-rays were reviewed and are significant for osteoarthritis most significantly in the medial compartment. Assessment:  Status post left total knee arthroplasty  Right knee osteoarthritis    Plan:  His left knee is doing very well and knee he can continue activity as tolerated. We discussed with the patient today the use of antibiotic prophylaxis prior to any dental procedures. We discussed that the antibiotic prophylaxis would be used to help prevent periprosthetic joint infections. If they were not offered antibiotics by the physician performing the procedure, they should contact our office that we may prescribe them antibiotics. His right knee is again becoming symptomatic. We discussed treatment options. He agreed to proceed with steroid injection. This was performed as described below. We will see him back in 3 months or sooner if needed for reassessment of his right knee.     PROCEDURE NOTE:  After verbal consent was obtained, the patient's right knee was prepped with betadine. Skin was anesthetized with ethyl chloride. The knee was then injected under sterile technique with 2mL of 0.25% marcaine and 1 mL of 40 mg/mL Kenalog. A bandage was applied. The patient tolerated the procedure well and there were no complications. This dictation was done with Dragon dictation and may contain mechanical errors related to translation.

## 2020-12-08 ENCOUNTER — NURSE ONLY (OUTPATIENT)
Dept: INTERNAL MEDICINE CLINIC | Age: 80
End: 2020-12-08
Payer: MEDICARE

## 2020-12-08 LAB
ANION GAP SERPL CALCULATED.3IONS-SCNC: 10 MMOL/L (ref 3–16)
BASOPHILS ABSOLUTE: 0 K/UL (ref 0–0.2)
BASOPHILS RELATIVE PERCENT: 0.4 %
BUN BLDV-MCNC: 23 MG/DL (ref 7–20)
CALCIUM SERPL-MCNC: 9.5 MG/DL (ref 8.3–10.6)
CHLORIDE BLD-SCNC: 109 MMOL/L (ref 99–110)
CHOLESTEROL, TOTAL: 153 MG/DL (ref 0–199)
CO2: 22 MMOL/L (ref 21–32)
CREAT SERPL-MCNC: 1.2 MG/DL (ref 0.8–1.3)
EOSINOPHILS ABSOLUTE: 0.2 K/UL (ref 0–0.6)
EOSINOPHILS RELATIVE PERCENT: 2.6 %
GFR AFRICAN AMERICAN: >60
GFR NON-AFRICAN AMERICAN: 58
GLUCOSE BLD-MCNC: 92 MG/DL (ref 70–99)
HCT VFR BLD CALC: 42.7 % (ref 40.5–52.5)
HDLC SERPL-MCNC: 51 MG/DL (ref 40–60)
HEMOGLOBIN: 14.3 G/DL (ref 13.5–17.5)
LDL CHOLESTEROL CALCULATED: 80 MG/DL
LYMPHOCYTES ABSOLUTE: 1 K/UL (ref 1–5.1)
LYMPHOCYTES RELATIVE PERCENT: 16.5 %
MCH RBC QN AUTO: 30.3 PG (ref 26–34)
MCHC RBC AUTO-ENTMCNC: 33.5 G/DL (ref 31–36)
MCV RBC AUTO: 90.5 FL (ref 80–100)
MONOCYTES ABSOLUTE: 0.5 K/UL (ref 0–1.3)
MONOCYTES RELATIVE PERCENT: 8.7 %
NEUTROPHILS ABSOLUTE: 4.5 K/UL (ref 1.7–7.7)
NEUTROPHILS RELATIVE PERCENT: 71.8 %
PDW BLD-RTO: 13.9 % (ref 12.4–15.4)
PLATELET # BLD: 151 K/UL (ref 135–450)
PMV BLD AUTO: 8.7 FL (ref 5–10.5)
POTASSIUM SERPL-SCNC: 4.9 MMOL/L (ref 3.5–5.1)
RBC # BLD: 4.73 M/UL (ref 4.2–5.9)
SODIUM BLD-SCNC: 141 MMOL/L (ref 136–145)
T4 FREE: 1 NG/DL (ref 0.9–1.8)
TRIGL SERPL-MCNC: 112 MG/DL (ref 0–150)
TSH REFLEX: 4.85 UIU/ML (ref 0.27–4.2)
VLDLC SERPL CALC-MCNC: 22 MG/DL
WBC # BLD: 6.2 K/UL (ref 4–11)

## 2020-12-08 PROCEDURE — 36415 COLL VENOUS BLD VENIPUNCTURE: CPT | Performed by: INTERNAL MEDICINE

## 2020-12-09 ENCOUNTER — OFFICE VISIT (OUTPATIENT)
Dept: INTERNAL MEDICINE CLINIC | Age: 80
End: 2020-12-09
Payer: MEDICARE

## 2020-12-09 VITALS
WEIGHT: 197.2 LBS | SYSTOLIC BLOOD PRESSURE: 120 MMHG | TEMPERATURE: 97.8 F | BODY MASS INDEX: 30.95 KG/M2 | DIASTOLIC BLOOD PRESSURE: 70 MMHG | HEART RATE: 58 BPM | HEIGHT: 67 IN | RESPIRATION RATE: 16 BRPM | OXYGEN SATURATION: 96 %

## 2020-12-09 PROCEDURE — G8427 DOCREV CUR MEDS BY ELIG CLIN: HCPCS | Performed by: INTERNAL MEDICINE

## 2020-12-09 PROCEDURE — 99213 OFFICE O/P EST LOW 20 MIN: CPT | Performed by: INTERNAL MEDICINE

## 2020-12-09 PROCEDURE — G8417 CALC BMI ABV UP PARAM F/U: HCPCS | Performed by: INTERNAL MEDICINE

## 2020-12-09 PROCEDURE — 1036F TOBACCO NON-USER: CPT | Performed by: INTERNAL MEDICINE

## 2020-12-09 PROCEDURE — 4040F PNEUMOC VAC/ADMIN/RCVD: CPT | Performed by: INTERNAL MEDICINE

## 2020-12-09 PROCEDURE — 1123F ACP DISCUSS/DSCN MKR DOCD: CPT | Performed by: INTERNAL MEDICINE

## 2020-12-09 PROCEDURE — G8484 FLU IMMUNIZE NO ADMIN: HCPCS | Performed by: INTERNAL MEDICINE

## 2020-12-09 RX ORDER — METHYLPREDNISOLONE 4 MG/1
TABLET ORAL
Qty: 1 KIT | Refills: 0 | Status: SHIPPED | OUTPATIENT
Start: 2020-12-09 | End: 2020-12-15

## 2020-12-09 ASSESSMENT — ENCOUNTER SYMPTOMS
TROUBLE SWALLOWING: 0
CHEST TIGHTNESS: 0
SHORTNESS OF BREATH: 0
RHINORRHEA: 1
GASTROINTESTINAL NEGATIVE: 1
SORE THROAT: 1
COUGH: 1
WHEEZING: 0

## 2020-12-09 NOTE — PATIENT INSTRUCTIONS
DISCUSSED WITH HIM AND HIS WIFE  START ON MEDROL DOSE PACK   Continue current medications  See in  3 months

## 2020-12-09 NOTE — PROGRESS NOTES
Subjective:      Patient ID: Jose Ricketts is a 79 y.o. male. HPI  For a week has nasal congestion drainage sore throat and white  Sputum and at times had yellow green color in sputum and nasal drainage  Has no fever or chills and has slight cough and has no wheezing-has cats in pets ad not new  Has no orthopnea or pnd  Feels fatigued during day and has this every winter and he does not feel depressed,sleeps well and ha sno snoring at nights  Has no symptoms with activity  Review of Systems   Constitutional: Positive for fatigue. Negative for activity change, appetite change, chills and fever. HENT: Positive for postnasal drip, rhinorrhea and sore throat. Negative for trouble swallowing. Respiratory: Positive for cough. Negative for chest tightness, shortness of breath and wheezing. Cardiovascular: Negative for chest pain, palpitations and leg swelling. Gastrointestinal: Negative. Genitourinary: Negative. Neurological: Negative for dizziness, light-headedness and headaches. Objective:   Physical Exam  Vitals signs and nursing note reviewed. Constitutional:       General: He is not in acute distress. HENT:      Nose: Congestion present. No rhinorrhea. Mouth/Throat:      Mouth: Mucous membranes are moist.      Pharynx: Oropharynx is clear. No oropharyngeal exudate. Posterior oropharyngeal erythema: has no sinus tenderness. Eyes:      General: No scleral icterus. Extraocular Movements: Extraocular movements intact. Conjunctiva/sclera: Conjunctivae normal.      Pupils: Pupils are equal, round, and reactive to light. Neck:      Thyroid: No thyromegaly. Vascular: No carotid bruit or JVD. Cardiovascular:      Rate and Rhythm: Normal rate and regular rhythm. Pulses: Normal pulses. Heart sounds: Normal heart sounds. No murmur. No gallop. Pulmonary:      Effort: No respiratory distress. Breath sounds: Normal breath sounds. No wheezing, rhonchi or rales. Musculoskeletal:      Right lower leg: No edema. Left lower leg: No edema. Lymphadenopathy:      Cervical: No cervical adenopathy. Neurological:      Mental Status: He is alert and oriented to person, place, and time.      all labs are in normal range    Assessment:    possibly has allergic rhinitis  htn controlled; seasonal depression   CAD STABLE  GERD CONTROLLED        Plan:      DISCUSSED WITH HIM AND HIS WIFE  START ON MEDROL DOSE PACK   Continue current medications  See in  3 months        Lula Lopez MD

## 2020-12-23 NOTE — PROGRESS NOTES
[facility]  Cardiology progress Note        CC: \"        HPI: This is a 79 y.o. male with severe three-vessel disease with normal LV function. S/p CABG. He had possible CVA post CABG surgery. Also has a hx of Prostate CA diagnosed in 2016.  Patient comes for routine follow up. ***    Past Medical History:   Diagnosis Date    Anxiety     Cancer ([address] 11/2016    Prostates CA    Coronary artery disease 12/28/15    multi vessel CAD    GERD (gastroesophageal reflux disease)     History of blood transfusion     s/p left nephrectomy age 2years old    Hyperlipidemia LDL goal <70     Hypertension     IBS (irritable bowel syndrome)     Migraine     Primary osteoarthritis of right knee 10/5/2018    Reactive depression 6/8/2016    Shingles 2012    torso, top of head    Thyroid disease     Wears dentures       Past Surgical History:   Procedure Laterality Date    APPENDECTOMY      CARPAL TUNNEL RELEASE Bilateral     COLONOSCOPY N/A 10/8/2019    COLONOSCOPY POLYPECTOMY SNARE/COLD BIOPSY performed by Latanya Matthews MD at [address]  2015    x`s 4 vessel    ENDOSCOPY, COLON, DIAGNOSTIC      EGD with dilatation    EYE SURGERY Bilateral     catacts, bilateral and repeat    HEMORRHOID SURGERY      KIDNEY REMOVAL  @ 1944    pt thinks left kidney for disease    SHOULDER SURGERY Left     rotator cuff    TOTAL KNEE ARTHROPLASTY Left 3/11/2020    ROBOTIC ASSISTED LEFT TOTAL KNEE REPLACEMENT performed by Sena Payne MD at [address] History   Problem Relation Age of Onset    High Blood Pressure Mother     Other Mother     Heart Disease Father       Social History     Tobacco Use    Smoking status: Never Smoker    Smokeless tobacco: Never Used   Substance Use Topics    Alcohol use: No    Drug use: No     Allergies   Allergen Reactions    Demerol Hcl [Meperidine]      Uncontrollable shaking    Pcn [Penicillins] Unknown. As child @ 2years old \"almost killed me\"    Tramadol Nausea And Vomiting     Review of Systems -   Constitutional: Negative for weight gain/loss, fever. Respiratory: Negative for Asthma;  cough and hemoptysis  Cardiovascular: Negative for palpitations,dizziness   Gastrointestinal: Negative for abd.pain; constipation/diarrhea;    Genitourinary: Negative for stones; hematuria; frequency hesitancy  Integumentt: Negative for rash or pruritis  Hematologic/lymphatic: Negative for blood dyscrasia; leukemia/lymphoma  Musculoskeletal: Negative for Connective tissue disease  Neurological:  Negative for Seizure   Behavioral/Psych:Negative for Bipolar disorder, Schizophrenia; Dementia  Endocrine: negative for thyroid, parathyroid disease      Physical Examination:    There were no vitals taken for this visit. HEENT:  Face: Atraumatic, Conjunctiva: Pink; non icteric, Mucous Memb:  Moist, No thyromegaly or Lymphadenopathy  Respiratory: Resp Assessment: normal, Resp Auscultation: clear   Cardiovascular: Auscultation: nl S1 & S2, Palpation:  Nl PMI; No heaves or thrills, JVP:  normal  Abdomen: Soft, non-tender, Normal bowel sounds,  No organomegaly  Extremities: No Cyanosis or Clubbing; Edema none  Neurological: Oriented to time, place, and person, Non-anxious  Psychiatric: Normal mood and affect  Skin: Warm and dry,  No rash seen      EK20: Sinus Bradycardia    ECHO: 2015  Normal LV size and systolic function. Estimated ejection fraction is 60%. Trivial tricuspid regurgitation     Coronary angiography:2015  1. Multi-lesion, multi-vessel coronary artery disease. 2. Mid LAD  has long segment which has at least 60% stenosis throughout and at least one area of 80% to 90% stenosis. The diagonals have high-grade stenosis in the 80% to 90% range. 3. The circumflex has 2 high-grade 80% to 90% lesions   4. Rright coronary artery also has an 80% mid lesion.

## 2021-01-04 ENCOUNTER — OFFICE VISIT (OUTPATIENT)
Dept: CARDIOLOGY CLINIC | Age: 81
End: 2021-01-04
Payer: MEDICARE

## 2021-01-04 VITALS
SYSTOLIC BLOOD PRESSURE: 118 MMHG | OXYGEN SATURATION: 99 % | HEART RATE: 58 BPM | DIASTOLIC BLOOD PRESSURE: 56 MMHG | TEMPERATURE: 97.7 F | WEIGHT: 206.6 LBS | BODY MASS INDEX: 32.43 KG/M2 | HEIGHT: 67 IN

## 2021-01-04 DIAGNOSIS — I10 ESSENTIAL HYPERTENSION: ICD-10-CM

## 2021-01-04 DIAGNOSIS — Z95.1 S/P CABG X 4: ICD-10-CM

## 2021-01-04 DIAGNOSIS — C61 PROSTATE CANCER (HCC): ICD-10-CM

## 2021-01-04 DIAGNOSIS — I25.10 CORONARY ARTERY DISEASE INVOLVING NATIVE CORONARY ARTERY OF NATIVE HEART WITHOUT ANGINA PECTORIS: Primary | ICD-10-CM

## 2021-01-04 DIAGNOSIS — E78.5 HYPERLIPIDEMIA LDL GOAL <70: ICD-10-CM

## 2021-01-04 PROCEDURE — 1036F TOBACCO NON-USER: CPT | Performed by: INTERNAL MEDICINE

## 2021-01-04 PROCEDURE — G8484 FLU IMMUNIZE NO ADMIN: HCPCS | Performed by: INTERNAL MEDICINE

## 2021-01-04 PROCEDURE — G8427 DOCREV CUR MEDS BY ELIG CLIN: HCPCS | Performed by: INTERNAL MEDICINE

## 2021-01-04 PROCEDURE — 99214 OFFICE O/P EST MOD 30 MIN: CPT | Performed by: INTERNAL MEDICINE

## 2021-01-04 PROCEDURE — 4040F PNEUMOC VAC/ADMIN/RCVD: CPT | Performed by: INTERNAL MEDICINE

## 2021-01-04 PROCEDURE — 1123F ACP DISCUSS/DSCN MKR DOCD: CPT | Performed by: INTERNAL MEDICINE

## 2021-01-04 PROCEDURE — G8417 CALC BMI ABV UP PARAM F/U: HCPCS | Performed by: INTERNAL MEDICINE

## 2021-01-04 RX ORDER — ATORVASTATIN CALCIUM 80 MG/1
TABLET, FILM COATED ORAL
Qty: 90 TABLET | Refills: 3
Start: 2021-01-04 | End: 2021-03-01

## 2021-01-04 NOTE — PROGRESS NOTES
[address]  Cardiology progress Note        CC: \"I am feeling okay. \"      HPI: This is a 79 y.o. male with severe three-vessel disease with normal LV function. S/p CABG. He had possible CVA post CABG surgery. Also has a hx of Prostate CA diagnosed in 2016. Today, he is here for follow up for CAD. He says that he has been doing well. Patient denies exertional chest pain/pressure, dyspnea at rest, SUE, PND, orthopnea, palpitations, lightheadedness, weight changes, changes in LE edema, and syncope. Patient reports compliance to his medications.      Past Medical History:   Diagnosis Date    Anxiety     Cancer ([address] 11/2016    Prostates CA    Coronary artery disease 12/28/15    multi vessel CAD    GERD (gastroesophageal reflux disease)     History of blood transfusion     s/p left nephrectomy age 2years old    Hyperlipidemia LDL goal <70     Hypertension     IBS (irritable bowel syndrome)     Migraine     Primary osteoarthritis of right knee 10/5/2018    Reactive depression 6/8/2016    Shingles 2012    torso, top of head    Thyroid disease     Wears dentures       Past Surgical History:   Procedure Laterality Date    APPENDECTOMY      CARPAL TUNNEL RELEASE Bilateral     COLONOSCOPY N/A 10/8/2019    COLONOSCOPY POLYPECTOMY SNARE/COLD BIOPSY performed by Caro Howard MD at [address]  2015    x`s 4 vessel    ENDOSCOPY, COLON, DIAGNOSTIC      EGD with dilatation    EYE SURGERY Bilateral     catacts, bilateral and repeat    HEMORRHOID SURGERY      KIDNEY REMOVAL  @ 1944    pt thinks left kidney for disease    SHOULDER SURGERY Left     rotator cuff    TOTAL KNEE ARTHROPLASTY Left 3/11/2020    ROBOTIC ASSISTED LEFT TOTAL KNEE REPLACEMENT performed by Guillaume Schmitt MD at [facility]      Family History   Problem Relation Age of Onset    High Blood Pressure Mother     Other Mother     Heart Disease Father       Social History     Tobacco Use 1. Multi-lesion, multi-vessel coronary artery disease. 2. Mid LAD  has long segment which has at least 60% stenosis throughout and at least one area of 80% to 90% stenosis. The diagonals have high-grade stenosis in the 80% to 90% range. 3. The circumflex has 2 high-grade 80% to 90% lesions   4. Rright coronary artery also has an 80% mid lesion. 3. Normal left ventricular size and systolic function. Ejection fraction is 60%. ASSESSMENT AND PLAN:      CAD S/P CABGX4 12/30/15  On Statin and ASA  No BB due to bradycardia   Asymptomatic. Hypertension  Blood pressure stable. Continue medical management    Hyperlipidemia  LDL goal <70   LDL 71 (11/2019)   Continue statin therapy and Zetia. Prostate CA  Managed by Dr. Jean Wray    Follow up in 1 year. Naveed Mckeon M.D  1/4/2021     This note was scribed in the presence of Dr Radha Hernández, by Gerri Baron RN    Physician Attestation:  The scribes documentation has been prepared under my direction and personally reviewed by me in its entirety. I confirm that the note above accurately reflects all work, treatment, procedures, and medical decision making performed by me.

## 2021-01-04 NOTE — PATIENT INSTRUCTIONS
Patient Education        Heart-Healthy Diet: Care Instructions  Your Care Instructions     A heart-healthy diet has lots of vegetables, fruits, nuts, beans, and whole grains, and is low in salt. It limits foods that are high in saturated fat, such as meats, cheeses, and fried foods. It may be hard to change your diet, but even small changes can lower your risk of heart attack and heart disease. Follow-up care is a key part of your treatment and safety. Be sure to make and go to all appointments, and call your doctor if you are having problems. It's also a good idea to know your test results and keep a list of the medicines you take. How can you care for yourself at home? Watch your portions  · Learn what a serving is. A \"serving\" and a \"portion\" are not always the same thing. Make sure that you are not eating larger portions than are recommended. For example, a serving of pasta is ½ cup. A serving size of meat is 2 to 3 ounces. A 3-ounce serving is about the size of a deck of cards. Measure serving sizes until you are good at [place]" them. Keep in mind that restaurants often serve portions that are 2 or 3 times the size of one serving. · To keep your energy level up and keep you from feeling hungry, eat often but in smaller portions. · Eat only the number of calories you need to stay at a healthy weight. If you need to lose weight, eat fewer calories than your body burns (through exercise and other physical activity). Eat more fruits and vegetables  · Eat a variety of fruit and vegetables every day. Dark green, deep orange, red, or yellow fruits and vegetables are especially good for you. Examples include spinach, carrots, peaches, and berries. · Keep carrots, celery, and other veggies handy for snacks. Buy fruit that is in season and store it where you can see it so that you will be tempted to eat it. · Cook dishes that have a lot of veggies in them, such as stir-fries and soups. Go to [email or web address] and sign in to your [employer] account. Enter V137 in the Zutux box to learn more about \"Heart-Healthy Diet: Care Instructions. \"     If you do not have an account, please click on the \"Sign Up Now\" link. Current as of: August 22, 2019               Content Version: 12.6  © 0039-9130 [employer]. Care instructions adapted under license by [facility] ([facility]). If you have questions about a medical condition or this instruction, always ask your healthcare professional. [place] [address] any warranty or liability for your use of this information.

## 2021-01-05 PROCEDURE — 93000 ELECTROCARDIOGRAM COMPLETE: CPT | Performed by: INTERNAL MEDICINE

## 2021-02-01 RX ORDER — ESCITALOPRAM OXALATE 20 MG/1
TABLET ORAL
Qty: 90 TABLET | Refills: 1 | Status: SHIPPED | OUTPATIENT
Start: 2021-02-01 | End: 2021-07-22

## 2021-02-01 NOTE — TELEPHONE ENCOUNTER
Last office visit :12/09/2020    Future Appointments   Date Time Provider Leonie Silva   2/19/2021 10:15 AM Mayi Peña MD [facility]   3/15/2021 10:15 AM Terrance Zaman MD [facility]

## 2021-02-04 ENCOUNTER — TELEPHONE (OUTPATIENT)
Dept: INTERNAL MEDICINE CLINIC | Age: 81
End: 2021-02-04

## 2021-02-04 NOTE — TELEPHONE ENCOUNTER
Please advise Has The Cancer Been Biopsied Before?: has been previously biopsied Who Is Your Referring Provider?: Regina Ambrosio MD When Was Your Biopsy?: 11/13/2020 Body Location Override (Optional): Right lower eyelid, pathology report states right superior central malar cheek Accession (Optional): [id number]

## 2021-02-19 ENCOUNTER — OFFICE VISIT (OUTPATIENT)
Dept: ORTHOPEDIC SURGERY | Age: 81
End: 2021-02-19
Payer: MEDICARE

## 2021-02-19 VITALS — DIASTOLIC BLOOD PRESSURE: 76 MMHG | HEART RATE: 58 BPM | SYSTOLIC BLOOD PRESSURE: 155 MMHG | TEMPERATURE: 97.1 F

## 2021-02-19 DIAGNOSIS — M17.11 PRIMARY OSTEOARTHRITIS OF RIGHT KNEE: Primary | ICD-10-CM

## 2021-02-19 PROCEDURE — G8417 CALC BMI ABV UP PARAM F/U: HCPCS | Performed by: PHYSICIAN ASSISTANT

## 2021-02-19 PROCEDURE — 4040F PNEUMOC VAC/ADMIN/RCVD: CPT | Performed by: PHYSICIAN ASSISTANT

## 2021-02-19 PROCEDURE — G8428 CUR MEDS NOT DOCUMENT: HCPCS | Performed by: PHYSICIAN ASSISTANT

## 2021-02-19 PROCEDURE — G8484 FLU IMMUNIZE NO ADMIN: HCPCS | Performed by: PHYSICIAN ASSISTANT

## 2021-02-19 PROCEDURE — 20610 DRAIN/INJ JOINT/BURSA W/O US: CPT | Performed by: PHYSICIAN ASSISTANT

## 2021-02-19 PROCEDURE — 99213 OFFICE O/P EST LOW 20 MIN: CPT | Performed by: PHYSICIAN ASSISTANT

## 2021-02-19 PROCEDURE — 1036F TOBACCO NON-USER: CPT | Performed by: PHYSICIAN ASSISTANT

## 2021-02-19 PROCEDURE — 1123F ACP DISCUSS/DSCN MKR DOCD: CPT | Performed by: PHYSICIAN ASSISTANT

## 2021-02-22 NOTE — PROGRESS NOTES
Kenalog:  NDC: [id number]  Lot Number: [id number]  Expiration Date: 4/22
I went through a good stretch and strengthening exercise program. They understand that at some point, they will need a knee replacement.  And they will follow up with us on a when necessary basis over the next 3-6 months

## 2021-03-01 RX ORDER — EZETIMIBE 10 MG/1
TABLET ORAL
Qty: 30 TABLET | Refills: 5 | Status: SHIPPED | OUTPATIENT
Start: 2021-03-01 | End: 2021-08-30

## 2021-03-01 RX ORDER — ATORVASTATIN CALCIUM 80 MG/1
TABLET, FILM COATED ORAL
Qty: 30 TABLET | Refills: 5 | Status: SHIPPED | OUTPATIENT
Start: 2021-03-01 | End: 2021-08-30

## 2021-03-01 NOTE — TELEPHONE ENCOUNTER
Last office visit : 12/09/2020    Future Appointments   Date Time Provider Leonie Silva   3/15/2021 10:15 AM Justin Mason [address]   5/20/2021 10:15 AM MD Andrzej Cochran [facility]

## 2021-03-15 ENCOUNTER — OFFICE VISIT (OUTPATIENT)
Dept: INTERNAL MEDICINE CLINIC | Age: 81
End: 2021-03-15
Payer: MEDICARE

## 2021-03-15 VITALS
HEART RATE: 61 BPM | RESPIRATION RATE: 16 BRPM | DIASTOLIC BLOOD PRESSURE: 70 MMHG | BODY MASS INDEX: 31.52 KG/M2 | WEIGHT: 200.8 LBS | TEMPERATURE: 96.7 F | OXYGEN SATURATION: 99 % | SYSTOLIC BLOOD PRESSURE: 130 MMHG | HEIGHT: 67 IN

## 2021-03-15 DIAGNOSIS — I10 ESSENTIAL HYPERTENSION: Primary | ICD-10-CM

## 2021-03-15 DIAGNOSIS — Z95.1 S/P CABG (CORONARY ARTERY BYPASS GRAFT): ICD-10-CM

## 2021-03-15 DIAGNOSIS — K21.9 GASTROESOPHAGEAL REFLUX DISEASE WITHOUT ESOPHAGITIS: ICD-10-CM

## 2021-03-15 PROCEDURE — 99213 OFFICE O/P EST LOW 20 MIN: CPT | Performed by: INTERNAL MEDICINE

## 2021-03-15 PROCEDURE — G8427 DOCREV CUR MEDS BY ELIG CLIN: HCPCS | Performed by: INTERNAL MEDICINE

## 2021-03-15 PROCEDURE — 1036F TOBACCO NON-USER: CPT | Performed by: INTERNAL MEDICINE

## 2021-03-15 PROCEDURE — 4040F PNEUMOC VAC/ADMIN/RCVD: CPT | Performed by: INTERNAL MEDICINE

## 2021-03-15 PROCEDURE — G8417 CALC BMI ABV UP PARAM F/U: HCPCS | Performed by: INTERNAL MEDICINE

## 2021-03-15 PROCEDURE — G8484 FLU IMMUNIZE NO ADMIN: HCPCS | Performed by: INTERNAL MEDICINE

## 2021-03-15 PROCEDURE — 1123F ACP DISCUSS/DSCN MKR DOCD: CPT | Performed by: INTERNAL MEDICINE

## 2021-03-15 RX ORDER — MULTIVIT-MIN/IRON/FOLIC ACID/K 18-600-40
CAPSULE ORAL
COMMUNITY
End: 2021-09-22

## 2021-03-15 ASSESSMENT — ENCOUNTER SYMPTOMS
WHEEZING: 0
COUGH: 0
CHEST TIGHTNESS: 0
TROUBLE SWALLOWING: 0
SHORTNESS OF BREATH: 0

## 2021-03-15 NOTE — PROGRESS NOTES
Subjective:      Patient ID: Flaco Martinez is a 79 y.o. male. Chief Complaint   Patient presents with    Hypertension        HPI  He is doing well  Gets knee  injections every 2-3 months  Has no other cp gi or gu symptoms  Has no symptoms with activity-can walk 6 blocks before he has to stop due to knee pain and has no other symptoms  Sleeps well   Review of Systems   Constitutional: Negative for activity change, appetite change and unexpected weight change. HENT: Negative for trouble swallowing. Respiratory: Negative for cough, chest tightness, shortness of breath and wheezing. Cardiovascular: Negative for chest pain, palpitations and leg swelling. Gastrointestinal: Abdominal pain: ha sno heart burns and takes nexium daily OTC - 2 times daily. Neurological: Negative for dizziness, light-headedness and headaches.        Current Outpatient Medications   Medication Sig Dispense Refill    Ascorbic Acid (VITAMIN C) 500 MG CAPS Take by mouth      atorvastatin (LIPITOR) 80 MG tablet TAKE 1 TABLET BY MOUTH DAILY 30 tablet 5    ezetimibe (ZETIA) 10 MG tablet TAKE 1 TABLET BY MOUTH DAILY 30 tablet 5    escitalopram (LEXAPRO) 20 MG tablet TAKE 1 TABLET BY MOUTH DAILY 90 tablet 1    levothyroxine (SYNTHROID) 50 MCG tablet TAKE 1 TABLET BY MOUTH EVERY DAY 90 tablet 1    lisinopril (PRINIVIL;ZESTRIL) 10 MG tablet TAKE 1 TABLET BY MOUTH DAILY 90 tablet 1    aspirin 81 MG EC tablet Take 81 mg by mouth daily      esomeprazole (NEXIUM) 20 MG delayed release capsule Take 20 mg by mouth 2 times daily      polyethylene glycol (GLYCOLAX) packet Take 17 g by mouth daily as needed for Constipation      Cholecalciferol (VITAMIN D) 2000 units CAPS capsule Take 1 capsule by mouth daily      Magnesium 500 MG CAPS Take 1 capsule by mouth nightly       docusate sodium (COLACE, DULCOLAX) 100 MG CAPS Take 100 mg by mouth 2 times daily as needed for Constipation 60 capsule 0     No current facility-administered medications for this visit. No results for input(s): WBC, HGB, HCT, MCV, PLT in the last 720 hours. Lab Results   Component Value Date     12/08/2020    K 4.9 12/08/2020     12/08/2020    CO2 22 12/08/2020    BUN 23 12/08/2020    CREATININE 1.2 12/08/2020    GLUCOSE 92 12/08/2020    CALCIUM 9.5 12/08/2020        Lab Results   Component Value Date    CHOL 153 12/08/2020    CHOL 145 11/21/2019    CHOL 147 11/06/2018     Lab Results   Component Value Date    TRIG 112 12/08/2020    TRIG 147 11/21/2019    TRIG 96 11/06/2018     Lab Results   Component Value Date    HDL 51 12/08/2020    HDL 45 11/21/2019    HDL 46 11/06/2018     Lab Results   Component Value Date    LDLCALC 80 12/08/2020    LDLCALC 71 11/21/2019    LDLCALC 82 11/06/2018     Lab Results   Component Value Date    LABVLDL 22 12/08/2020    LABVLDL 29 11/21/2019    LABVLDL 19 11/06/2018     No results found for: CHOLHDLRATIO     Objective:   Physical Exam  Vitals signs and nursing note reviewed. Constitutional:       General: He is not in acute distress. Eyes:      General: No scleral icterus. Extraocular Movements: Extraocular movements intact. Pupils: Pupils are equal, round, and reactive to light. Neck:      Thyroid: No thyromegaly. Vascular: No carotid bruit or JVD. Cardiovascular:      Rate and Rhythm: Normal rate and regular rhythm. Pulses: Normal pulses. Heart sounds: Normal heart sounds. No murmur. No gallop. Pulmonary:      Effort: No respiratory distress. Breath sounds: Normal breath sounds. No wheezing, rhonchi or rales. Musculoskeletal:      Right lower leg: No edema. Left lower leg: No edema. Lymphadenopathy:      Cervical: No cervical adenopathy. Neurological:      Mental Status: He is alert and oriented to person, place, and time.          Assessment:      htn well controlled  CAD -has no symptoms of angina    GERD controlled  Plan:      Continue current medications  See in 3  months        Daryle Po, MD

## 2021-03-29 RX ORDER — LISINOPRIL 10 MG/1
10 TABLET ORAL DAILY
Qty: 90 TABLET | Refills: 1 | Status: SHIPPED | OUTPATIENT
Start: 2021-03-29 | End: 2021-09-22 | Stop reason: SDUPTHER

## 2021-05-11 RX ORDER — LEVOTHYROXINE SODIUM 0.05 MG/1
TABLET ORAL
Qty: 90 TABLET | Refills: 1 | Status: SHIPPED | OUTPATIENT
Start: 2021-05-11 | End: 2021-09-22 | Stop reason: SDUPTHER

## 2021-05-11 NOTE — TELEPHONE ENCOUNTER
Last office visit : 03/15/2021    Future Appointments   Date Time Provider Leonie Silva   5/20/2021 10:15 AM MD GABE Joiner   6/21/2021 10:00 AM Shilpa Camacho

## 2021-05-20 ENCOUNTER — OFFICE VISIT (OUTPATIENT)
Dept: ORTHOPEDIC SURGERY | Age: 81
End: 2021-05-20
Payer: MEDICARE

## 2021-05-20 VITALS — HEIGHT: 67 IN | WEIGHT: 200 LBS | RESPIRATION RATE: 18 BRPM | BODY MASS INDEX: 31.39 KG/M2

## 2021-05-20 DIAGNOSIS — M17.11 PRIMARY OSTEOARTHRITIS OF RIGHT KNEE: Primary | ICD-10-CM

## 2021-05-20 PROCEDURE — 99213 OFFICE O/P EST LOW 20 MIN: CPT | Performed by: PHYSICIAN ASSISTANT

## 2021-05-20 PROCEDURE — G8417 CALC BMI ABV UP PARAM F/U: HCPCS | Performed by: PHYSICIAN ASSISTANT

## 2021-05-20 PROCEDURE — 1123F ACP DISCUSS/DSCN MKR DOCD: CPT | Performed by: PHYSICIAN ASSISTANT

## 2021-05-20 PROCEDURE — 4040F PNEUMOC VAC/ADMIN/RCVD: CPT | Performed by: PHYSICIAN ASSISTANT

## 2021-05-20 PROCEDURE — 1036F TOBACCO NON-USER: CPT | Performed by: PHYSICIAN ASSISTANT

## 2021-05-20 PROCEDURE — G8427 DOCREV CUR MEDS BY ELIG CLIN: HCPCS | Performed by: PHYSICIAN ASSISTANT

## 2021-05-20 PROCEDURE — 20610 DRAIN/INJ JOINT/BURSA W/O US: CPT | Performed by: PHYSICIAN ASSISTANT

## 2021-05-23 NOTE — PROGRESS NOTES
This dictation was done with Taomeeon dictation and may contain mechanical errors related to translation. Resp. rate 18, height 5' 7\" (1.702 m), weight 200 lb (90.7 kg). This is a very nice 32-year-old gentleman who is here in follow-up for her right knee osteoarthritis. His pain has returned over the last few weeks he did get a lot of relief from a previous injection on 2/19/2021. This patient is here in follow-up for ongoing pain and dysfunction in their right knee. There x-rays done previously showed joint space narrowing subchondral sclerosis with osteophyte formation. They currently have had relief with injections of cortisone, anti-inflammatories and a home exercise program. There are here with increased pain over the last few days with swelling and dysfunction. On examination this is a well-developed patient in no acute distress. They are alert and oriented ×3. They walk without antalgia or any significant varus or valgus deformity. They have crepitus during flexion and extension in the patellofemoral joint. They have a negative Lachman and a negative Selina. There are good distal pulses and good dorsiflexion and plantarflexion strength present    My impression is right knee osteoarthritis    After a discussion of the multiple options, they consented to a cortisone shot. 1 ml of 40mg/ml Kenalog and 2 ml's of 0.25%Marcaine were injected into the right knee joint space. The leg was slightly flexed and injected just lateral to the patella tendon to under the patella. This was done with sterile technique and they tolerated it well. During today's visit, there was approximately 20 minutes of face-to-face discussion in regards to the patient's current condition and treatment options. More than 50 % of the time was counseling and coordination of care. I went through a good stretch and strengthening exercise program. They understand that at some point, they will need a knee replacement.  And they will follow up with us on a when necessary basis over the next 3-6 months

## 2021-06-21 ENCOUNTER — OFFICE VISIT (OUTPATIENT)
Dept: INTERNAL MEDICINE CLINIC | Age: 81
End: 2021-06-21
Payer: MEDICARE

## 2021-06-21 VITALS
WEIGHT: 199.4 LBS | OXYGEN SATURATION: 97 % | RESPIRATION RATE: 16 BRPM | TEMPERATURE: 97.2 F | HEIGHT: 67 IN | DIASTOLIC BLOOD PRESSURE: 70 MMHG | BODY MASS INDEX: 31.3 KG/M2 | HEART RATE: 65 BPM | SYSTOLIC BLOOD PRESSURE: 130 MMHG

## 2021-06-21 DIAGNOSIS — G43.719 INTRACTABLE CHRONIC MIGRAINE WITHOUT AURA AND WITHOUT STATUS MIGRAINOSUS: ICD-10-CM

## 2021-06-21 DIAGNOSIS — E03.9 ACQUIRED HYPOTHYROIDISM: ICD-10-CM

## 2021-06-21 DIAGNOSIS — I10 ESSENTIAL HYPERTENSION: Primary | ICD-10-CM

## 2021-06-21 DIAGNOSIS — I25.10 CORONARY ARTERY DISEASE INVOLVING NATIVE CORONARY ARTERY OF NATIVE HEART WITHOUT ANGINA PECTORIS: ICD-10-CM

## 2021-06-21 PROCEDURE — 99214 OFFICE O/P EST MOD 30 MIN: CPT | Performed by: INTERNAL MEDICINE

## 2021-06-21 PROCEDURE — 4040F PNEUMOC VAC/ADMIN/RCVD: CPT | Performed by: INTERNAL MEDICINE

## 2021-06-21 PROCEDURE — 1123F ACP DISCUSS/DSCN MKR DOCD: CPT | Performed by: INTERNAL MEDICINE

## 2021-06-21 PROCEDURE — G8427 DOCREV CUR MEDS BY ELIG CLIN: HCPCS | Performed by: INTERNAL MEDICINE

## 2021-06-21 PROCEDURE — G8417 CALC BMI ABV UP PARAM F/U: HCPCS | Performed by: INTERNAL MEDICINE

## 2021-06-21 PROCEDURE — 1036F TOBACCO NON-USER: CPT | Performed by: INTERNAL MEDICINE

## 2021-06-21 RX ORDER — TOPIRAMATE 50 MG/1
TABLET, FILM COATED ORAL
Qty: 90 TABLET | OUTPATIENT
Start: 2021-06-21

## 2021-06-21 RX ORDER — TOPIRAMATE 50 MG/1
TABLET, FILM COATED ORAL
Qty: 30 TABLET | Refills: 3 | Status: SHIPPED | OUTPATIENT
Start: 2021-06-21 | End: 2021-09-08

## 2021-06-21 SDOH — ECONOMIC STABILITY: FOOD INSECURITY: WITHIN THE PAST 12 MONTHS, THE FOOD YOU BOUGHT JUST DIDN'T LAST AND YOU DIDN'T HAVE MONEY TO GET MORE.: NEVER TRUE

## 2021-06-21 SDOH — ECONOMIC STABILITY: FOOD INSECURITY: WITHIN THE PAST 12 MONTHS, YOU WORRIED THAT YOUR FOOD WOULD RUN OUT BEFORE YOU GOT MONEY TO BUY MORE.: NEVER TRUE

## 2021-06-21 ASSESSMENT — ENCOUNTER SYMPTOMS
WHEEZING: 0
COUGH: 0
SHORTNESS OF BREATH: 0
CHEST TIGHTNESS: 0
TROUBLE SWALLOWING: 0
GASTROINTESTINAL NEGATIVE: 1

## 2021-06-21 ASSESSMENT — SOCIAL DETERMINANTS OF HEALTH (SDOH): HOW HARD IS IT FOR YOU TO PAY FOR THE VERY BASICS LIKE FOOD, HOUSING, MEDICAL CARE, AND HEATING?: NOT HARD AT ALL

## 2021-06-21 ASSESSMENT — PATIENT HEALTH QUESTIONNAIRE - PHQ9
SUM OF ALL RESPONSES TO PHQ QUESTIONS 1-9: 0
SUM OF ALL RESPONSES TO PHQ9 QUESTIONS 1 & 2: 0
2. FEELING DOWN, DEPRESSED OR HOPELESS: 0
SUM OF ALL RESPONSES TO PHQ QUESTIONS 1-9: 0
SUM OF ALL RESPONSES TO PHQ QUESTIONS 1-9: 0
1. LITTLE INTEREST OR PLEASURE IN DOING THINGS: 0

## 2021-06-21 NOTE — PROGRESS NOTES
UP TO BATHROOM. GAIT GOOD. NO DISTRESS TOLERATES WELL. HAND  STRONG AND
EQUAL mg by mouth 2 times daily      polyethylene glycol (GLYCOLAX) packet Take 17 g by mouth daily as needed for Constipation      Cholecalciferol (VITAMIN D) 2000 units CAPS capsule Take 1 capsule by mouth daily      Magnesium 500 MG CAPS Take 1 capsule by mouth nightly       docusate sodium (COLACE, DULCOLAX) 100 MG CAPS Take 100 mg by mouth 2 times daily as needed for Constipation 60 capsule 0     No current facility-administered medications for this visit. No results for input(s): WBC, HGB, HCT, MCV, PLT in the last 720 hours. Lab Results   Component Value Date     12/08/2020    K 4.9 12/08/2020     12/08/2020    CO2 22 12/08/2020    BUN 23 12/08/2020    CREATININE 1.2 12/08/2020    GLUCOSE 92 12/08/2020    CALCIUM 9.5 12/08/2020        Lab Results   Component Value Date    CHOL 153 12/08/2020    CHOL 145 11/21/2019    CHOL 147 11/06/2018     Lab Results   Component Value Date    TRIG 112 12/08/2020    TRIG 147 11/21/2019    TRIG 96 11/06/2018     Lab Results   Component Value Date    HDL 51 12/08/2020    HDL 45 11/21/2019    HDL 46 11/06/2018     Lab Results   Component Value Date    LDLCALC 80 12/08/2020    LDLCALC 71 11/21/2019    LDLCALC 82 11/06/2018     Lab Results   Component Value Date    LABVLDL 22 12/08/2020    LABVLDL 29 11/21/2019    LABVLDL 19 11/06/2018     No results found for: CHOLHDLRATIO     Objective:   Physical Exam  Vitals and nursing note reviewed. Constitutional:       General: He is not in acute distress. HENT:      Nose: Nose normal. No congestion or rhinorrhea. Mouth/Throat:      Mouth: Mucous membranes are moist.      Pharynx: Oropharynx is clear. Eyes:      General: No scleral icterus. Conjunctiva/sclera: Conjunctivae normal.      Pupils: Pupils are equal, round, and reactive to light. Neck:      Thyroid: No thyromegaly. Vascular: No carotid bruit. Cardiovascular:      Rate and Rhythm: Normal rate and regular rhythm.       Pulses: Normal pulses. Heart sounds: Normal heart sounds. No murmur heard. No gallop. Pulmonary:      Effort: No respiratory distress. Breath sounds: Normal breath sounds. No wheezing, rhonchi or rales. Musculoskeletal:      Right lower leg: No edema. Left lower leg: No edema. Lymphadenopathy:      Cervical: No cervical adenopathy. Neurological:      Mental Status: He is alert and oriented to person, place, and time. Cranial Nerves: No cranial nerve deficit. Motor: No weakness.       Gait: Gait normal.         Assessment:     chronci migraine ehadaches -seems to be bothering him a lot  2- htn well controlled  CAD STABLE AND HA SNO ANGINAL SYMPTOMS  HYPOTHYROID STABLE  GERD CONTROLLED        Plan:      DISCUSSED WITH HIM AND HE FEELS HE NEEDS TREATMENT   START ON topomax -25 mg daily x 1 week and then 50 mg daily   Call me in 3-4 weeks and see how his headaches are and if he has any side effects-discussed -liver bone marrow and blood related side effects and understood  Continue current medications and see in 3 months         Mar Gonzalez MD

## 2021-06-21 NOTE — PATIENT INSTRUCTIONS
DISCUSSED WITH HIM AND HE FEELS HE NEEDS TREATMENT   START ON topomax -25 mg daily x 1 week and then 50 mg daily   Call me in 3-4 weeks and see how his headaches are and if he has any side effects-discussed -liver bone marrow and blood related side effects and understood  Continue current medications and see in 3 months

## 2021-06-26 LAB — PROSTATE SPECIFIC ANTIGEN: 0.17 NG/ML (ref 0–4)

## 2021-06-28 ENCOUNTER — TELEPHONE (OUTPATIENT)
Dept: INTERNAL MEDICINE CLINIC | Age: 81
End: 2021-06-28

## 2021-06-28 NOTE — TELEPHONE ENCOUNTER
Spoke with Tracee Daly asked if Mo Heir will still need blood test for next month since stopping topamax?

## 2021-06-28 NOTE — TELEPHONE ENCOUNTER
You put him on Topamax can't get out of bed, making him really tired and dizzy. Taking it at night before he goes to bed. Doesn't want to take anymore due to side effects.     Please call him back [phone]

## 2021-07-22 RX ORDER — ESCITALOPRAM OXALATE 20 MG/1
TABLET ORAL
Qty: 90 TABLET | Refills: 1 | Status: SHIPPED | OUTPATIENT
Start: 2021-07-22 | End: 2022-01-11

## 2021-07-22 NOTE — TELEPHONE ENCOUNTER
LOV: 06/21/21      Future Appointments   Date Time Provider Leonie Silva   8/23/2021 10:30 AM Garcia Norton [facility]   9/22/2021 10:00 AM Rich Judd MD [facility]

## 2021-08-10 ENCOUNTER — TELEPHONE (OUTPATIENT)
Dept: ORTHOPEDIC SURGERY | Age: 81
End: 2021-08-10

## 2021-08-10 NOTE — TELEPHONE ENCOUNTER
Other Patient would like a call back requesting information of fluoroscopic guided injection and is requesting the office to ask for his wife Abhay Murphy.  ph [phone]

## 2021-08-12 ENCOUNTER — OFFICE VISIT (OUTPATIENT)
Dept: ORTHOPEDIC SURGERY | Age: 81
End: 2021-08-12
Payer: MEDICARE

## 2021-08-12 VITALS — HEIGHT: 67 IN | BODY MASS INDEX: 31.39 KG/M2 | WEIGHT: 200 LBS | RESPIRATION RATE: 16 BRPM

## 2021-08-12 DIAGNOSIS — M17.11 PRIMARY OSTEOARTHRITIS OF RIGHT KNEE: Primary | ICD-10-CM

## 2021-08-12 PROCEDURE — 1123F ACP DISCUSS/DSCN MKR DOCD: CPT | Performed by: PHYSICIAN ASSISTANT

## 2021-08-12 PROCEDURE — G8417 CALC BMI ABV UP PARAM F/U: HCPCS | Performed by: PHYSICIAN ASSISTANT

## 2021-08-12 PROCEDURE — 20610 DRAIN/INJ JOINT/BURSA W/O US: CPT | Performed by: PHYSICIAN ASSISTANT

## 2021-08-12 PROCEDURE — G8427 DOCREV CUR MEDS BY ELIG CLIN: HCPCS | Performed by: PHYSICIAN ASSISTANT

## 2021-08-12 PROCEDURE — 4040F PNEUMOC VAC/ADMIN/RCVD: CPT | Performed by: PHYSICIAN ASSISTANT

## 2021-08-12 PROCEDURE — 1036F TOBACCO NON-USER: CPT | Performed by: PHYSICIAN ASSISTANT

## 2021-08-12 PROCEDURE — 99213 OFFICE O/P EST LOW 20 MIN: CPT | Performed by: PHYSICIAN ASSISTANT

## 2021-08-16 ENCOUNTER — OFFICE VISIT (OUTPATIENT)
Dept: INTERNAL MEDICINE CLINIC | Age: 81
End: 2021-08-16
Payer: MEDICARE

## 2021-08-16 VITALS
DIASTOLIC BLOOD PRESSURE: 82 MMHG | SYSTOLIC BLOOD PRESSURE: 106 MMHG | OXYGEN SATURATION: 97 % | RESPIRATION RATE: 20 BRPM | HEART RATE: 69 BPM | WEIGHT: 199 LBS | TEMPERATURE: 97.1 F | BODY MASS INDEX: 31.17 KG/M2

## 2021-08-16 DIAGNOSIS — J20.8 ACUTE BRONCHITIS DUE TO OTHER SPECIFIED ORGANISMS: ICD-10-CM

## 2021-08-16 PROCEDURE — 4040F PNEUMOC VAC/ADMIN/RCVD: CPT | Performed by: INTERNAL MEDICINE

## 2021-08-16 PROCEDURE — G8417 CALC BMI ABV UP PARAM F/U: HCPCS | Performed by: INTERNAL MEDICINE

## 2021-08-16 PROCEDURE — 99213 OFFICE O/P EST LOW 20 MIN: CPT | Performed by: INTERNAL MEDICINE

## 2021-08-16 PROCEDURE — 1036F TOBACCO NON-USER: CPT | Performed by: INTERNAL MEDICINE

## 2021-08-16 PROCEDURE — 1123F ACP DISCUSS/DSCN MKR DOCD: CPT | Performed by: INTERNAL MEDICINE

## 2021-08-16 PROCEDURE — G8427 DOCREV CUR MEDS BY ELIG CLIN: HCPCS | Performed by: INTERNAL MEDICINE

## 2021-08-16 RX ORDER — DOXYCYCLINE HYCLATE 100 MG
100 TABLET ORAL 2 TIMES DAILY
Qty: 14 TABLET | Refills: 0 | Status: SHIPPED | OUTPATIENT
Start: 2021-08-16 | End: 2021-08-23

## 2021-08-16 RX ORDER — GUAIFENESIN 600 MG/1
600 TABLET, EXTENDED RELEASE ORAL EVERY 6 HOURS PRN
COMMUNITY

## 2021-08-16 RX ORDER — FLUTICASONE PROPIONATE 50 MCG
1 SPRAY, SUSPENSION (ML) NASAL DAILY
COMMUNITY
End: 2021-12-13 | Stop reason: ALTCHOICE

## 2021-08-16 ASSESSMENT — ENCOUNTER SYMPTOMS: TROUBLE SWALLOWING: 0

## 2021-08-16 ASSESSMENT — PATIENT HEALTH QUESTIONNAIRE - PHQ9
SUM OF ALL RESPONSES TO PHQ QUESTIONS 1-9: 0
SUM OF ALL RESPONSES TO PHQ QUESTIONS 1-9: 0
SUM OF ALL RESPONSES TO PHQ9 QUESTIONS 1 & 2: 0
SUM OF ALL RESPONSES TO PHQ QUESTIONS 1-9: 0
2. FEELING DOWN, DEPRESSED OR HOPELESS: 0
1. LITTLE INTEREST OR PLEASURE IN DOING THINGS: 0

## 2021-08-16 NOTE — PATIENT INSTRUCTIONS
Can take zyrtec or zyrtec-d every 12 hours as needed for nasal symptoms    Start doxycycline 100 mg 2 times daily for 1 week and call if not getting better

## 2021-08-16 NOTE — PROGRESS NOTES
This dictation was done with Dragon dictation and may contain mechanical errors related to translation. I have today reviewed with Marco A Ward the clinically relevant, past medical history, medications, allergies, family history, social history, and Review Of Systems form the patients most recent history form & I have documented any details relevant to today's presenting complaints in my history below. Mr. Sally Rivera's self-reported past medical history, medications, allergies, family history, social history, and Review Of Systems form has been scanned into the chart under the \"Media\" tab. Subjective:  Marco A Ward is a [de-identified] y.o. who is here pain in his right knee. He has had a cortisone injection dating back to 5/20/2021 which private pretty significant relief for short period time. He now is 8 out of 10 pain in all of his pain seems to be from crepitus and joint soreness. He is requesting viscosupplementation injections.       Patient Active Problem List   Diagnosis    Chest pain on exertion    Essential hypertension    S/P CABG (coronary artery bypass graft)    Hyperlipidemia LDL goal <70    Reactive depression    Prostate carcinoma ([facility] [address]    Vitamin D deficiency    Left hip pain    Chronic left-sided low back pain without sciatica    Coronary artery disease    Edema of right lower extremity    Greater trochanteric bursitis of left hip    Acute pain of right knee    Primary osteoarthritis of right knee    Primary osteoarthritis of left knee    Chronic fatigue    Acquired hypothyroidism    Gastroesophageal reflux disease without esophagitis    Arthritis of left knee    History of total knee replacement, left-3.11.2020    Intractable chronic migraine without aura and without status migrainosus           Current Outpatient Medications on File Prior to Visit   Medication Sig Dispense Refill    escitalopram (LEXAPRO) 20 MG tablet TAKE 1 TABLET BY MOUTH DAILY 90 tablet 1    topiramate (TOPAMAX) 50 MG tablet 1/2 tablet daily for 1 week and then 1 tablet daily 30 tablet 3    levothyroxine (SYNTHROID) 50 MCG tablet TAKE 1 TABLET BY MOUTH EVERY DAY 90 tablet 1    lisinopril (PRINIVIL;ZESTRIL) 10 MG tablet TAKE 1 TABLET BY MOUTH DAILY 90 tablet 1    Ascorbic Acid (VITAMIN C) 500 MG CAPS Take by mouth      atorvastatin (LIPITOR) 80 MG tablet TAKE 1 TABLET BY MOUTH DAILY 30 tablet 5    ezetimibe (ZETIA) 10 MG tablet TAKE 1 TABLET BY MOUTH DAILY 30 tablet 5    aspirin 81 MG EC tablet Take 81 mg by mouth daily      esomeprazole (NEXIUM) 20 MG delayed release capsule Take 20 mg by mouth 2 times daily      polyethylene glycol (GLYCOLAX) packet Take 17 g by mouth daily as needed for Constipation      Cholecalciferol (VITAMIN D) 2000 units CAPS capsule Take 1 capsule by mouth daily      Magnesium 500 MG CAPS Take 1 capsule by mouth nightly       docusate sodium (COLACE, DULCOLAX) 100 MG CAPS Take 100 mg by mouth 2 times daily as needed for Constipation 60 capsule 0     No current facility-administered medications on file prior to visit. Objective:   Resp. rate 16, height 5' 7\" (1.702 m), weight 200 lb (90.7 kg). On examination today this is a pleasant 52-year-old gentleman in no acute distress he is alert and oriented x3 he is 0 to 140 degrees of motion no varus or valgus laxity is decent quad tone he has medial joint line tenderness and maybe 1+ edema. Skin good distal pulses good dorsiflexion plantarflexion strength. Neuro exam grossly intact both lower extremities. Intact sensation to light touch. Motor exam 4+ to 5/5 in all major motor groups. Negative Oquendo's sign. Skin is warm, dry and intact with out erythema or significant increased temperature around the knee joint(s). There are no cutaneous lesions or lymphadenopathy present.     X-RAYS:  X-rays taken previously show joint space narrowing and subchondral sclerosis consistent with osteoarthritis      Assessment:  Right knee osteoarthritis    Plan:  During today's visit, there was approximately 20 minutes of face-to-face discussion in regards to the patient's current condition and treatment options. More than 50 % of the time was counseling and coordination of care as indicated above. He consented to a cortisone injection today and we will start the hyaluronic acid injections next week      PROCEDURE NOTE:  After a discussion of the multiple options, they consented to a cortisone shot. 1 ml of 40mg/ml Kenalog and 2 ml's of 0.25%Marcaine were injected into the right knee joint space. The leg was slightly flexed and injected just lateral to the patella tendon to under the patella. This was done with sterile technique and they tolerated it well.           They will schedule a follow up in 1 week

## 2021-08-16 NOTE — PROGRESS NOTES
Subjective:      Patient ID: Geronimo Luevano is a [de-identified] y.o. male. Chief Complaint   Patient presents with    Cough     body aches, facial pressure, ear pressure x 4 days, congestion, afebrile        HPI  405 day h/o increasing headaches to 2 times daily   Nasal congestion drainage cough and green sputum  Has no fever or body aches  Has no wheezing dyspnea or chest pain   Had covid vaccines and uses masks when out and had no exposure to any one who was sick with similar symptoms  Review of Systems   HENT: Negative for trouble swallowing. Cardiovascular: Negative for chest pain, palpitations and leg swelling. Neurological: Positive for headaches. Negative for dizziness and light-headedness.        Current Outpatient Medications   Medication Sig Dispense Refill    guaiFENesin (MUCINEX) 600 MG extended release tablet Take 600 mg by mouth every 6 hours as needed for Congestion      fluticasone (FLONASE) 50 MCG/ACT nasal spray 1 spray by Each Nostril route daily      escitalopram (LEXAPRO) 20 MG tablet TAKE 1 TABLET BY MOUTH DAILY 90 tablet 1    topiramate (TOPAMAX) 50 MG tablet 1/2 tablet daily for 1 week and then 1 tablet daily 30 tablet 3    levothyroxine (SYNTHROID) 50 MCG tablet TAKE 1 TABLET BY MOUTH EVERY DAY 90 tablet 1    lisinopril (PRINIVIL;ZESTRIL) 10 MG tablet TAKE 1 TABLET BY MOUTH DAILY 90 tablet 1    Ascorbic Acid (VITAMIN C) 500 MG CAPS Take by mouth      atorvastatin (LIPITOR) 80 MG tablet TAKE 1 TABLET BY MOUTH DAILY 30 tablet 5    ezetimibe (ZETIA) 10 MG tablet TAKE 1 TABLET BY MOUTH DAILY 30 tablet 5    aspirin 81 MG EC tablet Take 81 mg by mouth daily      esomeprazole (NEXIUM) 20 MG delayed release capsule Take 20 mg by mouth 2 times daily      polyethylene glycol (GLYCOLAX) packet Take 17 g by mouth daily as needed for Constipation      Cholecalciferol (VITAMIN D) 2000 units CAPS capsule Take 1 capsule by mouth daily      Magnesium 500 MG CAPS Take 1 capsule by mouth nightly       docusate sodium (COLACE, DULCOLAX) 100 MG CAPS Take 100 mg by mouth 2 times daily as needed for Constipation 60 capsule 0     No current facility-administered medications for this visit. No results for input(s): WBC, HGB, HCT, MCV, PLT in the last 720 hours. Lab Results   Component Value Date     12/08/2020    K 4.9 12/08/2020     12/08/2020    CO2 22 12/08/2020    BUN 23 12/08/2020    CREATININE 1.2 12/08/2020    GLUCOSE 92 12/08/2020    CALCIUM 9.5 12/08/2020        Lab Results   Component Value Date    CHOL 153 12/08/2020    CHOL 145 11/21/2019    CHOL 147 11/06/2018     Lab Results   Component Value Date    TRIG 112 12/08/2020    TRIG 147 11/21/2019    TRIG 96 11/06/2018     Lab Results   Component Value Date    HDL 51 12/08/2020    HDL 45 11/21/2019    HDL 46 11/06/2018     Lab Results   Component Value Date    LDLCALC 80 12/08/2020    LDLCALC 71 11/21/2019    LDLCALC 82 11/06/2018     Lab Results   Component Value Date    LABVLDL 22 12/08/2020    LABVLDL 29 11/21/2019    LABVLDL 19 11/06/2018     No results found for: CHOLHDLRATIO     Objective:   Physical Exam  Vitals and nursing note reviewed. Constitutional:       General: He is not in acute distress. HENT:      Right Ear: Tympanic membrane, ear canal and external ear normal. There is no impacted cerumen. Left Ear: Tympanic membrane, ear canal and external ear normal. There is no impacted cerumen. Nose: Congestion present. No rhinorrhea. Mouth/Throat:      Mouth: Mucous membranes are moist.      Pharynx: Oropharynx is clear. No oropharyngeal exudate or posterior oropharyngeal erythema. Eyes:      General: No scleral icterus. Extraocular Movements: Extraocular movements intact. Conjunctiva/sclera: Conjunctivae normal.      Pupils: Pupils are equal, round, and reactive to light. Neck:      Thyroid: No thyromegaly. Vascular: No carotid bruit or JVD.    Cardiovascular:      Rate and Rhythm: Normal rate and regular rhythm. Heart sounds: Normal heart sounds. No murmur heard. No gallop. Pulmonary:      Effort: No respiratory distress. Breath sounds: Normal breath sounds. No wheezing, rhonchi or rales. Musculoskeletal:      Right lower leg: No edema. Left lower leg: No edema. Lymphadenopathy:      Cervical: No cervical adenopathy. Neurological:      Mental Status: He is alert and oriented to person, place, and time.          Assessment:      Acute bronchitis    Doubt he has covid symptoms  Plan:      Can take zyrtec or zyrtec-d every 12 hours as needed for nasal symptoms    Start doxycycline 100 mg 2 times daily for 1 week and call if not getting better    Adonis Lara MD

## 2021-08-17 ENCOUNTER — TELEPHONE (OUTPATIENT)
Dept: INTERNAL MEDICINE CLINIC | Age: 81
End: 2021-08-17

## 2021-08-19 ENCOUNTER — OFFICE VISIT (OUTPATIENT)
Dept: ORTHOPEDIC SURGERY | Age: 81
End: 2021-08-19
Payer: MEDICARE

## 2021-08-19 VITALS — RESPIRATION RATE: 16 BRPM | HEIGHT: 67 IN | WEIGHT: 199 LBS | BODY MASS INDEX: 31.23 KG/M2

## 2021-08-19 DIAGNOSIS — M17.11 PRIMARY OSTEOARTHRITIS OF RIGHT KNEE: Primary | ICD-10-CM

## 2021-08-19 PROCEDURE — 1123F ACP DISCUSS/DSCN MKR DOCD: CPT | Performed by: PHYSICIAN ASSISTANT

## 2021-08-19 PROCEDURE — 20610 DRAIN/INJ JOINT/BURSA W/O US: CPT | Performed by: PHYSICIAN ASSISTANT

## 2021-08-19 PROCEDURE — G8427 DOCREV CUR MEDS BY ELIG CLIN: HCPCS | Performed by: PHYSICIAN ASSISTANT

## 2021-08-19 PROCEDURE — 4040F PNEUMOC VAC/ADMIN/RCVD: CPT | Performed by: PHYSICIAN ASSISTANT

## 2021-08-19 PROCEDURE — 1036F TOBACCO NON-USER: CPT | Performed by: PHYSICIAN ASSISTANT

## 2021-08-19 PROCEDURE — G8417 CALC BMI ABV UP PARAM F/U: HCPCS | Performed by: PHYSICIAN ASSISTANT

## 2021-08-21 NOTE — PROGRESS NOTES
This dictation was done with [employer] dictation and may contain mechanical errors related to translation. Resp. rate 16, height 5' 7\" (1.702 m), weight 199 lb (90.3 kg). This is a pleasant 81-year-old who has ongoing pain from osteoarthritis. He has had some relief with the cortisone in the past most recently is requesting a Euflexxa injection series. I did send him for x-rays including AP lateral sunrise view. This showed bone-on-bone osteoarthritis in the medial compartment no fractures or other bone abnormalities noted his patella tracking is appropriate and this was shown to the patient    Subjective:  right knee pain. He is here for the osteoarthritis in the knee. After discussion examination we decided to proceed with the 1st Euflexxa  injection for the  right knee(s). Objective:   Resp. rate 16, height 5' 7\" (1.702 m), weight 199 lb (90.3 kg). There is a milld joint effusion noted of the right knee(s). There is moderate pain with range of motion testing. There is no significant  instability noted. Neuro exam grossly intact both lower extremities. Intact sensation to light touch. Motor exam 4+ to 5/5 in all major motor groups. Negative Oquendo's sign. Skin is warm, dry and intact with out erythema or significant increased temperature around the knee joint(s). Assessment:  Degenerative Joint Disease of the right Knee(s). Plan:  Discussed  injections including all  risks and benefits including increased pain, drug reaction, infection, bleeding, lack of improvement and  neurovascular injury. All the questions were answered. PROCEDURE NOTE:  PRE-PROCEDURE DIAGNOSIS: DJD knee  POST-PROCEDURE DIAGNOSIS: DJD knee    With the patient's permission, his right knee was prepped in standard sterile fashion with  Alcohol. The prefilled injection of the preferred Eufflexxa was injected into the right lateral joint space compartment without difficulty.   The patient tolerated the procedure(s) well without difficulty. A band-aid was applied. POST-PROCEDURE INSTRUCTIONS GIVEN TO PATIENT: The patient was advised to ice the knee for 15-20 minutes to relieve any injection site related pain. Decrease activity for the next 24 to 48 hours. May use prescription or OTC pain relievers as needed      FOLLOW-UP:   As directed or call or return to clinic if these symptoms worsen or fail to improve as anticipated. If at any time you are concerned you may contact the office for further instructions or care.

## 2021-08-26 ENCOUNTER — OFFICE VISIT (OUTPATIENT)
Dept: ORTHOPEDIC SURGERY | Age: 81
End: 2021-08-26
Payer: MEDICARE

## 2021-08-26 DIAGNOSIS — M17.11 PRIMARY OSTEOARTHRITIS OF RIGHT KNEE: Primary | ICD-10-CM

## 2021-08-26 PROCEDURE — 20610 DRAIN/INJ JOINT/BURSA W/O US: CPT | Performed by: PHYSICIAN ASSISTANT

## 2021-08-26 NOTE — PROGRESS NOTES
This dictation was done with Levl[employer] dictation and may contain mechanical errors related to translation. This is a very pleasant 41-year-old gentleman who had some relief with the first in a series of 3 Euflexxa injections. He is here for his second. Subjective:  right knee pain. He is here for the osteoarthritis in the knee. After discussion examination we decided to proceed with the 2nd Euflexxa  injection for the  right knee(s). Objective: There were no vitals taken for this visit. There is a milld joint effusion noted of the right knee(s). There is moderate pain with range of motion testing. There is no significant  instability noted. Neuro exam grossly intact both lower extremities. Intact sensation to light touch. Motor exam 4+ to 5/5 in all major motor groups. Negative Oquendo's sign. Skin is warm, dry and intact with out erythema or significant increased temperature around the knee joint(s). Assessment:  Degenerative Joint Disease of the right Knee(s). Plan:  Discussed  injections including all  risks and benefits including increased pain, drug reaction, infection, bleeding, lack of improvement and  neurovascular injury. All the questions were answered. PROCEDURE NOTE:  PRE-PROCEDURE DIAGNOSIS: DJD knee  POST-PROCEDURE DIAGNOSIS: DJD knee    With the patient's permission, his right knee was prepped in standard sterile fashion with  Alcohol. The prefilled injection of the preferred Eufflexxa was injected into the right lateral joint space compartment without difficulty. The patient tolerated the procedure(s) well without difficulty. A band-aid was applied. POST-PROCEDURE INSTRUCTIONS GIVEN TO PATIENT: The patient was advised to ice the knee for 15-20 minutes to relieve any injection site related pain. Decrease activity for the next 24 to 48 hours.  May use prescription or OTC pain relievers as needed      FOLLOW-UP:   As directed or call or return to clinic if these symptoms worsen or fail to improve as anticipated. If at any time you are concerned you may contact the office for further instructions or care.

## 2021-08-30 RX ORDER — EZETIMIBE 10 MG/1
TABLET ORAL
Qty: 30 TABLET | Refills: 5 | Status: SHIPPED | OUTPATIENT
Start: 2021-08-30 | End: 2022-01-17 | Stop reason: SDUPTHER

## 2021-08-30 RX ORDER — ATORVASTATIN CALCIUM 80 MG/1
TABLET, FILM COATED ORAL
Qty: 30 TABLET | Refills: 5 | Status: SHIPPED | OUTPATIENT
Start: 2021-08-30 | End: 2022-01-17 | Stop reason: SDUPTHER

## 2021-08-30 NOTE — TELEPHONE ENCOUNTER
Last ov 08/16/021  Future Appointments   Date Time Provider Leonie Silva   9/2/2021  2:45 PM Jennifer Bowles MD [facility]   9/22/2021 10:00 AM Remington Waters[address]

## 2021-09-02 ENCOUNTER — OFFICE VISIT (OUTPATIENT)
Dept: ORTHOPEDIC SURGERY | Age: 81
End: 2021-09-02
Payer: MEDICARE

## 2021-09-02 VITALS — WEIGHT: 199 LBS | HEIGHT: 67 IN | BODY MASS INDEX: 31.23 KG/M2 | RESPIRATION RATE: 16 BRPM

## 2021-09-02 DIAGNOSIS — M17.11 PRIMARY OSTEOARTHRITIS OF RIGHT KNEE: Primary | ICD-10-CM

## 2021-09-02 PROCEDURE — 20610 DRAIN/INJ JOINT/BURSA W/O US: CPT | Performed by: ORTHOPAEDIC SURGERY

## 2021-09-02 RX ORDER — HYALURONATE SODIUM 10 MG/ML
20 SYRINGE (ML) INTRAARTICULAR ONCE
Status: COMPLETED | OUTPATIENT
Start: 2021-09-02 | End: 2021-09-02

## 2021-09-02 RX ADMIN — Medication 20 MG: at 15:09

## 2021-09-02 NOTE — PROGRESS NOTES
[facility]  Office Visit    Chief Complaint: Right knee pain    HPI:  Nelia Hernandez is a [de-identified] y.o. who is here for his third Euflexxa injection in the right knee today. He reports improvement in pain with the first 2 injections. He denies any adverse events. Exam:  Resp. rate 16, height 5' 7\" (1.702 m), weight 199 lb (90.3 kg). Appearance: sitting in exam room chair, appears to be in no acute distress, awake and alert  Resp: unlabored breathing on room air  Skin: warm, dry and intact with out erythema or significant increased temperature  Neuro: grossly intact both lower extremities. Intact sensation to light touch. Motor exam 4+ to 5/5 in all major motor groups. MSK: Right leg -active knee range of motion 0 to 130 degrees. No knee effusion. Crepitance with active knee range of motion. Assessment:  Right knee osteoarthritis     Plan:  He is here for his third Euflexxa injection today. This is performed today as described below. He will follow-up as needed. Procedure:  After verbal consent was obtained, the patient's right knee was prepped with alcohol. Skin was anesthetized with ethyl chloride. The knee was then injected under sterile technique with Euflexxa. A bandage was applied. The patient tolerated the procedure well and there were no complications. This dictation was done with [employer] dictation and may contain mechanical errors related to translation.

## 2021-09-03 ENCOUNTER — OFFICE VISIT (OUTPATIENT)
Dept: INTERNAL MEDICINE CLINIC | Age: 81
End: 2021-09-03
Payer: MEDICARE

## 2021-09-03 ENCOUNTER — TELEPHONE (OUTPATIENT)
Dept: INTERNAL MEDICINE CLINIC | Age: 81
End: 2021-09-03

## 2021-09-03 VITALS
WEIGHT: 198.8 LBS | RESPIRATION RATE: 17 BRPM | DIASTOLIC BLOOD PRESSURE: 65 MMHG | OXYGEN SATURATION: 97 % | HEIGHT: 67 IN | BODY MASS INDEX: 31.2 KG/M2 | SYSTOLIC BLOOD PRESSURE: 125 MMHG | HEART RATE: 62 BPM

## 2021-09-03 DIAGNOSIS — H66.90 ACUTE OTITIS MEDIA, UNSPECIFIED OTITIS MEDIA TYPE: ICD-10-CM

## 2021-09-03 DIAGNOSIS — M79.5 FOREIGN BODY (FB) IN SOFT TISSUE: ICD-10-CM

## 2021-09-03 DIAGNOSIS — J20.8 ACUTE BRONCHITIS DUE TO OTHER SPECIFIED ORGANISMS: Primary | ICD-10-CM

## 2021-09-03 PROCEDURE — 1036F TOBACCO NON-USER: CPT | Performed by: INTERNAL MEDICINE

## 2021-09-03 PROCEDURE — 4040F PNEUMOC VAC/ADMIN/RCVD: CPT | Performed by: INTERNAL MEDICINE

## 2021-09-03 PROCEDURE — 99213 OFFICE O/P EST LOW 20 MIN: CPT | Performed by: INTERNAL MEDICINE

## 2021-09-03 PROCEDURE — G8417 CALC BMI ABV UP PARAM F/U: HCPCS | Performed by: INTERNAL MEDICINE

## 2021-09-03 PROCEDURE — 1123F ACP DISCUSS/DSCN MKR DOCD: CPT | Performed by: INTERNAL MEDICINE

## 2021-09-03 PROCEDURE — G8427 DOCREV CUR MEDS BY ELIG CLIN: HCPCS | Performed by: INTERNAL MEDICINE

## 2021-09-03 RX ORDER — AZITHROMYCIN 250 MG/1
250 TABLET, FILM COATED ORAL SEE ADMIN INSTRUCTIONS
Qty: 6 TABLET | Refills: 0 | Status: SHIPPED | OUTPATIENT
Start: 2021-09-03 | End: 2021-09-08

## 2021-09-03 ASSESSMENT — PATIENT HEALTH QUESTIONNAIRE - PHQ9
SUM OF ALL RESPONSES TO PHQ QUESTIONS 1-9: 0
SUM OF ALL RESPONSES TO PHQ9 QUESTIONS 1 & 2: 0
SUM OF ALL RESPONSES TO PHQ QUESTIONS 1-9: 0
1. LITTLE INTEREST OR PLEASURE IN DOING THINGS: 0
SUM OF ALL RESPONSES TO PHQ QUESTIONS 1-9: 0
2. FEELING DOWN, DEPRESSED OR HOPELESS: 0

## 2021-09-03 NOTE — PATIENT INSTRUCTIONS
Discussed with him   Start on Zithromax and take as directed on packet  See DR. Jerez for FB removal

## 2021-09-03 NOTE — TELEPHONE ENCOUNTER
----- Message from Gricelda Quach sent at 9/2/2021  5:18 PM EDT -----  Subject: Message to Provider    QUESTIONS  Information for Provider? Pt now has a sore throat on left side and an ear   ache and wants to know does he have to be seen before his scheduled appt   gabrielle on this month . Or even if he can get something called in for the   problem and wait to be seen?  ---------------------------------------------------------------------------  --------------  CALL BACK INFO  What is the best way for the office to contact you? OK to leave message on   voicemail  Preferred Call Back Phone Number? [phone]  ---------------------------------------------------------------------------  --------------  SCRIPT ANSWERS  Relationship to Patient? Other  Representative Name? [email or web address]  Is the Representative on the appropriate HIPAA document in Epic?  Yes

## 2021-09-03 NOTE — PROGRESS NOTES
Subjective:      Patient ID: Penny Verma is a [de-identified] y.o. male. Chief Complaint   Patient presents with    Pharyngitis    Otalgia     left ear        HPI  Left earache and and has it for 4 days and has slight sore throat on l;eft side and has no nasal symptoms  Has cough with yellow green drainage and same color of nasal drainage too  Has no fever or chills and has no dyspnea   Review of Systems   Neurological: Negative for dizziness, light-headedness and headaches. Current Outpatient Medications   Medication Sig Dispense Refill    atorvastatin (LIPITOR) 80 MG tablet TAKE 1 TABLET BY MOUTH DAILY 30 tablet 5    ezetimibe (ZETIA) 10 MG tablet TAKE 1 TABLET BY MOUTH DAILY 30 tablet 5    guaiFENesin (MUCINEX) 600 MG extended release tablet Take 600 mg by mouth every 6 hours as needed for Congestion      fluticasone (FLONASE) 50 MCG/ACT nasal spray 1 spray by Each Nostril route daily      escitalopram (LEXAPRO) 20 MG tablet TAKE 1 TABLET BY MOUTH DAILY 90 tablet 1    topiramate (TOPAMAX) 50 MG tablet 1/2 tablet daily for 1 week and then 1 tablet daily 30 tablet 3    levothyroxine (SYNTHROID) 50 MCG tablet TAKE 1 TABLET BY MOUTH EVERY DAY 90 tablet 1    lisinopril (PRINIVIL;ZESTRIL) 10 MG tablet TAKE 1 TABLET BY MOUTH DAILY 90 tablet 1    Ascorbic Acid (VITAMIN C) 500 MG CAPS Take by mouth      aspirin 81 MG EC tablet Take 81 mg by mouth daily      esomeprazole (NEXIUM) 20 MG delayed release capsule Take 20 mg by mouth 2 times daily      polyethylene glycol (GLYCOLAX) packet Take 17 g by mouth daily as needed for Constipation      Cholecalciferol (VITAMIN D) 2000 units CAPS capsule Take 1 capsule by mouth daily      Magnesium 500 MG CAPS Take 1 capsule by mouth nightly       docusate sodium (COLACE, DULCOLAX) 100 MG CAPS Take 100 mg by mouth 2 times daily as needed for Constipation 60 capsule 0     No current facility-administered medications for this visit.         No results for input(s): WBC, HGB, HCT, MCV, PLT in the last 720 hours. Lab Results   Component Value Date     12/08/2020    K 4.9 12/08/2020     12/08/2020    CO2 22 12/08/2020    BUN 23 12/08/2020    CREATININE 1.2 12/08/2020    GLUCOSE 92 12/08/2020    CALCIUM 9.5 12/08/2020        Lab Results   Component Value Date    CHOL 153 12/08/2020    CHOL 145 11/21/2019    CHOL 147 11/06/2018     Lab Results   Component Value Date    TRIG 112 12/08/2020    TRIG 147 11/21/2019    TRIG 96 11/06/2018     Lab Results   Component Value Date    HDL 51 12/08/2020    HDL 45 11/21/2019    HDL 46 11/06/2018     Lab Results   Component Value Date    LDLCALC 80 12/08/2020    LDLCALC 71 11/21/2019    LDLCALC 82 11/06/2018     Lab Results   Component Value Date    LABVLDL 22 12/08/2020    LABVLDL 29 11/21/2019    LABVLDL 19 11/06/2018     No results found for: CHOLHDLRATIO     Objective:   Physical Exam  Vitals and nursing note reviewed. Constitutional:       General: He is not in acute distress. HENT:      Right Ear: Tympanic membrane, ear canal and external ear normal.      Ears:      Comments: Has sligth erythema and swelling seen around a FB seen left ear close to TM -showed this to his wife and she identifies is as a part of his hearing aid      Nose: Nose normal. No congestion or rhinorrhea. Mouth/Throat:      Mouth: Mucous membranes are moist.      Pharynx: Oropharynx is clear. No oropharyngeal exudate or posterior oropharyngeal erythema. Eyes:      General: No scleral icterus. Extraocular Movements: Extraocular movements intact. Conjunctiva/sclera: Conjunctivae normal.      Pupils: Pupils are equal, round, and reactive to light. Neck:      Vascular: No carotid bruit. Pulmonary:      Effort: No respiratory distress. Breath sounds: Normal breath sounds. No wheezing, rhonchi or rales. Lymphadenopathy:      Cervical: No cervical adenopathy.    Neurological:      Mental Status: He is alert and oriented to person, place,

## 2021-09-04 ENCOUNTER — HOSPITAL ENCOUNTER (EMERGENCY)
Age: 81
Discharge: HOME | End: 2021-09-04
Payer: MEDICARE

## 2021-09-04 VITALS
HEART RATE: 62 BPM | DIASTOLIC BLOOD PRESSURE: 79 MMHG | SYSTOLIC BLOOD PRESSURE: 145 MMHG | TEMPERATURE: 98.2 F | RESPIRATION RATE: 12 BRPM | BODY MASS INDEX: 28.37 KG/M2 | HEIGHT: 70 IN | OXYGEN SATURATION: 98 % | WEIGHT: 198.19 LBS

## 2021-09-04 DIAGNOSIS — R03.0 ELEVATED BLOOD PRESSURE READING: ICD-10-CM

## 2021-09-04 DIAGNOSIS — T16.2XXA FOREIGN BODY OF LEFT EAR, INITIAL ENCOUNTER: Primary | ICD-10-CM

## 2021-09-04 PROCEDURE — 69200 CLEAR OUTER EAR CANAL: CPT

## 2021-09-04 PROCEDURE — 99283 EMERGENCY DEPT VISIT LOW MDM: CPT

## 2021-09-04 ASSESSMENT — PAIN DESCRIPTION - LOCATION: LOCATION: EAR

## 2021-09-04 ASSESSMENT — PAIN DESCRIPTION - DESCRIPTORS: DESCRIPTORS: ACHING;THROBBING;TENDER

## 2021-09-04 ASSESSMENT — PAIN - FUNCTIONAL ASSESSMENT: PAIN_FUNCTIONAL_ASSESSMENT: PREVENTS OR INTERFERES SOME ACTIVE ACTIVITIES AND ADLS

## 2021-09-04 ASSESSMENT — PAIN DESCRIPTION - FREQUENCY: FREQUENCY: CONTINUOUS

## 2021-09-04 ASSESSMENT — PAIN DESCRIPTION - PROGRESSION: CLINICAL_PROGRESSION: GRADUALLY WORSENING

## 2021-09-04 ASSESSMENT — PAIN DESCRIPTION - ORIENTATION: ORIENTATION: LEFT

## 2021-09-04 ASSESSMENT — PAIN DESCRIPTION - PAIN TYPE: TYPE: ACUTE PAIN

## 2021-09-04 ASSESSMENT — PAIN SCALES - GENERAL: PAINLEVEL_OUTOF10: 7

## 2021-09-04 NOTE — ED TRIAGE NOTES
Ingrid Menon is a [de-identified] y.o. male brought himself to the ER for eval of foreign body in ear. The patient is alert and oriented with an open and patent airway with a normal respiratory effort.

## 2021-09-04 NOTE — ED PROVIDER NOTES
[address] [facility]        Pt Name: Jose Gracia  MRN: [id number]  [place] 1940  Date of evaluation: 9/4/2021  Provider: NEHA Ernst    This patient was not seen and evaluated by the attending physician. CHIEF COMPLAINT     Foreign body in left ear      HISTORY OF PRESENT ILLNESS  (Location/Symptom, Timing/Onset, Context/Setting, Quality, Duration,Modifying Factors, Severity.)   Juan José Aj is a [de-identified] y.o. male who presents to the emergencydepartment for foreign body in left ear for the past few days. It is a piece of his hearing aid. Has associated pain. No drainage. No fever. Does not have an appoint with ENT until Wednesday due to the holiday. Nursing Notes were reviewed and I agree. OF SYSTEMS    (2-9 systems for level 4, 10 or more for level 5)     Pertinent positives and negatives as per HPI.        PAST MEDICAL HISTORY         Diagnosis Date    Anxiety     Cancer [facility]) 11/2016    Prostates CA    Coronary artery disease 12/28/15    multi vessel CAD    GERD (gastroesophageal reflux disease)     History of blood transfusion     s/p left nephrectomy age 2years old    Hyperlipidemia LDL goal <70     Hypertension     IBS (irritable bowel syndrome)     Migraine     Primary osteoarthritis of right knee 10/5/2018    Reactive depression 6/8/2016    Shingles 2012    torso, top of head    Thyroid disease     Wears dentures        SURGICAL HISTORY         Procedure Laterality Date    APPENDECTOMY      CARPAL TUNNEL RELEASE Bilateral     COLONOSCOPY N/A 10/8/2019    COLONOSCOPY POLYPECTOMY SNARE/COLD BIOPSY performed by Vandana Constantino MD at [address]  2015    x`s 4 vessel    ENDOSCOPY, COLON, DIAGNOSTIC      EGD with dilatation    EYE SURGERY Bilateral     catacts, bilateral and repeat    HEMORRHOID SURGERY      KIDNEY REMOVAL  @ 1944    pt thinks left kidney for RADIOLOGY:   Non-plain film images such as CT, Ultrasound and MRI are read by the radiologist. Plain radiographic images are visualized and preliminarily interpreted by NEHA Heredia with the below findings:      Interpretation per the Radiologist below, if available at the time of this note:    No orders to display         LABS:  Labs Reviewed - No data to display    All other labs were within normal range or not returned as of this dictation. EMERGENCY DEPARTMENT COURSE and DIFFERENTIALDIAGNOSIS/MDM:   Vitals:    Vitals:    09/04/21 1440 09/04/21 1524   BP: (!) 148/74 (!) 145/79   Pulse: 68 62   Resp: 16 12   Temp: 97.8 °F (36.6 °C) 98.2 °F (36.8 °C)   TempSrc: Temporal Oral   SpO2: 98% 98%   Weight: 198 lb 3.1 oz (89.9 kg)    Height: 5' 10\" (1.778 m)        Patient wasnontoxic, well appearing, afebrile with normal vital signs with exception of hypertension 148/74. Saturating well on room air. Foreign body was removed. See the note below. Patient tolerated well. No signs of infection. No TM perforation. Given that there was some irritation of the skin of the ear canal, I did discuss the patient did not use his hearing aid for a few days. He and wife understood      PROCEDURES:  Foreign Body    Date/Time: 9/4/2021 4:20 PM  Performed by: NEHA Heredia  Authorized by: NEHA Heredia     Consent:     Consent obtained:  Verbal    Consent given by:  Patient  Location:     Location:  Ear    Ear location:  L ear    Depth: canal.  Procedure type:     Procedure complexity:  Simple  Procedure details:     Removal mechanism: Alligator forceps. Foreign bodies recovered:  1    Description:  Piece of hearing aid    Intact foreign body removal: yes    Post-procedure details:     Confirmation:  No additional foreign bodies on visualization    Patient tolerance of procedure:   Tolerated well, no immediate complications  Comments:      Slight irritation of the skin of the ear

## 2021-09-08 RX ORDER — TOPIRAMATE 50 MG/1
TABLET, FILM COATED ORAL
Qty: 90 TABLET | Refills: 1 | Status: SHIPPED | OUTPATIENT
Start: 2021-09-08 | End: 2022-01-17 | Stop reason: SDUPTHER

## 2021-09-08 NOTE — TELEPHONE ENCOUNTER
Last ov 09/03/21  Future Appointments   Date Time Provider Leonie Silva   9/22/2021 10:00 AM Rachelle Martines[address]   11/15/2021  9:00 AM NEHA [facility]

## 2021-09-22 ENCOUNTER — OFFICE VISIT (OUTPATIENT)
Dept: INTERNAL MEDICINE CLINIC | Age: 81
End: 2021-09-22
Payer: MEDICARE

## 2021-09-22 VITALS
DIASTOLIC BLOOD PRESSURE: 72 MMHG | OXYGEN SATURATION: 97 % | HEART RATE: 63 BPM | SYSTOLIC BLOOD PRESSURE: 136 MMHG | RESPIRATION RATE: 16 BRPM | BODY MASS INDEX: 28.7 KG/M2 | TEMPERATURE: 97.2 F | WEIGHT: 200 LBS

## 2021-09-22 DIAGNOSIS — I25.10 CORONARY ARTERY DISEASE INVOLVING NATIVE CORONARY ARTERY OF NATIVE HEART WITHOUT ANGINA PECTORIS: ICD-10-CM

## 2021-09-22 DIAGNOSIS — K21.9 GASTROESOPHAGEAL REFLUX DISEASE WITHOUT ESOPHAGITIS: ICD-10-CM

## 2021-09-22 DIAGNOSIS — I10 ESSENTIAL HYPERTENSION: Primary | ICD-10-CM

## 2021-09-22 PROCEDURE — 1036F TOBACCO NON-USER: CPT | Performed by: INTERNAL MEDICINE

## 2021-09-22 PROCEDURE — 1123F ACP DISCUSS/DSCN MKR DOCD: CPT | Performed by: INTERNAL MEDICINE

## 2021-09-22 PROCEDURE — 99213 OFFICE O/P EST LOW 20 MIN: CPT | Performed by: INTERNAL MEDICINE

## 2021-09-22 PROCEDURE — G8417 CALC BMI ABV UP PARAM F/U: HCPCS | Performed by: INTERNAL MEDICINE

## 2021-09-22 PROCEDURE — 90694 VACC AIIV4 NO PRSRV 0.5ML IM: CPT | Performed by: INTERNAL MEDICINE

## 2021-09-22 PROCEDURE — G8427 DOCREV CUR MEDS BY ELIG CLIN: HCPCS | Performed by: INTERNAL MEDICINE

## 2021-09-22 PROCEDURE — 4040F PNEUMOC VAC/ADMIN/RCVD: CPT | Performed by: INTERNAL MEDICINE

## 2021-09-22 PROCEDURE — G0008 ADMIN INFLUENZA VIRUS VAC: HCPCS | Performed by: INTERNAL MEDICINE

## 2021-09-22 RX ORDER — LEVOTHYROXINE SODIUM 0.05 MG/1
TABLET ORAL
Qty: 90 TABLET | Refills: 1 | Status: SHIPPED | OUTPATIENT
Start: 2021-09-22 | End: 2022-01-17 | Stop reason: SDUPTHER

## 2021-09-22 RX ORDER — LISINOPRIL 10 MG/1
10 TABLET ORAL DAILY
Qty: 90 TABLET | Refills: 1 | Status: SHIPPED | OUTPATIENT
Start: 2021-09-22 | End: 2021-09-24

## 2021-09-22 ASSESSMENT — ENCOUNTER SYMPTOMS
CHEST TIGHTNESS: 0
COUGH: 0
GASTROINTESTINAL NEGATIVE: 1
SHORTNESS OF BREATH: 0
TROUBLE SWALLOWING: 0
WHEEZING: 0

## 2021-09-22 ASSESSMENT — PATIENT HEALTH QUESTIONNAIRE - PHQ9
SUM OF ALL RESPONSES TO PHQ QUESTIONS 1-9: 0
2. FEELING DOWN, DEPRESSED OR HOPELESS: 0
SUM OF ALL RESPONSES TO PHQ9 QUESTIONS 1 & 2: 0
1. LITTLE INTEREST OR PLEASURE IN DOING THINGS: 0

## 2021-09-22 NOTE — PROGRESS NOTES
Subjective:      Patient ID: Marco A Ward is a [de-identified] y.o. male. Chief Complaint   Patient presents with    Hypertension        HPI  Headaches are much better and has no cough or wheezing  Has no symptoms with activity tho has knee pain with activity  Has no other cp gi or gu symptoms  Has no heart burns with nexium  Review of Systems   Constitutional: Negative for activity change, appetite change and unexpected weight change. HENT: Negative for trouble swallowing. Respiratory: Negative for cough, chest tightness, shortness of breath and wheezing. Cardiovascular: Negative for chest pain, palpitations and leg swelling. Gastrointestinal: Negative. Genitourinary: Negative. Neurological: Negative for dizziness, light-headedness and headaches.        Current Outpatient Medications   Medication Sig Dispense Refill    topiramate (TOPAMAX) 50 MG tablet TAKE 1/2 TABLET EVERY DAY FOR 1 WEEK AND THEN 1 TABLET EVERY DAY 90 tablet 1    atorvastatin (LIPITOR) 80 MG tablet TAKE 1 TABLET BY MOUTH DAILY 30 tablet 5    ezetimibe (ZETIA) 10 MG tablet TAKE 1 TABLET BY MOUTH DAILY 30 tablet 5    guaiFENesin (MUCINEX) 600 MG extended release tablet Take 600 mg by mouth every 6 hours as needed for Congestion      fluticasone (FLONASE) 50 MCG/ACT nasal spray 1 spray by Each Nostril route daily      escitalopram (LEXAPRO) 20 MG tablet TAKE 1 TABLET BY MOUTH DAILY 90 tablet 1    levothyroxine (SYNTHROID) 50 MCG tablet TAKE 1 TABLET BY MOUTH EVERY DAY 90 tablet 1    lisinopril (PRINIVIL;ZESTRIL) 10 MG tablet TAKE 1 TABLET BY MOUTH DAILY 90 tablet 1    aspirin 81 MG EC tablet Take 81 mg by mouth daily      esomeprazole (NEXIUM) 20 MG delayed release capsule Take 20 mg by mouth 2 times daily      polyethylene glycol (GLYCOLAX) packet Take 17 g by mouth daily as needed for Constipation      Cholecalciferol (VITAMIN D) 2000 units CAPS capsule Take 1 capsule by mouth daily      Magnesium 500 MG CAPS Take 1 capsule by mouth nightly       docusate sodium (COLACE, DULCOLAX) 100 MG CAPS Take 100 mg by mouth 2 times daily as needed for Constipation 60 capsule 0     No current facility-administered medications for this visit. No results for input(s): WBC, HGB, HCT, MCV, PLT in the last 720 hours. Lab Results   Component Value Date     12/08/2020    K 4.9 12/08/2020     12/08/2020    CO2 22 12/08/2020    BUN 23 12/08/2020    CREATININE 1.2 12/08/2020    GLUCOSE 92 12/08/2020    CALCIUM 9.5 12/08/2020        Lab Results   Component Value Date    CHOL 153 12/08/2020    CHOL 145 11/21/2019    CHOL 147 11/06/2018     Lab Results   Component Value Date    TRIG 112 12/08/2020    TRIG 147 11/21/2019    TRIG 96 11/06/2018     Lab Results   Component Value Date    HDL 51 12/08/2020    HDL 45 11/21/2019    HDL 46 11/06/2018     Lab Results   Component Value Date    LDLCALC 80 12/08/2020    LDLCALC 71 11/21/2019    LDLCALC 82 11/06/2018     Lab Results   Component Value Date    LABVLDL 22 12/08/2020    LABVLDL 29 11/21/2019    LABVLDL 19 11/06/2018     No results found for: CHOLHDLRATIO     Objective:   Physical Exam  Vitals and nursing note reviewed. Constitutional:       General: He is not in acute distress. Eyes:      General: No scleral icterus. Extraocular Movements: Extraocular movements intact. Conjunctiva/sclera: Conjunctivae normal.      Pupils: Pupils are equal, round, and reactive to light. Neck:      Thyroid: No thyromegaly. Vascular: No carotid bruit or JVD. Cardiovascular:      Rate and Rhythm: Normal rate and regular rhythm. Pulses: Normal pulses. Heart sounds: Normal heart sounds. No murmur heard. Friction rub: has frequent skip beats. No gallop. Pulmonary:      Effort: No respiratory distress. Breath sounds: Normal breath sounds. No wheezing, rhonchi or rales. Musculoskeletal:      Right lower leg: No edema. Left lower leg: No edema.    Lymphadenopathy: Cervical: No cervical adenopathy. Neurological:      Mental Status: He is alert and oriented to person, place, and time.          Assessment:      htn well controlled  CAD STABLE     GERD controlled  Headaches improved with topiramate  Plan:      Fasting lipids next visit  Continue current medications  See in  3 months        Albania Rosenberg MD

## 2021-09-23 NOTE — FLOWSHEET NOTE
Prognosis: [x] Good [] Fair  [] Poor    Goals:    Short term goals  Time Frame for Short term goals: 3 weeks  Short term goal 1: Patient will be independent with HEP for prevention of reinjury. Short term goal 2: Increase knee PROM to +8-110 for ease with tranfers. Short term goal 3: Patient will report 30% improvement with PT for ease with ADLs. Long term goals  Time Frame for Long term goals : at discharge  Long term goal 1: Increase knee PROM to +5-115 for ease with transfers. Long term goal 2: Increase LE strength to 4+/5 for ease with stairs. Long term goal 3: Patient will report 70% improvement with pain for ease with ADLs. Long term goal 4: Patient will ambulate in community without AD demonstrating good gait pattern. Patient Requires Follow-up: [x] Yes  [] No    Plan:   [x] Continue per plan of care [] Alter current plan (see comments)  [] Plan of care initiated [] Hold pending MD visit [] Discharge    Plan for Next Session:  Add above as stated.  PUSH ROM    Electronically signed by:  BREONNA Dockery,[id number]
No

## 2021-09-24 RX ORDER — LISINOPRIL 10 MG/1
10 TABLET ORAL DAILY
Qty: 90 TABLET | Refills: 1 | Status: SHIPPED | OUTPATIENT
Start: 2021-09-24 | End: 2022-01-17 | Stop reason: SDUPTHER

## 2021-09-24 NOTE — TELEPHONE ENCOUNTER
Last ov 09/22/21  Future Appointments   Date Time Provider Leonie Silva   11/15/2021  9:00 AM Josh Abel [facility]   12/22/2021 10:30 AM Karson Zavala MD [facility]

## 2021-11-04 ENCOUNTER — OFFICE VISIT (OUTPATIENT)
Dept: ORTHOPEDIC SURGERY | Age: 81
End: 2021-11-04
Payer: MEDICARE

## 2021-11-04 ENCOUNTER — PREP FOR PROCEDURE (OUTPATIENT)
Dept: ORTHOPEDIC SURGERY | Age: 81
End: 2021-11-04

## 2021-11-04 VITALS — HEIGHT: 70 IN | BODY MASS INDEX: 28.63 KG/M2 | RESPIRATION RATE: 18 BRPM | WEIGHT: 200 LBS

## 2021-11-04 DIAGNOSIS — M17.11 PRIMARY OSTEOARTHRITIS OF RIGHT KNEE: Primary | ICD-10-CM

## 2021-11-04 PROCEDURE — 99213 OFFICE O/P EST LOW 20 MIN: CPT | Performed by: ORTHOPAEDIC SURGERY

## 2021-11-04 PROCEDURE — G8484 FLU IMMUNIZE NO ADMIN: HCPCS | Performed by: ORTHOPAEDIC SURGERY

## 2021-11-04 PROCEDURE — G8417 CALC BMI ABV UP PARAM F/U: HCPCS | Performed by: ORTHOPAEDIC SURGERY

## 2021-11-04 PROCEDURE — 4040F PNEUMOC VAC/ADMIN/RCVD: CPT | Performed by: ORTHOPAEDIC SURGERY

## 2021-11-04 PROCEDURE — 1036F TOBACCO NON-USER: CPT | Performed by: ORTHOPAEDIC SURGERY

## 2021-11-04 PROCEDURE — G8427 DOCREV CUR MEDS BY ELIG CLIN: HCPCS | Performed by: ORTHOPAEDIC SURGERY

## 2021-11-04 PROCEDURE — 1123F ACP DISCUSS/DSCN MKR DOCD: CPT | Performed by: ORTHOPAEDIC SURGERY

## 2021-11-05 NOTE — PROGRESS NOTES
[facility]    Chief Complaint: Follow-up for right knee pain. HPI:  Anila Dacosta is a [de-identified] y.o. who is here ahead of a planned right total knee arthroplasty. He underwent left total knee arthroplasty on March 11, 2020 with Dr. Hellen Rosenthal. This knee is doing very well; he has no complaints. He comes in today reporting right knee pain on a daily basis. He rates the pain as 10/10. He did undergo Euflexxa injections 2 months ago with minimal relief of symptoms. The knee is not giving out on him and he has trouble sleeping. The patient has difficulty climbing stairs, difficulty getting out of a chair, difficulty with activities of daily living including hygiene and pain at night. Pain level at rest is 7 and with activities 9-10/10. His knee will occasionally pop. He does wear a knee brace that helps with this. Aleve and Tylenol have also been helping. His medical history is significant for coronary artery bypass graft for which he takes baby aspirin daily. He denies diabetes, pulmonary issues, tobacco use, narcotic drug use, low back pain, sleep apnea.       Patient Active Problem List   Diagnosis    Chest pain on exertion    Essential hypertension    S/P CABG (coronary artery bypass graft)    Hyperlipidemia LDL goal <70    Reactive depression    Prostate carcinoma ([facility] [address]    Vitamin D deficiency    Left hip pain    Chronic left-sided low back pain without sciatica    Coronary artery disease    Edema of right lower extremity    Greater trochanteric bursitis of left hip    Acute pain of right knee    Primary osteoarthritis of right knee    Primary osteoarthritis of left knee    Chronic fatigue    Acquired hypothyroidism    Gastroesophageal reflux disease without esophagitis    Arthritis of left knee    History of total knee replacement, left-3.11.2020    Intractable chronic migraine without aura and without status migrainosus    Acute bronchitis due to other specified organisms    Acute otitis media    Foreign body (FB) in soft tissue-left ear       ROS:  Constitutional: denies fever, chills, weight loss  MSK: denies pain in other joints, muscle aches  Neurological: denies numbness, tingling, weakness    Exam:  Resp. rate 18, height 5' 10\" (1.778 m), weight 200 lb (90.7 kg). Appearance: sitting in exam room chair, appears to be in no acute distress, awake and alert  Resp: unlabored breathing on room air  Skin: warm, dry and intact with out erythema or significant increased temperature  Neuro: grossly intact both lower extremities. Intact sensation to light touch. Motor exam 4+ to 5/5 in all major motor groups. Right knee: Examination reveals that range of motion is 0 to 130 degrees. There is varus deformity, positive crepitus, positive joint line tenderness, positive Baker's cyst, antalgic gait. Neurologically, plantar flexion and dorsiflexion is intact. Pulses palpable distally. 5/5 strength. Imaging:  Prior right knee x-rays were reviewed and are significant for tricompartmental degenerative changes with joint space narrowing and periarticular osteophytes, most significantly in the medial compartment. Assessment:  Status post left total knee arthroplasty  Right knee osteoarthritis    Plan:  We discussed the diagnosis and treatment options. We discussed right total knee arthroplasty. The operative procedure, alternatives, and risks were discussed in detail with the patient. The risks include but are not limited to: Infection, vessel injury, nerve injury, DVT, pulmonary embolism, implant loosening, need for revision surgery, loss of motion, continued pain. Despite these risks the patient would like to proceed. All questions have been answered and no guarantees have been made. The patient is unable to do further physical therapy due to disabling pain. I discussed with the patient the diagnosis in detail and answered all the questions.   The patient verbalized understanding of the plan as it has been described above and is in agreement. Plan for right total knee arthroplasty. Total time spent on today's encounter was at least 26 minutes. This time included reviewing prior notes, radiographs, and lab results when available, reviewing history obtained by medical assistant, performing history and physical exam, reviewing tests/radiographs with the patient, counseling the patient, ordering medications or tests, documentation in the electronic health record, and coordination of care. This dictation was done with Dragon dictation and may contain mechanical errors related to translation.

## 2021-11-09 ENCOUNTER — TELEPHONE (OUTPATIENT)
Dept: CARDIOLOGY CLINIC | Age: 81
End: 2021-11-09

## 2021-11-09 NOTE — TELEPHONE ENCOUNTER
Cardiac Risk Assessment message information:     What type of procedure are you having:RIGHT KNEE REPLACEMENT     When is your procedure scheduled for:12/15/21     Who is the physician performing your procedure:DR. JULISSA CARTER    Which medications need to be held for your procedure and for how long: N/A         Phone [address] [facility]     Fax number to send the letter:[phone]    Clinical Staff use:     Cardiologist:  Last Appointment:  Next Appointment:  Are you on any blood thinners:  History of Coronary Artery Disease:  Last stress test:  Last echo:  Device Type and :

## 2021-11-09 NOTE — TELEPHONE ENCOUNTER
Patient denies any new or recurrent symptoms. Specifically denies any chest pain or shortness of breath with or without exertion. Notified that he may proceed with surgery and v/u.

## 2021-11-09 NOTE — TELEPHONE ENCOUNTER
Dr. Merlene Felton     Please review and advise regarding cardiac clearance. Patient last seen in the office 1/4/21 with hx of CAD, HLD and HTN. CABG 12/2015. No symptoms during OV and instructed to f/u in 1 year.

## 2021-11-09 NOTE — TELEPHONE ENCOUNTER
As long as the patient is not having any major symptoms with activity or exercise which includes chest pain shortness of breath, should be okay for surgery.   Otherwise he needs to see me    Otherwise he needs to see me for evaluation

## 2021-11-10 ENCOUNTER — HOSPITAL ENCOUNTER (OUTPATIENT)
Dept: CT IMAGING | Age: 81
Discharge: HOME | End: 2021-11-10
Payer: MEDICARE

## 2021-11-10 DIAGNOSIS — M17.11 PRIMARY OSTEOARTHRITIS OF RIGHT KNEE: ICD-10-CM

## 2021-11-10 PROCEDURE — 73700 CT LOWER EXTREMITY W/O DYE: CPT

## 2021-11-17 ENCOUNTER — TELEPHONE (OUTPATIENT)
Dept: ORTHOPEDIC SURGERY | Age: 81
End: 2021-11-17

## 2021-11-17 NOTE — TELEPHONE ENCOUNTER
Auth: NPR  Date: 12/15/2021  Reference # None  Spoke with: None  Type of SX: Outpatient  Location: [facility] [place]  CPT: 34467   DX: M17.11  SX area: Rt knee  Insurance: Medicare A&B

## 2021-11-30 NOTE — TELEPHONE ENCOUNTER
Jo-Ann Burgess is calling in wanting to know if Juan José is okay to hold his aspirin. On pre-op paperwork it states hold blood thinners 1 week prior to surgery.

## 2021-11-30 NOTE — TELEPHONE ENCOUNTER
Dr. Sandro Burroughs    Please advise in Dr. Ros titus. Patient previously cleared per Dr. Yolis Son but now needs to hold ASA for 5 days prior.

## 2021-11-30 NOTE — TELEPHONE ENCOUNTER
Left message on vm of Antoni Harness with entire message below. They are MW so should not need a letter.

## 2021-11-30 NOTE — TELEPHONE ENCOUNTER
Spoke to Dr Sandro Burroughs and bernard to hold Asa x 5 days. Please prepare letter and make patient aware.

## 2021-12-01 ENCOUNTER — OFFICE VISIT (OUTPATIENT)
Dept: INTERNAL MEDICINE CLINIC | Age: 81
End: 2021-12-01
Payer: MEDICARE

## 2021-12-01 VITALS
DIASTOLIC BLOOD PRESSURE: 60 MMHG | HEART RATE: 67 BPM | OXYGEN SATURATION: 97 % | SYSTOLIC BLOOD PRESSURE: 120 MMHG | HEIGHT: 70 IN | BODY MASS INDEX: 28.72 KG/M2 | WEIGHT: 200.6 LBS

## 2021-12-01 DIAGNOSIS — I25.10 CORONARY ARTERY DISEASE INVOLVING NATIVE CORONARY ARTERY OF NATIVE HEART WITHOUT ANGINA PECTORIS: ICD-10-CM

## 2021-12-01 DIAGNOSIS — K21.9 GASTROESOPHAGEAL REFLUX DISEASE WITHOUT ESOPHAGITIS: ICD-10-CM

## 2021-12-01 DIAGNOSIS — M17.11 PRIMARY OSTEOARTHRITIS OF RIGHT KNEE: Primary | ICD-10-CM

## 2021-12-01 DIAGNOSIS — E03.9 ACQUIRED HYPOTHYROIDISM: ICD-10-CM

## 2021-12-01 DIAGNOSIS — C61 PROSTATE CARCINOMA (HCC): ICD-10-CM

## 2021-12-01 DIAGNOSIS — I10 ESSENTIAL HYPERTENSION: ICD-10-CM

## 2021-12-01 DIAGNOSIS — E78.5 HYPERLIPIDEMIA LDL GOAL <70: ICD-10-CM

## 2021-12-01 DIAGNOSIS — G43.719 INTRACTABLE CHRONIC MIGRAINE WITHOUT AURA AND WITHOUT STATUS MIGRAINOSUS: ICD-10-CM

## 2021-12-01 DIAGNOSIS — Z01.818 PREOP EXAMINATION: ICD-10-CM

## 2021-12-01 PROBLEM — M79.5 FOREIGN BODY (FB) IN SOFT TISSUE: Status: RESOLVED | Noted: 2021-09-03 | Resolved: 2021-12-01

## 2021-12-01 PROBLEM — H66.90 ACUTE OTITIS MEDIA: Status: RESOLVED | Noted: 2021-09-03 | Resolved: 2021-12-01

## 2021-12-01 PROBLEM — M25.561 ACUTE PAIN OF RIGHT KNEE: Status: RESOLVED | Noted: 2018-07-27 | Resolved: 2021-12-01

## 2021-12-01 PROBLEM — J20.8 ACUTE BRONCHITIS DUE TO OTHER SPECIFIED ORGANISMS: Status: RESOLVED | Noted: 2021-08-16 | Resolved: 2021-12-01

## 2021-12-01 LAB
ANION GAP SERPL CALCULATED.3IONS-SCNC: 13 MMOL/L (ref 3–16)
BASOPHILS ABSOLUTE: 0 K/UL (ref 0–0.2)
BASOPHILS RELATIVE PERCENT: 0.5 %
BUN BLDV-MCNC: 28 MG/DL (ref 7–20)
CALCIUM SERPL-MCNC: 8.8 MG/DL (ref 8.3–10.6)
CHLORIDE BLD-SCNC: 108 MMOL/L (ref 99–110)
CO2: 21 MMOL/L (ref 21–32)
CREAT SERPL-MCNC: 1.4 MG/DL (ref 0.8–1.3)
EOSINOPHILS ABSOLUTE: 0.2 K/UL (ref 0–0.6)
EOSINOPHILS RELATIVE PERCENT: 2.7 %
GFR AFRICAN AMERICAN: 59
GFR NON-AFRICAN AMERICAN: 49
GLUCOSE BLD-MCNC: 83 MG/DL (ref 70–99)
HCT VFR BLD CALC: 41.5 % (ref 40.5–52.5)
HEMOGLOBIN: 13.6 G/DL (ref 13.5–17.5)
LYMPHOCYTES ABSOLUTE: 1.1 K/UL (ref 1–5.1)
LYMPHOCYTES RELATIVE PERCENT: 19.1 %
MCH RBC QN AUTO: 29.3 PG (ref 26–34)
MCHC RBC AUTO-ENTMCNC: 32.6 G/DL (ref 31–36)
MCV RBC AUTO: 89.9 FL (ref 80–100)
MONOCYTES ABSOLUTE: 0.7 K/UL (ref 0–1.3)
MONOCYTES RELATIVE PERCENT: 11.7 %
NEUTROPHILS ABSOLUTE: 3.8 K/UL (ref 1.7–7.7)
NEUTROPHILS RELATIVE PERCENT: 66 %
PDW BLD-RTO: 14.1 % (ref 12.4–15.4)
PLATELET # BLD: 155 K/UL (ref 135–450)
PMV BLD AUTO: 8.7 FL (ref 5–10.5)
POTASSIUM SERPL-SCNC: 4.5 MMOL/L (ref 3.5–5.1)
RBC # BLD: 4.62 M/UL (ref 4.2–5.9)
SODIUM BLD-SCNC: 142 MMOL/L (ref 136–145)
WBC # BLD: 5.7 K/UL (ref 4–11)

## 2021-12-01 PROCEDURE — G8417 CALC BMI ABV UP PARAM F/U: HCPCS | Performed by: INTERNAL MEDICINE

## 2021-12-01 PROCEDURE — 36415 COLL VENOUS BLD VENIPUNCTURE: CPT | Performed by: INTERNAL MEDICINE

## 2021-12-01 PROCEDURE — G8484 FLU IMMUNIZE NO ADMIN: HCPCS | Performed by: INTERNAL MEDICINE

## 2021-12-01 PROCEDURE — 99214 OFFICE O/P EST MOD 30 MIN: CPT | Performed by: INTERNAL MEDICINE

## 2021-12-01 PROCEDURE — 93000 ELECTROCARDIOGRAM COMPLETE: CPT | Performed by: INTERNAL MEDICINE

## 2021-12-01 PROCEDURE — G8427 DOCREV CUR MEDS BY ELIG CLIN: HCPCS | Performed by: INTERNAL MEDICINE

## 2021-12-01 ASSESSMENT — ENCOUNTER SYMPTOMS
CHEST TIGHTNESS: 0
SHORTNESS OF BREATH: 0
GASTROINTESTINAL NEGATIVE: 1
TROUBLE SWALLOWING: 0
COUGH: 0
SORE THROAT: 0
WHEEZING: 0
RHINORRHEA: 0

## 2021-12-01 NOTE — PROGRESS NOTES
Subjective:      Patient ID: Claudia Morales is a [de-identified] y.o. male. Chief Complaint   Patient presents with    Pre-op Exam     12/15 ; Dr Felicity Breen; Rt knee replacement             Chief Complaint:   Claudia Morales is a [de-identified] y.o. male who presents for a preoperative physical examination. He is scheduled to have rigth knee replacement  done by Dr. Rehana Kingsley at [facility] [place] on 12/15/21. Has pain in rigth knee due to arthritis and is not better with conservative treatments and has pain with walking  Has at times left shoulder pain with movements  Has no exertional symptoms  Has no other cp g or gu symptoms    Review of Systems   Constitutional: Negative for activity change, appetite change, chills, fatigue, fever and unexpected weight change. HENT: Positive for postnasal drip. Negative for ear pain, rhinorrhea, sneezing, sore throat and trouble swallowing. Eyes: Negative for visual disturbance. Respiratory: Negative for cough, chest tightness, shortness of breath and wheezing. Cardiovascular: Negative for chest pain, palpitations and leg swelling. Gastrointestinal: Negative. Endocrine: Negative. Genitourinary: Negative. Musculoskeletal: Positive for arthralgias. Neurological: Negative for dizziness, speech difficulty, weakness, light-headedness, numbness and headaches. Hematological: Does not bruise/bleed easily. Psychiatric/Behavioral: Negative for sleep disturbance.          Past Medical History:   Diagnosis Date    Anxiety     Cancer [facility]) 11/2016    Prostates CA    Coronary artery disease 12/28/15    multi vessel CAD    GERD (gastroesophageal reflux disease)     History of blood transfusion     s/p left nephrectomy age 2years old    Hyperlipidemia LDL goal <70     Hypertension     IBS (irritable bowel syndrome)     Migraine     Primary osteoarthritis of right knee 10/5/2018    Reactive depression 6/8/2016    Shingles 2012    torso, top of head    Thyroid disease     Wears dentures                     Previous anesthesia complications: None       Past Surgical History:   Procedure Laterality Date    APPENDECTOMY      CARPAL TUNNEL RELEASE Bilateral     COLONOSCOPY N/A 10/8/2019    COLONOSCOPY POLYPECTOMY SNARE/COLD BIOPSY performed by Hector Harrison MD at [facility]  2015    x`s 4 vessel    ENDOSCOPY, COLON, DIAGNOSTIC      EGD with dilatation    EYE SURGERY Bilateral     catacts, bilateral and repeat    HEMORRHOID SURGERY      KIDNEY REMOVAL  @ 1944    pt thinks left kidney for disease    SHOULDER SURGERY Left     rotator cuff    TOTAL KNEE ARTHROPLASTY Left 3/11/2020    ROBOTIC ASSISTED LEFT TOTAL KNEE REPLACEMENT performed by Suman Garza MD at [facility] 91                                                   Current Outpatient Medications   Medication Sig Dispense Refill    lisinopril (PRINIVIL;ZESTRIL) 10 MG tablet TAKE 1 TABLET BY MOUTH DAILY 90 tablet 1    levothyroxine (SYNTHROID) 50 MCG tablet TAKE 1 TABLET BY MOUTH EVERY DAY 90 tablet 1    topiramate (TOPAMAX) 50 MG tablet TAKE 1/2 TABLET EVERY DAY FOR 1 WEEK AND THEN 1 TABLET EVERY DAY 90 tablet 1    atorvastatin (LIPITOR) 80 MG tablet TAKE 1 TABLET BY MOUTH DAILY 30 tablet 5    ezetimibe (ZETIA) 10 MG tablet TAKE 1 TABLET BY MOUTH DAILY 30 tablet 5    guaiFENesin (MUCINEX) 600 MG extended release tablet Take 600 mg by mouth every 6 hours as needed for Congestion      fluticasone (FLONASE) 50 MCG/ACT nasal spray 1 spray by Each Nostril route daily      escitalopram (LEXAPRO) 20 MG tablet TAKE 1 TABLET BY MOUTH DAILY 90 tablet 1    aspirin 81 MG EC tablet Take 81 mg by mouth daily      esomeprazole (NEXIUM) 20 MG delayed release capsule Take 20 mg by mouth 2 times daily      polyethylene glycol (GLYCOLAX) packet Take 17 g by mouth daily as needed for Constipation      Cholecalciferol (VITAMIN D) 2000 units CAPS capsule Take 1 capsule by mouth daily      Magnesium 500 MG CAPS Take 1 capsule by mouth nightly       docusate sodium (COLACE, DULCOLAX) 100 MG CAPS Take 100 mg by mouth 2 times daily as needed for Constipation (Patient not taking: Reported on 12/1/2021) 60 capsule 0     No current facility-administered medications for this visit. Allergies   Allergen Reactions    Demerol Hcl [Meperidine]      Uncontrollable shaking    Pcn [Penicillins]      Unknown. As child @ 2years old \"almost killed me\"    Tramadol Nausea And Vomiting       Social History     Tobacco Use    Smoking status: Never Smoker    Smokeless tobacco: Never Used   Vaping Use    Vaping Use: Never used   Substance Use Topics    Alcohol use: No    Drug use: No        Family History   Problem Relation Age of Onset    High Blood Pressure Mother     Other Mother     Heart Disease Father         /60 (Site: Right Upper Arm, Position: Sitting, Cuff Size: Large Adult)   Pulse 67   Ht 5' 10\" (1.778 m)   Wt 200 lb 9.6 oz (91 kg)   SpO2 97%   BMI 28.78 kg/m²   Physical Exam  Vitals and nursing note reviewed. Constitutional:       General: He is not in acute distress. HENT:      Head: Normocephalic. Right Ear: Tympanic membrane, ear canal and external ear normal. There is no impacted cerumen. Left Ear: Tympanic membrane, ear canal and external ear normal. There is no impacted cerumen. Nose: Nose normal. No congestion or rhinorrhea. Mouth/Throat:      Mouth: Mucous membranes are moist.      Pharynx: Oropharynx is clear. No oropharyngeal exudate or posterior oropharyngeal erythema. Eyes:      General: No scleral icterus. Extraocular Movements: Extraocular movements intact. Conjunctiva/sclera: Conjunctivae normal.      Pupils: Pupils are equal, round, and reactive to light. Neck:      Thyroid: No thyromegaly. Vascular: No carotid bruit or JVD. Cardiovascular:      Rate and Rhythm: Normal rate and regular rhythm. Pulses: Normal pulses.       Heart sounds: Normal heart sounds. No murmur heard. No gallop. Pulmonary:      Effort: No respiratory distress. Breath sounds: Normal breath sounds. No wheezing, rhonchi or rales. Abdominal:      General: Abdomen is flat. Bowel sounds are normal. There is no distension. Palpations: Abdomen is soft. There is no hepatomegaly, splenomegaly or mass. Tenderness: There is no abdominal tenderness. Musculoskeletal:      Right lower leg: No edema. Left lower leg: No edema. Lymphadenopathy:      Cervical: No cervical adenopathy. Neurological:      General: No focal deficit present. Mental Status: He is alert and oriented to person, place, and time. Psychiatric:         Mood and Affect: Mood normal.         EKG: sinus bradycardia,1 st degree AV block,non specific st changes-urcharged from before  BLOOD WORK: Done      Assessment:         Diagnosis Orders   1. Primary osteoarthritis of right knee  CBC Auto Differential    Basic Metabolic Panel    EKG 12 Lead    EKG 12 Lead    Basic Metabolic Panel    CBC Auto Differential   2. Preop examination  CBC Auto Differential    Basic Metabolic Panel    EKG 12 Lead    EKG 12 Lead    Basic Metabolic Panel    CBC Auto Differential   3. Acquired hypothyroidism     4. Coronary artery disease involving native coronary artery of native heart without angina pectoris     5. Essential hypertension     6. Gastroesophageal reflux disease without esophagitis     7. Hyperlipidemia LDL goal <70     8. Intractable chronic migraine without aura and without status migrainosus     9. Prostate carcinoma [facility])         He is medically cleared for surgery and anesthesia. Plan:          Per operating surgeon. See also orders filed with this encounter, if any.   Instructions to patient:Advised to take lisinopril,topiramate,sunthroid,nexium on am of surgery with sips of water  Indication for manjinder-operative beta blocker therapy:      Penelope Molina MD   12/1/2021 2:44 PM

## 2021-12-06 ENCOUNTER — HOSPITAL ENCOUNTER (OUTPATIENT)
Dept: PREADMISSION TESTING | Age: 81
Discharge: HOME | End: 2021-12-10
Payer: MEDICARE

## 2021-12-06 DIAGNOSIS — M17.11 PRIMARY OSTEOARTHRITIS OF RIGHT KNEE: ICD-10-CM

## 2021-12-06 LAB
ABO/RH: NORMAL
ALBUMIN SERPL-MCNC: 4.1 G/DL (ref 3.4–5)
ANION GAP SERPL CALCULATED.3IONS-SCNC: 11 MMOL/L (ref 3–16)
ANTIBODY SCREEN: NORMAL
APTT: 37.3 SEC (ref 26.2–38.6)
BASOPHILS ABSOLUTE: 0 K/UL (ref 0–0.2)
BASOPHILS RELATIVE PERCENT: 0.4 %
BILIRUBIN URINE: NEGATIVE
BLOOD, URINE: NEGATIVE
BUN BLDV-MCNC: 19 MG/DL (ref 7–20)
CALCIUM SERPL-MCNC: 9.2 MG/DL (ref 8.3–10.6)
CHLORIDE BLD-SCNC: 109 MMOL/L (ref 99–110)
CLARITY: CLEAR
CO2: 22 MMOL/L (ref 21–32)
COLOR: YELLOW
CREAT SERPL-MCNC: 1.3 MG/DL (ref 0.8–1.3)
EOSINOPHILS ABSOLUTE: 0.1 K/UL (ref 0–0.6)
EOSINOPHILS RELATIVE PERCENT: 2.5 %
EPITHELIAL CELLS, UA: 3 /HPF (ref 0–5)
ESTIMATED AVERAGE GLUCOSE: 108.3 MG/DL
GFR AFRICAN AMERICAN: >60
GFR NON-AFRICAN AMERICAN: 53
GLUCOSE BLD-MCNC: 85 MG/DL (ref 70–99)
GLUCOSE URINE: NEGATIVE MG/DL
HBA1C MFR BLD: 5.4 %
HCT VFR BLD CALC: 41.4 % (ref 40.5–52.5)
HEMOGLOBIN: 14.1 G/DL (ref 13.5–17.5)
HYALINE CASTS: 4 /LPF (ref 0–8)
INR BLD: 1.03 (ref 0.88–1.12)
KETONES, URINE: NEGATIVE MG/DL
LEUKOCYTE ESTERASE, URINE: NEGATIVE
LYMPHOCYTES ABSOLUTE: 1 K/UL (ref 1–5.1)
LYMPHOCYTES RELATIVE PERCENT: 17.8 %
MCH RBC QN AUTO: 30.3 PG (ref 26–34)
MCHC RBC AUTO-ENTMCNC: 34.1 G/DL (ref 31–36)
MCV RBC AUTO: 88.9 FL (ref 80–100)
MICROSCOPIC EXAMINATION: YES
MONOCYTES ABSOLUTE: 0.6 K/UL (ref 0–1.3)
MONOCYTES RELATIVE PERCENT: 11.4 %
NEUTROPHILS ABSOLUTE: 3.8 K/UL (ref 1.7–7.7)
NEUTROPHILS RELATIVE PERCENT: 67.9 %
NITRITE, URINE: NEGATIVE
PDW BLD-RTO: 14.2 % (ref 12.4–15.4)
PH UA: 6.5 (ref 5–8)
PLATELET # BLD: 155 K/UL (ref 135–450)
PMV BLD AUTO: 7.7 FL (ref 5–10.5)
POTASSIUM SERPL-SCNC: 4.4 MMOL/L (ref 3.5–5.1)
PREALBUMIN: 28.8 MG/DL (ref 20–40)
PROTEIN UA: 30 MG/DL
PROTHROMBIN TIME: 11.7 SEC (ref 9.9–12.7)
RBC # BLD: 4.66 M/UL (ref 4.2–5.9)
RBC UA: 1 /HPF (ref 0–4)
SODIUM BLD-SCNC: 142 MMOL/L (ref 136–145)
SPECIFIC GRAVITY UA: 1.02 (ref 1–1.03)
URINE REFLEX TO CULTURE: ABNORMAL
URINE TYPE: ABNORMAL
UROBILINOGEN, URINE: 0.2 E.U./DL
VITAMIN D 25-HYDROXY: 38.5 NG/ML
WBC # BLD: 5.6 K/UL (ref 4–11)
WBC UA: 1 /HPF (ref 0–5)

## 2021-12-06 PROCEDURE — 86900 BLOOD TYPING SEROLOGIC ABO: CPT

## 2021-12-06 PROCEDURE — 86850 RBC ANTIBODY SCREEN: CPT

## 2021-12-06 PROCEDURE — 80048 BASIC METABOLIC PNL TOTAL CA: CPT

## 2021-12-06 PROCEDURE — 82040 ASSAY OF SERUM ALBUMIN: CPT

## 2021-12-06 PROCEDURE — 84134 ASSAY OF PREALBUMIN: CPT

## 2021-12-06 PROCEDURE — 87641 MR-STAPH DNA AMP PROBE: CPT

## 2021-12-06 PROCEDURE — 81001 URINALYSIS AUTO W/SCOPE: CPT

## 2021-12-06 PROCEDURE — 85025 COMPLETE CBC W/AUTO DIFF WBC: CPT

## 2021-12-06 PROCEDURE — 82306 VITAMIN D 25 HYDROXY: CPT

## 2021-12-06 PROCEDURE — 85730 THROMBOPLASTIN TIME PARTIAL: CPT

## 2021-12-06 PROCEDURE — 85610 PROTHROMBIN TIME: CPT

## 2021-12-06 PROCEDURE — 86901 BLOOD TYPING SEROLOGIC RH(D): CPT

## 2021-12-06 PROCEDURE — 83036 HEMOGLOBIN GLYCOSYLATED A1C: CPT

## 2021-12-07 ENCOUNTER — PREP FOR PROCEDURE (OUTPATIENT)
Dept: ORTHOPEDICS UNIT | Age: 81
End: 2021-12-07

## 2021-12-07 LAB — MRSA SCREEN RT-PCR: NORMAL

## 2021-12-07 NOTE — DISCHARGE INSTR - COC
[facility] ([facility]) Continuity of Care Form    Patient Name:  Yasemin Gloria  : 1940    MRN:  [id number]    Admit date:  (Not on file)  Discharge date:  2021    Code Status Order: Prior  Advance Directives: Yes    Admitting Physician: Tila Pereyra MD  PCP: Guy Tam MD    Discharging Nurse: PADMINI AGUILAR [facility] Unit/Room#: No information available for this encounter. Discharging Unit Phone Number: [phone]    Emergency Contact:        Past Surgical History:  Past Surgical History:   Procedure Laterality Date    APPENDECTOMY      CARPAL TUNNEL RELEASE Bilateral     COLONOSCOPY N/A 10/8/2019    COLONOSCOPY POLYPECTOMY SNARE/COLD BIOPSY performed by Talia Garrison MD at [facility] [address]  2015    x`s 4 vessel    ENDOSCOPY, COLON, DIAGNOSTIC      EGD with dilatation    EYE SURGERY Bilateral     catacts, bilateral and repeat    [address]  @ 1944    pt thinks left kidney for disease    SHOULDER SURGERY Left     rotator cuff    TOTAL KNEE ARTHROPLASTY Left 3/11/2020    ROBOTIC ASSISTED LEFT TOTAL KNEE REPLACEMENT performed by Janet Figueroa MD at [place] [address]       Immunization History:   Immunization History   Administered Date(s) Administered    COVID-19, Pfizer, PF, 30mcg/0.3mL 2021, 2021, 2021    Influenza Virus Vaccine 2020    Influenza, High Dose (Fluzone 65 yrs and older) 10/11/2017, 2018, 10/17/2018, 2019    Influenza, Quadv, adjuvanted, 65 yrs +, IM, PF (Fluad) 2020, 2021    Pneumococcal Conjugate 13-valent ([id number]) 10/17/2015    Pneumococcal Polysaccharide (Pnioypybq21) 2014    Td, unspecified formulation 2012    Zoster Live (Zostavax) 2013    Zoster Recombinant (Shingrix) 2019, 2019       Active Problems:  Active Problems:    * No active hospital problems. *  Resolved Problems:    * No resolved hospital problems.  *      Isolation/Infection: Nurse Assessment:  Last Vital Signs: There were no vitals taken for this visit. Last documented pain score (0-10 scale):    Last Weight:   Wt Readings from Last 1 Encounters:   12/01/21 200 lb 9.6 oz (91 kg)     Mental Status:  oriented and alert     IV Access:  - None    Nursing Mobility/ADLs:  Walking   Assisted  Transfer  Assisted  Bathing  Assisted  Dressing  Assisted  Toileting  Assisted  Feeding  Independent  Med [address] [facility] Delivery   whole    Wound Care Documentation and Therapy:  Keep glued Prineo dressing clean and intact. DO NOT remove. Prineo is waterproof for showering. Doctor will evaluated dressing for removal at office visit 2 weeks after surgery  Incision 12/30/15 Chest Mid (Active)   Number of days: 2169       Incision 12/30/15 Leg Right (Active)   Number of days: 2169       Incision 12/30/15 Leg Left (Active)   Number of days: 2169        Elimination:  Urinary Catheter: None   Colostomy/Ileostomy: No  Continence: Bowel: Yes  Bladder: Yes  Date of Last BM: 12/14/2021    Intake/Output Summary (Last 24 hours)   No intake or output data in the 24 hours ending 12/07/21 1424  Safety Concerns: At Risk for Falls    Impairments/Disabilities:      Vision and Hearing    Nutrition Therapy:  Current Nutrition Therapy: general  Routes of Feeding: Oral  Liquids: No Restrictions  Daily Fluid Restriction: no  Last Modified Barium Swallow with Video (Video Swallowing Test): not done    Treatments at the Time of Hospital Discharge:   Respiratory Treatments:   Oxygen Therapy:  is not on home oxygen therapy.   Ventilator:    - No ventilator support    Lab orders for discharge:        Rehab Therapies: Physical Therapy, Occupational Therapy and nursing care  Weight Bearing Status/Restrictions: No weight bearing restirctions  Other Medical Equipment (for information only, NOT a DME order):  Rolling walker  Other Treatments: ASA 81mg twice at day for 14 days for DVT prophylaxis , bilateral JANICE hose for 2 weeks after surgery  HOME HEALTH CARE: LEVEL 1 STANDARD    Home health agency to establish plan of care for patient over 60 day period   Nursing  Initial home SN evaluation visit to occur within 24-48 hours for:  medication management  VS and clinical assessment  S&S chronic disease exacerbation education + when to contact MD / NP  care coordination  Medication Reconciliation during 1st SN visit       PT/OT   Evaluate with goal of regaining prior level of functioning   OT to evaluate if patient has [address] needs for personal care      PCP Visit scheduled within 7 days of hospital discharge      Patient's personal belongings (please select all that are sent with patient):  None    RN SIGNATURE:  Electronically signed by Roel Peña RN on 12/15/21 at 3:14 PM EST    PHYSICIAN SECTION    Prognosis: Good    Condition at Discharge: Stable    Rehab Potential (if transferring to Rehab): Good    Physician Certification: I certify the above orders, information, and transfer of Alexandru Pemberton is necessary for the continuing treatment of the diagnosis listed and that he requires [address] for less 30 days.      Update Admission H&P: No change in H&P    PHYSICIAN SIGNATURE:  Electronically signed by AMARIS Chanel CNP on 12/7/21 at 2:25 PM EST/ Dr Leyla Coles Date: ***    [facility] Readmission Risk Assessment Score:    Discharging to Facility/ Agency   Name:  [facility] care    Address: [address] [place] [address] [address] [facility] [address] [facility]  Phone: [phone]  Fax: [phone]     Dialysis Facility (if applicable)   Name:  Address:  Dialysis Schedule:  Phone:  Fax:    / signature: {Esignature:[id number]}          Followup with Dr Maribell Pérez 2 weeks after surgery in office   [phone]  [facility] and [facility], [address] [facility], [address] [place] [address] KNEE REPLACEMENT  Activity:  Elevate your leg if swelling occurs in your ankle. Use elastic wraps/hose until swelling decreases. Continue the exercise program as prescribed by physical therapists. Take frequent walks. Use walker, crutches, or cane with weight bearing instructions as indicated by the physical therapists. Take rest periods often. Elevate leg during rest period. Wound Care:  Cover the wound with a sterile gauze dressing and change daily as long as there is drainage. Do not scrub wound. Pat it dry with a soft towel. Dont apply any lotions or creams to your wound. Check the incision every day for redness, swelling, or increase in drainage. Diet:  You can resume your normal diet. There are no limits on your diet due to your surgery. Pain pills and activity changes may lead to constipation. To prevent this, use prune juice or bran cereals liberally. You may need to use a laxative such as Dulcolax, Senokot, or Milk of Magnesia. Drink plenty of fluids. Medications: Take pain pills if needed to maintain comfort. Never drive while taking pain medicine. Avoid over the counter medications until checking with your doctor. Resume previous medications as instructed by your doctor. You will be on aspirin or Eliquis  for 14 days only. Stay off other anti-inflammatory medications (except Celebrex)  Call Your Doctor If:  You have increased pain not controlled by medications. Excessive swelling in your ankle. You develop numbness, tingling, or decreased movement. You have a fever greater than 100 degrees for a day or over 101 degrees at any one time. Your wound becomes more reddened, starts draining, or opens. If you fall. You have any questions about your recovery. Inform your family doctor/dentist or any other doctor who cares for you in the future that you have a joint replacement.  You may need antibiotics for dental or surgical procedures if there is any evidence of infection present. If you have required the use of insulin to control your blood sugar after surgery, follow up with your family doctor. Call your surgeons office to schedule your appointment to be seen after surgery. Make your appointment to continue physical therapy per doctors orders.   Smoking cessation assistance can be obtained from your family doctor or by calling [place] @ [phone]    _______________________________   _____   _______________________  ____                Patient Signature              Date      Witness                               Date

## 2021-12-09 ENCOUNTER — OFFICE VISIT (OUTPATIENT)
Dept: ORTHOPEDIC SURGERY | Age: 81
End: 2021-12-09
Payer: MEDICARE

## 2021-12-09 VITALS — WEIGHT: 200 LBS | HEIGHT: 70 IN | RESPIRATION RATE: 18 BRPM | BODY MASS INDEX: 28.63 KG/M2

## 2021-12-09 DIAGNOSIS — M17.11 PRIMARY OSTEOARTHRITIS OF RIGHT KNEE: ICD-10-CM

## 2021-12-09 DIAGNOSIS — M17.11 PRIMARY OSTEOARTHRITIS OF RIGHT KNEE: Primary | ICD-10-CM

## 2021-12-09 PROCEDURE — G8427 DOCREV CUR MEDS BY ELIG CLIN: HCPCS | Performed by: ORTHOPAEDIC SURGERY

## 2021-12-09 PROCEDURE — 99214 OFFICE O/P EST MOD 30 MIN: CPT | Performed by: ORTHOPAEDIC SURGERY

## 2021-12-09 PROCEDURE — 1123F ACP DISCUSS/DSCN MKR DOCD: CPT | Performed by: ORTHOPAEDIC SURGERY

## 2021-12-09 PROCEDURE — 4040F PNEUMOC VAC/ADMIN/RCVD: CPT | Performed by: ORTHOPAEDIC SURGERY

## 2021-12-09 PROCEDURE — G8484 FLU IMMUNIZE NO ADMIN: HCPCS | Performed by: ORTHOPAEDIC SURGERY

## 2021-12-09 PROCEDURE — 1036F TOBACCO NON-USER: CPT | Performed by: ORTHOPAEDIC SURGERY

## 2021-12-09 PROCEDURE — G8417 CALC BMI ABV UP PARAM F/U: HCPCS | Performed by: ORTHOPAEDIC SURGERY

## 2021-12-09 NOTE — PROGRESS NOTES
[facility]  Office Visit    Chief Complaint: Follow-up for right knee pain. HPI:  Merari Woo is a [de-identified] y.o. who is here ahead of a planned right total knee arthroplasty. He underwent left total knee arthroplasty on March 11, 2020 with Dr. Poncho Esteves. This knee is doing very well; he has no complaints. He comes in today reporting right knee pain on a daily basis. He rates the pain as 10/10. He did undergo Euflexxa injections 2 months ago with minimal relief of symptoms. The knee is not giving out on him and he has trouble sleeping. The patient has difficulty climbing stairs, difficulty getting out of a chair, difficulty with activities of daily living including hygiene and pain at night. Pain level at rest is 7 and with activities 9-10/10. His knee will occasionally pop. He does wear a knee brace that helps with this. Aleve and Tylenol have also been helping. His medical history is significant for coronary artery bypass graft for which he takes baby aspirin daily. He denies diabetes, pulmonary issues, tobacco use, narcotic drug use, low back pain, sleep apnea.       Patient Active Problem List   Diagnosis    Chest pain on exertion    Essential hypertension    S/P CABG (coronary artery bypass graft)    Hyperlipidemia LDL goal <70    Reactive depression    Prostate carcinoma ([address]    Vitamin D deficiency    Left hip pain    Chronic left-sided low back pain without sciatica    Coronary artery disease    Edema of right lower extremity    Greater trochanteric bursitis of left hip    Primary osteoarthritis of right knee    Primary osteoarthritis of left knee    Chronic fatigue    Acquired hypothyroidism    Gastroesophageal reflux disease without esophagitis    Arthritis of left knee    History of total knee replacement, left-3.11.2020    Intractable chronic migraine without aura and without status migrainosus    Preop examination       ROS:  Constitutional: denies fever, chills, weight loss  MSK: denies pain in other joints, muscle aches  Neurological: denies numbness, tingling, weakness    Exam:  Resp. rate 18, height 5' 10\" (1.778 m), weight 200 lb (90.7 kg). Appearance: sitting in exam room chair, appears to be in no acute distress, awake and alert  Resp: unlabored breathing on room air  Skin: warm, dry and intact with out erythema or significant increased temperature  Neuro: grossly intact both lower extremities. Intact sensation to light touch. Motor exam 4+ to 5/5 in all major motor groups. Right knee: Examination reveals that range of motion is 0 to 130 degrees. There is varus deformity, positive crepitus, positive joint line tenderness, positive Baker's cyst, antalgic gait. Neurologically, plantar flexion and dorsiflexion is intact. Pulses palpable distally. 5/5 strength. Imaging:  Prior right knee x-rays were reviewed and are significant for tricompartmental degenerative changes with joint space narrowing and periarticular osteophytes, most significantly in the medial compartment. Assessment:  Status post left total knee arthroplasty  Right knee osteoarthritis    Plan:  We discussed the diagnosis and treatment options. We discussed right total knee arthroplasty. The operative procedure, alternatives, and risks were discussed in detail with the patient. The risks include but are not limited to: Infection, vessel injury, nerve injury, DVT, pulmonary embolism, implant loosening, need for revision surgery, loss of motion, continued pain. Despite these risks the patient would like to proceed. All questions have been answered and no guarantees have been made. The patient is unable to do further physical therapy due to disabling pain. I discussed with the patient the diagnosis in detail and answered all the questions. The patient verbalized understanding of the plan as it has been described above and is in agreement.      Plan for right total knee

## 2021-12-10 LAB — SARS-COV-2: NOT DETECTED

## 2021-12-13 RX ORDER — OXYCODONE HYDROCHLORIDE 10 MG/1
10 TABLET ORAL ONCE
Status: CANCELLED | OUTPATIENT
Start: 2021-12-13 | End: 2021-12-13

## 2021-12-13 RX ORDER — MELOXICAM 7.5 MG/1
7.5 TABLET ORAL ONCE
Status: CANCELLED | OUTPATIENT
Start: 2021-12-13 | End: 2021-12-13

## 2021-12-13 RX ORDER — TRANEXAMIC ACID 650 1/1
1950 TABLET ORAL ONCE
Status: CANCELLED | OUTPATIENT
Start: 2021-12-13 | End: 2021-12-13

## 2021-12-13 NOTE — PROGRESS NOTES
Justin ROGEL CONTINUE WITH ANCEF IT HAS LOW CROSS-REACTIVITY WITH PCN ALLERGIES AND A 77YEAR OLD ALLERGY SHOULD NOT AFFECT HIM

## 2021-12-13 NOTE — DISCHARGE INSTR - ACTIVITY
Activity:  Elevate your leg if swelling occurs in your ankle. Use elastic wraps/hose until swelling decreases. Continue the exercise program as prescribed by physical therapists. Take frequent walks. Use walker, crutches, or cane with weight bearing instructions as indicated by the physical therapists. Take rest periods often. Elevate leg during rest period.

## 2021-12-13 NOTE — PROGRESS NOTES
Preoperative Screening for Elective Surgery/Invasive Procedures While COVID-19 present in the community     Have you tested positive or have been told to self-isolate for COVID-19 like symptoms within the past 28 days? NO   Do you currently have any of the following symptoms? NO  o Fever >100.0 F or 99.9 F in immunocompromised patients? NO  o New onset cough, shortness of breath or difficulty breathing? NO  o New onset sore throat, myalgia (muscle aches and pains), headache, loss of taste/smell or diarrhea? NO   Have you had a potential exposure to COVID-19 within the past 14 days by: NO  o Close contact with a confirmed case? NO  o Close contact with a healthcare worker,  or essential infrastructure worker (grocery store, [employer], gas station, public utilities or transportation)? YES  o Do you reside in a congregate setting such as; skilled nursing facility, adult home, correctional facility, homeless shelter or other institutional setting? NO  o Have you had recent travel to a known COVID-19 hotspot? NO    Indicate if the patient has a positive screen by answering yes to one or more of the above questions. Patients who test positive or screen positive prior to surgery or on the day of surgery should be evaluated in conjunction with the surgeon/proceduralist/anesthesiologist to determine the urgency of the procedure.

## 2021-12-13 NOTE — PROGRESS NOTES
PRE-OP INSTRUCTIONS     · Do not eat or drink anything after 12:00 midnight prior to surgery. This includes water, chewing gum, mints and ice chips. You may brush your teeth and gargle the morning of surgery but DO  NOT SWALLOW THE WATER. Take the following medications with a small sip of water on the morning of surgery: Follow your MD/Surgeons pre-procedure instructions regarding your medications    · If you use an inhaler, please use it the morning of surgery and bring with you to hospital.    · You may be asked to stop blood thinners such as:  Coumadin, Plavix, Fragmin and lovenox. Please check with your doctor before stopping these or any other medications. · Aspirin, ibuprofen, advil and naproxen, any anti-inflammatory products should be stopped for a week prior to your surgery. MAY TAKE TYLENOL    · Do not smoke and do not drink any alcoholic beverages 24 hours prior to your surgery. · Please do not wear any jewelry or body piercings on the day of surgery. · Please wear something simple, loose fitting clothing to the hospital.  Do not wear any make-up(including eye make-up) or nail polish on your fingers and toes. · As part of our patient safety program to minimize surgical infections, we ask you to do the followin. Please notify your surgeon if you develop any illness between now                          and the day of your surgery. This includes a cough, cold, fever, sore                       throat, nausea, vomiting, diarrhea, etc.  Also please notify your                                  surgeon if you experience dizziness, shortness of breath or                       Blurred vision between now and the time of your surgery.                    2.  Please notify your surgeon of any open or redden areas that may                        look infected                     3.  DO NOT shave your operative site 96 hours(four days) prior to surgery. 4.  Shower the week before surgery with an antibacterial soap, such as                       dial, safeguard, etc.                         5.  Three(3) days prior to your surgery, cleanse the operative site with                          Hibiclens(anti-microbial soap). This soap may dry your skin, please                          do not apply any oils or lotions     · Please bring your insurance card and picture ID day of surgery    · If you have a living will or durable power of attorny. Please bring in a copy of your advanced directives. · If you have dentures, they will be removed before going to the OR, we will provide you with a container. If you wear contact lenses or glasses, they will be removes, please bring a case for them. · Have you seen your family doctor for a pre-op history and physical.      · Surgery scheduler will call you 48 hours prior to your surgery to notify you of the time of your surgery and the time you will need to be at hospital...patients are asked to arrive 21/2 hours prior to surgery. ·  Please call Pre-Admission testing if you have any further questions. [id number] [facility] [place] testing phone number:  [phone]      Thank You for choosing [facility]!!    SAFETY FIRST. .call before you fall

## 2021-12-14 ENCOUNTER — ANESTHESIA EVENT (OUTPATIENT)
Dept: OPERATING ROOM | Age: 81
End: 2021-12-14
Payer: MEDICARE

## 2021-12-15 ENCOUNTER — HOSPITAL ENCOUNTER (OUTPATIENT)
Age: 81
Setting detail: OBSERVATION
Discharge: HOME HEALTH | End: 2021-12-16
Attending: ORTHOPAEDIC SURGERY | Admitting: ORTHOPAEDIC SURGERY
Payer: MEDICARE

## 2021-12-15 ENCOUNTER — ANESTHESIA (OUTPATIENT)
Dept: OPERATING ROOM | Age: 81
End: 2021-12-15
Payer: MEDICARE

## 2021-12-15 ENCOUNTER — APPOINTMENT (OUTPATIENT)
Dept: GENERAL RADIOLOGY | Age: 81
End: 2021-12-15
Attending: ORTHOPAEDIC SURGERY
Payer: MEDICARE

## 2021-12-15 VITALS
RESPIRATION RATE: 2 BRPM | OXYGEN SATURATION: 94 % | TEMPERATURE: 96.8 F | DIASTOLIC BLOOD PRESSURE: 54 MMHG | SYSTOLIC BLOOD PRESSURE: 95 MMHG

## 2021-12-15 DIAGNOSIS — M17.11 ARTHRITIS OF RIGHT KNEE: ICD-10-CM

## 2021-12-15 LAB
ABO/RH: NORMAL
ANTIBODY SCREEN: NORMAL
GLUCOSE BLD-MCNC: 219 MG/DL (ref 70–99)
GLUCOSE BLD-MCNC: 91 MG/DL (ref 70–99)
PERFORMED ON: ABNORMAL
PERFORMED ON: NORMAL

## 2021-12-15 PROCEDURE — 64447 NJX AA&/STRD FEMORAL NRV IMG: CPT | Performed by: ANESTHESIOLOGY

## 2021-12-15 PROCEDURE — 2500000003 HC RX 250 WO HCPCS: Performed by: NURSE ANESTHETIST, CERTIFIED REGISTERED

## 2021-12-15 PROCEDURE — 97162 PT EVAL MOD COMPLEX 30 MIN: CPT

## 2021-12-15 PROCEDURE — 7100000001 HC PACU RECOVERY - ADDTL 15 MIN: Performed by: ORTHOPAEDIC SURGERY

## 2021-12-15 PROCEDURE — 20985 CPTR-ASST DIR MS PX: CPT | Performed by: ORTHOPAEDIC SURGERY

## 2021-12-15 PROCEDURE — 88311 DECALCIFY TISSUE: CPT

## 2021-12-15 PROCEDURE — 2580000003 HC RX 258: Performed by: ORTHOPAEDIC SURGERY

## 2021-12-15 PROCEDURE — 27447 TOTAL KNEE ARTHROPLASTY: CPT | Performed by: PHYSICIAN ASSISTANT

## 2021-12-15 PROCEDURE — 6370000000 HC RX 637 (ALT 250 FOR IP): Performed by: ORTHOPAEDIC SURGERY

## 2021-12-15 PROCEDURE — 6360000002 HC RX W HCPCS: Performed by: NURSE ANESTHETIST, CERTIFIED REGISTERED

## 2021-12-15 PROCEDURE — 6360000002 HC RX W HCPCS: Performed by: ORTHOPAEDIC SURGERY

## 2021-12-15 PROCEDURE — 88305 TISSUE EXAM BY PATHOLOGIST: CPT

## 2021-12-15 PROCEDURE — G0378 HOSPITAL OBSERVATION PER HR: HCPCS

## 2021-12-15 PROCEDURE — 86900 BLOOD TYPING SEROLOGIC ABO: CPT

## 2021-12-15 PROCEDURE — 2709999900 HC NON-CHARGEABLE SUPPLY: Performed by: ORTHOPAEDIC SURGERY

## 2021-12-15 PROCEDURE — 6360000002 HC RX W HCPCS: Performed by: ANESTHESIOLOGY

## 2021-12-15 PROCEDURE — 3700000001 HC ADD 15 MINUTES (ANESTHESIA): Performed by: ORTHOPAEDIC SURGERY

## 2021-12-15 PROCEDURE — 27447 TOTAL KNEE ARTHROPLASTY: CPT | Performed by: ORTHOPAEDIC SURGERY

## 2021-12-15 PROCEDURE — 2580000003 HC RX 258: Performed by: ANESTHESIOLOGY

## 2021-12-15 PROCEDURE — 73560 X-RAY EXAM OF KNEE 1 OR 2: CPT

## 2021-12-15 PROCEDURE — C1713 ANCHOR/SCREW BN/BN,TIS/BN: HCPCS | Performed by: ORTHOPAEDIC SURGERY

## 2021-12-15 PROCEDURE — 3600000015 HC SURGERY LEVEL 5 ADDTL 15MIN: Performed by: ORTHOPAEDIC SURGERY

## 2021-12-15 PROCEDURE — 97165 OT EVAL LOW COMPLEX 30 MIN: CPT

## 2021-12-15 PROCEDURE — 2720000010 HC SURG SUPPLY STERILE: Performed by: ORTHOPAEDIC SURGERY

## 2021-12-15 PROCEDURE — 86901 BLOOD TYPING SEROLOGIC RH(D): CPT

## 2021-12-15 PROCEDURE — 86850 RBC ANTIBODY SCREEN: CPT

## 2021-12-15 PROCEDURE — 3700000000 HC ANESTHESIA ATTENDED CARE: Performed by: ORTHOPAEDIC SURGERY

## 2021-12-15 PROCEDURE — C1776 JOINT DEVICE (IMPLANTABLE): HCPCS | Performed by: ORTHOPAEDIC SURGERY

## 2021-12-15 PROCEDURE — 6370000000 HC RX 637 (ALT 250 FOR IP): Performed by: NURSE ANESTHETIST, CERTIFIED REGISTERED

## 2021-12-15 PROCEDURE — 3600000005 HC SURGERY LEVEL 5 BASE: Performed by: ORTHOPAEDIC SURGERY

## 2021-12-15 PROCEDURE — 7100000000 HC PACU RECOVERY - FIRST 15 MIN: Performed by: ORTHOPAEDIC SURGERY

## 2021-12-15 DEVICE — BASEPLATE TIB SZ 6 KNEE TRITANIUM CEM PRI LO PROF TRIATHLON: Type: IMPLANTABLE DEVICE | Site: KNEE | Status: FUNCTIONAL

## 2021-12-15 DEVICE — IMPLANT PATELLAR DIA35MM THK10MM X3 ASYMMETRIC TRIATHLON: Type: IMPLANTABLE DEVICE | Site: KNEE | Status: FUNCTIONAL

## 2021-12-15 DEVICE — IMPLANTABLE DEVICE: Type: IMPLANTABLE DEVICE | Site: KNEE | Status: FUNCTIONAL

## 2021-12-15 DEVICE — INSERT TIB CS 6 10 MM ARTC POST KNEE BEAR TECHNOLOGY X3: Type: IMPLANTABLE DEVICE | Site: KNEE | Status: FUNCTIONAL

## 2021-12-15 DEVICE — CEMENT BNE 20ML 40GM ACRYL RESIN HI VISC RADPQ FAST SET: Type: IMPLANTABLE DEVICE | Site: KNEE | Status: FUNCTIONAL

## 2021-12-15 RX ORDER — PROMETHAZINE HYDROCHLORIDE 25 MG/ML
6.25 INJECTION, SOLUTION INTRAMUSCULAR; INTRAVENOUS
Status: DISCONTINUED | OUTPATIENT
Start: 2021-12-15 | End: 2021-12-15 | Stop reason: HOSPADM

## 2021-12-15 RX ORDER — TRANEXAMIC ACID 650 1/1
1950 TABLET ORAL ONCE
Status: COMPLETED | OUTPATIENT
Start: 2021-12-15 | End: 2021-12-15

## 2021-12-15 RX ORDER — ASPIRIN 81 MG/1
81 TABLET ORAL 2 TIMES DAILY
Qty: 28 TABLET | Refills: 3 | Status: SHIPPED | OUTPATIENT
Start: 2021-12-15 | End: 2022-01-27

## 2021-12-15 RX ORDER — SODIUM CHLORIDE 450 MG/100ML
INJECTION, SOLUTION INTRAVENOUS CONTINUOUS
Status: DISCONTINUED | OUTPATIENT
Start: 2021-12-15 | End: 2021-12-16 | Stop reason: HOSPADM

## 2021-12-15 RX ORDER — ONDANSETRON 4 MG/1
4 TABLET, ORALLY DISINTEGRATING ORAL EVERY 8 HOURS PRN
Status: DISCONTINUED | OUTPATIENT
Start: 2021-12-15 | End: 2021-12-16 | Stop reason: HOSPADM

## 2021-12-15 RX ORDER — INSULIN GLARGINE 100 [IU]/ML
0.25 INJECTION, SOLUTION SUBCUTANEOUS NIGHTLY
Status: DISCONTINUED | OUTPATIENT
Start: 2021-12-15 | End: 2021-12-15

## 2021-12-15 RX ORDER — SODIUM CHLORIDE 9 MG/ML
25 INJECTION, SOLUTION INTRAVENOUS PRN
Status: DISCONTINUED | OUTPATIENT
Start: 2021-12-15 | End: 2021-12-15 | Stop reason: HOSPADM

## 2021-12-15 RX ORDER — DEXTROSE MONOHYDRATE 50 MG/ML
100 INJECTION, SOLUTION INTRAVENOUS PRN
Status: DISCONTINUED | OUTPATIENT
Start: 2021-12-15 | End: 2021-12-16 | Stop reason: HOSPADM

## 2021-12-15 RX ORDER — GLYCOPYRROLATE 0.2 MG/ML
INJECTION INTRAMUSCULAR; INTRAVENOUS PRN
Status: DISCONTINUED | OUTPATIENT
Start: 2021-12-15 | End: 2021-12-15 | Stop reason: SDUPTHER

## 2021-12-15 RX ORDER — LABETALOL HYDROCHLORIDE 5 MG/ML
5 INJECTION, SOLUTION INTRAVENOUS EVERY 10 MIN PRN
Status: DISCONTINUED | OUTPATIENT
Start: 2021-12-15 | End: 2021-12-15 | Stop reason: HOSPADM

## 2021-12-15 RX ORDER — MORPHINE SULFATE 2 MG/ML
2 INJECTION, SOLUTION INTRAMUSCULAR; INTRAVENOUS
Status: DISCONTINUED | OUTPATIENT
Start: 2021-12-15 | End: 2021-12-16 | Stop reason: HOSPADM

## 2021-12-15 RX ORDER — SODIUM CHLORIDE 0.9 % (FLUSH) 0.9 %
10 SYRINGE (ML) INJECTION EVERY 12 HOURS SCHEDULED
Status: DISCONTINUED | OUTPATIENT
Start: 2021-12-15 | End: 2021-12-15 | Stop reason: HOSPADM

## 2021-12-15 RX ORDER — OXYCODONE HYDROCHLORIDE 10 MG/1
10 TABLET ORAL ONCE
Status: COMPLETED | OUTPATIENT
Start: 2021-12-15 | End: 2021-12-15

## 2021-12-15 RX ORDER — ATORVASTATIN CALCIUM 80 MG/1
80 TABLET, FILM COATED ORAL NIGHTLY
Status: DISCONTINUED | OUTPATIENT
Start: 2021-12-15 | End: 2021-12-16 | Stop reason: HOSPADM

## 2021-12-15 RX ORDER — LISINOPRIL 10 MG/1
1 TABLET ORAL DAILY
Status: CANCELLED | OUTPATIENT
Start: 2021-12-16

## 2021-12-15 RX ORDER — OXYCODONE HYDROCHLORIDE 10 MG/1
10 TABLET ORAL EVERY 4 HOURS PRN
Status: DISCONTINUED | OUTPATIENT
Start: 2021-12-15 | End: 2021-12-16 | Stop reason: HOSPADM

## 2021-12-15 RX ORDER — SODIUM CHLORIDE 9 MG/ML
25 INJECTION, SOLUTION INTRAVENOUS PRN
Status: DISCONTINUED | OUTPATIENT
Start: 2021-12-15 | End: 2021-12-16 | Stop reason: HOSPADM

## 2021-12-15 RX ORDER — EZETIMIBE 10 MG/1
10 TABLET ORAL NIGHTLY
Status: DISCONTINUED | OUTPATIENT
Start: 2021-12-15 | End: 2021-12-16 | Stop reason: HOSPADM

## 2021-12-15 RX ORDER — NICOTINE POLACRILEX 4 MG
15 LOZENGE BUCCAL PRN
Status: DISCONTINUED | OUTPATIENT
Start: 2021-12-15 | End: 2021-12-16 | Stop reason: HOSPADM

## 2021-12-15 RX ORDER — ROPIVACAINE HYDROCHLORIDE 5 MG/ML
INJECTION, SOLUTION EPIDURAL; INFILTRATION; PERINEURAL
Status: COMPLETED | OUTPATIENT
Start: 2021-12-15 | End: 2021-12-15

## 2021-12-15 RX ORDER — ACETAMINOPHEN 325 MG/1
650 TABLET ORAL EVERY 6 HOURS
Status: DISCONTINUED | OUTPATIENT
Start: 2021-12-15 | End: 2021-12-16 | Stop reason: HOSPADM

## 2021-12-15 RX ORDER — MORPHINE SULFATE 4 MG/ML
4 INJECTION, SOLUTION INTRAMUSCULAR; INTRAVENOUS
Status: DISCONTINUED | OUTPATIENT
Start: 2021-12-15 | End: 2021-12-16 | Stop reason: HOSPADM

## 2021-12-15 RX ORDER — MELOXICAM 7.5 MG/1
7.5 TABLET ORAL ONCE
Status: COMPLETED | OUTPATIENT
Start: 2021-12-15 | End: 2021-12-15

## 2021-12-15 RX ORDER — SODIUM CHLORIDE 0.9 % (FLUSH) 0.9 %
10 SYRINGE (ML) INJECTION EVERY 12 HOURS SCHEDULED
Status: DISCONTINUED | OUTPATIENT
Start: 2021-12-15 | End: 2021-12-16 | Stop reason: HOSPADM

## 2021-12-15 RX ORDER — SODIUM CHLORIDE 9 MG/ML
INJECTION, SOLUTION INTRAVENOUS CONTINUOUS
Status: DISCONTINUED | OUTPATIENT
Start: 2021-12-15 | End: 2021-12-15

## 2021-12-15 RX ORDER — ALBUTEROL SULFATE 90 UG/1
AEROSOL, METERED RESPIRATORY (INHALATION) PRN
Status: DISCONTINUED | OUTPATIENT
Start: 2021-12-15 | End: 2021-12-15 | Stop reason: SDUPTHER

## 2021-12-15 RX ORDER — ONDANSETRON 2 MG/ML
INJECTION INTRAMUSCULAR; INTRAVENOUS PRN
Status: DISCONTINUED | OUTPATIENT
Start: 2021-12-15 | End: 2021-12-15 | Stop reason: SDUPTHER

## 2021-12-15 RX ORDER — ONDANSETRON 2 MG/ML
4 INJECTION INTRAMUSCULAR; INTRAVENOUS EVERY 6 HOURS PRN
Status: DISCONTINUED | OUTPATIENT
Start: 2021-12-15 | End: 2021-12-16 | Stop reason: HOSPADM

## 2021-12-15 RX ORDER — DIPHENHYDRAMINE HCL 25 MG
25 TABLET ORAL ONCE
Status: COMPLETED | OUTPATIENT
Start: 2021-12-15 | End: 2021-12-15

## 2021-12-15 RX ORDER — ESCITALOPRAM OXALATE 10 MG/1
10 TABLET ORAL 2 TIMES DAILY
Status: DISCONTINUED | OUTPATIENT
Start: 2021-12-15 | End: 2021-12-16 | Stop reason: HOSPADM

## 2021-12-15 RX ORDER — TOPIRAMATE 25 MG/1
25 TABLET ORAL 2 TIMES DAILY
Status: DISCONTINUED | OUTPATIENT
Start: 2021-12-15 | End: 2021-12-16 | Stop reason: HOSPADM

## 2021-12-15 RX ORDER — ASPIRIN 81 MG/1
81 TABLET ORAL 2 TIMES DAILY
Status: DISCONTINUED | OUTPATIENT
Start: 2021-12-15 | End: 2021-12-16 | Stop reason: HOSPADM

## 2021-12-15 RX ORDER — PREGABALIN 75 MG/1
75 CAPSULE ORAL 2 TIMES DAILY
Status: DISCONTINUED | OUTPATIENT
Start: 2021-12-15 | End: 2021-12-16 | Stop reason: HOSPADM

## 2021-12-15 RX ORDER — SODIUM CHLORIDE 0.9 % (FLUSH) 0.9 %
10 SYRINGE (ML) INJECTION PRN
Status: DISCONTINUED | OUTPATIENT
Start: 2021-12-15 | End: 2021-12-16 | Stop reason: HOSPADM

## 2021-12-15 RX ORDER — LEVOTHYROXINE SODIUM 0.05 MG/1
50 TABLET ORAL
Status: DISCONTINUED | OUTPATIENT
Start: 2021-12-16 | End: 2021-12-16 | Stop reason: HOSPADM

## 2021-12-15 RX ORDER — DEXAMETHASONE SODIUM PHOSPHATE 4 MG/ML
INJECTION, SOLUTION INTRA-ARTICULAR; INTRALESIONAL; INTRAMUSCULAR; INTRAVENOUS; SOFT TISSUE PRN
Status: DISCONTINUED | OUTPATIENT
Start: 2021-12-15 | End: 2021-12-15 | Stop reason: SDUPTHER

## 2021-12-15 RX ORDER — LIDOCAINE HYDROCHLORIDE 20 MG/ML
INJECTION, SOLUTION EPIDURAL; INFILTRATION; INTRACAUDAL; PERINEURAL PRN
Status: DISCONTINUED | OUTPATIENT
Start: 2021-12-15 | End: 2021-12-15 | Stop reason: SDUPTHER

## 2021-12-15 RX ORDER — SODIUM CHLORIDE 0.9 % (FLUSH) 0.9 %
10 SYRINGE (ML) INJECTION PRN
Status: DISCONTINUED | OUTPATIENT
Start: 2021-12-15 | End: 2021-12-15 | Stop reason: HOSPADM

## 2021-12-15 RX ORDER — PANTOPRAZOLE SODIUM 40 MG/1
40 TABLET, DELAYED RELEASE ORAL
Status: DISCONTINUED | OUTPATIENT
Start: 2021-12-16 | End: 2021-12-16 | Stop reason: HOSPADM

## 2021-12-15 RX ORDER — OXYCODONE HYDROCHLORIDE 5 MG/1
5-10 TABLET ORAL EVERY 6 HOURS PRN
Qty: 40 TABLET | Refills: 0 | Status: SHIPPED | OUTPATIENT
Start: 2021-12-15 | End: 2021-12-20

## 2021-12-15 RX ORDER — OXYCODONE HYDROCHLORIDE 5 MG/1
5 TABLET ORAL EVERY 4 HOURS PRN
Status: DISCONTINUED | OUTPATIENT
Start: 2021-12-15 | End: 2021-12-16 | Stop reason: HOSPADM

## 2021-12-15 RX ORDER — LISINOPRIL 10 MG/1
10 TABLET ORAL DAILY
Status: DISCONTINUED | OUTPATIENT
Start: 2021-12-16 | End: 2021-12-16 | Stop reason: HOSPADM

## 2021-12-15 RX ORDER — FENTANYL CITRATE 50 UG/ML
INJECTION, SOLUTION INTRAMUSCULAR; INTRAVENOUS PRN
Status: DISCONTINUED | OUTPATIENT
Start: 2021-12-15 | End: 2021-12-15 | Stop reason: SDUPTHER

## 2021-12-15 RX ORDER — PROPOFOL 10 MG/ML
INJECTION, EMULSION INTRAVENOUS CONTINUOUS PRN
Status: DISCONTINUED | OUTPATIENT
Start: 2021-12-15 | End: 2021-12-15 | Stop reason: SDUPTHER

## 2021-12-15 RX ORDER — MAGNESIUM HYDROXIDE 1200 MG/15ML
LIQUID ORAL CONTINUOUS PRN
Status: COMPLETED | OUTPATIENT
Start: 2021-12-15 | End: 2021-12-15

## 2021-12-15 RX ORDER — PREGABALIN 75 MG/1
75 CAPSULE ORAL 2 TIMES DAILY
Qty: 28 CAPSULE | Refills: 0 | Status: SHIPPED | OUTPATIENT
Start: 2021-12-15 | End: 2022-01-27

## 2021-12-15 RX ORDER — EPHEDRINE SULFATE/0.9% NACL/PF 50 MG/5 ML
SYRINGE (ML) INTRAVENOUS PRN
Status: DISCONTINUED | OUTPATIENT
Start: 2021-12-15 | End: 2021-12-15 | Stop reason: SDUPTHER

## 2021-12-15 RX ORDER — DEXTROSE MONOHYDRATE 25 G/50ML
12.5 INJECTION, SOLUTION INTRAVENOUS PRN
Status: DISCONTINUED | OUTPATIENT
Start: 2021-12-15 | End: 2021-12-16 | Stop reason: HOSPADM

## 2021-12-15 RX ADMIN — PREGABALIN 75 MG: 75 CAPSULE ORAL at 21:34

## 2021-12-15 RX ADMIN — FENTANYL CITRATE 50 MCG: 50 INJECTION INTRAMUSCULAR; INTRAVENOUS at 09:24

## 2021-12-15 RX ADMIN — SODIUM CHLORIDE: 9 INJECTION, SOLUTION INTRAVENOUS at 08:17

## 2021-12-15 RX ADMIN — SODIUM CHLORIDE: 4.5 INJECTION, SOLUTION INTRAVENOUS at 15:07

## 2021-12-15 RX ADMIN — ACETAMINOPHEN 650 MG: 325 TABLET ORAL at 21:33

## 2021-12-15 RX ADMIN — ESCITALOPRAM OXALATE 10 MG: 10 TABLET ORAL at 21:34

## 2021-12-15 RX ADMIN — TRANEXAMIC ACID 1950 MG: 650 TABLET ORAL at 08:15

## 2021-12-15 RX ADMIN — ROPIVACAINE HYDROCHLORIDE 20 ML: 5 INJECTION, SOLUTION EPIDURAL; INFILTRATION; PERINEURAL at 10:48

## 2021-12-15 RX ADMIN — EZETIMIBE 10 MG: 10 TABLET ORAL at 21:33

## 2021-12-15 RX ADMIN — GLYCOPYRROLATE 0.2 MG: 0.2 INJECTION, SOLUTION INTRAMUSCULAR; INTRAVENOUS at 10:07

## 2021-12-15 RX ADMIN — ONDANSETRON 4 MG: 2 INJECTION INTRAMUSCULAR; INTRAVENOUS at 09:57

## 2021-12-15 RX ADMIN — Medication 10 MG: at 10:56

## 2021-12-15 RX ADMIN — ACETAMINOPHEN 650 MG: 325 TABLET ORAL at 15:29

## 2021-12-15 RX ADMIN — CEFAZOLIN 2 G: 10 INJECTION, POWDER, FOR SOLUTION INTRAVENOUS at 09:43

## 2021-12-15 RX ADMIN — ATORVASTATIN CALCIUM 80 MG: 80 TABLET, FILM COATED ORAL at 21:34

## 2021-12-15 RX ADMIN — TOPIRAMATE 25 MG: 25 TABLET, FILM COATED ORAL at 21:33

## 2021-12-15 RX ADMIN — PROPOFOL 180 MCG/KG/MIN: 10 INJECTION, EMULSION INTRAVENOUS at 09:57

## 2021-12-15 RX ADMIN — DIPHENHYDRAMINE HCL 25 MG: 25 TABLET ORAL at 21:34

## 2021-12-15 RX ADMIN — FENTANYL CITRATE 50 MCG: 50 INJECTION INTRAMUSCULAR; INTRAVENOUS at 10:54

## 2021-12-15 RX ADMIN — DEXAMETHASONE SODIUM PHOSPHATE 8 MG: 4 INJECTION, SOLUTION INTRAMUSCULAR; INTRAVENOUS at 10:17

## 2021-12-15 RX ADMIN — Medication 10 MG: at 10:12

## 2021-12-15 RX ADMIN — CEFAZOLIN 2000 MG: 10 INJECTION, POWDER, FOR SOLUTION INTRAVENOUS at 15:30

## 2021-12-15 RX ADMIN — ASPIRIN 81 MG: 81 TABLET, COATED ORAL at 21:33

## 2021-12-15 RX ADMIN — OXYCODONE HYDROCHLORIDE 10 MG: 10 TABLET ORAL at 08:16

## 2021-12-15 RX ADMIN — Medication 6 PUFF: at 11:17

## 2021-12-15 RX ADMIN — ROPIVACAINE HYDROCHLORIDE 25 ML: 5 INJECTION, SOLUTION EPIDURAL; INFILTRATION; PERINEURAL at 10:47

## 2021-12-15 RX ADMIN — INSULIN LISPRO 1 UNITS: 100 INJECTION, SOLUTION INTRAVENOUS; SUBCUTANEOUS at 21:34

## 2021-12-15 RX ADMIN — Medication 10 MG: at 11:06

## 2021-12-15 RX ADMIN — LIDOCAINE HYDROCHLORIDE 100 MG: 20 INJECTION, SOLUTION EPIDURAL; INFILTRATION; INTRACAUDAL; PERINEURAL at 09:57

## 2021-12-15 RX ADMIN — MELOXICAM 7.5 MG: 7.5 TABLET ORAL at 08:16

## 2021-12-15 ASSESSMENT — PAIN SCALES - GENERAL
PAINLEVEL_OUTOF10: 0
PAINLEVEL_OUTOF10: 1
PAINLEVEL_OUTOF10: 0

## 2021-12-15 ASSESSMENT — PULMONARY FUNCTION TESTS
PIF_VALUE: 1
PIF_VALUE: 1
PIF_VALUE: 10
PIF_VALUE: 0
PIF_VALUE: 1
PIF_VALUE: 0
PIF_VALUE: 1
PIF_VALUE: 5
PIF_VALUE: 1
PIF_VALUE: 0
PIF_VALUE: 1
PIF_VALUE: 3
PIF_VALUE: 1
PIF_VALUE: 2
PIF_VALUE: 1
PIF_VALUE: 0
PIF_VALUE: 1
PIF_VALUE: 2
PIF_VALUE: 1

## 2021-12-15 ASSESSMENT — PAIN - FUNCTIONAL ASSESSMENT
PAIN_FUNCTIONAL_ASSESSMENT: 0-10
PAIN_FUNCTIONAL_ASSESSMENT: ACTIVITIES ARE NOT PREVENTED

## 2021-12-15 ASSESSMENT — PAIN DESCRIPTION - LOCATION: LOCATION: KNEE

## 2021-12-15 ASSESSMENT — PAIN DESCRIPTION - PAIN TYPE: TYPE: ACUTE PAIN;SURGICAL PAIN

## 2021-12-15 ASSESSMENT — PAIN DESCRIPTION - FREQUENCY: FREQUENCY: INTERMITTENT

## 2021-12-15 ASSESSMENT — PAIN DESCRIPTION - ORIENTATION: ORIENTATION: RIGHT

## 2021-12-15 ASSESSMENT — PAIN DESCRIPTION - PROGRESSION: CLINICAL_PROGRESSION: NOT CHANGED

## 2021-12-15 ASSESSMENT — PAIN DESCRIPTION - ONSET: ONSET: ON-GOING

## 2021-12-15 ASSESSMENT — PAIN DESCRIPTION - DESCRIPTORS: DESCRIPTORS: ACHING

## 2021-12-15 NOTE — PROGRESS NOTES
Pt arrived to PACU from OR. Pt awake and alert. Denies c/o pain. Right leg with ace wrap C/D/I. Ice pack applied. +pulses noted.

## 2021-12-15 NOTE — ANESTHESIA PROCEDURE NOTES
Spinal Block    Patient location during procedure: OR  Start time: 12/15/2021 9:55 AM  End time: 12/15/2021 10:00 AM  Reason for block: post-op pain management and at surgeon's request  Staffing  Performed: anesthesiologist   Anesthesiologist: Rose Marie Pollard MD  Preanesthetic Checklist  Completed: patient identified, IV checked, site marked, risks and benefits discussed, surgical consent, monitors and equipment checked, pre-op evaluation, timeout performed, anesthesia consent given, oxygen available and patient being monitored  Spinal Block  Patient position: sitting  Prep: ChloraPrep  Patient monitoring: continuous pulse ox and cardiac monitor  Approach: midline  Location: L3/L4  Guidance: paresthesia technique  Provider prep: mask and sterile gloves  Local infiltration: lidocaine  Agent: bupivacaine  Dose: 1.7  Dose: 1.7  Needle  Needle type: pencil-tip   Needle gauge: 22 G  Needle length: 5 in  Needle insertion depth: 4 cm  Assessment  Sensory level: T6  Swirl obtained: Yes  CSF: clear  Attempts: 2  Hemodynamics: stable

## 2021-12-15 NOTE — H&P
Update History & Physical    The patient's History and Physical of December 1, 2021 was reviewed with the patient and I examined the patient. There was no change. The surgical site was confirmed by the patient and me. Plan: The risks, benefits, expected outcome, and alternative to the recommended procedure have been discussed with the patient. Patient understands and wants to proceed with the procedure.      Electronically signed by Pradeep Velez MD on 12/15/2021 at 8:52 AM

## 2021-12-15 NOTE — PROGRESS NOTES
Occupational Therapy   Occupational Therapy Initial Assessment  Date: 12/15/2021   Patient Name: Leslie Camejo  MRN: [id number]     : 1940    Date of Service: 12/15/2021    Discharge Recommendations:  2-3 sessions per week, Patient would benefit from continued therapy after discharge, Home with Home health OT, 24 hour supervision or assist  OT Equipment Recommendations  Equipment Needed: No    Juan José Rivera scored a 18/24 on the AM-PAC ADL Inpatient form. Current research shows that an AM-PAC score of 18 or greater is typically associated with a discharge to the patient's home setting. Based on the patient's AM-PAC score, and their current ADL deficits, it is recommended that the patient have 2-3 sessions per week of Occupational Therapy at d/c to increase the patient's independence. At this time, this patient demonstrates the endurance and safety to discharge home with [facility] (home vs OP services) and a follow up treatment frequency of 2-3x/wk. Please see assessment section for further patient specific details. If patient discharges prior to next session this note will serve as a discharge summary. Please see below for the latest assessment towards goals. Assessment   Performance deficits / Impairments: Decreased functional mobility ; Decreased balance; Decreased ADL status; Decreased endurance; Decreased high-level IADLs; Decreased strength  Assessment: [de-identified] yo male admitted 12/15 for elective R TKA. PMH: Prostate Cancer, IBS, Shingles, CAD, HTN, 2020 L TKA. PTA, pt lives with wife and reports independence with ADL, IADL, fxl mobility no AD, and driving. Today, pt functioning just below baseline. Pt with no pain- educated on not pushing too hard tonight d/t likely increased pain tomorrow. Pt requiring SBA bed mobility, Max A socks and CGA/SBA tx and fxl mobility with RW. Pt with appropriate use of RW. BUE WFL for self care. Pt declines ADL needs.  Anticipate Min/CGA LB and set up UB ADL based on balance, endurance, cognition, pain, and PLOF. Cont acute OT to address above deficits and rec OT 2-3x/week to facilitate safe return home  Treatment Diagnosis: impaired ADL. fxl mobility  Prognosis: Good  Decision Making: Low Complexity  OT Education: OT Role; Transfer Training; Plan of Care  REQUIRES OT FOLLOW UP: Yes  Activity Tolerance  Activity Tolerance: Patient Tolerated treatment well  Safety Devices  Safety Devices in place: Yes  Type of devices: Nurse notified; Gait belt; Call light within reach; Chair alarm in place; Left in chair; Patient at risk for falls           Patient Diagnosis(es): The encounter diagnosis was Arthritis of right knee. has a past medical history of Anxiety, Cancer ([facility] [address] Coronary artery disease, GERD (gastroesophageal reflux disease), History of blood transfusion, Hyperlipidemia LDL goal <70, Hypertension, IBS (irritable bowel syndrome), Migraine, Primary osteoarthritis of right knee, Reactive depression, Shingles, Thyroid disease, and Wears dentures. has a past surgical history that includes Kidney removal (@ 1944); Appendectomy; shoulder surgery (Left); Carpal tunnel release (Bilateral); Hemorrhoid surgery; Endoscopy, colon, diagnostic; Coronary artery bypass graft (2015); eye surgery (Bilateral); Colonoscopy (N/A, 10/8/2019); and Total knee arthroplasty (Left, 3/11/2020). Treatment Diagnosis: impaired ADL. fxl mobility      Restrictions  Restrictions/Precautions  Restrictions/Precautions: Fall Risk, Weight Bearing  Lower Extremity Weight Bearing Restrictions  Right Lower Extremity Weight Bearing: Weight Bearing As Tolerated    Subjective   General  Chart Reviewed: Yes  Patient assessed for rehabilitation services?: Yes  Additional Pertinent Hx: [de-identified] yo male admitted 12/15 for elective R TKA.  PMH: Prostate Cancer, IBS, Shingles, CAD, HTN, 2020 L TKA  Family / Caregiver Present: Yes (wife)  Referring Practitioner: Lord Jose A MD  Diagnosis: R TKA  Subjective  Subjective: Pt resting in bed upon arrival and agreeable to OT/PT eval. Pt reporting no pain in R knee. Alert and oriented  General Comment  Comments: RN ok to see  Patient Currently in Pain: Denies  Pain Assessment  Pain Assessment: 0-10  Pain Level: 0  Vital Signs  Patient Currently in Pain: Denies  Height and Weight  Height: 5' 9\" (175.3 cm)    Social/Functional History  Social/Functional History  Lives With: Spouse  Type of Home: House  Home Layout: One level, Laundry in basement, Able to Live on Main level with bedroom/bathroom  Home Access:  (through garage with stair lift)  Bathroom Shower/Tub: Tub/Shower unit, Shower chair with back  Bathroom Toilet: Standard  Bathroom Equipment: Grab bars in shower, Hand-held shower  Bathroom Accessibility: Walker accessible  Home Equipment: Rolling walker, Cane  ADL Assistance: Independent  Homemaking Assistance: Independent  Homemaking Responsibilities: Yes  Ambulation Assistance: Independent (walking stick at times)  Transfer Assistance: Independent  Active : Yes  Mode of Transportation: Car       Objective   Vision: Within Functional Limits  Hearing: Within functional limits    Orientation  Overall Orientation Status: Within Functional Limits  Observation/Palpation  Observation: RLE ace bandage toes to proximal thigh  Balance  Sitting Balance: Stand by assistance  Standing Balance: Contact guard assistance (PRN tx)  Functional Mobility  Functional - Mobility Device: Rolling Walker  Activity:  (EOB>around foot of bed>recliner)  Assist Level: Contact guard assistance  Functional Mobility Comments: CGA for safety d/t first time up since surgery and spinal. Little to no unsteadiness, no LOB  Toilet Transfers  Toilet Transfers Comments: declines  ADL  LE Dressing: Maximum assistance (socks)  Additional Comments: Pt declines ADL needs.  Anticipate Min/CGA LB and set up UB ADL based on balance, endurance, cognition, pain, and PLOF  Tone RUE  RUE Tone: Normotonic  Tone LUE  LUE Tone: Normotonic  Coordination  Movements Are Fluid And Coordinated: Yes     Bed mobility  Supine to Sit: Stand by assistance (Simultaneous filing. User may not have seen previous data.)  Scooting: Stand by assistance  Comment: HOB elevated  Transfers  Sit to stand: Contact guard assistance  Stand to sit: Contact guard assistance  Transfer Comments: RW. cues for hand placement     Cognition  Overall Cognitive Status: WFL        Sensation  Overall Sensation Status: WFL        LUE AROM (degrees)  LUE AROM : WFL  RUE AROM (degrees)  RUE AROM : WFL  LUE Strength  Gross LUE Strength: WFL  RUE Strength  Gross RUE Strength: WFL                Plan   Plan  Times per week: 2-3  Current Treatment Recommendations: Strengthening, Endurance Training, Patient/Caregiver Education & Training, ROM, Self-Care / ADL, Balance Training, Pain Management, Functional Mobility Training, Safety Education & Training, Positioning    AM-PAC Score        AM-[facility] Inpatient Daily Activity Raw Score: 18 (12/15/21 1551)  -PAC Inpatient ADL T-Scale Score : 38.66 (12/15/21 1551)  ADL Inpatient CMS 0-100% Score: 46.65 (12/15/21 1551)  ADL Inpatient CMS G-Code Modifier : CK (12/15/21 1551)    Goals  Short term goals  Time Frame for Short term goals: prior to d/c  Short term goal 1: toileting SUP  Short term goal 2: ADL tx SUP  Short term goal 3: LB dressing SBA  Short term goal 4: UB dressing set up  Short term goal 5:  Tolerate 5 min fxl standing task SUP  Patient Goals   Patient goals : return home       Therapy Time   Individual Concurrent Group Co-treatment   Time In 1524         Time Out 1538         Minutes 14         Timed Code Treatment Minutes: 0 Minutes (14 eval)       LATRICE Townsend, OTR/L

## 2021-12-15 NOTE — PROGRESS NOTES
Patient A&O. IV fluid infusing. Call light within reach, able to make needs knows, fall precautions in place. Will continue to monitor.  Electronically signed by Lori Hermosillo RN on 12/15/2021 at 6:12 PM

## 2021-12-15 NOTE — ANESTHESIA PRE PROCEDURE
[facility] Department of Anesthesiology  Pre-Anesthesia Evaluation/Consultation       Name:  Claudia Morales  : 1940  Age:  [de-identified] y.o. MRN:  [id number]  Date: 12/15/2021           Surgeon: Surgeon(s):  Felicity Breen MD    Procedure: Procedure(s):  ROBOTIC ASSITED RIGHT TOTAL KNEE REPLACEMENT     Allergies   Allergen Reactions    Pcn [Penicillins] Anaphylaxis     Unknown.  As child @ 2years old \"almost killed me\"    Demerol Hcl [Meperidine]      Uncontrollable shaking    Tramadol Nausea And Vomiting     Patient Active Problem List   Diagnosis    Chest pain on exertion    Essential hypertension    S/P CABG (coronary artery bypass graft)    Hyperlipidemia LDL goal <70    Reactive depression    Prostate carcinoma ([facility] [address]    Vitamin D deficiency    Left hip pain    Chronic left-sided low back pain without sciatica    Coronary artery disease    Edema of right lower extremity    Greater trochanteric bursitis of left hip    Primary osteoarthritis of right knee    Primary osteoarthritis of left knee    Chronic fatigue    Acquired hypothyroidism    Gastroesophageal reflux disease without esophagitis    Arthritis of left knee    History of total knee replacement, left-3.11.2020    Intractable chronic migraine without aura and without status migrainosus    Preop examination     Past Medical History:   Diagnosis Date    Anxiety     Cancer ([facility] [address] 2016    Prostates CA    Coronary artery disease 12/28/15    multi vessel CAD    GERD (gastroesophageal reflux disease)     History of blood transfusion     s/p left nephrectomy age 2years old    Hyperlipidemia LDL goal <70     Hypertension     IBS (irritable bowel syndrome)     Migraine     Primary osteoarthritis of right knee 10/5/2018    Reactive depression 2016    Shingles 2012    torso, top of head    Thyroid disease     Wears dentures      Past Surgical History:   Procedure Laterality Date    APPENDECTOMY      CARPAL TUNNEL RELEASE Bilateral     COLONOSCOPY N/A 10/8/2019    COLONOSCOPY POLYPECTOMY SNARE/COLD BIOPSY performed by Pastor Michael MD at [facility]  2015    x`s 4 vessel    ENDOSCOPY, COLON, DIAGNOSTIC      EGD with dilatation    EYE SURGERY Bilateral     catacts, bilateral and repeat    HEMORRHOID SURGERY      KIDNEY REMOVAL  @ 1944    pt thinks left kidney for disease    SHOULDER SURGERY Left     rotator cuff    TOTAL KNEE ARTHROPLASTY Left 3/11/2020    ROBOTIC ASSISTED LEFT TOTAL KNEE REPLACEMENT performed by Jo Rayo MD at [facility] History     Tobacco Use    Smoking status: Never Smoker    Smokeless tobacco: Never Used   Vaping Use    Vaping Use: Never used   Substance Use Topics    Alcohol use: No    Drug use: Never     Medications  No current facility-administered medications on file prior to encounter.      Current Outpatient Medications on File Prior to Encounter   Medication Sig Dispense Refill    Misc Natural Products (GLUCOSAMINE CHOND CMP TRIPLE) TABS Take 1 tablet by mouth 2 times daily      Lysine 500 MG CAPS Take 1 capsule by mouth daily      Aspirin-Acetaminophen-Caffeine (EXCEDRIN MIGRAINE PO) Take by mouth      lisinopril (PRINIVIL;ZESTRIL) 10 MG tablet TAKE 1 TABLET BY MOUTH DAILY 90 tablet 1    levothyroxine (SYNTHROID) 50 MCG tablet TAKE 1 TABLET BY MOUTH EVERY DAY 90 tablet 1    topiramate (TOPAMAX) 50 MG tablet TAKE 1/2 TABLET EVERY DAY FOR 1 WEEK AND THEN 1 TABLET EVERY DAY (Patient taking differently: Take 25 mg by mouth 2 times daily ) 90 tablet 1    atorvastatin (LIPITOR) 80 MG tablet TAKE 1 TABLET BY MOUTH DAILY (Patient taking differently: Take 80 mg by mouth nightly TAKE 1 TABLET BY MOUTH DAILY) 30 tablet 5    ezetimibe (ZETIA) 10 MG tablet TAKE 1 TABLET BY MOUTH DAILY (Patient taking differently: Take by mouth nightly ) 30 tablet 5    escitalopram (LEXAPRO) 20 MG tablet TAKE 1 TABLET BY MOUTH DAILY (Patient taking differently: Take 10 mg by mouth 2 times daily ) 90 tablet 1    aspirin 81 MG EC tablet Take 81 mg by mouth daily      esomeprazole (NEXIUM) 20 MG delayed release capsule Take 20 mg by mouth 2 times daily      polyethylene glycol (GLYCOLAX) packet Take 17 g by mouth daily as needed for Constipation      Cholecalciferol (VITAMIN D) 2000 units CAPS capsule Take 1 capsule by mouth daily      Magnesium 500 MG CAPS Take 1 capsule by mouth nightly       docusate sodium (COLACE, DULCOLAX) 100 MG CAPS Take 100 mg by mouth 2 times daily as needed for Constipation 60 capsule 0    guaiFENesin (MUCINEX) 600 MG extended release tablet Take 600 mg by mouth every 6 hours as needed for Congestion       Current Facility-Administered Medications   Medication Dose Route Frequency Provider Last Rate Last Admin    0.9 % sodium chloride infusion   IntraVENous Continuous Erica Hall MD        sodium chloride flush 0.9 % injection 10 mL  10 mL IntraVENous 2 times per day Erica Hall MD        sodium chloride flush 0.9 % injection 10 mL  10 mL IntraVENous PRN Erica Hall MD        0.9 % sodium chloride infusion  25 mL IntraVENous PRN Erica Hall MD        ceFAZolin (ANCEF) 2000 mg in dextrose 5 % 100 mL IVPB  2,000 mg IntraVENous Once Amanda Roberto MD        meloxicam ALCON REECE ALFONSO [facility]) tablet 7.5 mg  7.5 mg Oral Once Amanda Roberto MD        oxyCODONE HCl (OXY-IR) immediate release tablet 10 mg  10 mg Oral Once Amanda Roberto MD        tranexamic acid (LYSTEDA) tablet 1,950 mg  1,950 mg Oral Once Amanda Roberto MD         Vital Signs (Current) There were no vitals filed for this visit. Vital Signs Statistics (for past 48 hrs)     No data recorded    BP Readings from Last 3 Encounters:   12/01/21 120/60   09/22/21 136/72   09/04/21 (!) 145/79     BMI  Body mass index is 28.8 kg/m².   Estimated body mass index is 28.8 kg/m² as calculated from the following:    Height as of this encounter: 5' 9\" (1.679 m).    Weight as of this encounter: 195 lb (88.5 kg). CBC   Lab Results   Component Value Date    WBC 5.6 12/06/2021    RBC 4.66 12/06/2021    HGB 14.1 12/06/2021    HCT 41.4 12/06/2021    MCV 88.9 12/06/2021    RDW 14.2 12/06/2021     12/06/2021     CMP    Lab Results   Component Value Date     12/06/2021    K 4.4 12/06/2021     12/06/2021    CO2 22 12/06/2021    BUN 19 12/06/2021    CREATININE 1.3 12/06/2021    GFRAA >60 12/06/2021    LABGLOM 53 12/06/2021    GLUCOSE 85 12/06/2021    PROT 6.6 11/21/2019    CALCIUM 9.2 12/06/2021    BILITOT 0.5 11/21/2019    ALKPHOS 94 11/21/2019    AST 19 11/21/2019    ALT 16 11/21/2019     BMP    Lab Results   Component Value Date     12/06/2021    K 4.4 12/06/2021     12/06/2021    CO2 22 12/06/2021    BUN 19 12/06/2021    CREATININE 1.3 12/06/2021    CALCIUM 9.2 12/06/2021    GFRAA >60 12/06/2021    LABGLOM 53 12/06/2021    GLUCOSE 85 12/06/2021     POCGlucose  No results for input(s): GLUCOSE in the last 72 hours.    Coags    Lab Results   Component Value Date    PROTIME 11.7 12/06/2021    INR 1.03 12/06/2021    APTT 37.3 27/18/2160     HCG (If Applicable) No results found for: Melisa Roof, HCG, HCGQUANT   ABGs   Lab Results   Component Value Date    PHART 7.416 12/30/2015    PO2ART 92 12/30/2015    DYN0UUM 38 12/30/2015    IGH1QES 24.7 12/30/2015    BEART 0 12/30/2015    U8IELKAP 97 12/30/2015      Type & Screen (If Applicable)  No results found for: LABABO, LABRH                         BMI: Wt Readings from Last 3 Encounters:       NPO Status:   Date of last liquid consumption: 12/14/21   Time of last liquid consumption: 2100   Date of last solid food consumption: 12/14/21      Time of last solid consumption: 2100       Anesthesia Evaluation  Patient summary reviewed no history of anesthetic complications:   Airway: Mallampati: III  TM distance: >3 FB      Dental:    (+) upper dentures      Pulmonary:Negative Pulmonary ROS and normal exam                               Cardiovascular:  Exercise tolerance: good (>4 METS),   (+) hypertension:, angina: no interval change, CAD:, CABG/stent (CABG):, dysrhythmias (1AVB):,       ECG reviewed  Rhythm: regular  Rate: normal           Beta Blocker:  Dose within 24 Hrs         Neuro/Psych:   (+) headaches:, psychiatric history:depression/anxiety             GI/Hepatic/Renal:   (+) GERD: well controlled,           Endo/Other:    (+) hypothyroidism::., .    (-) blood dyscrasia               Abdominal:             Vascular: negative vascular ROS. Other Findings:             Anesthesia Plan      MAC, regional and spinal     ASA 3     (R/B/A of procedure, spinal, nerve blocks and anesthesia described. Patient/family understand and would like to proceed. Questions and concerns addressed.)  Induction: intravenous. MIPS: Postoperative opioids intended and Prophylactic antiemetics administered. Anesthetic plan and risks discussed with patient and spouse. Plan discussed with CRNA. This pre-anesthesia assessment may be used as a history and physical.    DOS STAFF ADDENDUM:    Pt seen and examined, chart reviewed (including anesthesia, drug and allergy history). No interval changes to history and physical examination. Anesthetic plan, risks, benefits, alternatives, and personnel involved discussed with patient. Questions and concerns addressed. Patient(family) verbalized an understanding and agrees to proceed.       Mandie De Souza MD  December 15, 2021  8:07 AM

## 2021-12-15 NOTE — OP NOTE
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 POSTOPERATIVE DIAGNOSIS:  Tricompartmental degenerative osteoarthritis of the right knee. OPERATION PERFORMED:  Robotic-assisted right total knee arthroplasty. SURGEON:  John Paul Unger MD     ASSISTANT:  NEHA Weiss    EBL: 100 mL     IMPLANTS:  Blevins Triathlon CR femur size 6  Blevins Triathlon tibia size 6  10mm CS polyethylene  35mm asymmetric patella     INDICATIONS:  The patient is a [de-identified] y.o. male who presents for right knee replacement. He had been treated conservatively with anti-inflammatories, physical therapy and intra-articular cortisone injections; none of these gave any significant relief. We discussed total knee arthroplasty. The operative procedure, alternatives, and risks were discussed in detail with the patient. The risks include but are not limited to: Infection, vessel injury, nerve injury, DVT, pulmonary embolism, implant loosening, need for revision surgery, loss of motion, continued pain. Informed consent for surgery was signed by the patient. OPERATIVE PROCEDURE:  The patient was seen in the preoperative holding area where the site of surgery was marked and informed consent was confirmed. The patient was brought back to the operating room by OR personnel. General anesthesia was administered. The patient was positioned supine on the operating table. The right lower extremity was then prepped and draped in a standard and sterile fashion. A final and formal timeout was then performed which confirmed the correct patient, correct position, and correct site of surgery. IV antibiotics were administered within 1 hour of the skin incision. An anterior midline incision was made over the knee. Skin and subcutaneous tissue were divided down to the extensor mechanism.  A subvastus arthrotomy was performed. The knee was fully exposed and then two distal femoral pins and two proximal tibial pins were placed with robotic aerials. Using a third robotic aerial, the knee was registered with the robot. The implant was virtually manipulated to balance the flexion and extension gaps. Once this was done, the robotic cutting arm was brought in and in the order of posterior femoral condyles, anterior femoral condylar cut, anterior femoral chamfer cuts, tibia cut, posterior femoral chamfer cuts, and then distal femoral condylar cuts were made. All bony fragments were removed. The knee was reconstructed to accept a #6 tibial baseplate, a #6 femoral cruciate-retaining femur, and a 10-mm polyethylene liner. The patella was flipped, measured, and cut to accept a 35-mm asymmetric patella. The knee came to full extension, excellent flexion, and good overall stability. The patella tracked within the trochlea of the femur. All trial implants were then removed. The ends of the bones were irrigated. The #6 tibial tray and the #6 Sandra Triathlon cruciate-retaining femoral component were then cemented into place. A trial reduction with the 10-mm poly was performed. Once this was done then, the 35-mm asymmetric patella was cemented into place. Excess cement was removed. Cement was allowed to harden. The knee came to full extension, excellent flexion, and good overall stability. The patella tracked well. The real 10-mm polyethylene liner was placed. The wound was irrigated out. Hemostasis was obtained. The wound was injected with local anesthetic. It was also bathed with aqueous iodine. The wound then was closed in layers. The patient tolerated the procedure well. A dressing was applied, and the patient was brought to the recovery room in good condition. NEHA Hahn was essential in patient positioning, surgical assistance during the arthroplasty, and in wound closure.     Gino Cheema MD  12/15/2021

## 2021-12-15 NOTE — PROGRESS NOTES
[facility] Orthopedic Surgery   Progress Note      S/P :  SUBJECTIVE  In bed. Alert and oriented. . Pain is   described in right knee and with the intensity of moderate. Pain is described as aching. OBJECTIVE              Physical                      VITALS:  /63   Pulse 62   Temp 98.6 °F (37 °C) (Temporal)   Resp 13   Ht 5' 9\" (1.753 m)   Wt 195 lb (88.5 kg)   SpO2 99%   BMI 28.80 kg/m²                     MUSCULOSKELETAL:  right foot NVI. Wiggles toes to command. Pedal pulses are palpable. NEUROLOGIC:                                  Sensory:  Touch:  Right Lower Extremity:  normal                                                 Surgical wound appears clean and dry right knee with ACE wrap. Ice pack not currently on.      Data       CBC:   Lab Results   Component Value Date    WBC 5.6 12/06/2021    RBC 4.66 12/06/2021    HGB 14.1 12/06/2021    HCT 41.4 12/06/2021    MCV 88.9 12/06/2021    MCH 30.3 12/06/2021    MCHC 34.1 12/06/2021    RDW 14.2 12/06/2021     12/06/2021    MPV 7.7 12/06/2021        WBC:    Lab Results   Component Value Date    WBC 5.6 12/06/2021        Hemoglobin/Hematocrit:    Lab Results   Component Value Date    HGB 14.1 12/06/2021    HCT 41.4 12/06/2021        PT/INR:    Lab Results   Component Value Date    PROTIME 11.7 12/06/2021    INR 1.03 12/06/2021              Current Inpatient Medications             Current Facility-Administered Medications: atorvastatin (LIPITOR) tablet 80 mg, 80 mg, Oral, Nightly  escitalopram (LEXAPRO) tablet 10 mg, 10 mg, Oral, BID  [START ON 12/16/2021] pantoprazole (PROTONIX) tablet 40 mg, 40 mg, Oral, QAM AC  ezetimibe (ZETIA) tablet 10 mg, 10 mg, Oral, Nightly  [START ON 12/16/2021] levothyroxine (SYNTHROID) tablet 50 mcg, 50 mcg, Oral, QAM AC  topiramate (TOPAMAX) tablet 25 mg, 25 mg, Oral, BID  insulin lispro (HUMALOG) injection vial 0-6 Units, 0-6 Units, SubCUTAneous, TID WC  insulin lispro (HUMALOG) injection vial 0-3 Units, 0-3 Units, SubCUTAneous, Nightly  glucose (GLUTOSE) 40 % oral gel 15 g, 15 g, Oral, PRN  dextrose 50 % IV solution, 12.5 g, IntraVENous, PRN  glucagon (rDNA) injection 1 mg, 1 mg, IntraMUSCular, PRN  dextrose 5 % solution, 100 mL/hr, IntraVENous, PRN  0.45 % sodium chloride infusion, , IntraVENous, Continuous  sodium chloride flush 0.9 % injection 10 mL, 10 mL, IntraVENous, 2 times per day  sodium chloride flush 0.9 % injection 10 mL, 10 mL, IntraVENous, PRN  0.9 % sodium chloride infusion, 25 mL, IntraVENous, PRN  acetaminophen (TYLENOL) tablet 650 mg, 650 mg, Oral, Q6H  oxyCODONE (ROXICODONE) immediate release tablet 5 mg, 5 mg, Oral, Q4H PRN **OR** oxyCODONE HCl (OXY-IR) immediate release tablet 10 mg, 10 mg, Oral, Q4H PRN  morphine (PF) injection 2 mg, 2 mg, IntraVENous, Q3H PRN **OR** morphine (PF) injection 4 mg, 4 mg, IntraVENous, Q3H PRN  ceFAZolin (ANCEF) 2000 mg in dextrose 5 % 100 mL IVPB, 2,000 mg, IntraVENous, Q8H  ondansetron (ZOFRAN-ODT) disintegrating tablet 4 mg, 4 mg, Oral, Q8H PRN **OR** ondansetron (ZOFRAN) injection 4 mg, 4 mg, IntraVENous, Q6H PRN  aspirin EC tablet 81 mg, 81 mg, Oral, BID  pregabalin (LYRICA) capsule 75 mg, 75 mg, Oral, BID  [START ON 12/16/2021] lisinopril (PRINIVIL;ZESTRIL) tablet 10 mg, 10 mg, Oral, Daily    ASSESSMENT AND PLAN      Post right TKA, stable exam  DVT prophylaxis ordered, ASA 81mg twice at day for 14 days for DVT prophylaxis  PT OT for ADL's and ambulation as tolerated  SS for DC planning, home with home care tomorrow  IV or PO pain med as ordered, Lyrica added for postop pain control in an effort to reduce narcotic use per Dr Moses Em protocol.      Colin Baptiste, APRN - CNP  12/15/2021  2:55 PM

## 2021-12-15 NOTE — ANESTHESIA PROCEDURE NOTES
Peripheral Block    Patient location during procedure: pre-op  Start time: 12/15/2021 8:50 AM  End time: 12/15/2021 8:53 AM  Staffing  Performed: anesthesiologist   Anesthesiologist: Rose Marie Pollard MD  Preanesthetic Checklist  Completed: patient identified, IV checked, site marked, risks and benefits discussed, surgical consent, monitors and equipment checked, pre-op evaluation, timeout performed, anesthesia consent given, oxygen available and patient being monitored  Peripheral Block  Patient position: sitting  Patient monitoring: continuous pulse ox and IV access  Block type: iPacks  Laterality: right  Injection technique: single-shot  Guidance: ultrasound guided  Provider prep: sterile gloves and mask  Needle  Needle type: combined needle/nerve stimulator   Needle gauge: 20 G  Needle length: 8 cm  Needle localization: ultrasound guidance  Needle insertion depth: 4 cm  Assessment  Injection assessment: negative aspiration for heme, no paresthesia on injection and local visualized surrounding nerve on ultrasound  Paresthesia pain: none  Slow fractionated injection: yes  Hemodynamics: stable  Medications Administered  Ropivacaine (NAROPIN) injection 0.5%, 20 mL  Reason for block: post-op pain management and at surgeon's request

## 2021-12-15 NOTE — ANESTHESIA POSTPROCEDURE EVALUATION
[facility] Department of Anesthesiology  Post-Anesthesia Note       Name:  Rula Juarez                                  Age:  [de-identified] y.o. MRN:  [id number]     Last Vitals & Oxygen Saturation: /63   Pulse 62   Temp 98.6 °F (37 °C) (Temporal)   Resp 13   Ht 5' 9\" (1.753 m)   Wt 195 lb (88.5 kg)   SpO2 99%   BMI 28.80 kg/m²   Patient Vitals for the past 4 hrs:   BP Temp Temp src Pulse Resp SpO2   12/15/21 1313 -- -- -- 62 13 99 %   12/15/21 1302 101/63 -- -- 58 11 97 %   12/15/21 1246 (!) 125/58 -- -- 59 12 97 %   12/15/21 1237 -- -- -- 59 13 96 %   12/15/21 1233 112/75 -- -- 67 18 98 %   12/15/21 1227 -- -- -- 63 18 98 %   12/15/21 1217 (!) 111/59 -- -- 64 13 100 %   12/15/21 1213 -- -- -- 63 10 98 %   12/15/21 1203 (!) 113/52 -- -- 69 19 (!) 87 %   12/15/21 1157 (!) 117/58 -- -- 67 13 98 %   12/15/21 1152 111/60 -- -- 71 12 95 %   12/15/21 1149 (!) 108/59 -- -- 70 16 93 %   12/15/21 1142 104/68 -- -- 79 12 95 %   12/15/21 1134 (!) 100/53 98.6 °F (37 °C) Temporal 73 9 95 %       Level of consciousness:  Awake, alert    Respiratory: Respirations easy, no distress. Stable. Cardiovascular: Hemodynamically stable. Hydration: Adequate. PONV: Adequately managed. Post-op pain: Adequately controlled. Post-op assessment: Tolerated anesthetic well without complication. Complications:  None. Spinal anesthesia intact. Moving feet bilatarally. No issues.     Erica Hall MD  December 15, 2021   1:59 PM

## 2021-12-15 NOTE — CARE COORDINATION
12/15 met with patient and his wife at bedside to discuss dc plans. Patient plans to return home with the assist of his wife. Agreeable to [facility] ( has used them in the recent past) - Amrita myers/ [facility] notified of referral and will follow and pull orders from [facility] at dc . Denies need for any DME . Wife can transport home at dc.   Electronically signed by Pepito Novak on 12/15/2021 at 2:47 PM  #[phone]

## 2021-12-15 NOTE — PROGRESS NOTES
Physical Therapy    Facility/Department: [address] ORTHOPEDICS  Initial Assessment    NAME: Merari Woo  : 1940  MRN: [id number]    Date of Service: 12/15/2021    Discharge Recommendations:  24 hour supervision or assist, Home with Home health PT, S Level 1, Patient would benefit from continued therapy after discharge   PT Equipment Recommendations  Equipment Needed: No  Other: pt has RW     Juan José Rivera scored a 18/24 on the AM-PAC short mobility form. Current research shows that an AM-PAC score of 18 or greater is typically associated with a discharge to the patient's home setting. Based on the patient's AM-PAC score and their current functional mobility deficits, it is recommended that the patient have 2-3 sessions per week of Physical Therapy at d/c to increase the patient's independence. At this time, this patient demonstrates the endurance and safety to discharge home with home PT and a follow up treatment frequency of 2-3x/wk. Please see assessment section for further patient specific details. If patient discharges prior to next session this note will serve as a discharge summary. Please see below for the latest assessment towards goals. HOME HEALTH CARE: LEVEL 1 STANDARD     -Initial home health evaluation to occur within 24-48 hours, in patient home    -Home health agency to establish plan of care for patient over 60 day period    -Medication Reconciliation    -PCP Visit scheduled within seven days of discharge    -PT/OT to evaluate with goal of regaining prior level of functioning    -OT to evaluate if patient has [address] needs for personal care       Assessment   Body structures, Functions, Activity limitations: Decreased functional mobility ; Decreased strength; Decreased ROM  Assessment: Pt is an [de-identified] y/o male s/p elective R TKA on 12/15 by Dr. Hemant Morrell. WBAT RLE. Today, pt able to complete bed mobility with SBA and transfers and short distance ambulation with RW and CGA. Anticipate that pt will be safe to d/c home with initial 24 hour assist and home health PT level 1. Treatment Diagnosis: decreased strength and ROM RLE s/p R TKA  Prognosis: Good  Decision Making: Medium Complexity  History: Pt is an [de-identified] y/o male s/p elective R TKA on 12/15 by Dr. Anay Toscano. WBAT RLE. Exam: functional mobility, ROM, strength  Clinical Presentation: evolving  PT Education: Goals; PT Role; Plan of Care; General Safety; Weight-bearing Education; Precautions; Transfer Training; Functional Mobility Training  Barriers to Learning: none  REQUIRES PT FOLLOW UP: Yes  Activity Tolerance  Activity Tolerance: Patient Tolerated treatment well       Patient Diagnosis(es): The encounter diagnosis was Arthritis of right knee. has a past medical history of Anxiety, Cancer ([address] Coronary artery disease, GERD (gastroesophageal reflux disease), History of blood transfusion, Hyperlipidemia LDL goal <70, Hypertension, IBS (irritable bowel syndrome), Migraine, Primary osteoarthritis of right knee, Reactive depression, Shingles, Thyroid disease, and Wears dentures. has a past surgical history that includes Kidney removal (@ 1944); Appendectomy; shoulder surgery (Left); Carpal tunnel release (Bilateral); Hemorrhoid surgery; Endoscopy, colon, diagnostic; Coronary artery bypass graft (2015); eye surgery (Bilateral); Colonoscopy (N/A, 10/8/2019); and Total knee arthroplasty (Left, 3/11/2020). Restrictions  Restrictions/Precautions  Restrictions/Precautions: Fall Risk, Weight Bearing  Lower Extremity Weight Bearing Restrictions  Right Lower Extremity Weight Bearing: Weight Bearing As Tolerated     Vision/Hearing  Vision: Within Functional Limits  Hearing: Within functional limits       Subjective  General  Chart Reviewed: Yes  Patient assessed for rehabilitation services?: Yes  Additional Pertinent Hx: Pt is an [de-identified] y/o male s/p elective R TKA on 12/15 by Dr. Anay Toscano. WBAT RLE.   Response To Previous Treatment: Not applicable  Family / Caregiver Present: Yes (wife)  Referring Practitioner: Maribell Péerz MD  Referral Date : 12/15/21  Diagnosis: R knee arthritis  Follows Commands: Within Functional Limits  Subjective  Subjective: Pt pleasant and agreeable to PT eval and treat. Denies pain.   Pain Screening  Patient Currently in Pain: Denies          Orientation  Orientation  Overall Orientation Status: Within Functional Limits     Social/Functional History  Social/Functional History  Lives With: Spouse  Type of Home: House  Home Layout: One level, Laundry in basement, Able to Live on Main level with bedroom/bathroom  Home Access:  (through garage with stair lift)  Bathroom Shower/Tub: Tub/Shower unit, Shower chair with back  Bathroom Toilet: Standard  Bathroom Equipment: Grab bars in shower, Hand-held shower  Bathroom Accessibility: Walker accessible  Home Equipment: Rolling walker, Cane  ADL Assistance: Independent  Homemaking Assistance: Independent  Homemaking Responsibilities: Yes  Ambulation Assistance: Independent (walking stick at times)  Transfer Assistance: Independent  Active : Yes  Mode of Transportation: Car       Objective          AROM RLE (degrees)  RLE General AROM: knee limited by post-op status but ankle and hip WFL  AROM LLE (degrees)  LLE AROM : WFL  Strength RLE  Comment: not formally tested but at least 3+/5 demonstrated by functional mobility  Strength LLE  Strength LLE: WFL  Tone RLE  RLE Tone: Normotonic  Tone LLE  LLE Tone: Normotonic  Motor Control  Gross Motor?: WFL  Sensation  Overall Sensation Status: WFL  Bed mobility  Sit to Supine: Unable to assess (up in chair at end of session)     Transfers  Sit to Stand: Contact guard assistance  Stand to sit: Contact guard assistance     Ambulation  Ambulation?: Yes  WB Status: WBAT RLE  More Ambulation?: No  Ambulation 1  Surface: level tile  Device: Rolling Walker  Other Apparatus:  (therapist managed IV)  Assistance: Contact guard assistance  Quality of

## 2021-12-15 NOTE — PROGRESS NOTES
Patient is alert & oriented x4, 2/4 bed rails up, bed in lowest position, fall precautions in place, call light within reach. Pt denies pain at this time. Dressing on R knee is clean, dry and intact. Ice applied to R knee. Will cont to monitor and reassess.  Electronically signed by Ruben Thomas RN on 12/15/2021 at 1:29 PM

## 2021-12-15 NOTE — PROGRESS NOTES
Met with patient and family at bedside, patient is alert and oriented x4. discussed role of nurse navigator. Reviewed reasons to call with questions or concerns, importance of TEDS, Incentive spirometer, pain medication, and physical and occupational therapy. 2/4 bed rails up, bed in lowest position, fall precautions in place, call light within reach. Pulses present bilaterally +2 pedal, no drainage or odor noted at surgical dressing right knee. ace Dressing clean, dry, and intact. Ice in place. Silas and scds on LLE. Neurovascular checks performed and WNLs, patient denies numbness or tingling. DC Plan: home with [facility] and wife Denver Porter. Denver Porter to transport patient. DME needs:denies, already has a rolling walker.     Dejon Tenorio  Orthopedic Nurse Navigator  Phone number: [phone]    Future Appointments   Date Time Provider Leonie Silva   12/30/2021  1:15 PM MD Linh Schreiber     Electronically signed by Ike Rosario RN on 12/15/2021 at 3:13 PM

## 2021-12-15 NOTE — ANESTHESIA PROCEDURE NOTES
Peripheral Block    Patient location during procedure: pre-op  Start time: 12/15/2021 8:45 AM  End time: 12/15/2021 8:48 AM  Staffing  Performed: anesthesiologist   Anesthesiologist: Candy Orozco MD  Preanesthetic Checklist  Completed: patient identified, IV checked, site marked, risks and benefits discussed, surgical consent, monitors and equipment checked, pre-op evaluation, timeout performed, anesthesia consent given, oxygen available and patient being monitored  Peripheral Block  Patient position: sitting  Prep: DuraPrep  Patient monitoring: continuous pulse ox and IV access  Block type: Femoral  Laterality: right  Injection technique: single-shot  Guidance: ultrasound guided  Adductor canal  Provider prep: sterile gloves and mask  Needle  Needle type: combined needle/nerve stimulator   Needle gauge: 20 G  Needle length: 8 cm  Needle localization: ultrasound guidance  Needle insertion depth: 5 cm  Assessment  Injection assessment: negative aspiration for heme, no paresthesia on injection and local visualized surrounding nerve on ultrasound  Paresthesia pain: none  Slow fractionated injection: yes  Hemodynamics: stable  Medications Administered  Ropivacaine (NAROPIN) injection 0.5%, 25 mL  Reason for block: post-op pain management and at surgeon's request

## 2021-12-16 VITALS
RESPIRATION RATE: 23 BRPM | WEIGHT: 201.5 LBS | HEIGHT: 69 IN | TEMPERATURE: 98.2 F | OXYGEN SATURATION: 97 % | HEART RATE: 62 BPM | SYSTOLIC BLOOD PRESSURE: 146 MMHG | BODY MASS INDEX: 29.84 KG/M2 | DIASTOLIC BLOOD PRESSURE: 73 MMHG

## 2021-12-16 LAB
ANION GAP SERPL CALCULATED.3IONS-SCNC: 10 MMOL/L (ref 3–16)
BUN BLDV-MCNC: 22 MG/DL (ref 7–20)
CALCIUM SERPL-MCNC: 8.8 MG/DL (ref 8.3–10.6)
CHLORIDE BLD-SCNC: 107 MMOL/L (ref 99–110)
CO2: 21 MMOL/L (ref 21–32)
CREAT SERPL-MCNC: 1.4 MG/DL (ref 0.8–1.3)
GFR AFRICAN AMERICAN: 59
GFR NON-AFRICAN AMERICAN: 49
GLUCOSE BLD-MCNC: 115 MG/DL (ref 70–99)
GLUCOSE BLD-MCNC: 134 MG/DL (ref 70–99)
PERFORMED ON: ABNORMAL
POTASSIUM SERPL-SCNC: 4.7 MMOL/L (ref 3.5–5.1)
SODIUM BLD-SCNC: 138 MMOL/L (ref 136–145)

## 2021-12-16 PROCEDURE — 97535 SELF CARE MNGMENT TRAINING: CPT

## 2021-12-16 PROCEDURE — 2580000003 HC RX 258: Performed by: ORTHOPAEDIC SURGERY

## 2021-12-16 PROCEDURE — G0378 HOSPITAL OBSERVATION PER HR: HCPCS

## 2021-12-16 PROCEDURE — 97110 THERAPEUTIC EXERCISES: CPT

## 2021-12-16 PROCEDURE — 6360000002 HC RX W HCPCS: Performed by: ORTHOPAEDIC SURGERY

## 2021-12-16 PROCEDURE — 36415 COLL VENOUS BLD VENIPUNCTURE: CPT

## 2021-12-16 PROCEDURE — 94761 N-INVAS EAR/PLS OXIMETRY MLT: CPT

## 2021-12-16 PROCEDURE — 97530 THERAPEUTIC ACTIVITIES: CPT

## 2021-12-16 PROCEDURE — 80048 BASIC METABOLIC PNL TOTAL CA: CPT

## 2021-12-16 PROCEDURE — 6370000000 HC RX 637 (ALT 250 FOR IP): Performed by: ORTHOPAEDIC SURGERY

## 2021-12-16 RX ADMIN — CEFAZOLIN 2000 MG: 10 INJECTION, POWDER, FOR SOLUTION INTRAVENOUS at 01:07

## 2021-12-16 RX ADMIN — TOPIRAMATE 25 MG: 25 TABLET, FILM COATED ORAL at 08:14

## 2021-12-16 RX ADMIN — ASPIRIN 81 MG: 81 TABLET, COATED ORAL at 08:14

## 2021-12-16 RX ADMIN — ACETAMINOPHEN 650 MG: 325 TABLET ORAL at 08:14

## 2021-12-16 RX ADMIN — Medication 10 ML: at 08:15

## 2021-12-16 RX ADMIN — PREGABALIN 75 MG: 75 CAPSULE ORAL at 08:14

## 2021-12-16 RX ADMIN — ACETAMINOPHEN 650 MG: 325 TABLET ORAL at 03:10

## 2021-12-16 RX ADMIN — LISINOPRIL 10 MG: 10 TABLET ORAL at 08:14

## 2021-12-16 RX ADMIN — ESCITALOPRAM OXALATE 10 MG: 10 TABLET ORAL at 08:14

## 2021-12-16 RX ADMIN — PANTOPRAZOLE SODIUM 40 MG: 40 TABLET, DELAYED RELEASE ORAL at 05:44

## 2021-12-16 RX ADMIN — LEVOTHYROXINE SODIUM 50 MCG: 0.05 TABLET ORAL at 05:44

## 2021-12-16 RX ADMIN — OXYCODONE HYDROCHLORIDE 10 MG: 10 TABLET ORAL at 08:14

## 2021-12-16 ASSESSMENT — PAIN SCALES - GENERAL
PAINLEVEL_OUTOF10: 7
PAINLEVEL_OUTOF10: 0
PAINLEVEL_OUTOF10: 1
PAINLEVEL_OUTOF10: 5

## 2021-12-16 ASSESSMENT — PAIN DESCRIPTION - LOCATION
LOCATION: KNEE

## 2021-12-16 ASSESSMENT — PAIN DESCRIPTION - FREQUENCY
FREQUENCY: INTERMITTENT
FREQUENCY: CONTINUOUS
FREQUENCY: CONTINUOUS

## 2021-12-16 ASSESSMENT — PAIN DESCRIPTION - PROGRESSION
CLINICAL_PROGRESSION: NOT CHANGED
CLINICAL_PROGRESSION: GRADUALLY IMPROVING
CLINICAL_PROGRESSION: GRADUALLY WORSENING

## 2021-12-16 ASSESSMENT — PAIN DESCRIPTION - ONSET
ONSET: ON-GOING

## 2021-12-16 ASSESSMENT — PAIN - FUNCTIONAL ASSESSMENT
PAIN_FUNCTIONAL_ASSESSMENT: ACTIVITIES ARE NOT PREVENTED
PAIN_FUNCTIONAL_ASSESSMENT: PREVENTS OR INTERFERES SOME ACTIVE ACTIVITIES AND ADLS
PAIN_FUNCTIONAL_ASSESSMENT: PREVENTS OR INTERFERES SOME ACTIVE ACTIVITIES AND ADLS

## 2021-12-16 ASSESSMENT — PAIN DESCRIPTION - ORIENTATION
ORIENTATION: RIGHT

## 2021-12-16 ASSESSMENT — PAIN DESCRIPTION - PAIN TYPE
TYPE: SURGICAL PAIN

## 2021-12-16 ASSESSMENT — PAIN DESCRIPTION - DESCRIPTORS
DESCRIPTORS: ACHING

## 2021-12-16 NOTE — PROGRESS NOTES
Clinical Pharmacy Note  Medication Counseling    Reviewed new medications started during hospital admission: aspirin, oxycodone, Lyrica. Indications and side effects were emphasized during counseling. All medication-related questions addressed. Patient verbalized understanding of education. Should the patient express any additional questions or concerns regarding their medications, please do not hesitate to contact the pharmacy department. Patient/caregiver aware they may refuse medications during hospital stay. 10 minutes spent educating patient regarding medications.       Hillery Felty, [facility], PharmD, 12/16/2021 10:28 AM

## 2021-12-16 NOTE — PROGRESS NOTES
Data- discharge order received, patient verbalized agreement to discharge, disposition to previous residence, needs noted for Joseph Fink and informed Damian Calderon NP. Action- discharge instructions prepared and given to patient and wife , patient verbalized understanding. Medication information packet given r/t NEW and/or CHANGED prescriptions emphasizing name/purpose/side effects, pt verbalized understanding. Discharge instruction summary: Diet- general, Activity- wbat, Primary Care Physician as follows: Umu Luis -[phone]. f/u appointment with orthopedic office noted below, immunizations reviewed and discussed with patient, prescription medications to be filled by retail pharmacy and then picked up. Inpatient surgical procedure precautions reviewed: . Neurovascular check performed and patient is WNLs, denies numbness/tingling in extremties. Incision site  prineo glue dressing assessed and is  clean,dry, and intact, no signs of redness, drainage, or odor noted. patient's bedside RN Rick Hall notified of patient completing discharge instructions and iv removal. Nurse Navigator and Orthopedic Office contact information on discharge instructions and provided to patient. Response- IV removed. Medications to be picked up form retail pharmacy prior to discharge. Disposition is home with [facility], to be transported with family.      Future Appointments   Date Time Provider Leonie Silva   12/30/2021  1:15 PM Brett Peraza MD Omar Reach [facility]           Electronically signed by Bonifacio Patton RN on 12/16/2021 at 10:47 AM

## 2021-12-16 NOTE — PROGRESS NOTES
Pt discharged to home. Transportation here with wheelchair. Accompanied by spouse. Transported in personal vehicle. Discharge instructions, Rxs, and personal belongings given to pt. Explanation of discharge medications and instructions understood by verbal statement. No questions, comments or concerns at this time.  Electronically signed by Pietro Peñaloza RN on 12/16/2021

## 2021-12-16 NOTE — PLAN OF CARE
Problem: Cardiac:  Goal: Hemodynamic stability will improve  Description: Hemodynamic stability will improve  Outcome: Ongoing  Note: Will continue to monitor patients vitals       Problem: Discharge Planning:  Goal: Discharged to appropriate level of care  Description: Discharged to appropriate level of care  Outcome: Ongoing  Note: Patient will be discharged to appropriate level of care       Problem: Mobility - Impaired:  Goal: Mobility will improve  Description: Mobility will improve  Outcome: Ongoing  Note: Patients mobility will improve       Problem: Infection - Surgical Site:  Goal: Will show no infection signs and symptoms  Description: Will show no infection signs and symptoms  Outcome: Ongoing  Note: Pt is free of signs and symptoms of infection. Incision and dressing are clean, dry and intact. Vital signs stable. Will monitor. Problem: Pain - Acute:  Goal: Pain level will decrease  Description: Pain level will decrease  Outcome: Ongoing  Note: Pt assessed for pain. Pt in pain and assessed with 0-10 pain rating scale. Pt given prescribed analgesic for pain. (See eMar) Pt satisfied with pain relief thus far. Will reassess and continue to monitor. Problem: Falls - Risk of:  Goal: Will remain free from falls  Description: Will remain free from falls  Outcome: Ongoing  Note: Fall risk assessment completed. Fall precautions in place. Call light within reach. Pt educated on calling for assistance before getting up. Walkway free of clutter. Will continue to monitor. Goal: Absence of physical injury  Description: Absence of physical injury  Outcome: Ongoing  Note: Pt is free of injury. No injury noted. Fall precautions in place. Call light within reach. Will monitor.       Electronically signed by Jorge L Lo RN on 12/15/2021 at 10:45 PM

## 2021-12-16 NOTE — PROGRESS NOTES
Patient A&O. Tolerating PO. Pain controlled with pain medication (see MAR). Call light within reach. Will continue to monitor and reassess.  Electronically signed by Piedad Thomason RN on 12/16/2021

## 2021-12-16 NOTE — PROGRESS NOTES
Brandon performed this morning including Nurse navigator Shahzad Grewal, Physical therapist Pal Brown, and Occupational therapist Angel Carrera. Discussed plan of care, discharge plan, and dme needs if applicable for orthopedic total joint patient.

## 2021-12-16 NOTE — CARE COORDINATION
[facility]    DC order noted, all docs needed have been faxed to [facility] for home care services.     Home care to see patient within 24-48 hrs    Roshan Pennington RN, BSN CTN  [facility] [phone]

## 2021-12-16 NOTE — PROGRESS NOTES
Dressing to right knee removed at this time. Scant amounts of drainage noted. JANICE hose, NATHAN, and Ice applied. Patient tolerated well. Denies a need for pain medication at this time. Will continue to monitor.    Electronically signed by Cliff Sanchez RN on 12/16/2021 at 7:33 AM

## 2021-12-16 NOTE — PROGRESS NOTES
Occupational Therapy  Facility/Department: [address] ORTHOPEDICS  Daily Treatment Note  NAME: Sweta Desai  : 1940  MRN: [id number]    Date of Service: 2021    Discharge Recommendations:  2-3 sessions per week, Patient would benefit from continued therapy after discharge, Home with Home health OT, 24 hour supervision or assist       Sweta Desai scored a 20/24 on the AM-PAC ADL Inpatient form. Current research shows that an AM-PAC score of 18 or greater is typically associated with a discharge to the patient's home setting. Based on the patient's AM-PAC score, and their current ADL deficits, it is recommended that the patient have 2-3 sessions per week of Occupational Therapy at d/c to increase the patient's independence. At this time, this patient demonstrates the endurance and safety to discharge home with home (home vs OP services) and a follow up treatment frequency of 2-3x/wk. Please see assessment section for further patient specific details. If patient discharges prior to next session this note will serve as a discharge summary. Please see below for the latest assessment towards goals. HOME HEALTH CARE: LEVEL 3 SAFETY        -Initial home health evaluation to occur within 24-48 hours, in patient home    -Home health agency to establish plan of care for patient over 60 day period    -Medication Reconciliation    -PT/OT/Speech evaluations in home within 24-48 hours of discharge; including  -DME and home safety    -Frontload therapy 5 days, then 3x a week    -OT to evaluate if patient has [address] needs for personal care    - evaluation within 24-48 hours, includes evaluation of resources   and insurance to determine [place], [place], LTC, and Medicaid options    -PCP Visit scheduled within three to seven days of discharge       Assessment: Discussed with OTR am pac score is 20.  Anticipate that patient will be safe to return home with wife to provide 24/7 assist/supervision and home OT. Patient able to complete sit<>stand from recliner chair to RW to recliner chair with cues for hand placement and safety. Functional mobility with RW with SBA, slow steady gait with no overt LOB noted. Dressed lower body with assist for shoes. Stood with SBA to wash face, hands and brush teeth. Plan is for discharge today. Patient Diagnosis(es): The encounter diagnosis was Arthritis of right knee. has a past medical history of Anxiety, Cancer ([address] Coronary artery disease, GERD (gastroesophageal reflux disease), History of blood transfusion, Hyperlipidemia LDL goal <70, Hypertension, IBS (irritable bowel syndrome), Migraine, Primary osteoarthritis of right knee, Reactive depression, Shingles, Thyroid disease, and Wears dentures. has a past surgical history that includes Kidney removal (@ 1944); Appendectomy; shoulder surgery (Left); Carpal tunnel release (Bilateral); Hemorrhoid surgery; Endoscopy, colon, diagnostic; Coronary artery bypass graft (2015); eye surgery (Bilateral); Colonoscopy (N/A, 10/8/2019); Total knee arthroplasty (Left, 3/11/2020); and Total knee arthroplasty (Right, 12/15/2021). Restrictions  Restrictions/Precautions  Restrictions/Precautions: Fall Risk, Weight Bearing  Lower Extremity Weight Bearing Restrictions  Right Lower Extremity Weight Bearing: Weight Bearing As Tolerated  Subjective   General  Chart Reviewed: Yes  Patient assessed for rehabilitation services?: Yes  Additional Pertinent Hx: [de-identified] yo male admitted 12/15 for elective R TKA. PMH: Prostate Cancer, IBS, Shingles, CAD, HTN, 2020 L TKA  Response to previous treatment: Patient with no complaints from previous session  Family / Caregiver Present: No  Referring Practitioner: David Bullock MD  Diagnosis: R TKA  Subjective  Subjective: Patient seated in recliner chair upon arrival to room. Patient agreeable to therapy.  Min reports of pain  General Comment  Comments: RN ok to see Orientation  Orientation  Overall Orientation Status: Within Functional Limits  Objective    ADL  Grooming: Stand by assistance (standing in front of sink to wash face, hands and brush teeth)  UE Dressing: Setup  LE Dressing: Stand by assistance; Minimal assistance (SBA to dress lower body except for shoes)        Balance  Sitting Balance: Supervision  Standing Balance: Stand by assistance  Standing Balance  Activity: Stood for ADL tasks  Functional Mobility  Functional - Mobility Device: Rolling Walker  Activity: To/from bathroom; Other  Assist Level: Stand by assistance  Functional Mobility Comments: Functional mobility with RW with SBA, slow steady gait with no overt LOB noted, mild cues for walker safety  Bed mobility  Supine to Sit: Unable to assess  Sit to Supine: Unable to assess  Comment: up in chair at start and end of session  Transfers  Sit to stand: Stand by assistance  Stand to sit: Stand by assistance  Transfer Comments: SBA for sit<>stand from recliner chair to RW to recliner chair with mild cues for hand placement and safety     Cognition  Overall Cognitive Status: [facility]     Assessment   Performance deficits / Impairments: Decreased functional mobility ; Decreased balance; Decreased ADL status; Decreased endurance; Decreased high-level IADLs; Decreased strength  Assessment: Discussed with OTR am pac score is 20. Anticipate that patient will be safe to return home with wife to provide 24/7 assist/supervision and home OT. Patient able to complete sit<>stand from recliner chair to RW to recliner chair with cues for hand placement and safety. Functional mobility with RW with SBA, slow steady gait with no overt LOB noted. Dressed lower body with assist for shoes. Stood with SBA to wash face, hands and brush teeth. Plan is for discharge today.   OT Education: OT Role; Transfer Training; Plan of Care; ADL Adaptive Strategies  Patient Education: Ice therapy, diego hose for 2 weeks and importance of mobility  REQUIRES OT FOLLOW UP: Yes  Activity Tolerance  Activity Tolerance: Patient Tolerated treatment well  Safety Devices  Safety Devices in place: Yes  Type of devices: Nurse notified; Gait belt; Call light within reach; Chair alarm in place; Left in chair; Patient at risk for falls        Plan   Plan  Times per week: Plan is for discharge to home today  Current Treatment Recommendations: Strengthening, Endurance Training, Patient/Caregiver Education & Training, ROM, Self-Care / ADL, Balance Training, Pain Management, Functional Mobility Training, Safety Education & Training, Positioning    AM-PAC Score        AM-[facility] Inpatient Daily Activity Raw Score: 20 (12/16/21 0934)  AM-PAC Inpatient ADL T-Scale Score : 42.03 (12/16/21 0934)  ADL Inpatient CMS 0-100% Score: 38.32 (12/16/21 0934)  ADL Inpatient CMS G-Code Modifier : Abiodun Montgomery (12/16/21 0934)    Goals  Short term goals  Time Frame for Short term goals: prior to d/c: all goals ongoing except where noted  Short term goal 1: toileting SUP  Short term goal 2: ADL tx SUP  Short term goal 3: LB dressing SBA  Short term goal 4: UB dressing set up: met  Short term goal 5:  Tolerate 5 min fxl standing task SUP  Patient Goals   Patient goals : return home       Therapy Time   Individual Concurrent Group Co-treatment   Time In 0850         Time Out 0945         Minutes 55               Electronically signed by KANG [id number] on 12/16/2021 at 9:45 AM

## 2021-12-16 NOTE — PROGRESS NOTES
Patient is resting in bed. Alert and oriented X4. Denies pain at this time. IV in place and infusing. Dressing to RLE clean, dry,and intact. Ice applied. Assessment complete. All patient needs are met at this time. Fall precautions are in place. Call light is in reach. Will continue to monitor.    Electronically signed by Albertina Hess RN on 12/15/2021 at 10:46 PM

## 2021-12-16 NOTE — PROGRESS NOTES
Physical Therapy    Facility/Department: [address] ORTHOPEDICS  Treatment note    NAME: Rula Juarez  : 1940  MRN: [id number]    Date of Service: 2021    Assessment / Discharge Recommendations:  -progressing well and ready for home today with family assist and Home PT  -has equipment needed at home  -has stair lift from basement garage to reach the first floor   -instructed in initial HEP and general activity to do until Home PT takes charge of care  -aarom <10 to ~70 degrees     Activity Tolerance  Activity Tolerance: Patient Tolerated treatment well       Patient Diagnosis(es): The encounter diagnosis was Arthritis of right knee. has a past medical history of Anxiety, Cancer ([facility] [address] Coronary artery disease, GERD (gastroesophageal reflux disease), History of blood transfusion, Hyperlipidemia LDL goal <70, Hypertension, IBS (irritable bowel syndrome), Migraine, Primary osteoarthritis of right knee, Reactive depression, Shingles, Thyroid disease, and Wears dentures. has a past surgical history that includes Kidney removal (@ 1944); Appendectomy; shoulder surgery (Left); Carpal tunnel release (Bilateral); Hemorrhoid surgery; Endoscopy, colon, diagnostic; Coronary artery bypass graft (2015); eye surgery (Bilateral); Colonoscopy (N/A, 10/8/2019); Total knee arthroplasty (Left, 3/11/2020); and Total knee arthroplasty (Right, 12/15/2021). Restrictions  Restrictions/Precautions  Restrictions/Precautions: Fall Risk, Weight Bearing  Lower Extremity Weight Bearing Restrictions  Right Lower Extremity Weight Bearing: Weight Bearing As Tolerated  Vision/Hearing  Vision: Within Functional Limits  Hearing: Within functional limits     Subjective  General  Chart Reviewed: Yes  Additional Pertinent Hx: Pt is an [de-identified] y/o male s/p elective R TKA on 12/15 by Dr. Oneida Hatch. WBAT RLE. Response To Previous Treatment: Patient with no complaints from previous session.   Family / Caregiver Present: Yes  Subjective  Subjective: to room following OT session to patient resting in recliner with cold pack to right knee- he is alert oriented and agreeable to PT session for exercises and instruction general activity to do until Home PT takes charge of care   -rates pain to 4-5/10 following pain meds  Pain Screening  Patient Currently in Pain: Denies  Orientation  Orientation  Overall Orientation Status: Within Functional Limits  Social/Functional History  Social/Functional History  Lives With: Spouse  Type of Home: House  Home Layout: One level, Laundry in basement, Able to Live on Main level with bedroom/bathroom  Home Access:  (through garage with stair lift)  Bathroom Shower/Tub: Tub/Shower unit, Shower chair with back  Bathroom Toilet: Standard  Bathroom Equipment: Grab bars in shower, Hand-held shower  Bathroom Accessibility: Walker accessible  Home Equipment: Rolling walker, Cane  ADL Assistance: Independent  Homemaking Assistance: Independent  Homemaking Responsibilities: Yes  Ambulation Assistance: Independent (walking stick at times)  Transfer Assistance: Independent  Active : Yes  Mode of Transportation: Car  Cognition   Cognition  Overall Cognitive Status: WFL  Objective  Bed mobility  Comment: not observed - OOB at start and conclusion of session - patient states he was able to get to EOB himself  Transfers  Sit to Stand:  (OT indicates supervision)  Ambulation  Ambulation?:  (ambulated with nursing and OT this morning in room for several passes for distances as needed in household with good heel contact and safe use of walker)  Balance  Comments: OT indicates stable with transfers and ambulation  Exercises  Quad Sets: 10 per hour  Gluteal Sets: 10 per hour  Ankle Pumps: 30 per hour in easy pace  Comments: encouraged to continue practice with the spirometer at home for a few more days   Plan   Plan  Times per week: 2x/day until d/c  Times per day: Twice a day  Current Treatment Recommendations: Functional Mobility Training, Transfer Training, Gait Training, Balance Training, Home Exercise Program, Safety Education & Training, Patient/Caregiver Education & Training, Equipment Evaluation, Education, & procurement  Safety Devices  Type of devices:  All fall risk precautions in place, Call light within reach, Left in chair, Nurse notified    Goals  Short term goals  Time Frame for Short term goals: upon d/c  Short term goal 1: bed mobility mod I  Short term goal 2: transfers SBA  Short term goal 3: ambulate 76' with RW and SBA  Long term goals  Time Frame for Long term goals : LTG=STG  Patient Goals   Patient goals : \"to go home\"       Therapy Time   Individual Concurrent Group Co-treatment   Time In 1005         Time Out 1035         Minutes Ez Ramos PT

## 2021-12-16 NOTE — PROGRESS NOTES
dean AMOR asked this nurse to assess patient's incision due to bleeding. Upon assessment, bright red bloody drainage noted at mid and distal incision, both sites covered with dry gauze and tape. Provided extra supplies of gauze and tape for patient to take home and educated on frequency of dressing changes and how to apply dressings. Educated patient to not shower if having active bleeding/drainage. Educated patient to not wear tight undergarments or tight pants. patient verbalized understanding. Notified Janet Morris NP of bleeding.  Electronically signed by Merlin Knack, RN on 12/16/2021 at 9:42 AM

## 2021-12-16 NOTE — PLAN OF CARE
Problem: Cardiac:  Goal: Hemodynamic stability will improve  Description: Hemodynamic stability will improve  12/16/2021 1039 by Andrei Bishop RN  Outcome: Ongoing-Patient's VSS     Problem: Discharge Planning:  Goal: Discharged to appropriate level of care  Description: Discharged to appropriate level of care  12/16/2021 1039 by Andrei Bishop RN  Outcome: Ongoing-Discharge plan discussed with patient     Problem: Mobility - Impaired:  Goal: Mobility will improve  Description: Mobility will improve  12/16/2021 1039 by Andrei Bishop RN  Outcome: Ongoing-Patient working with OT/PT     Problem: Infection - Surgical Site:  Goal: Will show no infection signs and symptoms  Description: Will show no infection signs and symptoms  12/16/2021 1039 by Andrei Bishop RN  Outcome: Ongoing-No signs or symptoms of infection noted or reported     Problem: Pain - Acute:  Goal: Pain level will decrease  Description: Pain level will decrease  12/16/2021 1039 by Andrei Bishop RN  Outcome: Ongoing-Patient taking oral pain medication for pain     Problem: Falls - Risk of:  Goal: Will remain free from falls  Description: Will remain free from falls  12/16/2021 1039 by Andrei Bishop RN  Outcome: Ongoing-No falls noted or reported     Problem: Falls - Risk of:  Goal: Absence of physical injury  Description: Absence of physical injury  12/16/2021 1039 by Andrei Bishop RN  Outcome: Ongoing-No injuries noted or reported

## 2021-12-16 NOTE — PROGRESS NOTES
[facility] Orthopedic Surgery   Progress Note      S/P :  SUBJECTIVE  In recliner. Has been up with PT OT. Pain is   described in right knee and with the intensity of moderate. Pain is described as aching. OBJECTIVE              Physical                      VITALS:  BP (!) 146/73   Pulse 62   Temp 98.2 °F (36.8 °C) (Oral)   Resp 23   Ht 5' 9\" (1.753 m)   Wt 201 lb 8 oz (91.4 kg)   SpO2 97%   BMI 29.76 kg/m²                     MUSCULOSKELETAL:  right foot NVI. Wiggles toes to command. Pedal pulses are palpable. NEUROLOGIC:                                  Sensory:  Touch:  Right Lower Extremity:  normal                                                 Surgical wound appears clean and dry right knee with Prineo and Ice pack. JANICE hose on.      Data       CBC:   Lab Results   Component Value Date    WBC 5.6 12/06/2021    RBC 4.66 12/06/2021    HGB 14.1 12/06/2021    HCT 41.4 12/06/2021    MCV 88.9 12/06/2021    MCH 30.3 12/06/2021    MCHC 34.1 12/06/2021    RDW 14.2 12/06/2021     12/06/2021    MPV 7.7 12/06/2021        WBC:    Lab Results   Component Value Date    WBC 5.6 12/06/2021        Hemoglobin/Hematocrit:    Lab Results   Component Value Date    HGB 14.1 12/06/2021    HCT 41.4 12/06/2021        PT/INR:    Lab Results   Component Value Date    PROTIME 11.7 12/06/2021    INR 1.03 12/06/2021              Current Inpatient Medications             Current Facility-Administered Medications: atorvastatin (LIPITOR) tablet 80 mg, 80 mg, Oral, Nightly  escitalopram (LEXAPRO) tablet 10 mg, 10 mg, Oral, BID  pantoprazole (PROTONIX) tablet 40 mg, 40 mg, Oral, QAM AC  ezetimibe (ZETIA) tablet 10 mg, 10 mg, Oral, Nightly  levothyroxine (SYNTHROID) tablet 50 mcg, 50 mcg, Oral, QAM AC  topiramate (TOPAMAX) tablet 25 mg, 25 mg, Oral, BID  insulin lispro (HUMALOG) injection vial 0-6 Units, 0-6 Units, SubCUTAneous, TID WC  insulin lispro (HUMALOG) injection vial 0-3 Units, 0-3 Units, SubCUTAneous, Nightly  glucose (GLUTOSE) 40 % oral gel 15 g, 15 g, Oral, PRN  dextrose 50 % IV solution, 12.5 g, IntraVENous, PRN  glucagon (rDNA) injection 1 mg, 1 mg, IntraMUSCular, PRN  dextrose 5 % solution, 100 mL/hr, IntraVENous, PRN  0.45 % sodium chloride infusion, , IntraVENous, Continuous  sodium chloride flush 0.9 % injection 10 mL, 10 mL, IntraVENous, 2 times per day  sodium chloride flush 0.9 % injection 10 mL, 10 mL, IntraVENous, PRN  0.9 % sodium chloride infusion, 25 mL, IntraVENous, PRN  acetaminophen (TYLENOL) tablet 650 mg, 650 mg, Oral, Q6H  oxyCODONE (ROXICODONE) immediate release tablet 5 mg, 5 mg, Oral, Q4H PRN **OR** oxyCODONE HCl (OXY-IR) immediate release tablet 10 mg, 10 mg, Oral, Q4H PRN  morphine (PF) injection 2 mg, 2 mg, IntraVENous, Q3H PRN **OR** morphine (PF) injection 4 mg, 4 mg, IntraVENous, Q3H PRN  ondansetron (ZOFRAN-ODT) disintegrating tablet 4 mg, 4 mg, Oral, Q8H PRN **OR** ondansetron (ZOFRAN) injection 4 mg, 4 mg, IntraVENous, Q6H PRN  aspirin EC tablet 81 mg, 81 mg, Oral, BID  pregabalin (LYRICA) capsule 75 mg, 75 mg, Oral, BID  lisinopril (PRINIVIL;ZESTRIL) tablet 10 mg, 10 mg, Oral, Daily    ASSESSMENT AND PLAN      Post right TKA, stable exam  DVT prophylaxis ordered, ASA 81mg twice at day for 14 days for DVT prophylaxis  PT OT for ADL's and ambulation as tolerated  SS for DC planning, home with home care today  IV or PO pain med as ordered, Lyrica added for postop pain control in an effort to reduce narcotic use per Dr Octavio Weiss protocol.      AMARIS Scott - CNP  12/16/2021  9:41 AM

## 2021-12-17 ENCOUNTER — CARE COORDINATION (OUTPATIENT)
Dept: CASE MANAGEMENT | Age: 81
End: 2021-12-17

## 2021-12-17 NOTE — CARE COORDINATION
[facility] Transitions Initial Follow Up Call    Call within 2 business days of discharge: Yes    Patient: Aurora Damico Patient : 1940   MRN: <[id number]>  Reason for Admission: RIGHT TOTAL KNEE REPLACEMENT   Discharge Date: 21 RARS: No data recorded    Last Discharge [facility]       Complaint Diagnosis Description Type Department Provider    12/15/21  Arthritis of right knee Admission (Discharged) Nupur Mahmood MD           Spoke with: quin    Facility: [facility] attempted outreach for 24 hr hospital discharge care transition follow up. Left HIPPA compliant message and contact information for call back.     Talked with Robinson Mendez at [facility] to verify receipt of patient referral.    Care Transitions 24 Hour Call    Care Transitions Interventions         Follow Up  Future Appointments   Date Time Provider Leonie Silva   2021  1:15 PM MD Shaq Myers LPN

## 2021-12-20 ENCOUNTER — TELEPHONE (OUTPATIENT)
Dept: INTERNAL MEDICINE CLINIC | Age: 81
End: 2021-12-20

## 2021-12-20 ENCOUNTER — CARE COORDINATION (OUTPATIENT)
Dept: CASE MANAGEMENT | Age: 81
End: 2021-12-20

## 2021-12-20 ENCOUNTER — TELEPHONE (OUTPATIENT)
Dept: ORTHOPEDICS UNIT | Age: 81
End: 2021-12-20

## 2021-12-20 RX ORDER — DOXYCYCLINE HYCLATE 100 MG
100 TABLET ORAL 2 TIMES DAILY
Qty: 14 TABLET | Refills: 0 | Status: SHIPPED | OUTPATIENT
Start: 2021-12-20 | End: 2021-12-27

## 2021-12-20 NOTE — TELEPHONE ENCOUNTER
Pt had a right knee replacement last week. Pt is getting home PT & OT. Pt has a very  sore throat, throat is red, difficulty swallowing and  pain up into ear area. Pt wants to know if Dr. Eric Lara will call in an abx. Pt is taking 1/2  Tab of Oxycodone about once a day and is taking Tylenol. Kathleen.

## 2021-12-20 NOTE — TELEPHONE ENCOUNTER
Again it would be difficult to say what the infection is from -viral,bacterial or from even Covid  I will call in antibiotics and not sure this will help or not   See me if not getting better

## 2021-12-20 NOTE — TELEPHONE ENCOUNTER
Lord Perez the message , she also mention that he is having some bottom lip swelling .  Per Rose Kennedy , take zyrtec or Claritin / benadryl

## 2021-12-20 NOTE — TELEPHONE ENCOUNTER
spoke to patient's wife, stated patient was having hallucinations and double vision when taking lyrica- stopped medication on Saturday and symptoms have resolved. Added to allergy list. Stated patient had chills , sore throat,  And temperature of 100.1 on Thursday night and took tylenol and resolved. Afebrile since. Stated patient had difficulty swallowing due to sore throat after surgery. Piedad John stated patient is not having difficulty breathing. Had intubation during procedure and has been eating soft foods without difficulty but now feels he has swelling in ear. encouraged wife to continue to monitor and continue soft foods and ice chips/popsicles and if throat soreness worsens or if patient has febrile episode to contact myself or the orthopedic office.

## 2021-12-20 NOTE — CARE COORDINATION
[facility] Transitions Initial Follow Up Call    Call within 2 business days of discharge: Yes    Patient: Darcy Moe Patient : 1940   MRN: [id number]  Reason for Admission: arthritis of right knee  Discharge Date: 21 RARS: No data recorded    Last Discharge [facility]       Complaint Diagnosis Description Type Department Provider    12/15/21  Arthritis of right knee Admission (Discharged) Clemencia Huerta MD           Spoke with: Remington Bishop - [facility]    Facility: [address] [place] [address] 24 Hour Call    Care Transitions Interventions       2nd and final attempt at CT discharge phone call. Spoke briefly with Vincent Victoria. She states Juan José is sleeping. She declines CT discharge phone call or additional CT calls. She states [facility] is active and [place] will be following up with the ortho surgeon. Vincent Victoria took writer's contact info.     Follow Up  Future Appointments   Date Time Provider Leonie Silva   2021  1:15 PM Mark Mc MD W ORTHO JESSICA Fountain RN BSN  Care Transition Nurse  [phone]

## 2021-12-21 ENCOUNTER — TELEPHONE (OUTPATIENT)
Dept: ORTHOPEDIC SURGERY | Age: 81
End: 2021-12-21

## 2021-12-21 NOTE — DISCHARGE SUMMARY
Physician Discharge Summary     Patient ID:  Zohreh Neville  [id number]  29 y.o.  1940    Admit date: 12/15/2021    Discharge date and time: 12/16/2021 11:03 AM     Admitting Physician: Maximilian Madrigal MD     Discharge Physician: Salo Umanzor    Admission Diagnoses: Arthritis of right knee [M17.11]    Discharge Diagnoses: right knee OA    Admission Condition: good    Discharged Condition: good    Indication for Admission: Failed conservative treatment as outpatient for joint pain including PT and pain meds. This patient was then electively scheduled for total joint replacement surgery    Surgical procedure: right TKA    Consults: PT OT SS    This patient had no postoperative complications. They has PT and OT for ADL's . IV and PO pain med for pain control and was eventually DC in stable condition    Treatments: analgesia,  therapies: PT OT,  and surgery      Disposition: home    Patient Instructions:   [unfilled]  Activity: activity as tolerated  Diet: regular diet  Wound Care: keep wound clean and dry    Follow-up with LISA Salmon in 2 weeks.     Signed:  AMARIS Brown CNP  12/21/2021  12:40 PM

## 2021-12-28 ENCOUNTER — HOSPITAL ENCOUNTER (OUTPATIENT)
Dept: VASCULAR LAB | Age: 81
Discharge: HOME | End: 2021-12-28
Payer: MEDICARE

## 2021-12-28 ENCOUNTER — TELEPHONE (OUTPATIENT)
Dept: ORTHOPEDIC SURGERY | Age: 81
End: 2021-12-28

## 2021-12-28 DIAGNOSIS — M79.89 RIGHT LEG SWELLING: Primary | ICD-10-CM

## 2021-12-28 DIAGNOSIS — M79.89 RIGHT LEG SWELLING: ICD-10-CM

## 2021-12-28 PROCEDURE — 93971 EXTREMITY STUDY: CPT

## 2021-12-28 NOTE — TELEPHONE ENCOUNTER
Spoke to Vahid, she stated the patient has no fever, no chills. Swelling of entire leg from foot to thigh. Patient recently finished a antibiotic for a throat infection and he finished it yesterday. Incision looks good per KHADIJAH Redding.  Spoke with Servando Carrasco and he requested a STAT venous doppler which is scheduled for today 230p, at [facility].

## 2021-12-28 NOTE — TELEPHONE ENCOUNTER
KHADIJAH DALEY from [employer] called this am after seeing the patient. She is concerned that his whole Rt leg is swollen, including his foot. However, there is no redness or pitting. The pt was on antibiotics for a throat infection, ordered by PCP. He has finished the meds. The patient's wife called in on [place] and was given instructions to elevate and ice it. Maxi oSl RN last saw the patient on 12/21/21. At that time the patient did have post op swelling, which was expected. Today, she says the swelling has increased in size. He is wearing to the knee JANICE hose, she doesn't think this is affecting the swelling. Vincent Boast has an appt to see Dr. Claudean Harp on Thursday. If there are any additional instructions, please contact Maxi Sol RN at [phone].

## 2021-12-30 ENCOUNTER — OFFICE VISIT (OUTPATIENT)
Dept: ORTHOPEDIC SURGERY | Age: 81
End: 2021-12-30

## 2021-12-30 VITALS — BODY MASS INDEX: 29.77 KG/M2 | HEIGHT: 69 IN | WEIGHT: 201 LBS | RESPIRATION RATE: 16 BRPM

## 2021-12-30 DIAGNOSIS — Z96.651 STATUS POST TOTAL RIGHT KNEE REPLACEMENT: Primary | ICD-10-CM

## 2021-12-30 PROCEDURE — 99024 POSTOP FOLLOW-UP VISIT: CPT | Performed by: ORTHOPAEDIC SURGERY

## 2021-12-30 NOTE — PROGRESS NOTES
[facility]    Chief Complaint: Follow-up s/p right total knee arthroplasty    HPI:  Chon Freitas is a [de-identified] y.o. who is here in follow-up of right total knee arthroplasty performed on 12/15/2021. He is here for his 1st postoperative visit today. He did call in with increased leg swelling a few days ago for which a stat Doppler was done which is negative. He is here on a walker today. He is taking Advil and Tylenol for pain control. He rates the pain as 2/10. He is working with home physical therapy. Patient Active Problem List   Diagnosis    Chest pain on exertion    Essential hypertension    S/P CABG (coronary artery bypass graft)    Hyperlipidemia LDL goal <70    Reactive depression    Prostate carcinoma ([facility] [address]    Vitamin D deficiency    Left hip pain    Chronic left-sided low back pain without sciatica    Coronary artery disease    Edema of right lower extremity    Greater trochanteric bursitis of left hip    Primary osteoarthritis of right knee    Primary osteoarthritis of left knee    Chronic fatigue    Acquired hypothyroidism    Gastroesophageal reflux disease without esophagitis    Arthritis of left knee    History of total knee replacement, 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 rate 16, height 5' 9\" (1.753 m), weight 201 lb (91.2 kg). Appearance: sitting in exam room chair, appears to be in no acute distress, awake and alert  Resp: unlabored breathing on room air  Skin: warm, dry and intact with out erythema or significant increased temperature  Neuro: grossly intact both lower extremities. Intact sensation to light touch.  Motor exam 4+ to 5/5 in all major motor groups. Right knee: Incision is healing as expected. Range of motion is 5 to 90 degrees. Sensation is intact light touch. There is brisk capillary refill. There is 5/5 muscle strength in all muscle groups. Imaging:  3 views of the right knee were performed and reviewed today. Significant for total knee arthroplasty prosthesis in place with no signs of osteolysis, loosening, fracture, or dislocation. Assessment:  S/p right total knee arthroplasty    Plan:  He is recovering as expected from right total knee arthroplasty. He will transition outpatient physical therapy. He will return here in 3 to 4 weeks for repeat examination and range of motion check. This dictation was done with [employer] dictation and may contain mechanical errors related to translation.

## 2021-12-31 PROBLEM — Z01.818 PREOP EXAMINATION: Status: RESOLVED | Noted: 2021-12-01 | Resolved: 2021-12-31

## 2022-01-05 ENCOUNTER — HOSPITAL ENCOUNTER (OUTPATIENT)
Dept: PHYSICAL THERAPY | Age: 82
Setting detail: THERAPIES SERIES
Discharge: HOME | End: 2022-01-05
Payer: MEDICARE

## 2022-01-05 PROCEDURE — 97110 THERAPEUTIC EXERCISES: CPT

## 2022-01-05 PROCEDURE — 97140 MANUAL THERAPY 1/> REGIONS: CPT

## 2022-01-05 PROCEDURE — 97161 PT EVAL LOW COMPLEX 20 MIN: CPT

## 2022-01-05 NOTE — FLOWSHEET NOTE
[address] [facility],[address], [address] [facility], [address]  Phone: [phone]   Fax: [phone]    Physical Therapy Treatment Note/ Progress Report:         Date:  2022    Patient Name:  Evy Apley    :  1940  MRN: [id number]  Restrictions/Precautions:    Medical/Treatment Diagnosis Information:  · Diagnosis: V17.816 (ICD-10-CM) - Status post total right knee replacement  Treatment Diagnosis: Decreased functional mobility, Increased pain, Decreased ROM, Decreased strength, Decreased endurance  Insurance/Certification information:  PT Insurance Information: Medicare  Physician Information:  Referring Practitioner: Dr. Maxx Childs  Has the plan of care been signed (Y/N):        []  Yes  [x]  No     Date of Patient follow up with Physician: 22      Is this a Progress Report:     []  Yes  [x]  No        If Yes:  Date Range for reporting period:  Beginnin22  Ending    Progress report will be due (10 Rx or 30 days whichever is less): 3/1/54       Recertification will be due (POC Duration  / 90 days whichever is less): see above         Visit # Insurance Allowable Auth Required   1 BMN []  Yes [x]  No        Functional Scale:     OUTCOME MEASURE DATE DEFICIT   WOMAC 22 IE 43.75% Deficit         Latex Allergy:  [x]NO      []YES  Preferred Language for Healthcare:   [x]English       []other:      Pain level: 1/10     SUBJECTIVE:  See eval  3 weeks s/p R TKA     OBJECTIVE: See eval   Observation:    Test measurements:       RESTRICTIONS/PRECAUTIONS: hx of prostate cancer, hx of quadruple bypass, hx of L TKA, HTN, anxiety, depression    Exercises/Interventions:       Exercise/Equipment Resistance/Repetitions Other comments   Stretching     Hamstring 5x10\" EOT   Prone quad     Strap calf stretch 5x10\"    Hip Flexion- Pato stretch     ITB                              SLR     Supine     Abduction     Adduction     Prone     Munices  Long sitting        Isometrics     Quad sets 5x5\"         Patellar Glides     Medial/Lateral 4'    Superior/Inferior  4'              ROM     Standing chair lunge     Hang Weights     Seated EOT knee flex/ext 10x5\"    Supine SB knee flexion     Weight Shift     Ankle Pumps                    CKC     Calf raises     Wall sits     Step ups     Lateral step ups     1 leg stand     Squatting     CC TKE     Balance     bridges     FR quad iso     SL KB pass     KB DL     Decline heel taps                    PRE     Extension  RANGE:   Flexion  RANGE:        Leg curl               Bike          Manual interventions     Patellar mobs (grade 2-3), effleurage  8'               Therapeutic Exercise and NMR EXR  [x] (63865) Provided verbal/tactile cueing for activities related to strengthening, flexibility, endurance, ROM for improvements in LE, proximal hip, and core control with self care, mobility, lifting, ambulation. [x] (74503) Provided verbal/tactile cueing for activities related to improving balance, coordination, kinesthetic sense, posture, motor skill, proprioception  to assist with LE, proximal hip, and core control in self care, mobility, lifting, ambulation and eccentric single leg control.      NMR and Therapeutic Activities:    [x] (14814 or 52130) Provided verbal/tactile cueing for activities related to improving balance, coordination, kinesthetic sense, posture, motor skill, proprioception and motor activation to allow for proper function of core, proximal hip and LE with self care and ADLs  [] (45563) Gait Re-education- Provided training and instruction to the patient for proper LE, core and proximal hip recruitment and positioning and eccentric body weight control with ambulation re-education including up and down stairs     Home Exercise Program:    [x] (71095) Reviewed/Progressed HEP activities related to strengthening, flexibility, endurance, ROM of core, proximal hip and LE for functional self-care, mobility, lifting and ambulation/stair navigation   [] (88446)Reviewed/Progressed HEP activities related to improving balance, coordination, kinesthetic sense, posture, motor skill, proprioception of core, proximal hip and LE for self care, mobility, lifting, and ambulation/stair navigation      Manual Treatments:  PROM / STM / Oscillations-Mobs:  G-I, II, III, IV (PA's, Inf., Post.)  [] (14847) Provided manual therapy to mobilize LE, proximal hip and/or LS spine soft tissue/joints for the purpose of modulating pain, promoting relaxation,  increasing ROM, reducing/eliminating soft tissue swelling/inflammation/restriction, improving soft tissue extensibility and allowing for proper ROM for normal function with self care, mobility, lifting and ambulation. Modalities:     [] GAME READY (VASO)- for significant edema, swelling, pain control. Charges:  Timed Code Treatment Minutes: 26   Total Treatment Minutes: 46      [x] EVAL (LOW) 61972 (typically 20 minutes face-to-face)  [] EVAL (MOD) 27709 (typically 30 minutes face-to-face)  [] EVAL (HIGH) 20208 (typically 45 minutes face-to-face)  [] RE-EVAL     [x] AQ(42317) x   1  [] IONTO  [] NMR (56764) x     [] VASO  [x] Manual (33162) x    1 [] Other:  [] TA x      [] Mech Traction (90448)  [] ES(attended) (94700)      [] ES (un) (67213):       ASSESSMENT:  See eval    HEP instruction:   Access Code: [email or web address]  URL: [email or web address]  Date: 01/05/2022  Prepared by: Denisa Maravilla  (see scanned forms)    GOALS:  Patient stated goal: walk pain free  [] Progressing: [] Met: [] Not Met: [] Adjusted    Therapist goals for Patient:   Short Term Goals: To be achieved in: 2 weeks  1. Independent in HEP and progression per patient tolerance, in order to prevent re-injury. [] Progressing: [] Met: [] Not Met: [] Adjusted   2. Patient will have a decrease in pain to facilitate improvement in movement, function, and ADLs as indicated by Functional Deficits.     [] Progressing: Alter current plan (see comments above)  [x] Plan of care initiated [] Hold pending MD visit [] Discharge      Electronically signed by:  Colin Ramos, PT , DPT          Note: If patient does not return for scheduled/ recommended follow up visits, this note will serve as a discharge from care along with most recent update on progress.

## 2022-01-05 NOTE — PLAN OF CARE
[address] [facility], [address] [place] [address], [address] [facility]  Phone: [phone]   Fax: [phone]                                                       Physical Therapy Certification    Dear Referring Practitioner: Dr. Rabia Andrade,    We had the pleasure of evaluating the following patient for physical therapy services at [address] [place] [address]. A summary of our findings can be found in the initial assessment below. This includes our plan of care. If you have any questions or concerns regarding these findings, please do not hesitate to contact me at the office phone number checked above. Thank you for the referral.       Physician Signature:_______________________________Date:__________________  By signing above (or electronic signature), therapists plan is approved by physician      Patient: Jerrod García   : 1940   MRN: [id number]  Referring Physician: Referring Practitioner: Dr. Rabia Andrade      Evaluation Date: 2022      Medical Diagnosis Information:  Diagnosis: D83.748 (ICD-10-CM) - Status post total right knee replacement   Treatment Diagnosis: Decreased functional mobility, Increased pain, Decreased ROM, Decreased strength, Decreased endurance                                         Insurance information: PT Insurance Information: Medicare     C-SSRS Triggered by Intake questionnaire (Past 2 wk assessment):   [x] No, Questionnaire did not trigger screening.   [] Yes, Patient intake triggered further evaluation      [] C-SSRS Screening completed  [] PCP notified via Plan of Care  [] Emergency services notified     Precautions/ Contra-indications: hx of prostate cancer, hx of quadruple bypass, hx of L TKA, HTN, anxiety, depression  Latex Allergy:  [x]NO      []YES  Preferred Language for Healthcare:   [x]English       []other:    SUBJECTIVE: Patient stated complaint: 3 weeks s/p R TKA.  Pt has had home health physical therapy 2x week since operation. Hx from chart review: (12/30/21) follow-up of right total knee arthroplasty performed on 12/15/2021. He is here for his 1st postoperative visit today. He did call in with increased leg swelling a few days ago for which a stat Doppler was done which is negative. He is here on a walker today. He is taking Advil and Tylenol for pain control. He rates the pain as 2/10. He is working with home physical therapy. He is recovering as expected from right total knee arthroplasty. He will transition outpatient physical therapy. He will return here in 3 to 4 weeks for repeat examination and range of motion check. Relevant Medical History: hx of prostate cancer, hx of quadruple bypass, hx of L TKA, HTN, anxiety, depression    Functional Disability Index: WOMAC 1/5/22: 43.75%    Pain Scale: 1/10  Pain at worst: 5/10  Pain at best: 0/10    Easing factors: rest, ice  Provocative factors: sitting for over 10 minutes, walking long distances, \"unusual movements\", insidious spasms      Type: []Constant   [x]Intermittent  []Radiating []Localized []other:     Numbness/Tingling: pt denies    Characteristics: sharp dull, can be achy    Occupation/School: enjoys wood carving and would like to return to this activity. Enjoys walking, has held back due to both TKAs     Living Status/Prior Level of Function: Independent with ADLs and IADLs. Lives with his wife in a house with a stair lift to the basement.          OBJECTIVE:      Flexibility L R Comment   Hamstring      Gastroc      ITB      Quad      Hip flexor        ROM PROM AROM Overpressure Comment    L R L R* L R    Flexion  86 with heel slides 95 82   ROM tested in long sitting   Extension   0 +2 lacking                              Strength L R* Comment   Quad set 4+ 3- based on quad set    Hamstring 4+     Gastroc      Hip  flexion      Hip abd                      Special Test Results/Comment   Meniscal Click    Crepitus    Flexion Test    Valgus Laxity Varus Laxity    Lachmans    Drop Back    Homans            Girth L R   Mid Patella     Suprapatellar     5cm above     15cm above       Reflexes/Sensation: nt   []Dermatomes/Myotomes intact    []Reflexes equal and normal bilaterally   []Other:    Joint mobility:    []Normal    [x]Hypo: patellar mobility, likely due to edema   []Hyper    Palpation: (+) minimal TTP medial joint line    Functional Mobility/Transfers: independent but limited     Posture: WNL    Bandages/Dressings/Incisions: no s/s of infection     Gait: (include devices/WB status) SPC on R side of R TKA, decreased heel strike and knee flexion                        [x] Patient history, allergies, meds reviewed. Medical chart reviewed. See intake form. Review Of Systems (ROS):  [x]Performed Review of systems (Integumentary, CardioPulmonary, Neurological) by intake and observation. Intake form has been scanned into medical record. Patient has been instructed to contact their primary care physician regarding ROS issues if not already being addressed at this time.       Co-morbidities/Complexities (which will affect course of rehabilitation):   []None           Arthritic conditions   []Rheumatoid arthritis (M05.9)  [x]Osteoarthritis (M19.91)   Cardiovascular conditions   [x]Hypertension (I10)  []Hyperlipidemia (E78.5)  []Angina pectoris (I20)  []Atherosclerosis (I70)   Musculoskeletal conditions   []Disc pathology   []Congenital spine pathologies   []Prior surgical intervention  []Osteoporosis (M81.8)  []Osteopenia (M85.8)   Endocrine conditions   []Hypothyroid (E03.9)  []Hyperthyroid Gastrointestinal conditions   []Constipation (P44.30)   Metabolic conditions   []Morbid obesity (E66.01)  []Diabetes type 1(E10.65) or 2 (E11.65)   []Neuropathy (G60.9)     Pulmonary conditions   []Asthma (J45)  []Coughing   []COPD (J44.9)   Psychological Disorders  [x]Anxiety (F41.9)  [x]Depression (F32.9)   []Other:   [x]Other:     - prostate cancer (2 or 3 years ago) Barriers to/and or personal factors that will affect rehab potential:              [x]Age  []Sex              []Motivation/Lack of Motivation                        [x]Co-Morbidities              []Cognitive Function, education/learning barriers              []Environmental, home barriers              []profession/work barriers  []past PT/medical experience  []other:  Justification:     Falls Risk Assessment (30 days):   [x] Falls Risk assessed and no intervention required. [] Falls Risk assessed and Patient requires intervention due to being higher risk   TUG score (>12s at risk):     [] Falls education provided, including         ASSESSMENT:   Functional Impairments:     []Noted lumbar/proximal hip/LE hypomobility   [x]Decreased LE functional ROM   [x]Decreased core/proximal hip strength and neuromuscular control   [x]Decreased LE functional strength   [x]Reduced balance/proprioceptive control   []other:      Functional Activity Limitations (from functional questionnaire and intake)   [x]Reduced ability to tolerate prolonged functional positions   [x]Reduced ability or difficulty with changes of positions or transfers between positions   [x]Reduced ability to maintain good posture and demonstrate good body mechanics with sitting, bending, and lifting   [x]Reduced ability to sleep   [x] Reduced ability or tolerance with driving and/or computer work   [x]Reduced ability to perform lifting, carrying tasks   [x]Reduced ability to squat   [x]Reduced ability to forward bend   [x]Reduced ability to ambulate prolonged functional periods/distances/surfaces   [x]Reduced ability to ascend/descend stairs   []Reduced ability to run, hop or jump   []other:     Participation Restrictions:   [x]Reduced participation in self care activities   [x]Reduced participation in home management activities   []Reduced participation in work activities   [x]Reduced participation in social activities.    []Reduced participation in sport activities. Classification :    [x]Signs/symptoms consistent with post-surgical status including decreased ROM, strength and function. []Signs/symptoms consistent with joint sprain/strain   []Signs/symptoms consistent with patella-femoral syndrome   []Signs/symptoms consistent with knee OA/hip OA   []Signs/symptoms consistent with internal derangement of knee/Hip   []Signs/symptoms consistent with functional hip weakness/NMR control      []Signs/symptoms consistent with tendinitis/tendinosis    []signs/symptoms consistent with pathology which may benefit from Dry needling      []other:      Prognosis/Rehab Potential:     []Excellent   [x]Good    []Fair   []Poor    Tolerance of evaluation/treatment:    []Excellent   []Good    [x]Fair   []Poor    Physical Therapy Evaluation Complexity Justification  [x] A history of present problem with:  [] no personal factors and/or comorbidities that impact the plan of care;  [x]1-2 personal factors and/or comorbidities that impact the plan of care  []3 personal factors and/or comorbidities that impact the plan of care  [x] An examination of body systems using standardized tests and measures addressing any of the following: body structures and functions (impairments), activity limitations, and/or participation restrictions;:  [] a total of 1-2 or more elements   [x] a total of 3 or more elements   [] a total of 4 or more elements   [x] A clinical presentation with:  [x] stable and/or uncomplicated characteristics   [] evolving clinical presentation with changing characteristics  [] unstable and unpredictable characteristics;   [x] Clinical decision making of [x] low, [] moderate, [] high complexity using standardized patient assessment instrument and/or measurable assessment of functional outcome.     [x] EVAL (LOW) 30815 (typically 20 minutes face-to-face)  [] EVAL (MOD) 22503 (typically 30 minutes face-to-face)  [] EVAL (HIGH) 43303 (typically 45 minutes face-to-face)  [] Progressing: [] Met: [] Not Met: [] Adjusted  5. Patient will be able to tolerate a seated position for 1 hour in order to return to hobbies.      [] Progressing: [] Met: [] Not Met: [] Adjusted     Electronically signed by:  Serina Mathew, PT , DPT

## 2022-01-10 ENCOUNTER — HOSPITAL ENCOUNTER (OUTPATIENT)
Dept: PHYSICAL THERAPY | Age: 82
Setting detail: THERAPIES SERIES
Discharge: HOME | End: 2022-01-10
Payer: MEDICARE

## 2022-01-10 PROCEDURE — 97112 NEUROMUSCULAR REEDUCATION: CPT

## 2022-01-10 PROCEDURE — 97110 THERAPEUTIC EXERCISES: CPT

## 2022-01-10 NOTE — FLOWSHEET NOTE
[address] [facility],[address], [address] [facility], [address]  Phone: [phone]   Fax: [phone]    Physical Therapy Treatment Note/ Progress Report:         Date:  1/10/2022    Patient Name:  Rachel Pascual    :  1940  MRN: [id number]  Restrictions/Precautions:    Medical/Treatment Diagnosis Information:  · Diagnosis: K30.376 (ICD-10-CM) - Status post total right knee replacement  Treatment Diagnosis: Decreased functional mobility, Increased pain, Decreased ROM, Decreased strength, Decreased endurance  Insurance/Certification information:  PT Insurance Information: Medicare  Physician Information:  Referring Practitioner: Dr. Veronica Pappas  Has the plan of care been signed (Y/N):        []  Yes  [x]  No     Date of Patient follow up with Physician: 22      Is this a Progress Report:     []  Yes  [x]  No        If Yes:  Date Range for reporting period:  Beginnin22  Ending    Progress report will be due (10 Rx or 30 days whichever is less): 90       Recertification will be due (POC Duration  / 90 days whichever is less): see above         Visit # Insurance Allowable Auth Required   2 BMN []  Yes [x]  No        Functional Scale:     OUTCOME MEASURE DATE DEFICIT   WOMAC 22 IE 43.75% Deficit         Latex Allergy:  [x]NO      []YES  Preferred Language for Healthcare:   [x]English       []other:      Pain level: 1/10     SUBJECTIVE:  Patient states he was sore and tired after the last visit. Reports no issues with HEP completion and that he enjoys completing it before he gets out of bed in the morning. Patient states he uses the [facility] as needed, trying to walk without it in his house.    3+ weeks s/p R TKA (12/15/21)    OBJECTIVE: See eval   Observation:    Test measurements:       RESTRICTIONS/PRECAUTIONS: hx of prostate cancer, hx of quadruple bypass, hx of L TKA, HTN, anxiety, depression    Exercises/Interventions:       Exercise/Equipment Resistance/Repetitions Other comments   Stretching     Hamstring 5x20\" EOT   Prone quad     Strap calf stretch 5x20\"    Hip Flexion- Pato stretch     ITB                              SLR     Supine 2x5    Abduction     Adduction     Prone     Munices  Long sitting        Isometrics     Quad sets 10x5\"    Prone TKE               Patellar Glides                     ROM     Standing chair lunge     Hang Weights     Seated EOT knee flex/ext 10x5\"    Supine SB knee flexion     Weight Shift     Ankle Pumps     Supine heel slides 10x10\"              CKC     Calf raises x15    Wall sits     Step ups     Lateral step ups     1 leg stand     Squatting  Mini squat with BUE support   CC TKE     Balance     bridges     FR quad iso     SL KB pass     KB DL     Decline heel taps                    PRE     Extension  RANGE:   Flexion  RANGE:        Leg curl               Bike 5' rocking for ROM         Manual interventions     Patellar mobs (grade 2-3), effleurage                 Therapeutic Exercise and NMR EXR  [x] (49858) Provided verbal/tactile cueing for activities related to strengthening, flexibility, endurance, ROM for improvements in LE, proximal hip, and core control with self care, mobility, lifting, ambulation. [x] (32630) Provided verbal/tactile cueing for activities related to improving balance, coordination, kinesthetic sense, posture, motor skill, proprioception  to assist with LE, proximal hip, and core control in self care, mobility, lifting, ambulation and eccentric single leg control.      NMR and Therapeutic Activities:    [x] (76570 or 32935) Provided verbal/tactile cueing for activities related to improving balance, coordination, kinesthetic sense, posture, motor skill, proprioception and motor activation to allow for proper function of core, proximal hip and LE with self care and ADLs  [] (85393) Gait Re-education- Provided training and instruction to the patient for proper LE, core and proximal hip recruitment and positioning and eccentric body weight control with ambulation re-education including up and down stairs     Home Exercise Program:    [x] (05594) Reviewed/Progressed HEP activities related to strengthening, flexibility, endurance, ROM of core, proximal hip and LE for functional self-care, mobility, lifting and ambulation/stair navigation   [] (72459)Reviewed/Progressed HEP activities related to improving balance, coordination, kinesthetic sense, posture, motor skill, proprioception of core, proximal hip and LE for self care, mobility, lifting, and ambulation/stair navigation      Manual Treatments:  PROM / STM / Oscillations-Mobs:  G-I, II, III, IV (PA's, Inf., Post.)  [] (52098) Provided manual therapy to mobilize LE, proximal hip and/or LS spine soft tissue/joints for the purpose of modulating pain, promoting relaxation,  increasing ROM, reducing/eliminating soft tissue swelling/inflammation/restriction, improving soft tissue extensibility and allowing for proper ROM for normal function with self care, mobility, lifting and ambulation. Modalities:     [] GAME READY (VASO)- for significant edema, swelling, pain control. Charges:  Timed Code Treatment Minutes: 33   Total Treatment Minutes: 33   1124-157  [] EVAL (LOW) 65202 (typically 20 minutes face-to-face)  [] EVAL (MOD) 60233 (typically 30 minutes face-to-face)  [] EVAL (HIGH) 89847 (typically 45 minutes face-to-face)  [] RE-EVAL     [x] XD(57135) x   1  [] IONTO  [x] NMR (36511) x     [] VASO  [] Manual (16629) x    1 [] Other:  [] TA x      [] Mech Traction (55762)  [] ES(attended) (38709)      [] ES (un) (45996):       ASSESSMENT:  See eval    HEP instruction:   Access Code: [email or web address]  URL: [email or web address]  Date: 01/05/2022  Prepared by: Getachew Minaya  (see scanned forms)    GOALS:  Patient stated goal: walk pain free  [] Progressing: [] Met: [] Not Met: [] Adjusted    Therapist goals for Patient:   Short Term Goals:  To be achieved in: 2 weeks  1. Independent in HEP and progression per patient tolerance, in order to prevent re-injury. [] Progressing: [] Met: [] Not Met: [] Adjusted   2. Patient will have a decrease in pain to facilitate improvement in movement, function, and ADLs as indicated by Functional Deficits. [] Progressing: [] Met: [] Not Met: [] Adjusted    Long Term Goals: To be achieved in: 8 weeks  1. Disability index score of 21.8% or less for the LEFS to assist with reaching prior level of function. [] Progressing: [] Met: [] Not Met: [] Adjusted  2. Patient will demonstrate increased AROM to 100 flexion to allow for proper joint functioning as indicated by patients Functional Deficits. [] Progressing: [] Met: [] Not Met: [] Adjusted  3. Patient will demonstrate an increase in knee strength to 4+/5 in LE to allow for proper functional mobility as indicated by patients Functional Deficits. [] Progressing: [] Met: [] Not Met: [] Adjusted  4. Patient will return to walking without increased symptoms or restriction. [] Progressing: [] Met: [] Not Met: [] Adjusted  5. Patient will be able to tolerate a seated position for 1 hour in order to return to hobbies. [] Progressing: [] Met: [] Not Met: [] Adjusted      Overall Progression Towards Functional goals/ Treatment Progress Update:  [] Patient is progressing as expected towards functional goals listed. [] Progression is slowed due to complexities/Impairments listed. [] Progression has been slowed due to co-morbidities.   [x] Plan just implemented, too soon to assess goals progression <30days   [] Goals require adjustment due to lack of progress  [] Patient is not progressing as expected and requires additional follow up with physician  [] Other    Prognosis for POC: [x] Good [] Fair  [] Poor      Patient requires continued skilled intervention: [x] Yes  [] No    Treatment/Activity Tolerance:  [] Patient tolerated treatment well [x] Patient limited by michaelle  [] Patient limited by pain  [] Patient limited by other medical complications  [] Other: Patient demonstrating moderate quad lag but no increase in pain with SLR. Patient able to complete recumbent bike for 5 minutes with half revolutions, focusing on range of motion. Patient challenged with today's progressions, educated to continue HEP twice a day in order to increase exercise tolerance. Patient education: [facility] ambulation education, HEP, POC    PLAN: See eval, consider standing abd and mini squats NV  [] Continue per plan of care [] Alter current plan (see comments above)  [x] Plan of care initiated [] Hold pending MD visit [] Discharge      Electronically signed by:  Macarena Edwards, PT , DPT          Note: If patient does not return for scheduled/ recommended follow up visits, this note will serve as a discharge from care along with most recent update on progress.

## 2022-01-11 RX ORDER — ESCITALOPRAM OXALATE 20 MG/1
TABLET ORAL
Qty: 90 TABLET | Refills: 1 | Status: SHIPPED | OUTPATIENT
Start: 2022-01-11 | End: 2022-07-08 | Stop reason: SDUPTHER

## 2022-01-12 ENCOUNTER — HOSPITAL ENCOUNTER (OUTPATIENT)
Dept: PHYSICAL THERAPY | Age: 82
Setting detail: THERAPIES SERIES
Discharge: HOME | End: 2022-01-12
Payer: MEDICARE

## 2022-01-12 PROCEDURE — 97112 NEUROMUSCULAR REEDUCATION: CPT

## 2022-01-12 PROCEDURE — 97530 THERAPEUTIC ACTIVITIES: CPT

## 2022-01-12 PROCEDURE — 97110 THERAPEUTIC EXERCISES: CPT

## 2022-01-12 NOTE — FLOWSHEET NOTE
[address] [facility],[address], [address] [place], [address]  Phone: [phone]   Fax: [phone]    Physical Therapy Treatment Note/ Progress Report:         Date:  2022    Patient Name:  Moni Sauceda    :  1940  MRN: [id number]  Restrictions/Precautions:    Medical/Treatment Diagnosis Information:  · Diagnosis: L11.158 (ICD-10-CM) - Status post total right knee replacement  Treatment Diagnosis: Decreased functional mobility, Increased pain, Decreased ROM, Decreased strength, Decreased endurance  Insurance/Certification information:  PT Insurance Information: Medicare  Physician Information:  Referring Practitioner: Dr. Brothers Care  Has the plan of care been signed (Y/N):        []  Yes  [x]  No     Date of Patient follow up with Physician: 22      Is this a Progress Report:     []  Yes  [x]  No        If Yes:  Date Range for reporting period:  Beginnin22  Ending    Progress report will be due (10 Rx or 30 days whichever is less): 61       Recertification will be due (POC Duration  / 90 days whichever is less): see above         Visit # Insurance Allowable Auth Required   3 BMN []  Yes [x]  No        Functional Scale:     OUTCOME MEASURE DATE DEFICIT   WOMAC 22 IE 43.75% Deficit         Latex Allergy:  [x]NO      []YES  Preferred Language for Healthcare:   [x]English       []other:      Pain level: 1/10     SUBJECTIVE:  Patient states he decided to leave his [facility] at home today. No change in symptoms reported since last session.    4 weeks s/p R TKA (12/15/21)    OBJECTIVE:      22             ROM PROM AROM Overpressure Comment     L R L R* L R     Flexion   94 with ERMI 95 82     ROM tested in long sitting   Extension     0 +2 lacking                                                     RESTRICTIONS/PRECAUTIONS: hx of prostate cancer, hx of quadruple bypass, hx of L TKA, HTN, anxiety, depression    Exercises/Interventions: Exercise/Equipment Resistance/Repetitions Other comments   Stretching     Hamstring 5x20\" EOT   Prone quad     HEP   Hip Flexion- Pato stretch     ITB     ICBS 3x20\"                        SLR     Supine 2x10    Abduction 2x10    Adduction     Prone     Munices  Long sitting        Isometrics     Quad sets     Prone TKE               Patellar Glides     Medial/Lateral     Superior/Inferior                ROM     Standing chair lunge     Hang Weights     Seated EOT knee flex/ext     Supine SB knee flexion     Weight Shift     Ankle Pumps     Supine heel slides 10x10\"    ERMI 10x10\"          CKC     Calf raises x15    Wall sits     Step ups Green step x15 1 UE support    Lateral step ups     1 leg stand     Squatting  Mini squat with BUE support   CC TKE 4 plates 07Q7\"    Balance     bridges 2x10    FR quad iso     SL KB pass     KB DL     Decline heel taps                    PRE     Extension  RANGE:   Flexion  RANGE:        Leg curl               Bike 5' rocking for ROM         Manual interventions     Patellar mobs (grade 2-3), effleurage                 Therapeutic Exercise and NMR EXR  [x] (48391) Provided verbal/tactile cueing for activities related to strengthening, flexibility, endurance, ROM for improvements in LE, proximal hip, and core control with self care, mobility, lifting, ambulation. [x] (98898) Provided verbal/tactile cueing for activities related to improving balance, coordination, kinesthetic sense, posture, motor skill, proprioception  to assist with LE, proximal hip, and core control in self care, mobility, lifting, ambulation and eccentric single leg control.      NMR and Therapeutic Activities:    [x] (54828 or 53727) Provided verbal/tactile cueing for activities related to improving balance, coordination, kinesthetic sense, posture, motor skill, proprioception and motor activation to allow for proper function of core, proximal hip and LE with self care and ADLs  [] (30217) Gait Re-education- Provided training and instruction to the patient for proper LE, core and proximal hip recruitment and positioning and eccentric body weight control with ambulation re-education including up and down stairs     Home Exercise Program:    [x] (85443) Reviewed/Progressed HEP activities related to strengthening, flexibility, endurance, ROM of core, proximal hip and LE for functional self-care, mobility, lifting and ambulation/stair navigation   [] (17619)Reviewed/Progressed HEP activities related to improving balance, coordination, kinesthetic sense, posture, motor skill, proprioception of core, proximal hip and LE for self care, mobility, lifting, and ambulation/stair navigation      Manual Treatments:  PROM / STM / Oscillations-Mobs:  G-I, II, III, IV (PA's, Inf., Post.)  [] (09616) Provided manual therapy to mobilize LE, proximal hip and/or LS spine soft tissue/joints for the purpose of modulating pain, promoting relaxation,  increasing ROM, reducing/eliminating soft tissue swelling/inflammation/restriction, improving soft tissue extensibility and allowing for proper ROM for normal function with self care, mobility, lifting and ambulation. Modalities:     [] GAME READY (VASO)- for significant edema, swelling, pain control. Charges:  Timed Code Treatment Minutes: 39   Total Treatment Minutes: 56     [] EVAL (LOW) 27730 (typically 20 minutes face-to-face)  [] EVAL (MOD) 87144 (typically 30 minutes face-to-face)  [] EVAL (HIGH) 34996 (typically 45 minutes face-to-face)  [] RE-EVAL     [x] GK(47056) x   1  [] IONTO  [x] NMR (01763) x     [] VASO  [] Manual (27807) x    1 [] Other:  [x] TA x  1   [] Mech Traction (05017)  [] ES(attended) (30954)      [] ES (un) (34386):       ASSESSMENT:  See eval    HEP instruction:   Access Code: [email or web address]  URL: [email or web address]  Date: 01/05/2022  Prepared by: Colin Ramos  (see scanned forms)    GOALS:  Patient stated goal: walk pain free  [] Yes  [] No    Treatment/Activity Tolerance:  [] Patient tolerated treatment well [x] Patient limited by fatique  [] Patient limited by pain  [] Patient limited by other medical complications  [] Other: Patient fatigued with today's progressions. With frequent verbal cueing, patient able to complete 20 reps of SLR with minimal quad lag. Patient with continued TKE gait abnormalities. Progress mobility and functional strength as tolerated to return patient to his PLOF. Patient education: , HEP, POC, stair negotiation with continued step to pattern due to strength deficits    PLAN: See eval, consider standing abd and mini squats NV  [] Continue per plan of care [] Alter current plan (see comments above)  [x] Plan of care initiated [] Hold pending MD visit [] Discharge      Electronically signed by:  Cedric Phelps, PT , DPT          Note: If patient does not return for scheduled/ recommended follow up visits, this note will serve as a discharge from care along with most recent update on progress.

## 2022-01-17 ENCOUNTER — TELEPHONE (OUTPATIENT)
Dept: INTERNAL MEDICINE CLINIC | Age: 82
End: 2022-01-17

## 2022-01-17 ENCOUNTER — HOSPITAL ENCOUNTER (OUTPATIENT)
Dept: PHYSICAL THERAPY | Age: 82
Setting detail: THERAPIES SERIES
Discharge: HOME | End: 2022-01-17
Payer: MEDICARE

## 2022-01-17 PROCEDURE — 97530 THERAPEUTIC ACTIVITIES: CPT

## 2022-01-17 PROCEDURE — 97112 NEUROMUSCULAR REEDUCATION: CPT

## 2022-01-17 PROCEDURE — 97110 THERAPEUTIC EXERCISES: CPT

## 2022-01-17 RX ORDER — ATORVASTATIN CALCIUM 80 MG/1
80 TABLET, FILM COATED ORAL NIGHTLY
Qty: 90 TABLET | Refills: 1 | Status: SHIPPED | OUTPATIENT
Start: 2022-01-17 | End: 2022-08-24

## 2022-01-17 RX ORDER — LEVOTHYROXINE SODIUM 0.05 MG/1
TABLET ORAL
Qty: 90 TABLET | Refills: 1 | Status: SHIPPED | OUTPATIENT
Start: 2022-01-17 | End: 2022-03-31 | Stop reason: DRUGHIGH

## 2022-01-17 RX ORDER — TOPIRAMATE 50 MG/1
25 TABLET, FILM COATED ORAL 2 TIMES DAILY
Qty: 90 TABLET | Refills: 1 | Status: SHIPPED | OUTPATIENT
Start: 2022-01-17 | End: 2022-08-31

## 2022-01-17 RX ORDER — EZETIMIBE 10 MG/1
TABLET ORAL
Qty: 90 TABLET | Refills: 1 | Status: SHIPPED | OUTPATIENT
Start: 2022-01-17 | End: 2022-08-24

## 2022-01-17 RX ORDER — LISINOPRIL 10 MG/1
10 TABLET ORAL DAILY
Qty: 90 TABLET | Refills: 1 | Status: SHIPPED | OUTPATIENT
Start: 2022-01-17 | End: 2022-09-14 | Stop reason: SDUPTHER

## 2022-01-17 NOTE — TELEPHONE ENCOUNTER
Please send in scripts to last until his NTP with Dr. Caitlin Mayers on 3-31-22. Levothyroxine 0.05 mg, #90, 1 po qd;  Ezetimmibe 10 mg, #90, 1 po qd; Topiramate 50 mg, #90, 1 po qd;  Lisinopril 10 mg, #90, 1 po qd; Atorvastatin 80 mg, #90, 1 po qd.     Lizbet Jay

## 2022-01-17 NOTE — FLOWSHEET NOTE
training and instruction to the patient for proper LE, core and proximal hip recruitment and positioning and eccentric body weight control with ambulation re-education including up and down stairs     Home Exercise Program:    [x] (33133) Reviewed/Progressed HEP activities related to strengthening, flexibility, endurance, ROM of core, proximal hip and LE for functional self-care, mobility, lifting and ambulation/stair navigation   [] (37686)Reviewed/Progressed HEP activities related to improving balance, coordination, kinesthetic sense, posture, motor skill, proprioception of core, proximal hip and LE for self care, mobility, lifting, and ambulation/stair navigation      Manual Treatments:  PROM / STM / Oscillations-Mobs:  G-I, II, III, IV (PA's, Inf., Post.)  [] (53462) Provided manual therapy to mobilize LE, proximal hip and/or LS spine soft tissue/joints for the purpose of modulating pain, promoting relaxation,  increasing ROM, reducing/eliminating soft tissue swelling/inflammation/restriction, improving soft tissue extensibility and allowing for proper ROM for normal function with self care, mobility, lifting and ambulation. Modalities:     [] GAME READY (VASO)- for significant edema, swelling, pain control. Charges:  Timed Code Treatment Minutes: 53   Total Treatment Minutes: 53     [] EVAL (LOW) 25291 (typically 20 minutes face-to-face)  [] EVAL (MOD) 83004 (typically 30 minutes face-to-face)  [] EVAL (HIGH) 51032 (typically 45 minutes face-to-face)  [] RE-EVAL     [x] KQ(10216) x   2  [] IONTO  [x] NMR (19813) x     [] VASO  [] Manual (48693) x    1 [] Other:  [x] TA x  1   [] Mech Traction (61336)  [] ES(attended) (69452)      [] ES (un) (26582):       ASSESSMENT:  See eval    HEP instruction:   Access Code: [email or web address]  URL: "[email or web address]  Date: 01/05/2022  Prepared by: Denisa Maravilla  (see scanned forms)    GOALS:  Patient stated goal: walk pain free  [] Progressing: [] Met: [] Not Met: [] Adjusted    Therapist goals for Patient:   Short Term Goals: To be achieved in: 2 weeks  1. Independent in HEP and progression per patient tolerance, in order to prevent re-injury. [] Progressing: [] Met: [] Not Met: [] Adjusted   2. Patient will have a decrease in pain to facilitate improvement in movement, function, and ADLs as indicated by Functional Deficits. [] Progressing: [] Met: [] Not Met: [] Adjusted    Long Term Goals: To be achieved in: 8 weeks  1. Disability index score of 21.8% or less for the LEFS to assist with reaching prior level of function. [] Progressing: [] Met: [] Not Met: [] Adjusted  2. Patient will demonstrate increased AROM to 100 flexion to allow for proper joint functioning as indicated by patients Functional Deficits. [] Progressing: [] Met: [] Not Met: [] Adjusted  3. Patient will demonstrate an increase in knee strength to 4+/5 in LE to allow for proper functional mobility as indicated by patients Functional Deficits. [] Progressing: [] Met: [] Not Met: [] Adjusted  4. Patient will return to walking without increased symptoms or restriction. [] Progressing: [] Met: [] Not Met: [] Adjusted  5. Patient will be able to tolerate a seated position for 1 hour in order to return to hobbies. [] Progressing: [] Met: [] Not Met: [] Adjusted      Overall Progression Towards Functional goals/ Treatment Progress Update:  [] Patient is progressing as expected towards functional goals listed. [] Progression is slowed due to complexities/Impairments listed. [] Progression has been slowed due to co-morbidities.   [x] Plan just implemented, too soon to assess goals progression <30days   [] Goals require adjustment due to lack of progress  [] Patient is not progressing as expected and requires additional follow up with physician  [] Other    Prognosis for POC: [x] Good [] Fair  [] Poor      Patient requires continued skilled intervention: [x] Yes  [] No    Treatment/Activity Tolerance:  [x] Patient tolerated treatment well [] Patient limited by fatique  [] Patient limited by pain  [] Patient limited by other medical complications  [] Other: Patient tolerating progressions well without increase in pain or soreness. Pt challenged with single leg balance, requiring SBA from therapist. Patient making good improvements in functional endurance and mobility. Patient will continue to be progressed through functional strengthening as tolerated. Patient education: HEP, POC, stair negotiation with continued step to pattern due to strength deficits    PLAN: See eval, consider standing abd and mini squats NV  [] Continue per plan of care [] Alter current plan (see comments above)  [x] Plan of care initiated [] Hold pending MD visit [] Discharge      Electronically signed by:  Getachew Rodriguez, PT , DPT          Note: If patient does not return for scheduled/ recommended follow up visits, this note will serve as a discharge from care along with most recent update on progress.

## 2022-01-19 ENCOUNTER — HOSPITAL ENCOUNTER (OUTPATIENT)
Dept: PHYSICAL THERAPY | Age: 82
Setting detail: THERAPIES SERIES
Discharge: HOME | End: 2022-01-19
Payer: MEDICARE

## 2022-01-19 PROCEDURE — 97530 THERAPEUTIC ACTIVITIES: CPT

## 2022-01-19 PROCEDURE — 97112 NEUROMUSCULAR REEDUCATION: CPT

## 2022-01-19 PROCEDURE — 97110 THERAPEUTIC EXERCISES: CPT

## 2022-01-19 NOTE — FLOWSHEET NOTE
[facility], [address] [place], [address]  Phone: [phone]   Fax: [phone]    Physical Therapy Treatment Note/ Progress Report:         Date:  2022    Patient Name:  Elvira Herrera    :  1940  MRN: [id number]  Restrictions/Precautions:    Medical/Treatment Diagnosis Information:  · Diagnosis: Y89.689 (ICD-10-CM) - Status post total right knee replacement  Treatment Diagnosis: Decreased functional mobility, Increased pain, Decreased ROM, Decreased strength, Decreased endurance  Insurance/Certification information:  PT Insurance Information: Medicare  Physician Information:  Referring Practitioner: Dr. Jordyn Crisostomo  Has the plan of care been signed (Y/N):        []  Yes  [x]  No     Date of Patient follow up with Physician: 22      Is this a Progress Report:     []  Yes  [x]  No        If Yes:  Date Range for reporting period:  Beginnin22  Ending    Progress report will be due (10 Rx or 30 days whichever is less): 84       Recertification will be due (POC Duration  / 90 days whichever is less): see above         Visit # Insurance Allowable Auth Required   5 BMN []  Yes [x]  No        Functional Scale:     OUTCOME MEASURE DATE DEFICIT   WOMAC 22 IE 43.75% Deficit         Latex Allergy:  [x]NO      []YES  Preferred Language for Healthcare:   [x]English       []other:      Pain level: 3/10     SUBJECTIVE:  Patient states he is a little sore today, but his knee feels good overall.    5 weeks s/p R TKA (12/15/21)    OBJECTIVE:      22              ROM PROM AROM Overpressure Comment     L R L R* L R     Flexion   100 with ERMI 95 82     ROM tested in long sitting   Extension     0 +2 lacking                                                     RESTRICTIONS/PRECAUTIONS: hx of prostate cancer, hx of quadruple bypass, hx of L TKA, HTN, anxiety, depression    Exercises/Interventions:       Exercise/Equipment Resistance/Repetitions Other comments   Stretching     Hamstring 5x20\" EOT   Prone quad     HEP   Hip Flexion- Pato stretch     ITB     ICBS 4x20\"                        SLR     Supine 3x10    Abduction 3x10    Adduction     Prone     Munices  Long sitting        Isometrics     Quad sets     Prone TKE               Patellar Glides     Medial/Lateral     Superior/Inferior                ROM     Standing chair lunge     Hang Weights     Seated EOT knee flex/ext     Supine SB knee flexion     Weight Shift     Ankle Pumps     Supine heel slides     ERMI 10x10\"          CKC     Calf raises x20    Wall sits     Step ups     Lateral step ups     1 leg stand     Squatting 2x6 sit to stand     CC TKE 5 plates 14I9\"    Balance  5x10\" SL    SBA from LICENSE     Bridges  3x10    FR quad iso     SL KB pass     KB DL     Decline heel taps                    PRE     Extension  RANGE:   Flexion 40# 2x10 SL RANGE:        Leg press               Bike 5' rocking for ROM         Manual interventions                   Therapeutic Exercise and NMR EXR  [x] (61005) Provided verbal/tactile cueing for activities related to strengthening, flexibility, endurance, ROM for improvements in LE, proximal hip, and core control with self care, mobility, lifting, ambulation. [x] (03411) Provided verbal/tactile cueing for activities related to improving balance, coordination, kinesthetic sense, posture, motor skill, proprioception  to assist with LE, proximal hip, and core control in self care, mobility, lifting, ambulation and eccentric single leg control.      NMR and Therapeutic Activities:    [x] (05174 or 27241) Provided verbal/tactile cueing for activities related to improving balance, coordination, kinesthetic sense, posture, motor skill, proprioception and motor activation to allow for proper function of core, proximal hip and LE with self care and ADLs  [] (87777) Gait Re-education- Provided training and instruction to the patient for proper LE, core and proximal hip recruitment and positioning and eccentric body weight control with ambulation re-education including up and down stairs     Home Exercise Program:    [x] (77192) Reviewed/Progressed HEP activities related to strengthening, flexibility, endurance, ROM of core, proximal hip and LE for functional self-care, mobility, lifting and ambulation/stair navigation   [] (96924)Reviewed/Progressed HEP activities related to improving balance, coordination, kinesthetic sense, posture, motor skill, proprioception of core, proximal hip and LE for self care, mobility, lifting, and ambulation/stair navigation      Manual Treatments:  PROM / STM / Oscillations-Mobs:  G-I, II, III, IV (PA's, Inf., Post.)  [] (35194) Provided manual therapy to mobilize LE, proximal hip and/or LS spine soft tissue/joints for the purpose of modulating pain, promoting relaxation,  increasing ROM, reducing/eliminating soft tissue swelling/inflammation/restriction, improving soft tissue extensibility and allowing for proper ROM for normal function with self care, mobility, lifting and ambulation. Modalities:     [] GAME READY (VASO)- for significant edema, swelling, pain control. Charges:  Timed Code Treatment Minutes: 42'   Total Treatment Minutes: 42'   2282-7394  [] EVAL (LOW) 455 1011 (typically 20 minutes face-to-face)  [] EVAL (MOD) 77429 (typically 30 minutes face-to-face)  [] EVAL (HIGH) 33572 (typically 45 minutes face-to-face)  [] RE-EVAL     [x] PG(12934) x   1  [] IONTO  [x] NMR (70478) x     [] VASO  [] Manual (91786) x    1 [] Other:  [x] TA x  1   [] Mech Traction (09909)  [] ES(attended) (00423)      [] ES (un) (01743):       ASSESSMENT:  See eval    HEP instruction:   Access Code: [email or web address]  URL: [email or web address]  Date: 01/05/2022  Prepared by: Afsaneh Hand  (see scanned forms)    GOALS:  Patient stated goal: walk pain free  [] Progressing: [] Met: [] Not Met: [] Adjusted    Therapist goals for Patient:   Short Term Goals: To be achieved in: 2 weeks  1. Independent in HEP and progression per patient tolerance, in order to prevent re-injury. [] Progressing: [] Met: [] Not Met: [] Adjusted   2. Patient will have a decrease in pain to facilitate improvement in movement, function, and ADLs as indicated by Functional Deficits. [] Progressing: [] Met: [] Not Met: [] Adjusted    Long Term Goals: To be achieved in: 8 weeks  1. Disability index score of 21.8% or less for the LEFS to assist with reaching prior level of function. [] Progressing: [] Met: [] Not Met: [] Adjusted  2. Patient will demonstrate increased AROM to 100 flexion to allow for proper joint functioning as indicated by patients Functional Deficits. [] Progressing: [] Met: [] Not Met: [] Adjusted  3. Patient will demonstrate an increase in knee strength to 4+/5 in LE to allow for proper functional mobility as indicated by patients Functional Deficits. [] Progressing: [] Met: [] Not Met: [] Adjusted  4. Patient will return to walking without increased symptoms or restriction. [] Progressing: [] Met: [] Not Met: [] Adjusted  5. Patient will be able to tolerate a seated position for 1 hour in order to return to hobbies. [] Progressing: [] Met: [] Not Met: [] Adjusted      Overall Progression Towards Functional goals/ Treatment Progress Update:  [] Patient is progressing as expected towards functional goals listed. [] Progression is slowed due to complexities/Impairments listed. [] Progression has been slowed due to co-morbidities.   [x] Plan just implemented, too soon to assess goals progression <30days   [] Goals require adjustment due to lack of progress  [] Patient is not progressing as expected and requires additional follow up with physician  [] Other    Prognosis for POC: [x] Good [] Fair  [] Poor      Patient requires continued skilled intervention: [x] Yes  [] No    Treatment/Activity Tolerance:  [x] Patient tolerated treatment well [] Patient limited by fatique  [] Patient limited by pain  [] Patient limited by other medical complications  [] Other: Minimal quad lag present with SLR. Improved heel strike and TKE observed with ambulation. Patient fatigued, therefore progressions limited. Patient making great strides toward pain free PLOF. Patient education: HEP, POC, stair negotiation with continued step to pattern due to strength deficits    PLAN: See eval  [] Continue per plan of care [] Alter current plan (see comments above)  [x] Plan of care initiated [] Hold pending MD visit [] Discharge      Electronically signed by:  Afsaneh Hand, PT , DPT          Note: If patient does not return for scheduled/ recommended follow up visits, this note will serve as a discharge from care along with most recent update on progress.

## 2022-01-24 ENCOUNTER — HOSPITAL ENCOUNTER (OUTPATIENT)
Dept: PHYSICAL THERAPY | Age: 82
Setting detail: THERAPIES SERIES
Discharge: HOME | End: 2022-01-24
Payer: MEDICARE

## 2022-01-24 PROCEDURE — 97530 THERAPEUTIC ACTIVITIES: CPT

## 2022-01-24 PROCEDURE — 97112 NEUROMUSCULAR REEDUCATION: CPT

## 2022-01-24 PROCEDURE — 97110 THERAPEUTIC EXERCISES: CPT

## 2022-01-24 NOTE — FLOWSHEET NOTE
[facility], [address] [place] [address], [address] [facility]  Phone: [phone]   Fax: [phone]    Physical Therapy Treatment Note/ Progress Report:       Date:  2022    Patient Name:  Perez Lee    :  1940  MRN: [id number]  Restrictions/Precautions:    Medical/Treatment Diagnosis Information:  · Diagnosis: K14.128 (ICD-10-CM) - Status post total right knee replacement  Treatment Diagnosis: Decreased functional mobility, Increased pain, Decreased ROM, Decreased strength, Decreased endurance  Insurance/Certification information:  PT Insurance Information: Medicare  Physician Information:  Referring Practitioner: Dr. Janece Osler  Has the plan of care been signed (Y/N):        []  Yes  [x]  No     Date of Patient follow up with Physician: 22      Is this a Progress Report:     []  Yes  [x]  No        If Yes:  Date Range for reporting period:  Beginnin22  Ending    Progress report will be due (10 Rx or 30 days whichever is less): 4/5/10       Recertification will be due (POC Duration  / 90 days whichever is less): see above         Visit # Insurance Allowable Auth Required   6 BMN []  Yes [x]  No        Functional Scale:     OUTCOME MEASURE DATE DEFICIT   WOMAC 22 IE 43.75% Deficit         Latex Allergy:  [x]NO      []YES  Preferred Language for Healthcare:   [x]English       []other:      Pain level: 2/10     SUBJECTIVE:  Patient states he has some soreness in the back of his knee. He states he was standing around a lot this weekend and attributes this to his increased soreness.    5+ weeks s/p R TKA (12/15/21)    OBJECTIVE:      22              ROM PROM AROM Overpressure Comment     L R L R* L R     Flexion   105 with ERMI 95 82     ROM tested in long sitting   Extension     0 +2 lacking                                                     RESTRICTIONS/PRECAUTIONS: hx of prostate cancer, hx of quadruple bypass, hx of L TKA, HTN, anxiety, depression    Exercises/Interventions:       Exercise/Equipment Resistance/Repetitions Other comments   Stretching     Hamstring 5x20\" EOT   Prone quad     HEP   Hip Flexion- Pato stretch     ITB     ICBS 5x20\"                        SLR     Supine 3x10    Abduction 3x10    Adduction     Prone     Munices  Long sitting        Isometrics     Quad sets     Prone TKE               Patellar Glides     Medial/Lateral     Superior/Inferior                ROM     Standing chair lunge     Hang Weights     Seated EOT knee flex/ext     Supine SB knee flexion     Weight Shift     Ankle Pumps     Supine heel slides     ERMI 10x10\"          CKC     Calf raises x20    Wall sits     Step ups     Lateral step ups     1 leg stand     Squatting    CC TKE    Balance  5x15\" SL    SBA from LICENSE     Bridges  3x10    FR quad iso     SL KB pass     KB DL     Decline heel taps                    PRE     Extension  RANGE:   Flexion 45# 3x10 SL RANGE:        Leg press 60# 2x10 DL  60# 1x10 SL              Bike 5' rocking for ROM         Manual interventions                   Therapeutic Exercise and NMR EXR  [x] (63930) Provided verbal/tactile cueing for activities related to strengthening, flexibility, endurance, ROM for improvements in LE, proximal hip, and core control with self care, mobility, lifting, ambulation. [x] (33450) Provided verbal/tactile cueing for activities related to improving balance, coordination, kinesthetic sense, posture, motor skill, proprioception  to assist with LE, proximal hip, and core control in self care, mobility, lifting, ambulation and eccentric single leg control.      NMR and Therapeutic Activities:    [x] (19278 or 68000) Provided verbal/tactile cueing for activities related to improving balance, coordination, kinesthetic sense, posture, motor skill, proprioception and motor activation to allow for proper function of core, proximal hip and LE with self care and ADLs  [] (30812) Gait Re-education- Provided training and instruction to the patient for proper LE, core and proximal hip recruitment and positioning and eccentric body weight control with ambulation re-education including up and down stairs     Home Exercise Program:    [x] (65626) Reviewed/Progressed HEP activities related to strengthening, flexibility, endurance, ROM of core, proximal hip and LE for functional self-care, mobility, lifting and ambulation/stair navigation   [] (21244)Reviewed/Progressed HEP activities related to improving balance, coordination, kinesthetic sense, posture, motor skill, proprioception of core, proximal hip and LE for self care, mobility, lifting, and ambulation/stair navigation      Manual Treatments:  PROM / STM / Oscillations-Mobs:  G-I, II, III, IV (PA's, Inf., Post.)  [] (17905) Provided manual therapy to mobilize LE, proximal hip and/or LS spine soft tissue/joints for the purpose of modulating pain, promoting relaxation,  increasing ROM, reducing/eliminating soft tissue swelling/inflammation/restriction, improving soft tissue extensibility and allowing for proper ROM for normal function with self care, mobility, lifting and ambulation. Modalities:     [] GAME READY (VASO)- for significant edema, swelling, pain control. Charges:  Timed Code Treatment Minutes: 42'   Total Treatment Minutes: 42'   2182-4215   [] EVAL (LOW) 455 1011 (typically 20 minutes face-to-face)  [] EVAL (MOD) 79132 (typically 30 minutes face-to-face)  [] EVAL (HIGH) 79449 (typically 45 minutes face-to-face)  [] RE-EVAL     [x] EV(96326) x   1  [] IONTO  [x] NMR (68183) x     [] VASO  [] Manual (76675) x     [] Other:  [x] TA x  1   [] Mech Traction (43583)  [] ES(attended) (21423)      [] ES (un) (42724):       ASSESSMENT:  See eval    HEP instruction:   Access Code: [email or web address]  URL: [email or web address]  Date: 01/05/2022  Prepared by: Teddy Jorge  (see scanned forms)    GOALS:  Patient stated goal: walk pain free  [] Progressing: [] Met: [] Not Met: [] Adjusted    Therapist goals for Patient:   Short Term Goals: To be achieved in: 2 weeks  1. Independent in HEP and progression per patient tolerance, in order to prevent re-injury. [] Progressing: [] Met: [] Not Met: [] Adjusted   2. Patient will have a decrease in pain to facilitate improvement in movement, function, and ADLs as indicated by Functional Deficits. [] Progressing: [] Met: [] Not Met: [] Adjusted    Long Term Goals: To be achieved in: 8 weeks  1. Disability index score of 21.8% or less for the LEFS to assist with reaching prior level of function. [] Progressing: [] Met: [] Not Met: [] Adjusted  2. Patient will demonstrate increased AROM to 100 flexion to allow for proper joint functioning as indicated by patients Functional Deficits. [] Progressing: [] Met: [] Not Met: [] Adjusted  3. Patient will demonstrate an increase in knee strength to 4+/5 in LE to allow for proper functional mobility as indicated by patients Functional Deficits. [] Progressing: [] Met: [] Not Met: [] Adjusted  4. Patient will return to walking without increased symptoms or restriction. [] Progressing: [] Met: [] Not Met: [] Adjusted  5. Patient will be able to tolerate a seated position for 1 hour in order to return to hobbies. [] Progressing: [] Met: [] Not Met: [] Adjusted      Overall Progression Towards Functional goals/ Treatment Progress Update:  [] Patient is progressing as expected towards functional goals listed. [] Progression is slowed due to complexities/Impairments listed. [] Progression has been slowed due to co-morbidities.   [x] Plan just implemented, too soon to assess goals progression <30days   [] Goals require adjustment due to lack of progress  [] Patient is not progressing as expected and requires additional follow up with physician  [] Other    Prognosis for POC: [x] Good [] Fair  [] Poor      Patient requires continued skilled intervention: [x] Yes  [] No    Treatment/Activity Tolerance:  [x] Patient tolerated treatment well [] Patient limited by fatique  [] Patient limited by pain  [] Patient limited by other medical complications  [] Other: Patient continuing to require verbal cueing to maintain SLR without quad lag. Patient progressed with strengthening activities and tolerated them well. Patient fatigued with program, but progressing well with functional mobility. Patient education: HEP, POC, stair negotiation with continued step to pattern due to strength deficits    PLAN: See eval  [] Continue per plan of care [] Alter current plan (see comments above)  [x] Plan of care initiated [] Hold pending MD visit [] Discharge      Electronically signed by:  Natalio Dyson, PT , DPT          Note: If patient does not return for scheduled/ recommended follow up visits, this note will serve as a discharge from care along with most recent update on progress.

## 2022-01-25 NOTE — PROGRESS NOTES
[address]  Cardiology progress Note        CC: \" I'm pretty good. \"       HPI: This is a [de-identified] y.o. male with severe three-vessel disease with normal LV function. S/p CABG. He had possible CVA post CABG surgery. Also has a hx of Prostate CA diagnosed in 2016. Patient returns in follow up and reports that \"it has been a long road. \" Says he had knee replacement surgery in December 2021. He recently finished physical therapy. Says he has had no cardiac issues and reports a 10 pound weight loss since our last visit. Wife manages his medication. He denies any adverse reactions. Says he has been carving wood since long-term. Today, he denies any chest pain, pressure, tightness, nausea, vomiting, diaphoresis, SOB at rest / SUE, palpitations, heart racing, dizziness/lightheadedness, cough, orthopnea, PND, LE edema, syncope or changes in bowel/bladder habits.        Past Medical History:   Diagnosis Date    Anxiety     Cancer ([facility] [address] 11/2016    Prostates CA    Coronary artery disease 12/28/15    multi vessel CAD    GERD (gastroesophageal reflux disease)     History of blood transfusion     s/p left nephrectomy age 2years old    Hyperlipidemia LDL goal <70     Hypertension     IBS (irritable bowel syndrome)     Migraine     Primary osteoarthritis of right knee 10/5/2018    Reactive depression 6/8/2016    Shingles 2012    torso, top of head    Thyroid disease     Wears dentures       Past Surgical History:   Procedure Laterality Date    APPENDECTOMY      CARPAL TUNNEL RELEASE Bilateral     COLONOSCOPY N/A 10/8/2019    COLONOSCOPY POLYPECTOMY SNARE/COLD BIOPSY performed by Jennifer Gauthier MD at [address]  2015    x`s 4 vessel    ENDOSCOPY, COLON, DIAGNOSTIC      EGD with dilatation    EYE SURGERY Bilateral     catacts, bilateral and repeat    HEMORRHOID SURGERY      KIDNEY REMOVAL  @ 1944    pt thinks left kidney for disease    SHOULDER SURGERY Left     rotator cuff    TOTAL KNEE ARTHROPLASTY Left 3/11/2020    ROBOTIC ASSISTED LEFT TOTAL KNEE REPLACEMENT performed by Collette De La Cruz MD at [facility] Right 12/15/2021    ROBOTIC ASSITED RIGHT TOTAL KNEE REPLACEMENT performed by Angela Funes MD at [address] [place] [address] History   Problem Relation Age of Onset    High Blood Pressure Mother     Other Mother     Heart Disease Father       Social History     Tobacco Use    Smoking status: Never Smoker    Smokeless tobacco: Never Used   Vaping Use    Vaping Use: Never used   Substance Use Topics    Alcohol use: No    Drug use: Never     Allergies   Allergen Reactions    Pcn [Penicillins] Anaphylaxis     Unknown. As child @ 2years old \"almost killed me\"    Demerol Hcl [Meperidine]      Uncontrollable shaking    Lyrica [Pregabalin] Other (See Comments)     hallucinations and double vision     Oxycodone     Tramadol Nausea And Vomiting     Review of Systems -   Constitutional: Negative for weight gain/loss, fever. Respiratory: Negative for Asthma;  cough and hemoptysis  Cardiovascular: Negative for palpitations,dizziness   Gastrointestinal: Negative for abd.pain; constipation/diarrhea;    Genitourinary: Negative for stones; hematuria; frequency hesitancy  Integumentt: Negative for rash or pruritis  Hematologic/lymphatic: Negative for blood dyscrasia; leukemia/lymphoma  Musculoskeletal: Negative for Connective tissue disease  Neurological:  Negative for Seizure   Behavioral/Psych:Negative for Bipolar disorder, Schizophrenia; Dementia  Endocrine: negative for thyroid, parathyroid disease      Physical Examination:    /70   Pulse 54   Ht 5' 9\" (1.753 m)   Wt 197 lb (89.4 kg)   SpO2 99%   BMI 29.09 kg/m²    HEENT:  Face: Atraumatic, Conjunctiva: Pink; non icteric, Mucous Memb:  Moist, No thyromegaly or Lymphadenopathy  Respiratory: Resp Assessment: normal, Resp Auscultation: clear   Cardiovascular:   Auscultation: nl S1 & S2, Palpation: Nl PMI; No heaves or thrills, JVP:  normal  Abdomen: Soft, non-tender, Normal bowel sounds,  No organomegaly  Extremities: No Cyanosis or Clubbing; Edema none  Neurological: Oriented to time, place, and person, Non-anxious  Psychiatric: Normal mood and affect  Skin: Warm and dry,  No rash seen      EK/2/20: Sinus Bradycardia  2021 Sinus bradycardia  22 Sinus rhythm, rate 60    ECHO: 2015  Normal LV size and systolic function. Estimated ejection fraction is 60%. Trivial tricuspid regurgitation     Coronary angiography:2015  1. Multi-lesion, multi-vessel coronary artery disease. 2. Mid LAD  has long segment which has at least 60% stenosis throughout and at least one area of 80% to 90% stenosis. The diagonals have high-grade stenosis in the 80% to 90% range. 3. The circumflex has 2 high-grade 80% to 90% lesions   4. Rright coronary artery also has an 80% mid lesion. 3. Normal left ventricular size and systolic function. Ejection fraction is 60%. ASSESSMENT AND PLAN:      CAD S/P CABGX4 12/30/15  On Statin and ASA  No BB due to bradycardia   Asymptomatic. Essential hypertension  Blood pressure well controlled   Continue medical management  Reviewed labs from 21; creatinine stable at 1.4    Hyperlipidemia  LDL goal <70   LDL 80 on 20  Repeat fasting lipid panel   Continue Atorvastatin and Zetia. Prostate CA  Managed by Dr. Hamzah Granado    I am very happy to see Corey Fulton; he is [de-identified] but does not look [de-identified] he had coronary artery bypass surgery for three-vessel coronary disease 5 years ago and looks good keeps himself busy with amazing woodcarving and art. Follow up in 1 year. Debby Omer M.D  2022     This note was scribed in the presence of Dr. Debby Omer MD by Evaristo Sanford  Physician Attestation:  The scribes documentation has been prepared under my direction and personally reviewed by me in its entirety.      I confirm that the note above accurately

## 2022-01-26 ENCOUNTER — HOSPITAL ENCOUNTER (OUTPATIENT)
Dept: PHYSICAL THERAPY | Age: 82
Setting detail: THERAPIES SERIES
Discharge: HOME | End: 2022-01-26
Payer: MEDICARE

## 2022-01-26 PROCEDURE — 97530 THERAPEUTIC ACTIVITIES: CPT

## 2022-01-26 PROCEDURE — 97110 THERAPEUTIC EXERCISES: CPT

## 2022-01-26 PROCEDURE — 97112 NEUROMUSCULAR REEDUCATION: CPT

## 2022-01-26 NOTE — PLAN OF CARE
1940  MRN: [id number]  Restrictions/Precautions:    Medical/Treatment Diagnosis Information:  · Diagnosis: G10.210 (ICD-10-CM) - Status post total right knee replacement  Treatment Diagnosis: Decreased functional mobility, Increased pain, Decreased ROM, Decreased strength, Decreased endurance  Insurance/Certification information:  PT Insurance Information: Medicare  Physician Information:  Referring Practitioner: Dr. Mera Clark  Has the plan of care been signed (Y/N):        []  Yes  [x]  No     Date of Patient follow up with Physician: 22      Is this a Progress Report:     [x]  Yes  []  No        If Yes:  Date Range for reporting period:  Beginnin22  Ending 22    Progress report will be due (10 Rx or 30 days whichever is less): 38       Recertification will be due (POC Duration  / 90 days whichever is less): see above         Visit # Insurance Allowable Auth Required   7 BMN []  Yes [x]  No        Functional Scale:     OUTCOME MEASURE DATE DEFICIT   WOMAC 22  18.75% Deficit    WOMAC 22 IE 43.75% Deficit         Latex Allergy:  [x]NO      []YES  Preferred Language for Healthcare:   [x]English       []other:      Pain level: 0/10     SUBJECTIVE:  Reporting stiffness today. Patient states he felt good after the last visit. Pt states he had to repair his dryer after physical therapy and was crawling around. No increase in symptoms completing the activity, though.    6 weeks s/p R TKA (12/15/21)    OBJECTIVE:      22              ROM PROM AROM Overpressure Comment     L R L R* L R     Flexion   112 with ERMI 95 110 in supine        Extension     0 0 with heel prop and quad set                                                   22  Strength L R* Comment   Quad set 5  4+      Hamstring 5  4+     Gastroc         Hip  flexion         Hip abd                                    RESTRICTIONS/PRECAUTIONS: hx of prostate cancer, hx of quadruple bypass, hx of L TKA, HTN, anxiety, (13190) Gait Re-education- Provided training and instruction to the patient for proper LE, core and proximal hip recruitment and positioning and eccentric body weight control with ambulation re-education including up and down stairs     Home Exercise Program:    [x] (38742) Reviewed/Progressed HEP activities related to strengthening, flexibility, endurance, ROM of core, proximal hip and LE for functional self-care, mobility, lifting and ambulation/stair navigation   [] (54646)Reviewed/Progressed HEP activities related to improving balance, coordination, kinesthetic sense, posture, motor skill, proprioception of core, proximal hip and LE for self care, mobility, lifting, and ambulation/stair navigation      Manual Treatments:  PROM / STM / Oscillations-Mobs:  G-I, II, III, IV (PA's, Inf., Post.)  [] (42373) Provided manual therapy to mobilize LE, proximal hip and/or LS spine soft tissue/joints for the purpose of modulating pain, promoting relaxation,  increasing ROM, reducing/eliminating soft tissue swelling/inflammation/restriction, improving soft tissue extensibility and allowing for proper ROM for normal function with self care, mobility, lifting and ambulation. Modalities:     [] GAME READY (VASO)- for significant edema, swelling, pain control. Charges:  Timed Code Treatment Minutes: 37'   Total Treatment Minutes: 43'   3639-6565   [] EVAL (LOW) 89761 (typically 20 minutes face-to-face)  [] EVAL (MOD) 90784 (typically 30 minutes face-to-face)  [] EVAL (HIGH) 37118 (typically 45 minutes face-to-face)  [] RE-EVAL     [x] ZN(43853) x   1  [] IONTO  [x] NMR (19995) x  1   [] VASO  [] Manual (12550) x     [] Other:  [x] TA x  1   [] Mech Traction (07733)  [] ES(attended) (14608)      [] ES (un) (44054):       ASSESSMENT:  See eval    HEP instruction:   Access Code: [email or web address]  URL: [email or web address]  Date: 01/05/2022  Prepared by: Joyce Ayoub  (see scanned forms)    GOALS:  Patient stated goal: walk pain free  [] Progressing: [x] Met: [] Not Met: [] Adjusted    Therapist goals for Patient:   Short Term Goals: To be achieved in: 2 weeks  1. Independent in HEP and progression per patient tolerance, in order to prevent re-injury. [] Progressing: [x] Met: [] Not Met: [] Adjusted   2. Patient will have a decrease in pain to facilitate improvement in movement, function, and ADLs as indicated by Functional Deficits. [] Progressing: [x] Met: [] Not Met: [] Adjusted    Long Term Goals: To be achieved in: 8 weeks    1. Disability index score of 21.8% or less for the [facility] to assist with reaching prior level of function. [] Progressing: [x] Met: [] Not Met: [] Adjusted  2. Patient will demonstrate increased AROM to 100 flexion to allow for proper joint functioning as indicated by patients Functional Deficits. [] Progressing: [x] Met: [] Not Met: [] Adjusted  3. Patient will demonstrate an increase in knee strength to 4+/5 in LE to allow for proper functional mobility as indicated by patients Functional Deficits. [] Progressing: [x] Met: [] Not Met: [] Adjusted  4. Patient will return to walking without increased symptoms or restriction. [] Progressing: [x] Met: [] Not Met: [] Adjusted  5. Patient will be able to tolerate a seated position for 1 hour in order to return to hobbies. [x] Progressing: [] Met: [] Not Met: [] Adjusted        Overall Progression Towards Functional goals/ Treatment Progress Update:  [x] Patient is progressing as expected towards functional goals listed. [] Progression is slowed due to complexities/Impairments listed. [] Progression has been slowed due to co-morbidities.   [] Plan just implemented, too soon to assess goals progression <30days   [] Goals require adjustment due to lack of progress  [] Patient is not progressing as expected and requires additional follow up with physician  [] Other    Prognosis for POC: [x] Good [] Fair  [] Poor      Patient requires continued skilled intervention: [x] Yes  [] No    Treatment/Activity Tolerance:  [x] Patient tolerated treatment well [] Patient limited by fatique  [] Patient limited by pain  [] Patient limited by other medical complications  [] Other:         Patient education: HEP, POC, stair negotiation with continued step to pattern due to strength deficits    PLAN: 1-2 x week 2 weeks  [x] Continue per plan of care [] Alter current plan (see comments above)  [] Plan of care initiated [] Hold pending MD visit [] Discharge      Electronically signed by:  Greg Jacobsen, PT , DPT          Note: If patient does not return for scheduled/ recommended follow up visits, this note will serve as a discharge from care along with most recent update on progress.

## 2022-01-27 ENCOUNTER — OFFICE VISIT (OUTPATIENT)
Dept: CARDIOLOGY CLINIC | Age: 82
End: 2022-01-27
Payer: MEDICARE

## 2022-01-27 VITALS
OXYGEN SATURATION: 99 % | HEART RATE: 54 BPM | SYSTOLIC BLOOD PRESSURE: 124 MMHG | HEIGHT: 69 IN | DIASTOLIC BLOOD PRESSURE: 70 MMHG | WEIGHT: 197 LBS | BODY MASS INDEX: 29.18 KG/M2

## 2022-01-27 DIAGNOSIS — E78.5 HYPERLIPIDEMIA LDL GOAL <70: ICD-10-CM

## 2022-01-27 DIAGNOSIS — I10 ESSENTIAL HYPERTENSION: ICD-10-CM

## 2022-01-27 DIAGNOSIS — Z95.1 S/P CABG X 4: ICD-10-CM

## 2022-01-27 DIAGNOSIS — I25.10 CAD IN NATIVE ARTERY: Primary | ICD-10-CM

## 2022-01-27 PROCEDURE — 1123F ACP DISCUSS/DSCN MKR DOCD: CPT | Performed by: INTERNAL MEDICINE

## 2022-01-27 PROCEDURE — 93000 ELECTROCARDIOGRAM COMPLETE: CPT | Performed by: INTERNAL MEDICINE

## 2022-01-27 PROCEDURE — G8484 FLU IMMUNIZE NO ADMIN: HCPCS | Performed by: INTERNAL MEDICINE

## 2022-01-27 PROCEDURE — G8427 DOCREV CUR MEDS BY ELIG CLIN: HCPCS | Performed by: INTERNAL MEDICINE

## 2022-01-27 PROCEDURE — 99214 OFFICE O/P EST MOD 30 MIN: CPT | Performed by: INTERNAL MEDICINE

## 2022-01-27 PROCEDURE — 4040F PNEUMOC VAC/ADMIN/RCVD: CPT | Performed by: INTERNAL MEDICINE

## 2022-01-27 PROCEDURE — 1036F TOBACCO NON-USER: CPT | Performed by: INTERNAL MEDICINE

## 2022-01-27 PROCEDURE — G8417 CALC BMI ABV UP PARAM F/U: HCPCS | Performed by: INTERNAL MEDICINE

## 2022-01-27 NOTE — PATIENT INSTRUCTIONS

## 2022-01-31 ENCOUNTER — OFFICE VISIT (OUTPATIENT)
Dept: ORTHOPEDIC SURGERY | Age: 82
End: 2022-01-31

## 2022-01-31 VITALS — RESPIRATION RATE: 18 BRPM | WEIGHT: 197 LBS | HEIGHT: 69 IN | BODY MASS INDEX: 29.18 KG/M2

## 2022-01-31 DIAGNOSIS — Z96.651 STATUS POST TOTAL RIGHT KNEE REPLACEMENT: Primary | ICD-10-CM

## 2022-01-31 PROCEDURE — 99024 POSTOP FOLLOW-UP VISIT: CPT | Performed by: ORTHOPAEDIC SURGERY

## 2022-01-31 NOTE — PROGRESS NOTES
[facility]  Office Visit    Chief Complaint: Follow-up s/p right total knee arthroplasty    HPI:  Rachel Pascual is a [de-identified] y.o. who is here in follow-up of right total knee arthroplasty performed on 12/15/2021. He is doing well overall. He continues to work with physical therapy. He walks without assistive device. He has the narcotic pain medication. Patient Active Problem List   Diagnosis    Chest pain on exertion    Essential hypertension    S/P CABG (coronary artery bypass graft)    Hyperlipidemia LDL goal <70    Reactive depression    Prostate carcinoma ([facility]    Vitamin D deficiency    Left hip pain    Chronic left-sided low back pain without sciatica    Coronary artery disease    Edema of right lower extremity    Greater trochanteric bursitis of left hip    Primary osteoarthritis of right knee    Primary osteoarthritis of left knee    Chronic fatigue    Acquired hypothyroidism    Gastroesophageal reflux disease without esophagitis    Arthritis of left knee    History of total knee replacement, left-3.11.2020    Intractable chronic migraine without aura and without status migrainosus    Arthritis of right knee       ROS:  Constitutional: denies fever, chills, weight loss  MSK: denies pain in other joints, muscle aches  Neurological: denies numbness, tingling, weakness    Exam:  Resp. rate 18, height 5' 9\" (1.753 m), weight 197 lb (89.4 kg). Appearance: sitting in exam room chair, appears to be in no acute distress, awake and alert  Resp: unlabored breathing on room air  Skin: warm, dry and intact with out erythema or significant increased temperature  Neuro: grossly intact both lower extremities. Intact sensation to light touch. Motor exam 4+ to 5/5 in all major motor groups. Right knee: Incision is healed. Range of motion is 5 to 105 degrees. Sensation is intact light touch. There is brisk capillary refill.  There is 5/5 muscle strength in all muscle groups. Imaging:  None    Assessment:  S/p right total knee arthroplasty    Plan:  He is recovering as expected from right total knee arthroplasty. He will continue working with physical therapy. His range of motion is advancing. I do expect him to have less flexion than average based on his preoperative stiffness. He will follow up for repeat evaluation and radiographs in 6 months. This dictation was done with [employer] dictation and may contain mechanical errors related to translation.

## 2022-02-07 ENCOUNTER — HOSPITAL ENCOUNTER (OUTPATIENT)
Dept: PHYSICAL THERAPY | Age: 82
Setting detail: THERAPIES SERIES
Discharge: HOME | End: 2022-02-07
Payer: MEDICARE

## 2022-02-07 PROCEDURE — 97110 THERAPEUTIC EXERCISES: CPT

## 2022-02-07 PROCEDURE — 97112 NEUROMUSCULAR REEDUCATION: CPT

## 2022-02-07 NOTE — FLOWSHEET NOTE
[facility],[address], [address] [place], [address]  Phone: [phone]   Fax: [phone]      Physical Therapy Treatment Note/ Progress Report:       Date:  2022    Patient Name:  Teo Patel    :  1940  MRN: [id number]  Restrictions/Precautions:    Medical/Treatment Diagnosis Information:  · Diagnosis: D65.284 (ICD-10-CM) - Status post total right knee replacement  Treatment Diagnosis: Decreased functional mobility, Increased pain, Decreased ROM, Decreased strength, Decreased endurance  Insurance/Certification information:  PT Insurance Information: Medicare  Physician Information:  Referring Practitioner: Dr. Pekrins Child  Has the plan of care been signed (Y/N):        []  Yes  [x]  No     Date of Patient follow up with Physician: 22      Is this a Progress Report:     [x]  Yes  []  No        If Yes:  Date Range for reporting period:  Beginnin22  Ending 22    Progress report will be due (10 Rx or 30 days whichever is less):        Recertification will be due (POC Duration  / 90 days whichever is less): see above         Visit # Insurance Allowable Auth Required   8 BMN []  Yes [x]  No        Functional Scale:     OUTCOME MEASURE DATE DEFICIT   WOMAC 22  18.75% Deficit    WOMAC 22 IE 43.75% Deficit         Latex Allergy:  [x]NO      []YES  Preferred Language for Healthcare:   [x]English       []other:      Pain level: 5/10     SUBJECTIVE:  Patient had follow up with MD last week, states he is happy with the progress. Patient is to return to MD in 4 weeks. Patient woke up in the middle of the night last night with a stabbing pain in his knee. Patient states he is unsure of the cause. The knee continues to hurt currently. Patient states prior to this, he was completing stairs without issues or compensation.    7+ weeks s/p R TKA (12/15/21)    OBJECTIVE:      22              ROM PROM AROM Overpressure Comment   L R L R* L R     Flexion   112 with ERMI 95 110 in supine        Extension     0 0 with heel prop and quad set                                                   1/26/22  Strength L R* Comment   Quad set 5  4+      Hamstring 5  4+     Gastroc         Hip  flexion         Hip abd                                    RESTRICTIONS/PRECAUTIONS: hx of prostate cancer, hx of quadruple bypass, hx of L TKA, HTN, anxiety, depression    Exercises/Interventions:       Exercise/Equipment Resistance/Repetitions Other comments   Stretching     Hamstring 5x20\" EOT   Prone quad     HEP   Hip Flexion- Pato stretch     ITB     ICBS 5x20\"                   SLR     Supine 3x10    Abduction 3x10    Adduction     Prone     Munices  Long sitting        Isometrics     Quad sets     Prone TKE               Patellar Glides     Medial/Lateral     Superior/Inferior                ROM     Standing chair lunge     Hang Weights     Seated EOT knee flex/ext     Supine SB knee flexion     Weight Shift     Ankle Pumps     Supine heel slides     ERMI          CKC     Calf raises x30    Wall sits     Step ups     Lateral step ups     1 leg stand     Squatting 3x10 mini squats with UE support    CC TKE    Balance  5x15\" SL    SBA from LICENSE     Bridges      FR quad iso     SL KB pass     KB DL     Decline heel taps                    PRE     Extension RANGE: 70-45   Flexion RANGE: 0-90       Leg press              Bike 5' rocking for ROM         Manual interventions                   Therapeutic Exercise and NMR EXR  [x] (69662) Provided verbal/tactile cueing for activities related to strengthening, flexibility, endurance, ROM for improvements in LE, proximal hip, and core control with self care, mobility, lifting, ambulation.   [x] (06215) Provided verbal/tactile cueing for activities related to improving balance, coordination, kinesthetic sense, posture, motor skill, proprioception  to assist with LE, proximal hip, and core control in self care, mobility, lifting, ambulation and eccentric single leg control. NMR and Therapeutic Activities:    [x] (00345 or 34444) Provided verbal/tactile cueing for activities related to improving balance, coordination, kinesthetic sense, posture, motor skill, proprioception and motor activation to allow for proper function of core, proximal hip and LE with self care and ADLs  [] (16257) Gait Re-education- Provided training and instruction to the patient for proper LE, core and proximal hip recruitment and positioning and eccentric body weight control with ambulation re-education including up and down stairs     Home Exercise Program:    [x] (99514) Reviewed/Progressed HEP activities related to strengthening, flexibility, endurance, ROM of core, proximal hip and LE for functional self-care, mobility, lifting and ambulation/stair navigation   [] (23765)Reviewed/Progressed HEP activities related to improving balance, coordination, kinesthetic sense, posture, motor skill, proprioception of core, proximal hip and LE for self care, mobility, lifting, and ambulation/stair navigation      Manual Treatments:  PROM / STM / Oscillations-Mobs:  G-I, II, III, IV (PA's, Inf., Post.)  [] (01593) Provided manual therapy to mobilize LE, proximal hip and/or LS spine soft tissue/joints for the purpose of modulating pain, promoting relaxation,  increasing ROM, reducing/eliminating soft tissue swelling/inflammation/restriction, improving soft tissue extensibility and allowing for proper ROM for normal function with self care, mobility, lifting and ambulation. Modalities:     [] GAME READY (VASO)- for significant edema, swelling, pain control.      Charges:  Timed Code Treatment Minutes: 52'   Total Treatment Minutes: 47'   9448-3549   [] EVAL (LOW) 46955 (typically 20 minutes face-to-face)  [] EVAL (MOD) 30945 (typically 30 minutes face-to-face)  [] EVAL (HIGH) 67876 (typically 45 minutes face-to-face)  [] RE-EVAL     [x] FN(99623) x 2  [] IONTO  [x] NMR (52048) x  1   [] VASO  [] Manual (50074) x     [] Other:  [] TA x     [] Mech Traction (78613)  [] ES(attended) (20616)      [] ES (un) (79782):       ASSESSMENT:  See eval    HEP instruction:   Access Code: [email or web address]  URL: [email or web address]  Date: 01/05/2022  Prepared by: Joy Becerril  (see scanned forms)    GOALS:  Patient stated goal: walk pain free  [] Progressing: [x] Met: [] Not Met: [] Adjusted    Therapist goals for Patient:   Short Term Goals: To be achieved in: 2 weeks  1. Independent in HEP and progression per patient tolerance, in order to prevent re-injury. [] Progressing: [x] Met: [] Not Met: [] Adjusted   2. Patient will have a decrease in pain to facilitate improvement in movement, function, and ADLs as indicated by Functional Deficits. [] Progressing: [x] Met: [] Not Met: [] Adjusted    Long Term Goals: To be achieved in: 8 weeks    1. Disability index score of 21.8% or less for the [facility] to assist with reaching prior level of function. [] Progressing: [x] Met: [] Not Met: [] Adjusted  2. Patient will demonstrate increased AROM to 100 flexion to allow for proper joint functioning as indicated by patients Functional Deficits. [] Progressing: [x] Met: [] Not Met: [] Adjusted  3. Patient will demonstrate an increase in knee strength to 4+/5 in LE to allow for proper functional mobility as indicated by patients Functional Deficits. [] Progressing: [x] Met: [] Not Met: [] Adjusted  4. Patient will return to walking without increased symptoms or restriction. [] Progressing: [x] Met: [] Not Met: [] Adjusted  5. Patient will be able to tolerate a seated position for 1 hour in order to return to hobbies. [x] Progressing: [] Met: [] Not Met: [] Adjusted        Overall Progression Towards Functional goals/ Treatment Progress Update:  [x] Patient is progressing as expected towards functional goals listed.     [] Progression is slowed due to complexities/Impairments listed. [] Progression has been slowed due to co-morbidities. [] Plan just implemented, too soon to assess goals progression <30days   [] Goals require adjustment due to lack of progress  [] Patient is not progressing as expected and requires additional follow up with physician  [] Other    Prognosis for POC: [x] Good [] Fair  [] Poor      Patient requires continued skilled intervention: [x] Yes  [] No    Treatment/Activity Tolerance:  [] Patient tolerated treatment well [] Patient limited by fatique  [x] Patient limited by pain  [] Patient limited by other medical complications  [] Other: Patient reported a decrease in pain throughout session. Machines held this date due to patient's increased reports of pain. Patient did report he felt he was walking better while leaving the clinic. Ambulation pattern did normalize throughout session, from the antalgic gait pattern present on entry. Patient education: HEP, POC, stair negotiation with continued step to pattern due to strength deficits    PLAN: 1-2 x week 2 weeks  [x] Continue per plan of care [] Alter current plan (see comments above)  [] Plan of care initiated [] Hold pending MD visit [] Discharge      Electronically signed by:  Twyla Mcclellan, PT , DPT          Note: If patient does not return for scheduled/ recommended follow up visits, this note will serve as a discharge from care along with most recent update on progress.

## 2022-02-09 ENCOUNTER — HOSPITAL ENCOUNTER (OUTPATIENT)
Dept: PHYSICAL THERAPY | Age: 82
Setting detail: THERAPIES SERIES
Discharge: HOME | End: 2022-02-09
Payer: MEDICARE

## 2022-02-09 PROCEDURE — 97110 THERAPEUTIC EXERCISES: CPT

## 2022-02-09 PROCEDURE — 97112 NEUROMUSCULAR REEDUCATION: CPT

## 2022-02-09 NOTE — FLOWSHEET NOTE
[facility], [address] [place], [address]  Phone: [phone]   Fax: [phone]      Physical Therapy Treatment Note/ Progress Report:       Date:  2022    Patient Name:  Xiang Galo    :  1940  MRN: [id number]  Restrictions/Precautions:    Medical/Treatment Diagnosis Information:  · Diagnosis: M08.074 (ICD-10-CM) - Status post total right knee replacement  Treatment Diagnosis: Decreased functional mobility, Increased pain, Decreased ROM, Decreased strength, Decreased endurance  Insurance/Certification information:  PT Insurance Information: Medicare  Physician Information:  Referring Practitioner: Dr. Brandee Seaman  Has the plan of care been signed (Y/N):        []  Yes  [x]  No     Date of Patient follow up with Physician: 3/14/22       Is this a Progress Report:     [x]  Yes  []  No        If Yes:  Date Range for reporting period:  Beginnin22  Ending 22    Progress report will be due (10 Rx or 30 days whichever is less):        Recertification will be due (POC Duration  / 90 days whichever is less): see above         Visit # Insurance Allowable Auth Required   9 BMN []  Yes [x]  No        Functional Scale:     OUTCOME MEASURE DATE DEFICIT   WOMAC 22  18.75% Deficit    WOMAC 22 IE 43.75% Deficit         Latex Allergy:  [x]NO      []YES  Preferred Language for Healthcare:   [x]English       []other:      Pain level: 3/10     SUBJECTIVE:  Patient states his knee has been feeling better since the last visit. Some stiffness remains, but the sharp pain has diminished.    8 weeks s/p R TKA (12/15/21)    OBJECTIVE:      22              ROM PROM AROM Overpressure Comment     L R L R* L R     Flexion   112 with ERMI 95 110 in supine        Extension     0 0 with heel prop and quad set                                                   22  Strength L R* Comment   Quad set 5  4+      Hamstring 5  4+     Gastroc         Hip  flexion         Hip abd                                    RESTRICTIONS/PRECAUTIONS: hx of prostate cancer, hx of quadruple bypass, hx of L TKA, HTN, anxiety, depression    Exercises/Interventions:       Exercise/Equipment Resistance/Repetitions Other comments   Stretching     Hamstring 5x20\" EOT   Prone quad     HEP   Hip Flexion- Pato stretch     ITB     ICBS 5x20\"                   SLR     Supine 3x10    Abduction 3x10    Adduction     Prone     Munices  Long sitting        Isometrics     Quad sets     Prone TKE               Patellar Glides     Medial/Lateral     Superior/Inferior                ROM     Standing chair lunge     Hang Weights     Seated EOT knee flex/ext     Supine SB knee flexion     Weight Shift     Ankle Pumps     Supine heel slides     ERMI          CKC     Calf raises x30    Wall sits     Step ups Power step x15 1 UE support    Lateral step ups     1 leg stand     Squatting 3x10 mini squats with UE support    CC TKE    Balance     Bridges  3x10    FR quad iso     SL KB pass     KB DL     Decline heel taps                    PRE     Extension 10# 2x10 SL RANGE: 70-45   Flexion 45# 3x10 SL RANGE: 0-90        Leg press 60# 3x10 SL              Bike 5' rocking for ROM         Manual interventions                   Therapeutic Exercise and NMR EXR  [x] (64057) Provided verbal/tactile cueing for activities related to strengthening, flexibility, endurance, ROM for improvements in LE, proximal hip, and core control with self care, mobility, lifting, ambulation. [x] (74010) Provided verbal/tactile cueing for activities related to improving balance, coordination, kinesthetic sense, posture, motor skill, proprioception  to assist with LE, proximal hip, and core control in self care, mobility, lifting, ambulation and eccentric single leg control.      NMR and Therapeutic Activities:    [x] (92095 or 27156) Provided verbal/tactile cueing for activities related to improving balance, coordination, kinesthetic sense, posture, motor skill, proprioception and motor activation to allow for proper function of core, proximal hip and LE with self care and ADLs  [] (15528) Gait Re-education- Provided training and instruction to the patient for proper LE, core and proximal hip recruitment and positioning and eccentric body weight control with ambulation re-education including up and down stairs     Home Exercise Program:    [x] (15345) Reviewed/Progressed HEP activities related to strengthening, flexibility, endurance, ROM of core, proximal hip and LE for functional self-care, mobility, lifting and ambulation/stair navigation   [] (44902)Reviewed/Progressed HEP activities related to improving balance, coordination, kinesthetic sense, posture, motor skill, proprioception of core, proximal hip and LE for self care, mobility, lifting, and ambulation/stair navigation      Manual Treatments:  PROM / STM / Oscillations-Mobs:  G-I, II, III, IV (PA's, Inf., Post.)  [] (38214) Provided manual therapy to mobilize LE, proximal hip and/or LS spine soft tissue/joints for the purpose of modulating pain, promoting relaxation,  increasing ROM, reducing/eliminating soft tissue swelling/inflammation/restriction, improving soft tissue extensibility and allowing for proper ROM for normal function with self care, mobility, lifting and ambulation. Modalities:     [] GAME READY (VASO)- for significant edema, swelling, pain control.      Charges:  Timed Code Treatment Minutes: 42'   Total Treatment Minutes: 42'   4208-3157  [] EVAL (LOW) 54298 (typically 20 minutes face-to-face)  [] EVAL (MOD) 16611 (typically 30 minutes face-to-face)  [] EVAL (HIGH) 16613 (typically 45 minutes face-to-face)  [] RE-EVAL     [x] GC(72088) x   2  [] IONTO  [x] NMR (81503) x  1   [] VASO  [] Manual (12231) x     [] Other:  [] TA x     [] Mech Traction (46964)  [] ES(attended) (35081)      [] ES (un) (69578):       ASSESSMENT:  See eval    HEP instruction:   Access Code: [email or web address]  URL: [email or web address]  Date: 01/05/2022  Prepared by: Getachew Rodriguez  (see scanned forms)    GOALS:  Patient stated goal: walk pain free  [] Progressing: [x] Met: [] Not Met: [] Adjusted    Therapist goals for Patient:   Short Term Goals: To be achieved in: 2 weeks  1. Independent in HEP and progression per patient tolerance, in order to prevent re-injury. [] Progressing: [x] Met: [] Not Met: [] Adjusted   2. Patient will have a decrease in pain to facilitate improvement in movement, function, and ADLs as indicated by Functional Deficits. [] Progressing: [x] Met: [] Not Met: [] Adjusted    Long Term Goals: To be achieved in: 8 weeks    1. Disability index score of 21.8% or less for the [facility] to assist with reaching prior level of function. [] Progressing: [x] Met: [] Not Met: [] Adjusted  2. Patient will demonstrate increased AROM to 100 flexion to allow for proper joint functioning as indicated by patients Functional Deficits. [] Progressing: [x] Met: [] Not Met: [] Adjusted  3. Patient will demonstrate an increase in knee strength to 4+/5 in LE to allow for proper functional mobility as indicated by patients Functional Deficits. [] Progressing: [x] Met: [] Not Met: [] Adjusted  4. Patient will return to walking without increased symptoms or restriction. [] Progressing: [x] Met: [] Not Met: [] Adjusted  5. Patient will be able to tolerate a seated position for 1 hour in order to return to hobbies. [x] Progressing: [] Met: [] Not Met: [] Adjusted        Overall Progression Towards Functional goals/ Treatment Progress Update:  [x] Patient is progressing as expected towards functional goals listed. [] Progression is slowed due to complexities/Impairments listed. [] Progression has been slowed due to co-morbidities.   [] Plan just implemented, too soon to assess goals progression <30days   [] Goals require adjustment due to lack of progress  [] Patient is not progressing as expected and requires additional follow up with physician  [] Other    Prognosis for POC: [x] Good [] Fair  [] Poor      Patient requires continued skilled intervention: [x] Yes  [] No    Treatment/Activity Tolerance:  [] Patient tolerated treatment well [] Patient limited by fatique  [x] Patient limited by pain  [] Patient limited by other medical complications  [] Other: Patient tolerated the reintroduction of machine based strengthening as well as closed chained activities. Patient continues to be challenged with current program. Continue to progress functional mobility and strengthening as tolerated. Patient education: HEP, POC, stair negotiation with continued step to pattern due to strength deficits    PLAN: 1-2 x week 2 weeks  [x] Continue per plan of care [] Alter current plan (see comments above)  [] Plan of care initiated [] Hold pending MD visit [] Discharge      Electronically signed by:  Lucy Carlisle, PT , DPT          Note: If patient does not return for scheduled/ recommended follow up visits, this note will serve as a discharge from care along with most recent update on progress.

## 2022-02-14 ENCOUNTER — HOSPITAL ENCOUNTER (OUTPATIENT)
Dept: PHYSICAL THERAPY | Age: 82
Setting detail: THERAPIES SERIES
Discharge: HOME | End: 2022-02-14
Payer: MEDICARE

## 2022-02-14 PROCEDURE — 97112 NEUROMUSCULAR REEDUCATION: CPT

## 2022-02-14 PROCEDURE — 97530 THERAPEUTIC ACTIVITIES: CPT

## 2022-02-14 PROCEDURE — 97110 THERAPEUTIC EXERCISES: CPT

## 2022-02-14 NOTE — FLOWSHEET NOTE
[facility], [address] [place], [address]  Phone: [phone]   Fax: [phone]      Physical Therapy Treatment Note/ Progress Report:       Date:  2022    Patient Name:  Arun Arreaga    :  1940  MRN: [id number]  Restrictions/Precautions:    Medical/Treatment Diagnosis Information:  · Diagnosis: M71.008 (ICD-10-CM) - Status post total right knee replacement  Treatment Diagnosis: Decreased functional mobility, Increased pain, Decreased ROM, Decreased strength, Decreased endurance  Insurance/Certification information:  PT Insurance Information: Medicare  Physician Information:  Referring Practitioner: Dr. Venkatesh Manuel  Has the plan of care been signed (Y/N):        []  Yes  [x]  No     Date of Patient follow up with Physician: 3/14/22       Is this a Progress Report:     [x]  Yes  []  No        If Yes:  Date Range for reporting period:  Beginnin22  Ending 22    Progress report will be due (17 Rx or 30 days whichever is less): 64       Recertification will be due (POC Duration  / 90 days whichever is less): see above         Visit # Insurance Allowable Auth Required   10 BMN []  Yes [x]  No        Functional Scale:     OUTCOME MEASURE DATE DEFICIT   WOMAC 22  18.75% Deficit    WOMAC 22 IE 43.75% Deficit         Latex Allergy:  [x]NO      []YES  Preferred Language for Healthcare:   [x]English       []other:      Pain level: 2/10     SUBJECTIVE:  Patient states his knee feels more stiff than anything lately.     8+ weeks s/p R TKA (12/15/21)    OBJECTIVE:      22              ROM PROM AROM Overpressure Comment     L R L R* L R     Flexion   112 with ERMI 95 110 in supine        Extension     0 0 with heel prop and quad set                                                   22  Strength L R* Comment   Quad set 5  4+      Hamstring 5  4+     Gastroc         Hip  flexion         Hip abd                                    RESTRICTIONS/PRECAUTIONS: hx of prostate cancer, hx of quadruple bypass, hx of L TKA, HTN, anxiety, depression    Exercises/Interventions:       Exercise/Equipment Resistance/Repetitions Other comments   Stretching     Hamstring 5x20\" EOT   Prone quad     HEP   Hip Flexion- Pato stretch     ITB     ICBS 5x20\"                   SLR     Supine 3x10 1#    Abduction 3x10 1#    Adduction     Prone     Munices  Long sitting        Isometrics     Quad sets     Prone TKE               Patellar Glides     Medial/Lateral     Superior/Inferior                ROM     Standing chair lunge     Hang Weights     Seated EOT knee flex/ext     Supine SB knee flexion     Weight Shift     Ankle Pumps     Supine heel slides     ERMI 10x10\"          CKC     Calf raises     Wall sits     Step ups     Lateral step ups     1 leg stand     Squatting 2x10 sit to stand    CC TKE    Balance x15 standing marches     Bridges  3x10    FR quad iso     SL KB pass     KB DL     Decline heel taps                    PRE     Extension  RANGE: 70-45   Flexion 45# 3x10 SL RANGE: 0-90        Leg press               Bike 5' rocking for ROM         Manual interventions                   Therapeutic Exercise and NMR EXR  [x] (87406) Provided verbal/tactile cueing for activities related to strengthening, flexibility, endurance, ROM for improvements in LE, proximal hip, and core control with self care, mobility, lifting, ambulation. [x] (86391) Provided verbal/tactile cueing for activities related to improving balance, coordination, kinesthetic sense, posture, motor skill, proprioception  to assist with LE, proximal hip, and core control in self care, mobility, lifting, ambulation and eccentric single leg control.      NMR and Therapeutic Activities:    [x] (21015 or 36747) Provided verbal/tactile cueing for activities related to improving balance, coordination, kinesthetic sense, posture, motor skill, proprioception and motor activation to allow for proper function of core, proximal hip and LE with self care and ADLs  [] (51975) Gait Re-education- Provided training and instruction to the patient for proper LE, core and proximal hip recruitment and positioning and eccentric body weight control with ambulation re-education including up and down stairs     Home Exercise Program:    [x] (02032) Reviewed/Progressed HEP activities related to strengthening, flexibility, endurance, ROM of core, proximal hip and LE for functional self-care, mobility, lifting and ambulation/stair navigation   [] (64238)Reviewed/Progressed HEP activities related to improving balance, coordination, kinesthetic sense, posture, motor skill, proprioception of core, proximal hip and LE for self care, mobility, lifting, and ambulation/stair navigation      Manual Treatments:  PROM / STM / Oscillations-Mobs:  G-I, II, III, IV (PA's, Inf., Post.)  [] (89191) Provided manual therapy to mobilize LE, proximal hip and/or LS spine soft tissue/joints for the purpose of modulating pain, promoting relaxation,  increasing ROM, reducing/eliminating soft tissue swelling/inflammation/restriction, improving soft tissue extensibility and allowing for proper ROM for normal function with self care, mobility, lifting and ambulation. Modalities:     [] GAME READY (VASO)- for significant edema, swelling, pain control. Charges:  Timed Code Treatment Minutes: 48'   Total Treatment Minutes: 51'   [id number]  [] EVAL (LOW) 33321 (typically 20 minutes face-to-face)  [] EVAL (MOD) 05858 (typically 30 minutes face-to-face)  [] EVAL (HIGH) 01538 (typically 45 minutes face-to-face)  [] RE-EVAL     [x] HR(33908) x   1  [] IONTO  [x] NMR (98650) x  1   [] VASO  [] Manual (40203) x     [] Other:  [x] TA x  1   [] Mech Traction (15227)  [] ES(attended) (54707)      [] ES (un) (86848):       ASSESSMENT:  See eval    HEP instruction:   Access Code: [email or web address]  URL: [email or web address]  Date: 01/05/2022  Prepared by: Candy Moore  (see scanned forms)    GOALS:  Patient stated goal: walk pain free  [] Progressing: [x] Met: [] Not Met: [] Adjusted    Therapist goals for Patient:   Short Term Goals: To be achieved in: 2 weeks  1. Independent in HEP and progression per patient tolerance, in order to prevent re-injury. [] Progressing: [x] Met: [] Not Met: [] Adjusted   2. Patient will have a decrease in pain to facilitate improvement in movement, function, and ADLs as indicated by Functional Deficits. [] Progressing: [x] Met: [] Not Met: [] Adjusted    Long Term Goals: To be achieved in: 8 weeks    1. Disability index score of 21.8% or less for the [facility] to assist with reaching prior level of function. [] Progressing: [x] Met: [] Not Met: [] Adjusted  2. Patient will demonstrate increased AROM to 100 flexion to allow for proper joint functioning as indicated by patients Functional Deficits. [] Progressing: [x] Met: [] Not Met: [] Adjusted  3. Patient will demonstrate an increase in knee strength to 4+/5 in LE to allow for proper functional mobility as indicated by patients Functional Deficits. [] Progressing: [x] Met: [] Not Met: [] Adjusted  4. Patient will return to walking without increased symptoms or restriction. [] Progressing: [x] Met: [] Not Met: [] Adjusted  5. Patient will be able to tolerate a seated position for 1 hour in order to return to hobbies. [x] Progressing: [] Met: [] Not Met: [] Adjusted        Overall Progression Towards Functional goals/ Treatment Progress Update:  [x] Patient is progressing as expected towards functional goals listed. [] Progression is slowed due to complexities/Impairments listed. [] Progression has been slowed due to co-morbidities.   [] Plan just implemented, too soon to assess goals progression <30days   [] Goals require adjustment due to lack of progress  [] Patient is not progressing as expected and requires additional follow up with physician  [] Other    Prognosis for POC: [x] Good [] Fair  [] Poor      Patient requires continued skilled intervention: [x] Yes  [] No    Treatment/Activity Tolerance:  [] Patient tolerated treatment well [x] Patient limited by fatique  [] Patient limited by pain  [] Patient limited by other medical complications  [] Other:   Patient with improved single leg stability this visit, no UE assist required. Patient continuing to progress well toward PLOF. Patient was provided verbal cueing for knee flexion during ambulation, and was able to correct with this cue. Patient education: HEP, POC, stair negotiation with continued step to pattern due to strength deficits    PLAN: 1-2 x week 2 weeks  [x] Continue per plan of care [] Alter current plan (see comments above)  [] Plan of care initiated [] Hold pending MD visit [] Discharge      Electronically signed by:  Matteo Aquino, PT , DPT          Note: If patient does not return for scheduled/ recommended follow up visits, this note will serve as a discharge from care along with most recent update on progress.

## 2022-02-16 ENCOUNTER — HOSPITAL ENCOUNTER (OUTPATIENT)
Dept: PHYSICAL THERAPY | Age: 82
Setting detail: THERAPIES SERIES
Discharge: HOME | End: 2022-02-16
Payer: MEDICARE

## 2022-02-16 PROCEDURE — 97112 NEUROMUSCULAR REEDUCATION: CPT

## 2022-02-16 PROCEDURE — 97110 THERAPEUTIC EXERCISES: CPT

## 2022-02-16 PROCEDURE — 97530 THERAPEUTIC ACTIVITIES: CPT

## 2022-02-16 NOTE — PLAN OF CARE
[address] [place], [address]  Phone: [phone]   Fax: [phone]         Physical Therapy Discharge Summary    Dear  Dr. Joelle Osorio,    We had the pleasure of treating the following patient for physical therapy services at [address] [place] [address]. A summary of our findings can be found in the discharge summary below. If you have any questions or concerns regarding these findings, please do not hesitate to contact me at the office phone number above. Thank you for the referral.     Physician Signature:________________________________Date:__________________  By signing above (or electronic signature), therapists plan is approved by physician      Overall Response to Treatment:   [x]Patient is responding well to treatment and improvement is noted with regards  to goals   []Patient should continue to improve in reasonable time if they continue HEP   []Patient has plateaued and is no longer responding to skilled PT intervention    []Patient is getting worse and would benefit from return to referring MD   []Patient unable to adhere to initial POC   [x]Other: The patient is 9 weeks s/p R TKA and has attended 11 visits of outpatient skilled physical therapy. Patiet has made good progress with mobility, strength, and functional activity tolerance. Patient has met 100% of goals through this period. The patient is ambulating safely without AD and has minimal to no difficulty with ADLs. Patient is to be discharged at this time. He has been educated to call the physical therapist if questions arise.      Date range of Visits: 22-22  Total Visits: 11        Physical Therapy Treatment Note/ Progress Report:       Date:  2022    Patient Name:  Terence Arshad    :  1940  MRN: [id number]  Restrictions/Precautions:    Medical/Treatment Diagnosis Information:  · Diagnosis: W93.431 (ICD-10-CM) - Status post total 3x10 1#    Abduction 3x10 1#    Adduction     Prone     Munices  Long sitting        Isometrics     Quad sets     Prone TKE               Patellar Glides     Medial/Lateral     Superior/Inferior                ROM     Standing chair lunge     Hang Weights     Seated EOT knee flex/ext     Supine SB knee flexion     Weight Shift     Ankle Pumps     Supine heel slides     ERMI          CKC     Calf raises     Wall sits     Step ups     Lateral step ups     1 leg stand     Squatting 2x10 sit to stand    CC TKE    Balance x15 standing marches     Bridges  3x10    FR quad iso     SL KB pass     KB DL     Decline heel taps                    PRE     Extension 10# 2x10 SL RANGE: 70-45   Flexion 45# 3x10 SL RANGE: 0-90        Leg press               Bike 5' rocking for ROM         Manual interventions                   Therapeutic Exercise and NMR EXR  [x] (24984) Provided verbal/tactile cueing for activities related to strengthening, flexibility, endurance, ROM for improvements in LE, proximal hip, and core control with self care, mobility, lifting, ambulation. [x] (36717) Provided verbal/tactile cueing for activities related to improving balance, coordination, kinesthetic sense, posture, motor skill, proprioception  to assist with LE, proximal hip, and core control in self care, mobility, lifting, ambulation and eccentric single leg control.      NMR and Therapeutic Activities:    [x] (35900 or 40955) Provided verbal/tactile cueing for activities related to improving balance, coordination, kinesthetic sense, posture, motor skill, proprioception and motor activation to allow for proper function of core, proximal hip and LE with self care and ADLs  [] (19000) Gait Re-education- Provided training and instruction to the patient for proper LE, core and proximal hip recruitment and positioning and eccentric body weight control with ambulation re-education including up and down stairs     Home Exercise Program:    [x] (47836) Reviewed/Progressed HEP activities related to strengthening, flexibility, endurance, ROM of core, proximal hip and LE for functional self-care, mobility, lifting and ambulation/stair navigation   [] (13148)Reviewed/Progressed HEP activities related to improving balance, coordination, kinesthetic sense, posture, motor skill, proprioception of core, proximal hip and LE for self care, mobility, lifting, and ambulation/stair navigation      Manual Treatments:  PROM / STM / Oscillations-Mobs:  G-I, II, III, IV (PA's, Inf., Post.)  [] (79295) Provided manual therapy to mobilize LE, proximal hip and/or LS spine soft tissue/joints for the purpose of modulating pain, promoting relaxation,  increasing ROM, reducing/eliminating soft tissue swelling/inflammation/restriction, improving soft tissue extensibility and allowing for proper ROM for normal function with self care, mobility, lifting and ambulation. Modalities:     [] GAME READY (VASO)- for significant edema, swelling, pain control. Charges:  Timed Code Treatment Minutes: 42'   Total Treatment Minutes: 42'   0106-2444  [] EVAL (LOW) 455 1011 (typically 20 minutes face-to-face)  [] EVAL (MOD) 98147 (typically 30 minutes face-to-face)  [] EVAL (HIGH) 53662 (typically 45 minutes face-to-face)  [] RE-EVAL     [x] WW(81086) x   1  [] IONTO  [x] NMR (69891) x  1   [] VASO  [] Manual (34977) x     [] Other:  [x] TA x  1   [] Mech Traction (90012)  [] ES(attended) (88498)      [] ES (un) (75637):       ASSESSMENT:  See eval    HEP instruction:   Access Code: [email or web address]  URL: [email or web address]  Date: 02/16/2022  Prepared by: Tee Mojica  (see scanned forms)    GOALS:  Patient stated goal: walk pain free  [] Progressing: [x] Met: [] Not Met: [] Adjusted    Therapist goals for Patient:   Short Term Goals: To be achieved in: 2 weeks  1. Independent in HEP and progression per patient tolerance, in order to prevent re-injury.      [] Progressing: [x] Met: [] Not Met: [] Adjusted   2. Patient will have a decrease in pain to facilitate improvement in movement, function, and ADLs as indicated by Functional Deficits. [] Progressing: [x] Met: [] Not Met: [] Adjusted    Long Term Goals: To be achieved in: 8 weeks    1. Disability index score of 21.8% or less for the [facility] to assist with reaching prior level of function. [] Progressing: [x] Met: [] Not Met: [] Adjusted  2. Patient will demonstrate increased AROM to 100 flexion to allow for proper joint functioning as indicated by patients Functional Deficits. [] Progressing: [x] Met: [] Not Met: [] Adjusted  3. Patient will demonstrate an increase in knee strength to 4+/5 in LE to allow for proper functional mobility as indicated by patients Functional Deficits. [] Progressing: [x] Met: [] Not Met: [] Adjusted  4. Patient will return to walking without increased symptoms or restriction. [] Progressing: [x] Met: [] Not Met: [] Adjusted  5. Patient will be able to tolerate a seated position for 1 hour in order to return to hobbies. [] Progressing: [x] Met: [] Not Met: [] Adjusted        Overall Progression Towards Functional goals/ Treatment Progress Update:  [x] Patient is progressing as expected towards functional goals listed. [] Progression is slowed due to complexities/Impairments listed. [] Progression has been slowed due to co-morbidities.   [] Plan just implemented, too soon to assess goals progression <30days   [] Goals require adjustment due to lack of progress  [] Patient is not progressing as expected and requires additional follow up with physician  [] Other    Prognosis for POC: [x] Good [] Fair  [] Poor      Patient requires continued skilled intervention: [] Yes  [x] No     Treatment/Activity Tolerance:  [x] Patient tolerated treatment well [] Patient limited by fatique  [] Patient limited by pain  [] Patient limited by other medical complications  [] Other:         Patient education: HEP    PLAN:   [] Continue per plan of care [] Alter current plan (see comments above)  [] Plan of care initiated [] Hold pending MD visit [x] Discharge      Electronically signed by:  Tee Mojica PT , DPT          Note: If patient does not return for scheduled/ recommended follow up visits, this note will serve as a discharge from care along with most recent update on progress.

## 2022-03-09 ENCOUNTER — NURSE ONLY (OUTPATIENT)
Dept: INTERNAL MEDICINE CLINIC | Age: 82
End: 2022-03-09

## 2022-03-09 DIAGNOSIS — E78.5 HYPERLIPIDEMIA LDL GOAL <70: Primary | ICD-10-CM

## 2022-03-09 LAB
CHOLESTEROL, FASTING: 150 MG/DL (ref 0–199)
HDLC SERPL-MCNC: 37 MG/DL (ref 40–60)
LDL CHOLESTEROL CALCULATED: 73 MG/DL
TRIGLYCERIDE, FASTING: 198 MG/DL (ref 0–150)
VLDLC SERPL CALC-MCNC: 40 MG/DL

## 2022-03-14 ENCOUNTER — OFFICE VISIT (OUTPATIENT)
Dept: ORTHOPEDIC SURGERY | Age: 82
End: 2022-03-14

## 2022-03-14 VITALS — WEIGHT: 198 LBS | HEIGHT: 69 IN | BODY MASS INDEX: 29.33 KG/M2

## 2022-03-14 DIAGNOSIS — Z96.651 STATUS POST TOTAL RIGHT KNEE REPLACEMENT: Primary | ICD-10-CM

## 2022-03-14 PROCEDURE — 99024 POSTOP FOLLOW-UP VISIT: CPT | Performed by: ORTHOPAEDIC SURGERY

## 2022-03-14 NOTE — PROGRESS NOTES
[facility]  Office Visit    Chief Complaint: Follow-up s/p right total knee arthroplasty    HPI:  Mukund De La Torre is a [de-identified] y.o. who is here in follow-up of right total knee arthroplasty performed on 12/15/2021. He is doing well overall. He walks without assistive device. Patient Active Problem List   Diagnosis    Chest pain on exertion    Essential hypertension    S/P CABG (coronary artery bypass graft)    Hyperlipidemia LDL goal <70    Reactive depression    Prostate carcinoma ([facility] [address]    Vitamin D deficiency    Left hip pain    Chronic left-sided low back pain without sciatica    Coronary artery disease    Edema of right lower extremity    Greater trochanteric bursitis of left hip    Primary osteoarthritis of right knee    Primary osteoarthritis of left knee    Chronic fatigue    Acquired hypothyroidism    Gastroesophageal reflux disease without esophagitis    Arthritis of left knee    History of total knee replacement, left-3.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 Appearance: sitting in exam room chair, appears to be in no acute distress, awake and alert  Resp: unlabored breathing on room air  Skin: warm, dry and intact with out erythema or significant increased temperature  Neuro: grossly intact both lower extremities. Intact sensation to light touch. Motor exam 4+ to 5/5 in all major motor groups. Right knee: Incision is healed. Range of motion is 5 to 110 degrees. Sensation is intact light touch. There is brisk capillary refill. There is 5/5 muscle strength in all muscle groups. Imaging:  3 views of the right knee were performed and interpreted today.   He has a cemented total knee arthroplasty prosthesis in place with no signs of osteolysis, loosening, fracture, dislocation. Assessment:  S/p right total knee arthroplasty    Plan:  He is doing well 3 months postoperatively. He will continue activity as tolerated. We discussed with the patient today the use of antibiotic prophylaxis prior to any dental procedures. We discussed that the antibiotic prophylaxis would be used to help prevent periprosthetic joint infections. If they were not offered antibiotics by the physician performing the procedure, they should contact our office that we may prescribe them antibiotics. Follow-up next year for repeat evaluation of both knees. This dictation was done with [employer] dictation and may contain mechanical errors related to translation.

## 2022-03-30 ENCOUNTER — OFFICE VISIT (OUTPATIENT)
Dept: INTERNAL MEDICINE CLINIC | Age: 82
End: 2022-03-30
Payer: MEDICARE

## 2022-03-30 VITALS
RESPIRATION RATE: 18 BRPM | WEIGHT: 198 LBS | HEIGHT: 69 IN | SYSTOLIC BLOOD PRESSURE: 138 MMHG | BODY MASS INDEX: 29.33 KG/M2 | OXYGEN SATURATION: 95 % | DIASTOLIC BLOOD PRESSURE: 68 MMHG | HEART RATE: 61 BPM

## 2022-03-30 DIAGNOSIS — E03.9 ACQUIRED HYPOTHYROIDISM: Primary | ICD-10-CM

## 2022-03-30 DIAGNOSIS — Z96.652 HISTORY OF TOTAL LEFT KNEE REPLACEMENT (TKR): ICD-10-CM

## 2022-03-30 DIAGNOSIS — H91.8X3 OTHER SPECIFIED HEARING LOSS OF BOTH EARS: ICD-10-CM

## 2022-03-30 DIAGNOSIS — R20.0 BILATERAL LEG NUMBNESS: ICD-10-CM

## 2022-03-30 DIAGNOSIS — Z95.1 HX OF CABG: ICD-10-CM

## 2022-03-30 DIAGNOSIS — E55.9 VITAMIN D DEFICIENCY: ICD-10-CM

## 2022-03-30 DIAGNOSIS — C61 PROSTATE CARCINOMA (HCC): ICD-10-CM

## 2022-03-30 DIAGNOSIS — R53.83 OTHER FATIGUE: ICD-10-CM

## 2022-03-30 DIAGNOSIS — Z96.651 HISTORY OF TOTAL RIGHT KNEE REPLACEMENT (TKR): ICD-10-CM

## 2022-03-30 DIAGNOSIS — G43.719 INTRACTABLE CHRONIC MIGRAINE WITHOUT AURA AND WITHOUT STATUS MIGRAINOSUS: ICD-10-CM

## 2022-03-30 DIAGNOSIS — R53.82 CHRONIC FATIGUE: ICD-10-CM

## 2022-03-30 DIAGNOSIS — Z12.83 SKIN CANCER SCREENING: ICD-10-CM

## 2022-03-30 DIAGNOSIS — Z12.11 COLON CANCER SCREENING: ICD-10-CM

## 2022-03-30 DIAGNOSIS — F32.9 REACTIVE DEPRESSION: ICD-10-CM

## 2022-03-30 PROBLEM — M17.12 ARTHRITIS OF LEFT KNEE: Status: RESOLVED | Noted: 2020-03-11 | Resolved: 2022-03-30

## 2022-03-30 PROBLEM — H91.93 BILATERAL HEARING LOSS: Status: ACTIVE | Noted: 2022-03-30

## 2022-03-30 PROBLEM — M17.11 ARTHRITIS OF RIGHT KNEE: Status: RESOLVED | Noted: 2021-12-15 | Resolved: 2022-03-30

## 2022-03-30 LAB
ALBUMIN SERPL-MCNC: 4.3 G/DL (ref 3.4–5)
ALP BLD-CCNC: 94 U/L (ref 40–129)
ALT SERPL-CCNC: 20 U/L (ref 10–40)
ANION GAP SERPL CALCULATED.3IONS-SCNC: 17 MMOL/L (ref 3–16)
AST SERPL-CCNC: 17 U/L (ref 15–37)
BASOPHILS ABSOLUTE: 0 K/UL (ref 0–0.2)
BASOPHILS RELATIVE PERCENT: 0.3 %
BILIRUB SERPL-MCNC: 0.3 MG/DL (ref 0–1)
BILIRUBIN DIRECT: <0.2 MG/DL (ref 0–0.3)
BILIRUBIN URINE: NEGATIVE
BILIRUBIN, INDIRECT: NORMAL MG/DL (ref 0–1)
BLOOD, URINE: NEGATIVE
BUN BLDV-MCNC: 28 MG/DL (ref 7–20)
CALCIUM SERPL-MCNC: 9.3 MG/DL (ref 8.3–10.6)
CHLORIDE BLD-SCNC: 109 MMOL/L (ref 99–110)
CLARITY: CLEAR
CO2: 17 MMOL/L (ref 21–32)
COLOR: YELLOW
CREAT SERPL-MCNC: 1.5 MG/DL (ref 0.8–1.3)
EOSINOPHILS ABSOLUTE: 0.1 K/UL (ref 0–0.6)
EOSINOPHILS RELATIVE PERCENT: 2.2 %
GFR AFRICAN AMERICAN: 54
GFR NON-AFRICAN AMERICAN: 45
GLUCOSE BLD-MCNC: 89 MG/DL (ref 70–99)
GLUCOSE URINE: NEGATIVE MG/DL
HCT VFR BLD CALC: 39.8 % (ref 40.5–52.5)
HEMOGLOBIN: 13.4 G/DL (ref 13.5–17.5)
KETONES, URINE: ABNORMAL MG/DL
LEUKOCYTE ESTERASE, URINE: NEGATIVE
LYMPHOCYTES ABSOLUTE: 1 K/UL (ref 1–5.1)
LYMPHOCYTES RELATIVE PERCENT: 18.1 %
MAGNESIUM: 2.5 MG/DL (ref 1.8–2.4)
MCH RBC QN AUTO: 29.9 PG (ref 26–34)
MCHC RBC AUTO-ENTMCNC: 33.6 G/DL (ref 31–36)
MCV RBC AUTO: 89.2 FL (ref 80–100)
MICROSCOPIC EXAMINATION: ABNORMAL
MONOCYTES ABSOLUTE: 0.5 K/UL (ref 0–1.3)
MONOCYTES RELATIVE PERCENT: 8.9 %
NEUTROPHILS ABSOLUTE: 4 K/UL (ref 1.7–7.7)
NEUTROPHILS RELATIVE PERCENT: 70.5 %
NITRITE, URINE: NEGATIVE
PDW BLD-RTO: 14.1 % (ref 12.4–15.4)
PH UA: 5.5 (ref 5–8)
PHOSPHORUS: 4.9 MG/DL (ref 2.5–4.9)
PLATELET # BLD: 147 K/UL (ref 135–450)
PMV BLD AUTO: 8.4 FL (ref 5–10.5)
POTASSIUM SERPL-SCNC: 4.9 MMOL/L (ref 3.5–5.1)
PROTEIN UA: NEGATIVE MG/DL
RBC # BLD: 4.46 M/UL (ref 4.2–5.9)
SODIUM BLD-SCNC: 143 MMOL/L (ref 136–145)
SPECIFIC GRAVITY UA: >=1.03 (ref 1–1.03)
T4 FREE: 0.9 NG/DL (ref 0.9–1.8)
TOTAL PROTEIN: 7 G/DL (ref 6.4–8.2)
TSH REFLEX FT4: 7.31 UIU/ML (ref 0.27–4.2)
URINE TYPE: ABNORMAL
UROBILINOGEN, URINE: 0.2 E.U./DL
WBC # BLD: 5.7 K/UL (ref 4–11)

## 2022-03-30 PROCEDURE — 36415 COLL VENOUS BLD VENIPUNCTURE: CPT | Performed by: INTERNAL MEDICINE

## 2022-03-30 PROCEDURE — 82274 ASSAY TEST FOR BLOOD FECAL: CPT | Performed by: INTERNAL MEDICINE

## 2022-03-30 PROCEDURE — 99214 OFFICE O/P EST MOD 30 MIN: CPT | Performed by: INTERNAL MEDICINE

## 2022-03-30 PROCEDURE — 4040F PNEUMOC VAC/ADMIN/RCVD: CPT | Performed by: INTERNAL MEDICINE

## 2022-03-30 PROCEDURE — G8427 DOCREV CUR MEDS BY ELIG CLIN: HCPCS | Performed by: INTERNAL MEDICINE

## 2022-03-30 PROCEDURE — G8484 FLU IMMUNIZE NO ADMIN: HCPCS | Performed by: INTERNAL MEDICINE

## 2022-03-30 PROCEDURE — 1123F ACP DISCUSS/DSCN MKR DOCD: CPT | Performed by: INTERNAL MEDICINE

## 2022-03-30 PROCEDURE — 81003 URINALYSIS AUTO W/O SCOPE: CPT | Performed by: INTERNAL MEDICINE

## 2022-03-30 PROCEDURE — 1036F TOBACCO NON-USER: CPT | Performed by: INTERNAL MEDICINE

## 2022-03-30 PROCEDURE — G8417 CALC BMI ABV UP PARAM F/U: HCPCS | Performed by: INTERNAL MEDICINE

## 2022-03-30 ASSESSMENT — ENCOUNTER SYMPTOMS
RESPIRATORY NEGATIVE: 1
BLOOD IN STOOL: 0
NAUSEA: 0
CONSTIPATION: 0
EYES NEGATIVE: 1

## 2022-03-30 ASSESSMENT — PATIENT HEALTH QUESTIONNAIRE - PHQ9
1. LITTLE INTEREST OR PLEASURE IN DOING THINGS: 0
5. POOR APPETITE OR OVEREATING: 0
SUM OF ALL RESPONSES TO PHQ QUESTIONS 1-9: 0
SUM OF ALL RESPONSES TO PHQ QUESTIONS 1-9: 0
7. TROUBLE CONCENTRATING ON THINGS, SUCH AS READING THE NEWSPAPER OR WATCHING TELEVISION: 0
10. IF YOU CHECKED OFF ANY PROBLEMS, HOW DIFFICULT HAVE THESE PROBLEMS MADE IT FOR YOU TO DO YOUR WORK, TAKE CARE OF THINGS AT HOME, OR GET ALONG WITH OTHER PEOPLE: 0
3. TROUBLE FALLING OR STAYING ASLEEP: 0
SUM OF ALL RESPONSES TO PHQ QUESTIONS 1-9: 0
SUM OF ALL RESPONSES TO PHQ QUESTIONS 1-9: 0
8. MOVING OR SPEAKING SO SLOWLY THAT OTHER PEOPLE COULD HAVE NOTICED. OR THE OPPOSITE, BEING SO FIGETY OR RESTLESS THAT YOU HAVE BEEN MOVING AROUND A LOT MORE THAN USUAL: 0
9. THOUGHTS THAT YOU WOULD BE BETTER OFF DEAD, OR OF HURTING YOURSELF: 0
2. FEELING DOWN, DEPRESSED OR HOPELESS: 0
SUM OF ALL RESPONSES TO PHQ9 QUESTIONS 1 & 2: 0
4. FEELING TIRED OR HAVING LITTLE ENERGY: 0
6. FEELING BAD ABOUT YOURSELF - OR THAT YOU ARE A FAILURE OR HAVE LET YOURSELF OR YOUR FAMILY DOWN: 0

## 2022-03-30 NOTE — PROGRESS NOTES
Juan José Rivera (:  1940) is a [de-identified] y.o. male,Established patient, here for evaluation of the following chief complaint(s):  Established New Doctor         ASSESSMENT/PLAN:  1. Acquired hypothyroidism with fatigue. Will check TSH to monitor to see if supplement is adequate.  -     TSH with Reflex to FT4; Future  -     2. History of total right knee replacement (TKR), stable without problems. 3. History of total left knee replacement (TKR)stable without problems. 4. Chronic fatigue: concern for B 12 deficiency or not enough thyroid supplement or anemia or electrolyte disorder. Will check labs. -     Renal Function Panel; Future  -     Vitamin B12; Future, urinalysis, cbc, renal , hepatic. 5. Hx of CABG, sees cardiology regularly and no problems. Last visit with cardiology 22. 6. Prostate carcinoma ([facility] [address] normal PSA 21 and has follow up appointment with urology soon. No urinary problems. 7. Vitamin D deficiency: , high risk for deficiency with living in [place] and need to keep level above 30 in the covid 19 pandemic.  -     Vitamin D 25 Hydroxy; Future  8. Reactive depression, resolved with  Lexapro, continue to monitor. 9. Other fatigue: same as #4.  -     Renal Function Panel; Future  10. Other specified hearing loss of both ears, wears hearing aids and has annual checks. 11. Colon cancer screening, needed since independent and good health. Will get fit test.  -     POCT Fecal Immunochemical Test (FIT); Future  12. Skin cancer screening, needed, referral given. -     Missael Kidd MD, Dermatology, [facility]  13. Bilateral leg numbness in a stocking distribution. Check glucose and b 12 level and order an EMG to further evaluate. -     EMG; Future  -     Magnesium; Future  14. Intractable chronic migraine without aura and without status migrainosus. controlled with excedrin about three times a month. Pattern has not changed.   -         Return in about 3 months (around 6/30/2022). Subjective   SUBJECTIVE/OBJECTIVE:  Hypothyroidism: Patient presents for evaluation of thyroid function. Symptoms consist of fatigue. Symptoms have present for several months. The symptoms are mild. The problem has been unchanged. Previous thyroid studies include TSH. The hypothyroidism is due to hypothyroidism. Fatigue  This is a new problem. The current episode started more than 1 month ago. The problem occurs constantly. The problem has been unchanged. Associated symptoms include fatigue. Pertinent negatives include no abdominal pain, anorexia, arthralgias, change in bowel habit, chest pain, chills, congestion, coughing, diaphoresis, joint swelling, myalgias, nausea, neck pain, numbness, rash, sore throat, swollen glands, urinary symptoms, vertigo, visual change, vomiting or weakness. Associated symptoms comments: Migraines have not changed. . Nothing aggravates the symptoms. He has tried nothing for the symptoms. The treatment provided no relief. Review of Systems   Constitutional: Positive for fatigue. Negative for chills and diaphoresis. HENT: Positive for hearing loss and tinnitus. Negative for congestion and sore throat. Wears hearing aids. Eyes: Negative. Respiratory: Negative. Negative for cough. Cardiovascular: Negative for chest pain, palpitations and leg swelling. CABS,   Gastrointestinal: Negative for abdominal pain, anorexia, blood in stool, change in bowel habit, constipation, nausea and vomiting. Endocrine: Negative. Genitourinary:        History of prostate cancer   Musculoskeletal: Negative. Negative for arthralgias, joint swelling, myalgias and neck pain. Skin: Negative for rash. Allergic/Immunologic: Positive for environmental allergies. Neurological: Negative for vertigo, weakness and numbness. Migraines headaches controlled with topamax    Migraine 3 times a month. Hematological: Negative.     Psychiatric/Behavioral: Negative. Anxiety controlled with lexapro. Objective   Physical Exam  Constitutional:       General: He is not in acute distress. Appearance: Normal appearance. He is normal weight. He is not ill-appearing, toxic-appearing or diaphoretic. HENT:      Head: Normocephalic and atraumatic. Eyes:      Extraocular Movements: Extraocular movements intact. Conjunctiva/sclera: Conjunctivae normal.      Pupils: Pupils are equal, round, and reactive to light. Neck:      Vascular: No carotid bruit. Cardiovascular:      Pulses: Normal pulses. Heart sounds: Normal heart sounds. Pulmonary:      Effort: Pulmonary effort is normal.      Breath sounds: Normal breath sounds. Abdominal:      General: Abdomen is flat. Bowel sounds are normal. There is no distension. Palpations: Abdomen is soft. Musculoskeletal:         General: No tenderness. Normal range of motion. Cervical back: Normal range of motion and neck supple. No rigidity or tenderness. Lymphadenopathy:      Cervical: No cervical adenopathy. Skin:     General: Skin is warm and dry. Neurological:      General: No focal deficit present. Mental Status: He is alert. Sensory: Sensory deficit present. Comments: Decreased vibratory sensation in bilateral feet and lower legs in a stocking distribution. Psychiatric:         Mood and Affect: Mood normal.         Behavior: Behavior normal.         Thought Content: Thought content normal.         Judgment: Judgment normal.                  An electronic signature was used to authenticate this note.     --Светлана Raymond MD

## 2022-03-31 DIAGNOSIS — N18.31 STAGE 3A CHRONIC KIDNEY DISEASE (HCC): ICD-10-CM

## 2022-03-31 DIAGNOSIS — E03.9 ACQUIRED HYPOTHYROIDISM: Primary | ICD-10-CM

## 2022-03-31 DIAGNOSIS — N18.30 STAGE 3 CHRONIC KIDNEY DISEASE, UNSPECIFIED WHETHER STAGE 3A OR 3B CKD (HCC): ICD-10-CM

## 2022-03-31 LAB
VITAMIN B-12: 354 PG/ML (ref 211–911)
VITAMIN D 25-HYDROXY: 46.3 NG/ML

## 2022-03-31 RX ORDER — LEVOTHYROXINE SODIUM 0.05 MG/1
75 TABLET ORAL DAILY
Qty: 135 TABLET | Refills: 1
Start: 2022-03-31 | End: 2022-06-29

## 2022-03-31 NOTE — RESULT ENCOUNTER NOTE
Spoke with patient and explained mild decrease in renal function. Will get renal ultrasound to make sure no obstruction to drainage. Increase fluids. Patient is not taking nephrotoxic medication. Continue lisinopril for renal protection. Consider kerendia after evaluating ultrasound. Patient will call to schedule. Low normal vitamin B12 so will take 500 MCG of over-the-counter sublingual vitamin B12 daily. Patient will reduce magnesium is not taking the evening dosage. Thyroid level is low so will increase levothyroxine from 50-75 MCG daily. Patient will take 1-1/2 of the 50 MCG tablets. He has been taking on empty stomach first thing in the morning with water only.

## 2022-04-01 ENCOUNTER — NURSE ONLY (OUTPATIENT)
Dept: INTERNAL MEDICINE CLINIC | Age: 82
End: 2022-04-01

## 2022-04-01 LAB
CONTROL: NORMAL
HEMOCCULT STL QL: NEGATIVE

## 2022-04-02 ASSESSMENT — ENCOUNTER SYMPTOMS
SWOLLEN GLANDS: 0
CHANGE IN BOWEL HABIT: 0
VISUAL CHANGE: 0
SORE THROAT: 0
ABDOMINAL PAIN: 0
COUGH: 0
VOMITING: 0

## 2022-04-15 LAB — PROSTATE SPECIFIC ANTIGEN: 0.14 NG/ML (ref 0–4)

## 2022-04-28 ENCOUNTER — HOSPITAL ENCOUNTER (OUTPATIENT)
Dept: NEUROLOGY | Age: 82
Discharge: HOME | End: 2022-04-28
Payer: MEDICARE

## 2022-04-28 ENCOUNTER — HOSPITAL ENCOUNTER (OUTPATIENT)
Dept: ULTRASOUND IMAGING | Age: 82
Discharge: HOME | End: 2022-04-28
Payer: MEDICARE

## 2022-04-28 DIAGNOSIS — N18.30 STAGE 3 CHRONIC KIDNEY DISEASE, UNSPECIFIED WHETHER STAGE 3A OR 3B CKD (HCC): ICD-10-CM

## 2022-04-28 PROCEDURE — 95861 NEEDLE EMG 2 EXTREMITIES: CPT

## 2022-04-28 PROCEDURE — 95909 NRV CNDJ TST 5-6 STUDIES: CPT

## 2022-04-28 PROCEDURE — 76770 US EXAM ABDO BACK WALL COMP: CPT

## 2022-04-28 NOTE — PROCEDURES
Test Date:  2022    Patient: Velvet Kaur : 1940 Physician: Beatris Melgar DO   Sex: Male ID#:  Ref Phys: Bartolome Kumar MD     Patient Complaints:  Patient is a [de-identified]year-old male who presents with numbness in the lower extremities    Patient History / Exam:  PMH Right TKR 4 months ago. Left knee TKR one year ago. No endocrine disease. no back or hip surgery PE: reflexes trace at knees, absent at ankles, normal strength    NCV & EMG Findings:  Evaluation of the left fibular (EDB) motor nerve showed decreased conduction velocity (Bel Fib Head-Ankle, 37 m/s). The right fibular (EDB) motor nerve showed reduced amplitude (1.37 mV) and decreased conduction velocity (Bel Fib Head-Ankle, 35 m/s). The left tibial (AHB) motor nerve showed prolonged distal onset latency (6.3 ms) and reduced amplitude (2.8 mV). The right tibial (AHB) motor nerve showed reduced amplitude (3.2 mV). The left sural sensory and the right sural sensory nerves showed no response. All examined muscles (as indicated in the following table) showed no evidence of electrical instability. 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          Beatris Duluz marina AG        Nerve Conduction Studies  Motor Nerve Results      Latency Amplitude F-Lat Segment Distance CV Comment   Site (ms) Norm (mV) Norm (ms)  (cm) (m/s) Norm    Left Fibular (EDB) Motor   Ankle 6.0  < 6.1 2.2  > 2.0         Bel Fib Head 14.9 - 2.1 -  Bel Fib Head-Ankle 33 37  > 38    Pop Fossa 15.7 - 1.97 -  Pop Fossa-Bel Fib Head 4 50  > 42    Right Fibular (EDB) Motor   Ankle 6.0  < 6.1 1.37  > 2.0         Bel Fib Head 15.3 - 1.36 -  Bel Fib Head-Ankle 33 35  > 38    Pop Fossa 15.9 - 1.03 -  Pop Fossa-Bel Fib Head 4 67  > 42    Left Tibial (AHB) Motor   Ankle 6.3  < 6.1 2.8  > 4.4         Knee 15.2 - - -  Knee-Ankle 35 39  > 39    Right Tibial (AHB) Motor   Ankle 6.0  < 6.1 3.2  > 4.4         Knee 15.4 - - -  Knee-Ankle 37 39  > 39      Sensory Nerve Results      Latency (Peak) Amplitude (P-P) Segment Distance CV Comment   Site (ms) Norm (µV) Norm  (cm) (m/s) Norm    Left Sural Sensory   Calf-Lat Mall NR  < 4.0 NR  > 5 Calf-Lat Mall 14 NR  > 35    Right Sural Sensory   Calf-Lat Mall NR  < 4.0 NR  > 5 Calf-Lat Mall 14 NR  > 35        Electromyography     Side Muscle Nerve Root Ins Act Fibs Psw Amp Dur Poly Recrt Int Kristi Dyers Comment   Right Gluteus Med Sup Gluteal L5-S1 Nml Nml Nml Nml Nml 0 Nml Nml    Right Vastus Med Femoral L2-L4 Nml Nml Nml Nml Nml 0 Nml Nml    Right Add Longus Obturator L2-L4 Nml Nml Nml Nml Nml 0 Nml Nml    Right Tib Anterior Deep Fibular,  Fibula. .. L4-L5 Nml Nml Nml Nml Nml 0 Nml Nml    Right Fib longus  L5-S1 Nml Nml Nml Nml Nml 0 Nml Nml    Right Gastroc MH Tibial S1-S2 Nml Nml Nml Nml Nml 0 Nml Nml    Right Ext Ledezma Long Deep Fibular,  Fibula. .. L5-S1 Nml Nml Nml Nml Nml 0 Nml Nml    Right EDB Deep Fibular,  Fibula. .. L5-S1 Nml Nml Nml Nml Nml 0 Nml Nml    Right AHB Medial Plantar,  Tibi. .. S1-S2 Nml Nml Nml Nml Nml 0 Nml Nml    Right Lumbo Paraspinal (Upper) Rami L1-L2 Nml Nml Nml         Right Lumbo Paraspinal (Mid) Rami L3-L4 Nml Nml Nml         Right Lumbo Paraspinal (Lower) Rami L5-S1 Nml Nml Nml         Left Gluteus Med Sup Gluteal L5-S1 Nml Nml Nml Nml Nml 0 Nml Nml    Left Vastus Med Femoral L2-L4 Nml Nml Nml Nml Nml 0 Nml Nml    Left Add Longus Obturator L2-L4 Nml Nml Nml Nml Nml 0 Nml Nml    Left Tib Anterior Deep Fibular,  Fibula. .. L4-L5 Nml Nml Nml Nml Nml 0 Nml Nml    Left Fib longus  L5-S1 Nml Nml Nml Nml Nml 0 Nml Nml    Left Gastroc MH Tibial S1-S2 Nml Nml Nml Nml Nml 0 Nml Nml    Left Ext Ledezma Long Deep Fibular,  Fibula. .. L5-S1 Nml Nml Nml Nml Nml 0 Nml Nml    Left EDB Deep Fibular,  Fibula. .. L5-S1 Nml Nml Nml Nml Nml 0 Nml Nml    Left AHB Medial Plantar,  Tibi. ..  S1-S2 Nml Nml Nml Nml Nml 0 Nml Nml    Left Lumbo Paraspinal (Upper) Rami L1-L2 Nml Nml Nml         Left Lumbo Paraspinal (Mid) Rami L3-L4 Nml Nml Nml         Left Lumbo Paraspinal (Lower) Rami L5-S1 Nml Nml Nml             Electronically signed by Maki Bradley DO on 4/28/2022 at 9:53 AM

## 2022-04-28 NOTE — RESULT ENCOUNTER NOTE
The kidney ultrasound shows the left kidney has been removed. The right kidney appears normal and no blockage of urine flow. My Chart message sent.

## 2022-06-29 ENCOUNTER — OFFICE VISIT (OUTPATIENT)
Dept: INTERNAL MEDICINE CLINIC | Age: 82
End: 2022-06-29
Payer: MEDICARE

## 2022-06-29 ENCOUNTER — CLINICAL DOCUMENTATION (OUTPATIENT)
Dept: SPIRITUAL SERVICES | Age: 82
End: 2022-06-29

## 2022-06-29 VITALS
HEART RATE: 51 BPM | OXYGEN SATURATION: 98 % | DIASTOLIC BLOOD PRESSURE: 60 MMHG | WEIGHT: 199.6 LBS | HEIGHT: 69 IN | RESPIRATION RATE: 18 BRPM | BODY MASS INDEX: 29.56 KG/M2 | SYSTOLIC BLOOD PRESSURE: 110 MMHG

## 2022-06-29 DIAGNOSIS — N18.31 STAGE 3A CHRONIC KIDNEY DISEASE (HCC): ICD-10-CM

## 2022-06-29 DIAGNOSIS — Z00.00 INITIAL MEDICARE ANNUAL WELLNESS VISIT: Primary | ICD-10-CM

## 2022-06-29 DIAGNOSIS — C61 PROSTATE CARCINOMA (HCC): ICD-10-CM

## 2022-06-29 DIAGNOSIS — I10 ESSENTIAL HYPERTENSION: ICD-10-CM

## 2022-06-29 DIAGNOSIS — E03.9 ACQUIRED HYPOTHYROIDISM: ICD-10-CM

## 2022-06-29 DIAGNOSIS — E78.5 HYPERLIPIDEMIA LDL GOAL <70: ICD-10-CM

## 2022-06-29 LAB
ALBUMIN SERPL-MCNC: 4.2 G/DL (ref 3.4–5)
ANION GAP SERPL CALCULATED.3IONS-SCNC: 11 MMOL/L (ref 3–16)
BUN BLDV-MCNC: 22 MG/DL (ref 7–20)
CALCIUM SERPL-MCNC: 8.9 MG/DL (ref 8.3–10.6)
CHLORIDE BLD-SCNC: 108 MMOL/L (ref 99–110)
CO2: 23 MMOL/L (ref 21–32)
CREAT SERPL-MCNC: 1.4 MG/DL (ref 0.8–1.3)
GFR AFRICAN AMERICAN: 59
GFR NON-AFRICAN AMERICAN: 49
GLUCOSE BLD-MCNC: 64 MG/DL (ref 70–99)
PHOSPHORUS: 4.4 MG/DL (ref 2.5–4.9)
POTASSIUM SERPL-SCNC: 4.4 MMOL/L (ref 3.5–5.1)
SODIUM BLD-SCNC: 142 MMOL/L (ref 136–145)
TSH REFLEX FT4: 0.53 UIU/ML (ref 0.27–4.2)

## 2022-06-29 PROCEDURE — 36415 COLL VENOUS BLD VENIPUNCTURE: CPT | Performed by: INTERNAL MEDICINE

## 2022-06-29 PROCEDURE — 1123F ACP DISCUSS/DSCN MKR DOCD: CPT | Performed by: INTERNAL MEDICINE

## 2022-06-29 PROCEDURE — G0438 PPPS, INITIAL VISIT: HCPCS | Performed by: INTERNAL MEDICINE

## 2022-06-29 RX ORDER — LEVOTHYROXINE SODIUM 0.07 MG/1
75 TABLET ORAL DAILY
Qty: 90 TABLET | Refills: 1 | Status: SHIPPED | OUTPATIENT
Start: 2022-06-29

## 2022-06-29 SDOH — ECONOMIC STABILITY: TRANSPORTATION INSECURITY
IN THE PAST 12 MONTHS, HAS LACK OF TRANSPORTATION KEPT YOU FROM MEETINGS, WORK, OR FROM GETTING THINGS NEEDED FOR DAILY LIVING?: NO

## 2022-06-29 SDOH — ECONOMIC STABILITY: FOOD INSECURITY: WITHIN THE PAST 12 MONTHS, THE FOOD YOU BOUGHT JUST DIDN'T LAST AND YOU DIDN'T HAVE MONEY TO GET MORE.: NEVER TRUE

## 2022-06-29 SDOH — ECONOMIC STABILITY: TRANSPORTATION INSECURITY
IN THE PAST 12 MONTHS, HAS THE LACK OF TRANSPORTATION KEPT YOU FROM MEDICAL APPOINTMENTS OR FROM GETTING MEDICATIONS?: NO

## 2022-06-29 SDOH — ECONOMIC STABILITY: FOOD INSECURITY: WITHIN THE PAST 12 MONTHS, YOU WORRIED THAT YOUR FOOD WOULD RUN OUT BEFORE YOU GOT MONEY TO BUY MORE.: NEVER TRUE

## 2022-06-29 ASSESSMENT — PATIENT HEALTH QUESTIONNAIRE - PHQ9
9. THOUGHTS THAT YOU WOULD BE BETTER OFF DEAD, OR OF HURTING YOURSELF: 0
SUM OF ALL RESPONSES TO PHQ QUESTIONS 1-9: 0
SUM OF ALL RESPONSES TO PHQ QUESTIONS 1-9: 0
SUM OF ALL RESPONSES TO PHQ9 QUESTIONS 1 & 2: 0
4. FEELING TIRED OR HAVING LITTLE ENERGY: 0
SUM OF ALL RESPONSES TO PHQ QUESTIONS 1-9: 0
10. IF YOU CHECKED OFF ANY PROBLEMS, HOW DIFFICULT HAVE THESE PROBLEMS MADE IT FOR YOU TO DO YOUR WORK, TAKE CARE OF THINGS AT HOME, OR GET ALONG WITH OTHER PEOPLE: 0
5. POOR APPETITE OR OVEREATING: 0
2. FEELING DOWN, DEPRESSED OR HOPELESS: 0
1. LITTLE INTEREST OR PLEASURE IN DOING THINGS: 0
6. FEELING BAD ABOUT YOURSELF - OR THAT YOU ARE A FAILURE OR HAVE LET YOURSELF OR YOUR FAMILY DOWN: 0
SUM OF ALL RESPONSES TO PHQ QUESTIONS 1-9: 0
7. TROUBLE CONCENTRATING ON THINGS, SUCH AS READING THE NEWSPAPER OR WATCHING TELEVISION: 0
3. TROUBLE FALLING OR STAYING ASLEEP: 0
8. MOVING OR SPEAKING SO SLOWLY THAT OTHER PEOPLE COULD HAVE NOTICED. OR THE OPPOSITE, BEING SO FIGETY OR RESTLESS THAT YOU HAVE BEEN MOVING AROUND A LOT MORE THAN USUAL: 0

## 2022-06-29 ASSESSMENT — LIFESTYLE VARIABLES: HOW OFTEN DO YOU HAVE A DRINK CONTAINING ALCOHOL: NEVER

## 2022-06-29 ASSESSMENT — SOCIAL DETERMINANTS OF HEALTH (SDOH): HOW HARD IS IT FOR YOU TO PAY FOR THE VERY BASICS LIKE FOOD, HOUSING, MEDICAL CARE, AND HEATING?: NOT HARD AT ALL

## 2022-06-29 NOTE — PATIENT INSTRUCTIONS
Personalized Preventive Plan for Merari Woo - 6/29/2022  Medicare offers a range of preventive health benefits. Some of the tests and screenings are paid in full while other may be subject to a deductible, co-insurance, and/or copay. Some of these benefits include a comprehensive review of your medical history including lifestyle, illnesses that may run in your family, and various assessments and screenings as appropriate. After reviewing your medical record and screening and assessments performed today your provider may have ordered immunizations, labs, imaging, and/or referrals for you. A list of these orders (if applicable) as well as your Preventive Care list are included within your After Visit Summary for your review. Other Preventive Recommendations:    · A preventive eye exam performed by an eye specialist is recommended every 1-2 years to screen for glaucoma; cataracts, macular degeneration, and other eye disorders. · A preventive dental visit is recommended every 6 months. · Try to get at least 150 minutes of exercise per week or 10,000 steps per day on a pedometer . · Order or download the FREE \"Exercise & Physical Activity: Your Everyday Guide\" from [employer]. Call 8-708.262.7028 or search [employer] online. · You need 9246-9898 mg of calcium and 5425-8179 IU of vitamin D per day. It is possible to meet your calcium requirement with diet alone, but a vitamin D supplement is usually necessary to meet this goal.  · When exposed to the sun, use a sunscreen that protects against both UVA and UVB radiation with an SPF of 30 or greater. Reapply every 2 to 3 hours or after sweating, drying off with a towel, or swimming. · Always wear a seat belt when traveling in a car. Always wear a helmet when riding a bicycle or motorcycle.

## 2022-06-29 NOTE — ACP (ADVANCE CARE PLANNING)
Advance Care Planning   Ambulatory ACP Specialist Patient Outreach    Date:  6/29/2022  ACP Specialist:  ELTON Oviedo    Outreach call to patient in follow-up to ACP Specialist referral from: Ruperto Alejandre MD    [x] PCP  [x] Provider   [] Ambulatory Care Management [] Other for Reason:    [x] Advance Directive Assistance  [] Code Status Discussion  [] Complete Portable DNR Order  [] Discuss Goals of Care  [] Complete POST/MOST  [] Early ACP Decision-Making  [] Other: has living will     Date Referral Received: 6/29/2022     Today's Outreach:  [x] First   [] Second  [] Third                               Third outreach made by []  phone  [] email []   [employer]     Intervention:  [x] Spoke with Patient  [] Left VM requesting return call      Outcome: Spoke with patient offering an ACP conversation, patient declined the program services and has ACP team's contact information for future needs. Next Step:   [] ACP scheduled conversation  [] Outreach again in one week               [] Email / Mail ACP Info Sheets  [] Email / Mail Advance Directive            [x] Close Referral. Routing closure to referring provider/staff     and to ACP Specialist .      Thank you for this referral.

## 2022-06-29 NOTE — PROGRESS NOTES
Medicare Annual Wellness Visit    [facility] is here for 3 Month Follow-Up, Medication Refill (needs new script in order for med to be covered under insurance), and Medicare AWV    Assessment & Plan   Initial Medicare annual wellness visit  Acquired hypothyroidism  -     levothyroxine (SYNTHROID) 75 MCG tablet; Take 1 tablet by mouth Daily TAKE 1 TABLET BY MOUTH EVERY DAY, Disp-90 tablet, R-1Normal  -     TSH with Reflex to FT4; Future  Stage 3a chronic kidney disease ([facility] [address]  -     Renal Function Panel; Future  Essential hypertension  Hyperlipidemia LDL goal <70  Prostate carcinoma ([address]      Recommendations for Preventive Services Due: see orders and patient instructions/AVS.  Recommended screening schedule for the next 5-10 years is provided to the patient in written form: see Patient Instructions/AVS.     Return for Medicare Annual Wellness Visit in 1 year. Subjective   The following acute and/or chronic problems were also addressed today:   Diagnosis Orders   1. Initial Medicare annual wellness visit referral placed to advance care planning. 2. Acquired hypothyroidism last visit in March TSH was elevated and Synthroid was increased from 50-75 MCG daily. Patient no longer has swollen ankles and has more energy wife is noticed. We will get follow-up TSH level. levothyroxine (SYNTHROID) 75 MCG tablet    TSH with Reflex to FT4    TSH with Reflex to FT4   3. Stage 3a chronic kidney disease ([address] discussed with patient last visit renal ultrasound obtained that was normal with no hydronephrosis. Patient now knows to avoid NSAIDs and is on no nephrotoxic medication. He is taking renal protective lisinopril. Will monitor renal function to decide if additional protection from [place] needed. Renal Function Panel    Renal Function Panel   4. Essential hypertension controlled on lisinopril, continue monitor renal function.     5. Hyperlipidemia LDL goal <70 with history of coronary artery bypass surgery continue atorvastatin 80 mg and Zetia. Lab Results   Component Value Date    CHOL 153 12/08/2020    CHOL 145 11/21/2019    CHOL 147 11/06/2018     Lab Results   Component Value Date    TRIG 112 12/08/2020    TRIG 147 11/21/2019    TRIG 96 11/06/2018     Lab Results   Component Value Date    HDL 37 (L) 03/09/2022    HDL 51 12/08/2020    HDL 45 11/21/2019     Lab Results   Component Value Date    LDLCALC 73 03/09/2022    LDLCALC 80 12/08/2020    LDLCALC 71 11/21/2019     Lab Results   Component Value Date    LABVLDL 40 03/09/2022    LABVLDL 22 12/08/2020    LABVLDL 29 11/21/2019     No results found for: CHOLHDLRATIO      6. Prostate carcinoma [facility]) patient sees urology regularly and no recurrence. No urinary problems. Patient's complete Health Risk Assessment and screening values have been reviewed and are found in Flowsheets. The following problems were reviewed today and where indicated follow up appointments were made and/or referrals ordered. Positive Risk Factor Screenings with Interventions:                  No Positive Risk Factors identified today. Objective   Vitals:    06/29/22 1119 06/29/22 1146   BP: 112/62 110/60   Site: Left Upper Arm Right Upper Arm   Position: Sitting Sitting   Cuff Size: Large Adult Large Adult   Pulse: 51    Resp: 18    SpO2: 98%    Weight: 199 lb 9.6 oz (90.5 kg)    Height: 5' 9\" (1.753 m)       Body mass index is 29.48 kg/m². Physical Exam  Constitutional:       General: He is not in acute distress. Appearance: Normal appearance. He is normal weight. He is not ill-appearing, toxic-appearing or diaphoretic. HENT:      Head: Normocephalic and atraumatic. Eyes:      Extraocular Movements: Extraocular movements intact. Conjunctiva/sclera: Conjunctivae normal.      Pupils: Pupils are equal, round, and reactive to light. Neck:      Vascular: No carotid bruit. Cardiovascular:      Pulses: Normal pulses.       Heart sounds: Normal heart sounds. Pulmonary:      Effort: Pulmonary effort is normal.      Breath sounds: Normal breath sounds. Abdominal:      General: Abdomen is flat. Bowel sounds are normal. There is no distension. Palpations: Abdomen is soft. Musculoskeletal:         General: No tenderness. Normal range of motion. Cervical back: Normal range of motion and neck supple. No rigidity or tenderness. Right lower leg: No edema. Left lower leg: No edema. Lymphadenopathy:      Cervical: No cervical adenopathy. Skin:     General: Skin is warm and dry. Neurological:      General: No focal deficit present. Mental Status: He is alert. Sensory: Sensory deficit present. Comments: Decreased vibratory sensation in a stocking distribution. Psychiatric:         Mood and Affect: Mood normal.         Behavior: Behavior normal.         Thought Content: Thought content normal.         Judgment: Judgment normal.             Allergies   Allergen Reactions    Pcn [Penicillins] Anaphylaxis     Unknown. As child @ 2years old \"almost killed me\"    Demerol Hcl [Meperidine]      Uncontrollable shaking    Lyrica [Pregabalin] Other (See Comments)     hallucinations and double vision     Oxycodone     Tramadol Nausea And Vomiting     Prior to Visit Medications    Medication Sig Taking?  Authorizing Provider   levothyroxine (SYNTHROID) 75 MCG tablet Take 1 tablet by mouth Daily TAKE 1 TABLET BY MOUTH EVERY DAY Yes Silvia Yates MD   ezetimibe (ZETIA) 10 MG tablet TAKE 1 TABLET BY MOUTH DAILY Yes Lanre Aleman MD   topiramate (TOPAMAX) 50 MG tablet Take 0.5 tablets by mouth 2 times daily Yes Lanre Aleman MD   lisinopril (PRINIVIL;ZESTRIL) 10 MG tablet Take 1 tablet by mouth daily Yes Lanre Aleman MD   atorvastatin (LIPITOR) 80 MG tablet Take 1 tablet by mouth nightly TAKE 1 TABLET BY MOUTH DAILY Yes Lanre Aleman MD   escitalopram (LEXAPRO) 20 MG tablet TAKE 1 TABLET BY MOUTH DAILY Yes Shayan Vaca Earlene Fung MD   Misc Natural Products (GLUCOSAMINE CHOND CMP TRIPLE) TABS Take 1 tablet by mouth 2 times daily Yes Historical Provider, MD   Lysine 500 MG CAPS Take 1 capsule by mouth daily Yes Historical Provider, MD   guaiFENesin (MUCINEX) 600 MG extended release tablet Take 600 mg by mouth every 6 hours as needed for Congestion Yes Historical Provider, MD   esomeprazole (NEXIUM) 20 MG delayed release capsule Take 20 mg by mouth 2 times daily Yes Historical Provider, MD   polyethylene glycol (GLYCOLAX) packet Take 17 g by mouth daily as needed for Constipation Yes Historical Provider, MD   Cholecalciferol (VITAMIN D) 2000 units CAPS capsule Take 1 capsule by mouth daily Yes Historical Provider, MD   Magnesium 500 MG CAPS Take 1 capsule by mouth nightly  Yes Historical Provider, MD   docusate sodium (COLACE, DULCOLAX) 100 MG CAPS Take 100 mg by mouth 2 times daily as needed for Constipation Yes AMARIS Murphy CNP   aspirin 81 MG EC tablet Take 1 tablet by mouth 2 times daily for 14 days Take twice a day for 14 days after knee surgery then resume daily dosing  AMARIS Ko CNP     Wt Readings from Last 3 Encounters:   06/29/22 199 lb 9.6 oz (90.5 kg)   03/30/22 198 lb (89.8 kg)   03/14/22 198 lb (89.8 kg)         CareTeam (Including outside providers/suppliers regularly involved in providing care):   Patient Care Team:  Zane Remy MD as PCP - General (Internal Medicine)  Zane Remy MD as PCP - [facility] Empaneled Provider  Irma Mejia MD as Consulting Physician (Radiation Oncology)     Reviewed and updated this visit:  Tobacco  Allergies  Meds  Problems  Med Hx  Surg Hx  Soc Hx  Fam Hx

## 2022-07-08 RX ORDER — ESCITALOPRAM OXALATE 20 MG/1
TABLET ORAL
Qty: 90 TABLET | Refills: 0 | Status: SHIPPED | OUTPATIENT
Start: 2022-07-08 | End: 2022-10-06 | Stop reason: SDUPTHER

## 2022-07-08 NOTE — TELEPHONE ENCOUNTER
Last [facility]ort [id number]    Future Appointments   Date Time Provider Leonie Silva   9/28/2022 11:40 AM Melvin Tyler MD [facility] [place] - [place]   3/16/2023 10:45 AM Lord Blander, MD Severa Coma [facility]

## 2022-08-15 ENCOUNTER — OFFICE VISIT (OUTPATIENT)
Dept: ORTHOPEDIC SURGERY | Age: 82
End: 2022-08-15
Payer: MEDICARE

## 2022-08-15 VITALS — BODY MASS INDEX: 29.47 KG/M2 | HEIGHT: 69 IN | WEIGHT: 199 LBS | RESPIRATION RATE: 16 BRPM

## 2022-08-15 DIAGNOSIS — M25.512 LEFT SHOULDER PAIN, UNSPECIFIED CHRONICITY: Primary | ICD-10-CM

## 2022-08-15 DIAGNOSIS — M75.22 BICEPS TENDINITIS OF LEFT UPPER EXTREMITY: ICD-10-CM

## 2022-08-15 PROCEDURE — G8427 DOCREV CUR MEDS BY ELIG CLIN: HCPCS | Performed by: PHYSICIAN ASSISTANT

## 2022-08-15 PROCEDURE — 1036F TOBACCO NON-USER: CPT | Performed by: PHYSICIAN ASSISTANT

## 2022-08-15 PROCEDURE — 99213 OFFICE O/P EST LOW 20 MIN: CPT | Performed by: PHYSICIAN ASSISTANT

## 2022-08-15 PROCEDURE — 1123F ACP DISCUSS/DSCN MKR DOCD: CPT | Performed by: PHYSICIAN ASSISTANT

## 2022-08-15 PROCEDURE — 20610 DRAIN/INJ JOINT/BURSA W/O US: CPT | Performed by: PHYSICIAN ASSISTANT

## 2022-08-15 PROCEDURE — G8417 CALC BMI ABV UP PARAM F/U: HCPCS | Performed by: PHYSICIAN ASSISTANT

## 2022-08-15 RX ORDER — BUPIVACAINE HYDROCHLORIDE 2.5 MG/ML
2 INJECTION, SOLUTION INFILTRATION; PERINEURAL ONCE
Status: COMPLETED | OUTPATIENT
Start: 2022-08-15 | End: 2022-08-15

## 2022-08-15 RX ORDER — TRIAMCINOLONE ACETONIDE 40 MG/ML
40 INJECTION, SUSPENSION INTRA-ARTICULAR; INTRAMUSCULAR ONCE
Status: COMPLETED | OUTPATIENT
Start: 2022-08-15 | End: 2022-08-15

## 2022-08-15 RX ADMIN — BUPIVACAINE HYDROCHLORIDE 5 MG: 2.5 INJECTION, SOLUTION INFILTRATION; PERINEURAL at 08:14

## 2022-08-15 RX ADMIN — TRIAMCINOLONE ACETONIDE 40 MG: 40 INJECTION, SUSPENSION INTRA-ARTICULAR; INTRAMUSCULAR at 08:15

## 2022-08-19 NOTE — PROGRESS NOTES
This dictation was done with [email or web address] dictation and may contain mechanical errors related to translation. I have today reviewed with Sunday Kennedy the clinically relevant, past medical history, medications, allergies, family history, social history, and Review Of Systems form the patients most recent history form & I have documented any details relevant to today's presenting complaints in my history below. Mr. Lasha Rivera's self-reported past medical history, medications, allergies, family history, social history, and Review Of Systems form has been scanned into the chart under the \"Media\" tab. Subjective:  Sunday Speaker is a 81 y.o. who is here complaining of pain in his left shoulder off and on for several years. 20 years ago he had an arthroscopy of the left shoulder but no history of renal injections recently. He has soreness in the anterior aspect of the shoulder over his proximal biceps. It limits his forward flexion and overhead activities. He went to a chiropractor who did some adjustments such as seems like its worse. He was sent for x-rays including an AP transaxillary view and Y-view. He denies any significant numbness or tingling into the left hand.       Patient Active Problem List   Diagnosis    Chest pain on exertion    Essential hypertension    Hx of CABG    Hyperlipidemia LDL goal <70    Reactive depression    Prostate carcinoma (HCC)    Vitamin D deficiency    Left hip pain    Chronic left-sided low back pain without sciatica    Coronary artery disease    Edema of right lower extremity    Greater trochanteric bursitis of left hip    Primary osteoarthritis of right knee    Primary osteoarthritis of left knee    Chronic fatigue    Acquired hypothyroidism    Gastroesophageal reflux disease without esophagitis    History of total left knee replacement (TKR)    Intractable chronic migraine without aura and without status migrainosus    History of total right knee replacement (TKR)    Bilateral hearing loss    Stage 3a chronic kidney disease (HCC)           Current Outpatient Medications on File Prior to Visit   Medication Sig Dispense Refill    escitalopram (LEXAPRO) 20 MG tablet TAKE 1 TABLET BY MOUTH DAILY 90 tablet 0    levothyroxine (SYNTHROID) 75 MCG tablet Take 1 tablet by mouth Daily TAKE 1 TABLET BY MOUTH EVERY DAY 90 tablet 1    ezetimibe (ZETIA) 10 MG tablet TAKE 1 TABLET BY MOUTH DAILY 90 tablet 1    topiramate (TOPAMAX) 50 MG tablet Take 0.5 tablets by mouth 2 times daily 90 tablet 1    lisinopril (PRINIVIL;ZESTRIL) 10 MG tablet Take 1 tablet by mouth daily 90 tablet 1    atorvastatin (LIPITOR) 80 MG tablet Take 1 tablet by mouth nightly TAKE 1 TABLET BY MOUTH DAILY 90 tablet 1    aspirin 81 MG EC tablet Take 1 tablet by mouth 2 times daily for 14 days Take twice a day for 14 days after knee surgery then resume daily dosing 28 tablet 3    Misc Natural Products (GLUCOSAMINE CHOND CMP TRIPLE) TABS Take 1 tablet by mouth 2 times daily      Lysine 500 MG CAPS Take 1 capsule by mouth daily      guaiFENesin (MUCINEX) 600 MG extended release tablet Take 600 mg by mouth every 6 hours as needed for Congestion      esomeprazole (NEXIUM) 20 MG delayed release capsule Take 20 mg by mouth 2 times daily      polyethylene glycol (GLYCOLAX) packet Take 17 g by mouth daily as needed for Constipation      Cholecalciferol (VITAMIN D) 2000 units CAPS capsule Take 1 capsule by mouth daily      Magnesium 500 MG CAPS Take 1 capsule by mouth nightly       docusate sodium (COLACE, DULCOLAX) 100 MG CAPS Take 100 mg by mouth 2 times daily as needed for Constipation 60 capsule 0     No current facility-administered medications on file prior to visit. Objective:   Resp. rate 16, height 5' 9\" (1.753 m), weight 199 lb (90.3 kg).     On examination is pleasant 45-year-old gentleman who is alert and orient x3 he is got good symmetric motion through the neck and a negative Spurling's test.  Does have pain with crossover and impingement testing and weakness with supraspinous strength testing. He has good  strength good wrist extension strength good symmetric motion to the elbows. He is positive for speeds test and has palpable tenderness over the proximal biceps consistent with bicipital tendinitis  Neuro exam grossly intact both lower extremities. Intact sensation to light touch. Motor exam 4+ to 5/5 in all major motor groups. Negative Oquendo's sign. Skin is warm, dry and intact with out erythema or significant increased temperature around the knee joint(s). There are no cutaneous lesions or lymphadenopathy present. X-RAYS:  X-rays taken the office today show no significant bone abnormalities there is mild degenerative changes seen with acromial impingement but no fractures or other bone abnormalities are noted      Assessment:  Left shoulder proximal biceps tendinitis and rotator cuff tendinitis    Plan:  During today's visit, there was approximately 30 minutes of face-to-face discussion in regards to the patient's current condition and treatment options. More than 50 % of the time was counseling and coordination of care as indicated above. At this point he was injected in the proximal aspect of his biceps tendon sheath as well as a small amount into the subacromial space using 1 cc Kenalog and 2 cc of Marcaine.   He tolerated well and I went through a band exercise program.  If he continues have soreness pain after this injection I would consider getting an MRI      PROCEDURE NOTE:  Injected shoulder given a band for exercises      They will schedule a follow up in 4 weeks

## 2022-08-24 RX ORDER — EZETIMIBE 10 MG/1
TABLET ORAL
Qty: 90 TABLET | Refills: 3 | Status: SHIPPED | OUTPATIENT
Start: 2022-08-24 | End: 2022-08-26 | Stop reason: SDUPTHER

## 2022-08-24 RX ORDER — ATORVASTATIN CALCIUM 80 MG/1
TABLET, FILM COATED ORAL
Qty: 90 TABLET | Refills: 3 | Status: SHIPPED | OUTPATIENT
Start: 2022-08-24

## 2022-08-24 NOTE — TELEPHONE ENCOUNTER
Last ov 08 15 22  Future Appointments   Date Time Provider Leonie Shira   9/28/2022 11:40 AM Anita Rey MD [facility] [place] - [place]   3/16/2023 10:45 AM MD Jarvis Torres [facility]

## 2022-08-25 NOTE — TELEPHONE ENCOUNTER
Last ov 06 29 22  Future Appointments   Date Time Provider Leonie Silva   9/28/2022 11:40 AM Namita Shaffer MD [facility] [place] - [place]   3/16/2023 10:45 AM MD Erin Wilkinson [facility]

## 2022-08-26 RX ORDER — EZETIMIBE 10 MG/1
10 TABLET ORAL DAILY
Qty: 90 TABLET | Refills: 3 | Status: SHIPPED | OUTPATIENT
Start: 2022-08-26

## 2022-08-31 RX ORDER — TOPIRAMATE 50 MG/1
TABLET, FILM COATED ORAL
Qty: 90 TABLET | Refills: 3 | Status: SHIPPED | OUTPATIENT
Start: 2022-08-31

## 2022-09-14 RX ORDER — LISINOPRIL 10 MG/1
10 TABLET ORAL DAILY
Qty: 90 TABLET | Refills: 3 | Status: SHIPPED | OUTPATIENT
Start: 2022-09-14 | End: 2022-09-28

## 2022-09-14 NOTE — TELEPHONE ENCOUNTER
Last office visit 6/29/22        Future Appointments   Date Time Provider Leonie Shira   9/28/2022 10:30 AM Hector Harrison MD [facility]   3/16/2023 10:45 AM MD Hollie Arshad

## 2022-09-28 ENCOUNTER — OFFICE VISIT (OUTPATIENT)
Dept: INTERNAL MEDICINE CLINIC | Age: 82
End: 2022-09-28
Payer: MEDICARE

## 2022-09-28 VITALS
DIASTOLIC BLOOD PRESSURE: 60 MMHG | HEART RATE: 76 BPM | BODY MASS INDEX: 29.41 KG/M2 | OXYGEN SATURATION: 98 % | WEIGHT: 198.6 LBS | SYSTOLIC BLOOD PRESSURE: 104 MMHG | HEIGHT: 69 IN | RESPIRATION RATE: 18 BRPM

## 2022-09-28 DIAGNOSIS — E03.9 ACQUIRED HYPOTHYROIDISM: ICD-10-CM

## 2022-09-28 DIAGNOSIS — I25.10 CORONARY ARTERY DISEASE INVOLVING NATIVE CORONARY ARTERY OF NATIVE HEART WITHOUT ANGINA PECTORIS: ICD-10-CM

## 2022-09-28 DIAGNOSIS — I10 ESSENTIAL HYPERTENSION: Primary | ICD-10-CM

## 2022-09-28 PROCEDURE — G0008 ADMIN INFLUENZA VIRUS VAC: HCPCS | Performed by: STUDENT IN AN ORGANIZED HEALTH CARE EDUCATION/TRAINING PROGRAM

## 2022-09-28 PROCEDURE — 3288F FALL RISK ASSESSMENT DOCD: CPT | Performed by: STUDENT IN AN ORGANIZED HEALTH CARE EDUCATION/TRAINING PROGRAM

## 2022-09-28 PROCEDURE — 1123F ACP DISCUSS/DSCN MKR DOCD: CPT | Performed by: STUDENT IN AN ORGANIZED HEALTH CARE EDUCATION/TRAINING PROGRAM

## 2022-09-28 PROCEDURE — 99213 OFFICE O/P EST LOW 20 MIN: CPT | Performed by: STUDENT IN AN ORGANIZED HEALTH CARE EDUCATION/TRAINING PROGRAM

## 2022-09-28 PROCEDURE — 90694 VACC AIIV4 NO PRSRV 0.5ML IM: CPT | Performed by: STUDENT IN AN ORGANIZED HEALTH CARE EDUCATION/TRAINING PROGRAM

## 2022-09-28 RX ORDER — LISINOPRIL 10 MG/1
5 TABLET ORAL
Qty: 90 TABLET | Refills: 3
Start: 2022-09-28 | End: 2022-10-03 | Stop reason: SDUPTHER

## 2022-09-28 ASSESSMENT — PATIENT HEALTH QUESTIONNAIRE - PHQ9
SUM OF ALL RESPONSES TO PHQ QUESTIONS 1-9: 0
3. TROUBLE FALLING OR STAYING ASLEEP: 0
10. IF YOU CHECKED OFF ANY PROBLEMS, HOW DIFFICULT HAVE THESE PROBLEMS MADE IT FOR YOU TO DO YOUR WORK, TAKE CARE OF THINGS AT HOME, OR GET ALONG WITH OTHER PEOPLE: 0
8. MOVING OR SPEAKING SO SLOWLY THAT OTHER PEOPLE COULD HAVE NOTICED. OR THE OPPOSITE, BEING SO FIGETY OR RESTLESS THAT YOU HAVE BEEN MOVING AROUND A LOT MORE THAN USUAL: 0
SUM OF ALL RESPONSES TO PHQ QUESTIONS 1-9: 0
9. THOUGHTS THAT YOU WOULD BE BETTER OFF DEAD, OR OF HURTING YOURSELF: 0
SUM OF ALL RESPONSES TO PHQ9 QUESTIONS 1 & 2: 0
5. POOR APPETITE OR OVEREATING: 0
SUM OF ALL RESPONSES TO PHQ QUESTIONS 1-9: 0
4. FEELING TIRED OR HAVING LITTLE ENERGY: 0
2. FEELING DOWN, DEPRESSED OR HOPELESS: 0
6. FEELING BAD ABOUT YOURSELF - OR THAT YOU ARE A FAILURE OR HAVE LET YOURSELF OR YOUR FAMILY DOWN: 0
1. LITTLE INTEREST OR PLEASURE IN DOING THINGS: 0
7. TROUBLE CONCENTRATING ON THINGS, SUCH AS READING THE NEWSPAPER OR WATCHING TELEVISION: 0
SUM OF ALL RESPONSES TO PHQ QUESTIONS 1-9: 0

## 2022-09-28 ASSESSMENT — ENCOUNTER SYMPTOMS
CHEST TIGHTNESS: 0
ABDOMINAL PAIN: 0
SORE THROAT: 0
COUGH: 0
SHORTNESS OF BREATH: 0
BACK PAIN: 0
ABDOMINAL DISTENTION: 0
BLOOD IN STOOL: 0

## 2022-09-28 NOTE — ASSESSMENT & PLAN NOTE
Lab Results   Component Value Date    TSHFT4 0.53 06/29/2022    TSH 4.89 (H) 05/17/2019    TSHREFLEX 4.85 (H) 12/08/2020     Home medications: Levothyroxine 75 mcg p.o. daily  -Continue levothyroxine  -Recheck TSH at next visit in 6 months

## 2022-09-28 NOTE — PROGRESS NOTES
[facility]) Internal Medicine    Mrs. Simona Cushing is a 81 year male with past medical history as listed below who presents to the office for routine follow up. Patient was last seen by Dr. Cherie Ordoñez on 6/29/2022 for Medicare annual wellness visit    Left shoulder pain: Patient with left shoulder pain. He has history of adhesive capsulitis status post surgery. He had an injection and did some exercises given in his shoulder is improved. Fatigue: Patient reports fatigue that has been ongoing for a while now. He thinks it may be related to his blood pressure. He reports he felt better with his blood pressure at a higher level. Hypertension: Patient is compliant with all blood pressure medications. Hypothyroidism: Patient was recently increased from levothyroxine 50 to levothyroxine 75 mcg daily. He does not notice a change. He is having fatigue. But he denies any constipation or hair loss. Anxiety: Patient's anxiety is controlled and he is compliant with his SSRI    CAD: Patient with history of CAD. He denies any chest pain. He is not having any orthopnea or dyspnea on exertion. He has no leg swelling today. Review of Systems   Constitutional:  Positive for fatigue. Negative for fever. HENT:  Negative for congestion, nosebleeds and sore throat. Respiratory:  Negative for cough, chest tightness and shortness of breath. Cardiovascular:  Negative for chest pain, palpitations and leg swelling. Gastrointestinal:  Negative for abdominal distention, abdominal pain and blood in stool. Endocrine: Negative for cold intolerance and heat intolerance. Genitourinary:  Negative for difficulty urinating. Musculoskeletal:  Negative for back pain. Neurological:  Negative for weakness, light-headedness and numbness. Psychiatric/Behavioral:  Negative for confusion and sleep disturbance.       Past Medical History:   Diagnosis Date    Anxiety     [facility]    Prostates CA    Coronary artery disease 12/28/15    multi vessel CAD    GERD (gastroesophageal reflux disease)     History of blood transfusion     s/p left nephrectomy age 2years old    Hyperlipidemia LDL goal <70     Hypertension     IBS (irritable bowel syndrome)     Migraine     Primary osteoarthritis of right knee 10/5/2018    Reactive depression 6/8/2016    Shingles 2012    torso, top of head    Thyroid disease     Wears dentures        Past Surgical History:   Procedure Laterality Date    APPENDECTOMY      CARPAL TUNNEL RELEASE Bilateral     COLONOSCOPY N/A 10/8/2019    COLONOSCOPY POLYPECTOMY SNARE/COLD BIOPSY performed by Schuyler Schmidt MD at [facility]  2015    x`s 4 vessel    ENDOSCOPY, COLON, DIAGNOSTIC      EGD with dilatation    EYE SURGERY Bilateral     catacts, bilateral and repeat    HEMORRHOID SURGERY      KIDNEY REMOVAL  @ 1944    pt thinks left kidney for disease    SHOULDER SURGERY Left     rotator cuff    TOTAL KNEE ARTHROPLASTY Left 3/11/2020    ROBOTIC ASSISTED LEFT TOTAL KNEE REPLACEMENT performed by Perez Oquendo MD at [address] [place] [address] Right 12/15/2021    ROBOTIC ASSITED RIGHT TOTAL KNEE REPLACEMENT performed by Emelina Gasca MD at [address] [place] [address] History     Socioeconomic History    Marital status:      Spouse name: Not on file    Number of children: Not on file    Years of education: Not on file    Highest education level: Not on file   Occupational History    Not on file   Tobacco Use    Smoking status: Never    Smokeless tobacco: Never   Vaping Use    Vaping Use: Never used   Substance and Sexual Activity    Alcohol use: No    Drug use: Never    Sexual activity: Not Currently   Other Topics Concern    Not on file   Social History Narrative    Not on file     Social Determinants of Health     Financial Resource Strain: Low Risk     Difficulty of Paying Living Expenses: Not hard at all   Food Insecurity: No Food Insecurity    Worried About Running Out of Food in the Last Year: Never true    [facility] in the Last Year: Never true   Transportation Needs: No Transportation Needs    Lack of Transportation (Medical): No    Lack of Transportation (Non-Medical):  No   Physical Activity: Sufficiently Active    Days of Exercise per Week: 6 days    Minutes of Exercise per Session: 30 min   Stress: Not on file   Social Connections: Not on file   Intimate Partner Violence: Not on file   Housing Stability: Not on file       Family History   Problem Relation Age of Onset    High Blood Pressure Mother     Other Mother     Heart Disease Father          Current Outpatient Medications:     lisinopril (PRINIVIL;ZESTRIL) 10 MG tablet, Take 0.5 tablets by mouth every 48 hours, Disp: 90 tablet, Rfl: 3    topiramate (TOPAMAX) 50 MG tablet, TAKE 1/2 TABLET BY MOUTH TWICE DAILY, Disp: 90 tablet, Rfl: 3    ezetimibe (ZETIA) 10 MG tablet, Take 1 tablet by mouth daily, Disp: 90 tablet, Rfl: 3    atorvastatin (LIPITOR) 80 MG tablet, TAKE 1 TABLET BY MOUTH EVERY NIGHT, Disp: 90 tablet, Rfl: 3    escitalopram (LEXAPRO) 20 MG tablet, TAKE 1 TABLET BY MOUTH DAILY, Disp: 90 tablet, Rfl: 0    levothyroxine (SYNTHROID) 75 MCG tablet, Take 1 tablet by mouth Daily TAKE 1 TABLET BY MOUTH EVERY DAY, Disp: 90 tablet, Rfl: 1    Misc Natural Products (GLUCOSAMINE CHOND CMP TRIPLE) TABS, Take 1 tablet by mouth 2 times daily, Disp: , Rfl:     Lysine 500 MG CAPS, Take 1 capsule by mouth daily, Disp: , Rfl:     guaiFENesin (MUCINEX) 600 MG extended release tablet, Take 600 mg by mouth every 6 hours as needed for Congestion, Disp: , Rfl:     esomeprazole (NEXIUM) 20 MG delayed release capsule, Take 20 mg by mouth 2 times daily, Disp: , Rfl:     Cholecalciferol (VITAMIN D) 2000 units CAPS capsule, Take 1 capsule by mouth daily, Disp: , Rfl:     Magnesium 500 MG CAPS, Take 1 capsule by mouth nightly , Disp: , Rfl:     docusate sodium (COLACE, DULCOLAX) 100 MG CAPS, Take 100 mg by mouth 2 times daily as needed for Constipation, Disp: 60 capsule, Rfl: 0    aspirin 81 MG EC tablet, Take 1 tablet by mouth 2 times daily for 14 days Take twice a day for 14 days after knee surgery then resume daily dosing, Disp: 28 tablet, Rfl: 3     Examination  Vitals:    09/28/22 1041   BP: 104/60   Site: Left Upper Arm   Position: Sitting   Cuff Size: Large Adult   Pulse: 76   Resp: 18   SpO2: 98%   Weight: 198 lb 9.6 oz (90.1 kg)   Height: 5' 9\" (1.753 m)      Physical Exam  Constitutional:       General: He is not in acute distress. HENT:      Mouth/Throat:      Mouth: Mucous membranes are moist.   Eyes:      Pupils: Pupils are equal, round, and reactive to light. Cardiovascular:      Rate and Rhythm: Normal rate and regular rhythm. Pulses: Normal pulses. Pulmonary:      Effort: Pulmonary effort is normal. No respiratory distress. Breath sounds: Normal breath sounds. No wheezing, rhonchi or rales. Abdominal:      General: Abdomen is flat. There is no distension. Palpations: Abdomen is soft. Tenderness: There is no abdominal tenderness. Skin:     General: Skin is warm and dry. Coloration: Skin is not jaundiced or pale. Findings: No erythema. Neurological:      General: No focal deficit present. Mental Status: He is alert and oriented to person, place, and time. Assessment and Plan  Mr. Myron Luong is a an 28-year-old male with a past medical history significant for CAD, hypertension, anxiety, hypothyroidism, and GERD who presents for routine follow-up. His biggest complaint is fatigue. He thinks it is related to his blood pressure medications. Essential hypertension  Blood pressure is 104/60 today. It was 125/65 on repeat check  Home medications lisinopril 10 mg p.o. daily  - Alternate lisinopril 10 mg and 5 mg every day. Lisinopril 10 mg MWFSun and Lisinopril 5 mg all other days of the week.    -Monitor blood pressure at home    Coronary artery disease   History of multivessel CAD.  Home medications:Lisinopril 10 mg PO daily, atorvastatin 80 mg p.o. nightly, and aspirin 81 mg p.o. daily  -Change lisinopril to Lisinopril 10 mg Monday Wednesday Friday Sunday and lisinopril 5 mg Tuesday Thursday Saturday,  -Continue aspirin and statin    Acquired hypothyroidism     Lab Results   Component Value Date    TSHFT4 0.53 06/29/2022    TSH 4.89 (H) 05/17/2019    TSHREFLEX 4.85 (H) 12/08/2020     Home medications: Levothyroxine 75 mcg p.o. daily  -Continue levothyroxine  -Recheck TSH at next visit in 6 months       Discussed use, benefit, and side effects of prescribed medications. Barriers to medication compliance addressed. Discussed all ordered testing and labs. All patient questions answered. Patient agreeable with plan above. Please note that this chart was generated using dragon dictation software. Although every effort was made to ensure the accuracy of this automated transcription, some errors in transcription may have occurred.

## 2022-09-28 NOTE — PATIENT INSTRUCTIONS
Take lisinopril 5 mg every 48 hours. Take blood pressure at home a few times after making this medication change.

## 2022-09-28 NOTE — ASSESSMENT & PLAN NOTE
Blood pressure is 104/60 today. It was 125/65 on repeat check  Home medications lisinopril 10 mg p.o. daily  - Alternate lisinopril 10 mg and 5 mg every day. Lisinopril 10 mg MWFSun and Lisinopril 5 mg all other days of the week.    -Monitor blood pressure at home

## 2022-09-29 ENCOUNTER — TELEPHONE (OUTPATIENT)
Dept: INTERNAL MEDICINE CLINIC | Age: 82
End: 2022-09-29

## 2022-09-29 NOTE — TELEPHONE ENCOUNTER
Did cut the lisinopril in half last night. Feels much better today. ...;  blood pressure 130/69 today . ..       [phone]

## 2022-10-03 ENCOUNTER — TELEPHONE (OUTPATIENT)
Dept: INTERNAL MEDICINE CLINIC | Age: 82
End: 2022-10-03

## 2022-10-03 RX ORDER — LISINOPRIL 10 MG/1
5 TABLET ORAL DAILY
Qty: 90 TABLET | Refills: 3
Start: 2022-10-03

## 2022-10-03 NOTE — TELEPHONE ENCOUNTER
PT's wife needing a call back regarding the PT's lisinopril . He says the new dosage works great but would like to speak to the provider regarding the script.

## 2022-10-03 NOTE — TELEPHONE ENCOUNTER
Called regarding Lisinopril. He is feeling well and has good blood pressure with 5 mg by mouth daily. Patient will continue 5 mg by mouth daily.

## 2022-10-04 RX ORDER — ESCITALOPRAM OXALATE 20 MG/1
TABLET ORAL
Qty: 90 TABLET | Refills: 0 | Status: SHIPPED | OUTPATIENT
Start: 2022-10-04

## 2022-10-04 NOTE — TELEPHONE ENCOUNTER
Pt needs an Rx  from [facility] for Escitalopram 20 mg, #90, 1 po qd. The old script has Dr. John Waterman on it.     Rudy--Musa

## 2022-10-06 RX ORDER — ESCITALOPRAM OXALATE 20 MG/1
TABLET ORAL
Qty: 90 TABLET | Refills: 0 | Status: SHIPPED | OUTPATIENT
Start: 2022-10-06

## 2022-10-06 NOTE — TELEPHONE ENCOUNTER
Future Appointments   Date Time Provider Leonie Silva   3/16/2023 10:45 AM MD Lawanda Doherty [facility]   3/28/2023 10:15 AM Linda Sanchez MD [address]     Last appt on [phone]

## 2022-11-14 ENCOUNTER — OFFICE VISIT (OUTPATIENT)
Dept: INTERNAL MEDICINE CLINIC | Age: 82
End: 2022-11-14
Payer: MEDICARE

## 2022-11-14 VITALS — OXYGEN SATURATION: 92 % | DIASTOLIC BLOOD PRESSURE: 72 MMHG | SYSTOLIC BLOOD PRESSURE: 138 MMHG | HEART RATE: 58 BPM

## 2022-11-14 DIAGNOSIS — Z01.818 PRE-OP EXAM: Primary | ICD-10-CM

## 2022-11-14 LAB
ALBUMIN SERPL-MCNC: 4.6 G/DL (ref 3.4–5)
ANION GAP SERPL CALCULATED.3IONS-SCNC: 11 MMOL/L (ref 3–16)
BASOPHILS ABSOLUTE: 0 K/UL (ref 0–0.2)
BASOPHILS RELATIVE PERCENT: 0.4 %
BUN BLDV-MCNC: 25 MG/DL (ref 7–20)
CALCIUM SERPL-MCNC: 8.9 MG/DL (ref 8.3–10.6)
CHLORIDE BLD-SCNC: 110 MMOL/L (ref 99–110)
CO2: 21 MMOL/L (ref 21–32)
CREAT SERPL-MCNC: 1.5 MG/DL (ref 0.8–1.3)
EOSINOPHILS ABSOLUTE: 0.2 K/UL (ref 0–0.6)
EOSINOPHILS RELATIVE PERCENT: 2.9 %
GFR SERPL CREATININE-BSD FRML MDRD: 46 ML/MIN/{1.73_M2}
GLUCOSE BLD-MCNC: 90 MG/DL (ref 70–99)
HCT VFR BLD CALC: 41 % (ref 40.5–52.5)
HEMOGLOBIN: 13.3 G/DL (ref 13.5–17.5)
LYMPHOCYTES ABSOLUTE: 1 K/UL (ref 1–5.1)
LYMPHOCYTES RELATIVE PERCENT: 14.8 %
MCH RBC QN AUTO: 29.7 PG (ref 26–34)
MCHC RBC AUTO-ENTMCNC: 32.4 G/DL (ref 31–36)
MCV RBC AUTO: 91.7 FL (ref 80–100)
MONOCYTES ABSOLUTE: 0.6 K/UL (ref 0–1.3)
MONOCYTES RELATIVE PERCENT: 9.3 %
NEUTROPHILS ABSOLUTE: 4.7 K/UL (ref 1.7–7.7)
NEUTROPHILS RELATIVE PERCENT: 72.6 %
PDW BLD-RTO: 15 % (ref 12.4–15.4)
PHOSPHORUS: 4.4 MG/DL (ref 2.5–4.9)
PLATELET # BLD: 152 K/UL (ref 135–450)
PMV BLD AUTO: 9.3 FL (ref 5–10.5)
POTASSIUM SERPL-SCNC: 4.4 MMOL/L (ref 3.5–5.1)
RBC # BLD: 4.47 M/UL (ref 4.2–5.9)
SODIUM BLD-SCNC: 142 MMOL/L (ref 136–145)
WBC # BLD: 6.5 K/UL (ref 4–11)

## 2022-11-14 PROCEDURE — 99213 OFFICE O/P EST LOW 20 MIN: CPT | Performed by: STUDENT IN AN ORGANIZED HEALTH CARE EDUCATION/TRAINING PROGRAM

## 2022-11-14 PROCEDURE — G8417 CALC BMI ABV UP PARAM F/U: HCPCS | Performed by: STUDENT IN AN ORGANIZED HEALTH CARE EDUCATION/TRAINING PROGRAM

## 2022-11-14 PROCEDURE — 1036F TOBACCO NON-USER: CPT | Performed by: STUDENT IN AN ORGANIZED HEALTH CARE EDUCATION/TRAINING PROGRAM

## 2022-11-14 PROCEDURE — 36415 COLL VENOUS BLD VENIPUNCTURE: CPT | Performed by: STUDENT IN AN ORGANIZED HEALTH CARE EDUCATION/TRAINING PROGRAM

## 2022-11-14 PROCEDURE — G8484 FLU IMMUNIZE NO ADMIN: HCPCS | Performed by: STUDENT IN AN ORGANIZED HEALTH CARE EDUCATION/TRAINING PROGRAM

## 2022-11-14 PROCEDURE — 3074F SYST BP LT 130 MM HG: CPT | Performed by: STUDENT IN AN ORGANIZED HEALTH CARE EDUCATION/TRAINING PROGRAM

## 2022-11-14 PROCEDURE — 3078F DIAST BP <80 MM HG: CPT | Performed by: STUDENT IN AN ORGANIZED HEALTH CARE EDUCATION/TRAINING PROGRAM

## 2022-11-14 PROCEDURE — 1123F ACP DISCUSS/DSCN MKR DOCD: CPT | Performed by: STUDENT IN AN ORGANIZED HEALTH CARE EDUCATION/TRAINING PROGRAM

## 2022-11-14 PROCEDURE — G8428 CUR MEDS NOT DOCUMENT: HCPCS | Performed by: STUDENT IN AN ORGANIZED HEALTH CARE EDUCATION/TRAINING PROGRAM

## 2022-11-14 PROCEDURE — 93000 ELECTROCARDIOGRAM COMPLETE: CPT | Performed by: STUDENT IN AN ORGANIZED HEALTH CARE EDUCATION/TRAINING PROGRAM

## 2022-11-14 NOTE — PATIENT INSTRUCTIONS
Hold Lisinopril 1 day prior to your surgery. Your last dose of lisinopril before your surgery will be on 12/4/2022.

## 2022-11-14 NOTE — PROGRESS NOTES
Current Outpatient Medications   Medication Sig Dispense Refill    escitalopram (LEXAPRO) 20 MG tablet TAKE 1 TABLET BY MOUTH DAILY 90 tablet 0    escitalopram (LEXAPRO) 20 MG tablet TAKE 1 TABLET BY MOUTH DAILY 90 tablet 0    lisinopril (PRINIVIL;ZESTRIL) 10 MG tablet Take 0.5 tablets by mouth daily 90 tablet 3    topiramate (TOPAMAX) 50 MG tablet TAKE 1/2 TABLET BY MOUTH TWICE DAILY 90 tablet 3    ezetimibe (ZETIA) 10 MG tablet Take 1 tablet by mouth daily 90 tablet 3    atorvastatin (LIPITOR) 80 MG tablet TAKE 1 TABLET BY MOUTH EVERY NIGHT 90 tablet 3    levothyroxine (SYNTHROID) 75 MCG tablet Take 1 tablet by mouth Daily TAKE 1 TABLET BY MOUTH EVERY DAY 90 tablet 1    aspirin 81 MG EC tablet Take 1 tablet by mouth 2 times daily for 14 days Take twice a day for 14 days after knee surgery then resume daily dosing 28 tablet 3    Misc Natural Products (GLUCOSAMINE CHOND CMP TRIPLE) TABS Take 1 tablet by mouth 2 times daily      Lysine 500 MG CAPS Take 1 capsule by mouth daily      guaiFENesin (MUCINEX) 600 MG extended release tablet Take 600 mg by mouth every 6 hours as needed for Congestion      esomeprazole (NEXIUM) 20 MG delayed release capsule Take 20 mg by mouth 2 times daily      Cholecalciferol (VITAMIN D) 2000 units CAPS capsule Take 1 capsule by mouth daily      Magnesium 500 MG CAPS Take 1 capsule by mouth nightly       docusate sodium (COLACE, DULCOLAX) 100 MG CAPS Take 100 mg by mouth 2 times daily as needed for Constipation 60 capsule 0     No current facility-administered medications for this visit.

## 2022-11-15 ENCOUNTER — TELEPHONE (OUTPATIENT)
Dept: INTERNAL MEDICINE CLINIC | Age: 82
End: 2022-11-15

## 2022-11-28 NOTE — PROGRESS NOTES
[facility] time____0830________        Surgery time____0950________    Take the following medications with a sip of water: Follow your MD/Surgeons pre-procedure instructions regarding your medications     Do not eat or drink anything after 12:00 midnight prior to your surgery. This includes water chewing gum, mints and ice chips. You may brush your teeth and gargle the morning of your surgery, but do not swallow the water     Please see your family doctor/pediatrician for a history and physical and/or concerning medications. Bring any test results/reports from your physicians office. If you are under the care of a heart doctor or specialist doctor, please be aware that you may be asked to them for clearance    You may be asked to stop blood thinners such as Coumadin, Plavix, Fragmin, Lovenox, etc., or any anti-inflammatories such as:  Aspirin, Ibuprofen, Advil, Naproxen prior to your surgery. We also ask that you stop any OTC medications such as fish oil, vitamin E, glucosamine, garlic, Multivitamins, COQ 10, etc.    We ask that you do not smoke 24 hours prior to surgery  We ask that you do not  drink any alcoholic beverages 24 hours prior to surgery     You must make arrangements for a responsible adult to take you home after your surgery. For your safety you will not be allowed to leave alone or drive yourself home. Your surgery will be cancelled if you do not have a ride home. Also for your safety, it is strongly suggested that someone stay with you the first 24 hours after your surgery. A parent or legal guardian must accompany a child scheduled for surgery and plan to stay at the hospital until the child is discharged. Please do not bring other children with you. For your comfort, please wear simple loose fitting clothing to the hospital.  Please do not bring valuables.     Do not wear any make-up or nail polish on your fingers or toes      For your safety, please do not wear any jewelry or body piercing's on the day of surgery. All jewelry must be removed. If you have dentures, they will be removed before going to operating room. For your convenience, we will provide you with a container. If you wear contact lenses or glasses, they will be removed, please bring a case for them. If you have a living will and a durable power of  for healthcare, please bring in a copy. As part of our patient safety program to minimize surgical site infections, we ask you to do the following:    Please notify your surgeon if you develop any illness between         now and the  day of your surgery. This includes a cough, cold, fever, sore throat, nausea,         or vomiting, and diarrhea, etc.   Please notify your surgeon if you experience dizziness, shortness         of breath or blurred vision between now and the time of your surgery. Do not shave your operative site 96 hours prior to surgery. For face and neck surgery, men may use an electric razor 48 hours   prior to surgery. You may shower the night before surgery or the morning of   your surgery with an antibacterial soap. You will need to bring a photo ID and insurance card    [facility] has an onsite pharmacy, would you like to utilize our pharmacy     If you will be staying overnight and use a C-pap machine, please bring   your C-pap to hospital     Our goal is to provide you with excellent care, therefore, visitors will be limited to two(2) in the room at a time so that we may focus on providing this care for you. Please contact pre-admission testing if you have any further questions. [facility] phone number:  [phone]     [facility] PAT fax number:  [phone]  Please note these are generalized instructions for all surgical cases, you may be provided with more specific instructions according to your surgery.     C-Difficile admission screening and protocol:       * Admitted with diarrhea? [] YES    [x]  NO     *Prior history of C-Diff. In last 3 months? [] YES    [x]  NO     *Antibiotic use in the past 6-8 weeks? [x]  NO    []  YES                 If yes, which ANTIBIOTIC AND REASON______     *Prior hospitalization or nursing home in the last month? []  YES    []  NO        SAFETY FIRST. .call before you fall

## 2022-12-02 ENCOUNTER — ANESTHESIA EVENT (OUTPATIENT)
Dept: SURGERY | Age: 82
End: 2022-12-02
Payer: MEDICARE

## 2022-12-02 NOTE — PRE-PROCEDURE INSTRUCTIONS
[address] [facility]  [phone]        Pre-Op Phone Call:     Patient Name: Grayson Monson     Telephone Information:   Mobile [phone]     Home phone:  [phone]    Surgery Time:    9:20 AM     Arrival Time:  0800     Recent change in health status:  No     Advised of transportation/ policy:  Yes     NPO policy reviewed: Yes     Advised to take morning heart/blood pressure medications with sips of water morning of surgery? Yes     Instructed to bring eye drops, photo identification, and insurance card day of surgery? Yes     Advised to wear short sleeved button down shirt (no T-shirt underneath):  Yes     Advised not to wear jewelry, hairpins, or pantyhose day of surgery? Yes     Advised not to wear make-up and to wash face day of surgery?   Yes    Remarks:    Spoke with pt's wife    Electronically signed by:  Maliha Nolasco RN at 12/2/2022 10:41 AM

## 2022-12-05 ENCOUNTER — ANESTHESIA (OUTPATIENT)
Dept: SURGERY | Age: 82
End: 2022-12-05
Payer: MEDICARE

## 2022-12-05 ENCOUNTER — HOSPITAL ENCOUNTER (OUTPATIENT)
Age: 82
Setting detail: OUTPATIENT SURGERY
Discharge: HOME | End: 2022-12-05
Attending: OPHTHALMOLOGY | Admitting: OPHTHALMOLOGY
Payer: MEDICARE

## 2022-12-05 VITALS
TEMPERATURE: 97.4 F | WEIGHT: 198 LBS | HEART RATE: 58 BPM | RESPIRATION RATE: 13 BRPM | OXYGEN SATURATION: 98 % | SYSTOLIC BLOOD PRESSURE: 129 MMHG | HEIGHT: 70 IN | DIASTOLIC BLOOD PRESSURE: 64 MMHG | BODY MASS INDEX: 28.35 KG/M2

## 2022-12-05 PROCEDURE — 2580000003 HC RX 258: Performed by: NURSE ANESTHETIST, CERTIFIED REGISTERED

## 2022-12-05 PROCEDURE — 6370000000 HC RX 637 (ALT 250 FOR IP): Performed by: OPHTHALMOLOGY

## 2022-12-05 PROCEDURE — 2500000003 HC RX 250 WO HCPCS: Performed by: NURSE ANESTHETIST, CERTIFIED REGISTERED

## 2022-12-05 PROCEDURE — 3600000002 HC SURGERY LEVEL 2 BASE: Performed by: OPHTHALMOLOGY

## 2022-12-05 PROCEDURE — 3700000000 HC ANESTHESIA ATTENDED CARE: Performed by: OPHTHALMOLOGY

## 2022-12-05 PROCEDURE — 6360000002 HC RX W HCPCS: Performed by: NURSE ANESTHETIST, CERTIFIED REGISTERED

## 2022-12-05 PROCEDURE — 2500000003 HC RX 250 WO HCPCS: Performed by: OPHTHALMOLOGY

## 2022-12-05 PROCEDURE — 3700000001 HC ADD 15 MINUTES (ANESTHESIA): Performed by: OPHTHALMOLOGY

## 2022-12-05 PROCEDURE — 3600000012 HC SURGERY LEVEL 2 ADDTL 15MIN: Performed by: OPHTHALMOLOGY

## 2022-12-05 PROCEDURE — 7100000010 HC PHASE II RECOVERY - FIRST 15 MIN: Performed by: OPHTHALMOLOGY

## 2022-12-05 PROCEDURE — 2709999900 HC NON-CHARGEABLE SUPPLY: Performed by: OPHTHALMOLOGY

## 2022-12-05 RX ORDER — SODIUM CHLORIDE 0.9 % (FLUSH) 0.9 %
5-40 SYRINGE (ML) INJECTION EVERY 12 HOURS SCHEDULED
Status: CANCELLED | OUTPATIENT
Start: 2022-12-05

## 2022-12-05 RX ORDER — SODIUM CHLORIDE 0.9 % (FLUSH) 0.9 %
5-40 SYRINGE (ML) INJECTION EVERY 12 HOURS SCHEDULED
Status: DISCONTINUED | OUTPATIENT
Start: 2022-12-05 | End: 2022-12-05 | Stop reason: HOSPADM

## 2022-12-05 RX ORDER — SODIUM CHLORIDE 0.9 % (FLUSH) 0.9 %
5-40 SYRINGE (ML) INJECTION PRN
Status: CANCELLED | OUTPATIENT
Start: 2022-12-05

## 2022-12-05 RX ORDER — SODIUM CHLORIDE 9 MG/ML
INJECTION, SOLUTION INTRAVENOUS PRN
Status: DISCONTINUED | OUTPATIENT
Start: 2022-12-05 | End: 2022-12-05 | Stop reason: HOSPADM

## 2022-12-05 RX ORDER — MIDAZOLAM HYDROCHLORIDE 1 MG/ML
INJECTION INTRAMUSCULAR; INTRAVENOUS PRN
Status: DISCONTINUED | OUTPATIENT
Start: 2022-12-05 | End: 2022-12-05 | Stop reason: SDUPTHER

## 2022-12-05 RX ORDER — PROPOFOL 10 MG/ML
INJECTION, EMULSION INTRAVENOUS PRN
Status: DISCONTINUED | OUTPATIENT
Start: 2022-12-05 | End: 2022-12-05 | Stop reason: SDUPTHER

## 2022-12-05 RX ORDER — TETRACAINE HYDROCHLORIDE 5 MG/ML
SOLUTION OPHTHALMIC
Status: COMPLETED | OUTPATIENT
Start: 2022-12-05 | End: 2022-12-05

## 2022-12-05 RX ORDER — SODIUM CHLORIDE 9 MG/ML
INJECTION, SOLUTION INTRAVENOUS CONTINUOUS PRN
Status: DISCONTINUED | OUTPATIENT
Start: 2022-12-05 | End: 2022-12-05 | Stop reason: SDUPTHER

## 2022-12-05 RX ORDER — SODIUM CHLORIDE 9 MG/ML
INJECTION, SOLUTION INTRAVENOUS PRN
Status: CANCELLED | OUTPATIENT
Start: 2022-12-05

## 2022-12-05 RX ORDER — SODIUM CHLORIDE 0.9 % (FLUSH) 0.9 %
5-40 SYRINGE (ML) INJECTION PRN
Status: DISCONTINUED | OUTPATIENT
Start: 2022-12-05 | End: 2022-12-05 | Stop reason: HOSPADM

## 2022-12-05 RX ORDER — ONDANSETRON 2 MG/ML
4 INJECTION INTRAMUSCULAR; INTRAVENOUS
Status: CANCELLED | OUTPATIENT
Start: 2022-12-05 | End: 2022-12-06

## 2022-12-05 RX ORDER — GLYCOPYRROLATE 0.2 MG/ML
INJECTION INTRAMUSCULAR; INTRAVENOUS PRN
Status: DISCONTINUED | OUTPATIENT
Start: 2022-12-05 | End: 2022-12-05 | Stop reason: SDUPTHER

## 2022-12-05 RX ORDER — LIDOCAINE HYDROCHLORIDE 20 MG/ML
INJECTION, SOLUTION EPIDURAL; INFILTRATION; INTRACAUDAL; PERINEURAL PRN
Status: DISCONTINUED | OUTPATIENT
Start: 2022-12-05 | End: 2022-12-05 | Stop reason: SDUPTHER

## 2022-12-05 RX ORDER — FENTANYL CITRATE 50 UG/ML
INJECTION, SOLUTION INTRAMUSCULAR; INTRAVENOUS PRN
Status: DISCONTINUED | OUTPATIENT
Start: 2022-12-05 | End: 2022-12-05 | Stop reason: SDUPTHER

## 2022-12-05 RX ADMIN — PROPOFOL 20 MG: 10 INJECTION, EMULSION INTRAVENOUS at 09:16

## 2022-12-05 RX ADMIN — PROPOFOL 10 MG: 10 INJECTION, EMULSION INTRAVENOUS at 09:22

## 2022-12-05 RX ADMIN — MIDAZOLAM 0.5 MG: 1 INJECTION INTRAMUSCULAR; INTRAVENOUS at 09:18

## 2022-12-05 RX ADMIN — GLYCOPYRROLATE 0.2 MG: 0.2 INJECTION, SOLUTION INTRAMUSCULAR; INTRAVENOUS at 09:11

## 2022-12-05 RX ADMIN — FENTANYL CITRATE 25 MCG: 50 INJECTION INTRAMUSCULAR; INTRAVENOUS at 09:18

## 2022-12-05 RX ADMIN — PROPOFOL 10 MG: 10 INJECTION, EMULSION INTRAVENOUS at 09:28

## 2022-12-05 RX ADMIN — PROPOFOL 10 MG: 10 INJECTION, EMULSION INTRAVENOUS at 09:19

## 2022-12-05 RX ADMIN — LIDOCAINE HYDROCHLORIDE 50 MG: 20 INJECTION, SOLUTION EPIDURAL; INFILTRATION; INTRACAUDAL; PERINEURAL at 09:15

## 2022-12-05 RX ADMIN — PROPOFOL 100 MG: 10 INJECTION, EMULSION INTRAVENOUS at 09:15

## 2022-12-05 RX ADMIN — SODIUM CHLORIDE: 9 INJECTION, SOLUTION INTRAVENOUS at 09:00

## 2022-12-05 RX ADMIN — PROPOFOL 10 MG: 10 INJECTION, EMULSION INTRAVENOUS at 09:25

## 2022-12-05 ASSESSMENT — PAIN SCALES - GENERAL
PAINLEVEL_OUTOF10: 0
PAINLEVEL_OUTOF10: 0

## 2022-12-05 ASSESSMENT — PAIN - FUNCTIONAL ASSESSMENT: PAIN_FUNCTIONAL_ASSESSMENT: 0-10

## 2022-12-05 NOTE — OP NOTE
Title of Operation:  Bilateral upper lid functional blepharoplasty, CPT code 42193-90  Indications for Surgery:  81year-old male who presented visually significant bilateral upper lid dermatochalasis, for which medically necessary blepharoplasty was indicated. After benefits, risks and alternatives were discussed, the patient elected to proceed with surgical repair. Preoperative Diagnosis:  Bilateral upper lid dermatochalasis  Postoperative Diagnosis:  Bilateral upper lid dermatochalasis  Anesthesia:   Monitored anesthesia care (MAC) and Local.  Specimen (Bacteriological, Pathological or other):  None  Prosthetic Device/Implant:  None  Surgeon:  Blaire Chakraborty MD  Assistant:  None  Estimated blood loss:  Less than 5mL  Surgeon's Narrative: The patient was greeted in the preoperative area. The correct place for surgery was identified and marked. The patient was taken back to the operating room and placed in supine position. Time-out for safety was held. The upper eyelid crease and an ellipse of upper lid skin were measured and marked on each side with a marking pen. Intravenous sedation was administered. A 50:50 mix of 2% lidocaine with 1:100,000 epinephrine and 0.75% marcaine was injected in these areas for local anesthesia. The patient was then prepped and draped in the usual sterile fashion. The right upper lid was addressed first. The skin was incised with a #15 blade. The strip of excess skin was removed with Mervin scissors. Careful hemostasis was achieved with bipolar cautery. The skin was then closed with a running 6-0 plain gut suture. The exact same procedure was performed in the left upper lid. Antibiotic ointment was applied in the eyes and on the incision sites. The patient tolerated the procedure very well. There were no complications.

## 2022-12-05 NOTE — ANESTHESIA PRE PROCEDURE
[facility] Department of Anesthesiology  Pre-Anesthesia Evaluation/Consultation       Name:  Marjorie Washington  : 1940  Age:  81 y.o. MRN:  [id number]  Date: 2022           Surgeon: Surgeon(s):  Sunita Palacios MD    Procedure: Procedure(s):  BLEPHAROPLASTY - BILATERAL UPPER LIDS     Allergies   Allergen Reactions    Pcn [Penicillins] Anaphylaxis     Unknown.  As child @ 2years old \"almost killed me\"    Demerol Hcl [Meperidine]      Uncontrollable shaking    Lyrica [Pregabalin] Other (See Comments)     hallucinations and double vision     Oxycodone     Tramadol Nausea And Vomiting     Patient Active Problem List   Diagnosis    Chest pain on exertion    Essential hypertension    Hx of CABG    Hyperlipidemia LDL goal <70    Reactive depression    Pro[facility] carcinoma ([facility] [address]    Vitamin D deficiency    Left hip pain    Chronic left-sided low back pain without sciatica    Coronary artery disease    Edema of right lower extremity    Greater trochanteric bursitis of left hip    Primary osteoarthritis of right knee    Primary osteoarthritis of left knee    Chronic fatigue    Acquired hypothyroidism    Gastroesophageal reflux disease without esophagitis    History of total left knee replacement (TKR)    Intractable chronic migraine without aura and without status migrainosus    History of total right knee replacement (TKR)    Bilateral hearing loss    Stage 3a chronic kidney disease ([facility] [address]     Past Medical History:   Diagnosis Date    Anxiety     Cancer ([facility] [address] 2016    Prostates CA    Coronary artery disease 12/28/15    multi vessel CAD    GERD (gastroesophageal reflux disease)     History of blood transfusion     s/p left nephrectomy age 2years old    Hyperlipidemia LDL goal <70     Hypertension     IBS (irritable bowel syndrome)     Migraine     Primary osteoarthritis of right knee 10/5/2018    Reactive depression 2016    Shingles 2012    torso, top of head    Thyroid disease     Wears dentures      Past Surgical History:   Procedure Laterality Date    APPENDECTOMY      CARPAL TUNNEL RELEASE Bilateral     COLONOSCOPY N/A 10/8/2019    COLONOSCOPY POLYPECTOMY SNARE/COLD BIOPSY performed by Augusto Robertson MD at [address]  2015    x`s 4 vessel    ENDOSCOPY, COLON, DIAGNOSTIC      EGD with dilatation    EYE SURGERY Bilateral     catacts, bilateral and repeat    HEMORRHOID SURGERY      KIDNEY REMOVAL  @ 1944    pt thinks left kidney for disease    SHOULDER SURGERY Left     rotator cuff    TOTAL KNEE ARTHROPLASTY Left 3/11/2020    ROBOTIC ASSISTED LEFT TOTAL KNEE REPLACEMENT performed by Kathie Mejai MD at [address] [place] [address] Right 12/15/2021    ROBOTIC ASSITED RIGHT TOTAL KNEE REPLACEMENT performed by Caro Ross MD at [facility] History     Tobacco Use    Smoking status: Never    Smokeless tobacco: Never   Vaping Use    Vaping Use: Never used   Substance Use Topics    Alcohol use: No    Drug use: Never     Medications  No current facility-administered medications on file prior to encounter.      Current Outpatient Medications on File Prior to Encounter   Medication Sig Dispense Refill    escitalopram (LEXAPRO) 20 MG tablet TAKE 1 TABLET BY MOUTH DAILY 90 tablet 0    lisinopril (PRINIVIL;ZESTRIL) 10 MG tablet Take 0.5 tablets by mouth daily 90 tablet 3    topiramate (TOPAMAX) 50 MG tablet TAKE 1/2 TABLET BY MOUTH TWICE DAILY 90 tablet 3    ezetimibe (ZETIA) 10 MG tablet Take 1 tablet by mouth daily 90 tablet 3    atorvastatin (LIPITOR) 80 MG tablet TAKE 1 TABLET BY MOUTH EVERY NIGHT 90 tablet 3    levothyroxine (SYNTHROID) 75 MCG tablet Take 1 tablet by mouth Daily TAKE 1 TABLET BY MOUTH EVERY DAY 90 tablet 1    aspirin 81 MG EC tablet Take 1 tablet by mouth 2 times daily for 14 days Take twice a day for 14 days after knee surgery then resume daily dosing 28 tablet 3    Misc Natural Products (GLUCOSAMINE CHOND CMP TRIPLE) TABS Take 1 tablet by mouth 2 times daily      Lysine 500 MG CAPS Take 1 capsule by mouth daily      guaiFENesin (MUCINEX) 600 MG extended release tablet Take 600 mg by mouth every 6 hours as needed for Congestion      esomeprazole (NEXIUM) 20 MG delayed release capsule Take 20 mg by mouth 2 times daily      Cholecalciferol (VITAMIN D) 2000 units CAPS capsule Take 1 capsule by mouth daily      Magnesium 500 MG CAPS Take 1 capsule by mouth nightly       docusate sodium (COLACE, DULCOLAX) 100 MG CAPS Take 100 mg by mouth 2 times daily as needed for Constipation 60 capsule 0     Current Facility-Administered Medications   Medication Dose Route Frequency Provider Last Rate Last Admin    sodium chloride flush 0.9 % injection 5-40 mL  5-40 mL IntraVENous 2 times per day [place] Dose, MD        sodium chloride flush 0.9 % injection 5-40 mL  5-40 mL IntraVENous PRN [place] Dose, MD        0.9 % sodium chloride infusion   IntraVENous PRN Jefe Dose, MD         Vital Signs (Current)   Vitals:    22   BP: 134/62   Pulse: 53   Resp: 12   Temp: 97.3 °F (36.3 °C)   SpO2: 98%     Vital Signs Statistics (for past 48 hrs)     Temp  Av.3 °F (36.3 °C)  Min: 97.3 °F (36.3 °C)   Min taken time: 22  Max: 97.3 °F (36.3 °C)   Max taken time: 22  Pulse  Av  Min: 48   Min taken time: 22  Max: 48   Max taken time: 22  Resp  Av  Min: 12   Min taken time: 22  Max: 12   Max taken time: 22  BP  Min: 134/62   Min taken time: 22  Max: 134/62   Max taken time: 22  SpO2  Av %  Min: 98 %   Min taken time: 22  Max: 98 %   Max taken time: 22    BP Readings from Last 3 Encounters:   22 134/62   22 138/72   22 104/60     BMI  Body mass index is 28.41 kg/m².   Estimated body mass index is 28.41 kg/m² as calculated from the following:    Height as of this encounter: 5' 10\" (1.778 m). Weight as of this encounter: 198 lb (89.8 kg). CBC   Lab Results   Component Value Date/Time    WBC 6.5 11/14/2022 04:01 PM    RBC 4.47 11/14/2022 04:01 PM    HGB 13.3 11/14/2022 04:01 PM    HCT 41.0 11/14/2022 04:01 PM    MCV 91.7 11/14/2022 04:01 PM    RDW 15.0 11/14/2022 04:01 PM     11/14/2022 04:01 PM     CMP    Lab Results   Component Value Date/Time     11/14/2022 04:01 PM    K 4.4 11/14/2022 04:01 PM     11/14/2022 04:01 PM    CO2 21 11/14/2022 04:01 PM    BUN 25 11/14/2022 04:01 PM    CREATININE 1.5 11/14/2022 04:01 PM    GFRAA 59 06/29/2022 12:08 PM    LABGLOM 46 11/14/2022 04:01 PM    GLUCOSE 90 11/14/2022 04:01 PM    PROT 7.0 03/30/2022 11:41 AM    CALCIUM 8.9 11/14/2022 04:01 PM    BILITOT 0.3 03/30/2022 11:41 AM    ALKPHOS 94 03/30/2022 11:41 AM    AST 17 03/30/2022 11:41 AM    ALT 20 03/30/2022 11:41 AM     BMP    Lab Results   Component Value Date/Time     11/14/2022 04:01 PM    K 4.4 11/14/2022 04:01 PM     11/14/2022 04:01 PM    CO2 21 11/14/2022 04:01 PM    BUN 25 11/14/2022 04:01 PM    CREATININE 1.5 11/14/2022 04:01 PM    CALCIUM 8.9 11/14/2022 04:01 PM    GFRAA 59 06/29/2022 12:08 PM    LABGLOM 46 11/14/2022 04:01 PM    GLUCOSE 90 11/14/2022 04:01 PM     POCGlucose  No results for input(s): GLUCOSE in the last 72 hours.    Coags    Lab Results   Component Value Date/Time    PROTIME 11.7 12/06/2021 01:20 PM    INR 1.03 12/06/2021 01:20 PM    APTT 37.3 12/06/2021 01:20 PM     HCG (If Applicable) No results found for: Clark Eileen, HCG, HCGQUANT   ABGs   Lab Results   Component Value Date/Time    PHART 7.416 12/30/2015 05:24 PM    PO2ART 92 12/30/2015 05:24 PM    AQJ0VHL 38 12/30/2015 05:24 PM    YVC2SHR 24.7 12/30/2015 05:24 PM    BEART 0 12/30/2015 05:24 PM    A4JJFKFQ 97 12/30/2015 05:24 PM      Type & Screen (If Applicable)  No results found for: Aj Gastelum BMI: Wt Readings from Last 3 Encounters:       NPO Status:   Date of last liquid consumption: 12/04/22   Time of last liquid consumption: 2100   Date of last solid food consumption: 12/04/22      Time of last solid consumption: 2100       Anesthesia Evaluation  Patient summary reviewed no history of anesthetic complications:   Airway: Mallampati: III  TM distance: >3 FB   Neck ROM: full  Mouth opening: > = 3 FB   Dental:    (+) partials      Pulmonary:Negative Pulmonary ROS and normal exam                               Cardiovascular:  Exercise tolerance: good (>4 METS),   (+) hypertension (ef 55):, angina: no interval change, CAD:, CABG/stent (cabg):, dysrhythmias (sinus bradycardia, 1AVB): PVC, hyperlipidemia      ECG reviewed  Rhythm: regular  Rate: abnormal  Echocardiogram reviewed         Beta Blocker:  Not on Beta Blocker         Neuro/Psych:   (+) headaches:, psychiatric history:depression/anxiety             GI/Hepatic/Renal:   (+) GERD: well controlled,          ROS comment: IBS. Endo/Other:    (+) hypothyroidism::., malignancy/cancer (hx prostate ca). Abdominal:             Vascular: negative vascular ROS. Other Findings:           Anesthesia Plan      MAC     ASA 3       Induction: intravenous. Anesthetic plan and risks discussed with patient. Plan discussed with CRNA. This pre-anesthesia assessment may be used as a history and physical.    DOS STAFF ADDENDUM:    Pt seen and examined, chart reviewed (including anesthesia, drug and allergy history). No interval changes to history and physical examination. Anesthetic plan, risks, benefits, alternatives, and personnel involved discussed with patient. Questions and concerns addressed. Patient(family) verbalized an understanding and agrees to proceed.       Joana Norton MD  December 5, 2022  8:44 AM

## 2022-12-05 NOTE — ANESTHESIA POSTPROCEDURE EVALUATION
[address] [facility] Department of Anesthesiology  Post-Anesthesia Note       Name:  Robert Mathias                                  Age:  81 y.o. MRN:  [id number]     Last Vitals & Oxygen Saturation: /64   Pulse 58   Temp 97.4 °F (36.3 °C) (Temporal)   Resp 13   Ht 5' 10\" (1.778 m)   Wt 198 lb (89.8 kg)   SpO2 98%   BMI 28.41 kg/m²   Patient Vitals for the past 4 hrs:   BP Temp Temp src Pulse Resp SpO2 Height Weight   12/05/22 0942 129/64 -- -- 58 13 98 % -- --   12/05/22 0938 129/64 97.4 °F (36.3 °C) Temporal 57 12 97 % -- --   12/05/22 0838 134/62 97.3 °F (36.3 °C) Temporal 53 12 98 % 5' 10\" (1.778 m) 198 lb (89.8 kg)       Level of consciousness:  Awake, alert    Respiratory: Respirations easy, no distress. Stable. Cardiovascular: Hemodynamically stable. Hydration: Adequate. PONV: Adequately managed. Post-op pain: Adequately controlled. Post-op assessment: Tolerated anesthetic well without complication. Complications:  None.     Heidy Simons MD  December 5, 2022   9:59 AM

## 2022-12-05 NOTE — DISCHARGE INSTRUCTIONS
Post-Operative Instructions for Ophthalmic Plastic Surgery      ACTIVITY:     Today, rest quietly at home. Sit upright as much as possible and sleep with your head elevated for 1 week. Do not perform strenuous activity for 1 week. You may take a shower or bath 24 hours after surgery. It is Ok if the sutures get wet. A responsible person must accompany you home. Do not drive for 24 hours. EYE  CARE:   Place baggies of frozen peas or corn on each operated eye for 20 min on and 20 min off while awake; discontinue at bedtime. Do this for 2 days. Apply antibiotic ointment to the sutures twice a day for 2 weeks. Small amounts of bloody drainage may occur after surgery and will usually stop with firm pressure applied for 5 minutes; use a clean washcloth. If this does not stop the bleeding, then call immediately. Some soreness, swelling, bruising, and slightly blurry vision are normal.  If you have decreased vision, excessive swelling or bruising, or bulging forward of the eyeball, then please call immediately. DIET:    You may resume your regular diet. No alcohol for 24 hours. Resume your regular medications. Refer to surgical packet for instructions on when to restart Coumadin, Plavix and aspirin. PAIN:   You may take Tylenol (acetaminophen) for pain. If this does not relieve your pain, then please call. OTHER:   If you experience nausea, vomiting, or excessive pain, please contact your surgeon immediately.       Please call our main number if you have any questions or problems:  [phone]

## 2022-12-05 NOTE — H&P
Date of Surgery Update:  Janneth Brendan was seen, history and physical examination reviewed, and patient examined by me today. There have been no significant clinical changes since the completion of the previous history and physical. The surgical site was confirmed by the patient and me. The risk, benefits, and alternatives of the proposed procedure have been explained to the patient (or appropriate guardian) and understanding verbalized. All questions answered. Patient wishes to proceed.     Electronically signed by: Ariel Messer MD,12/5/2022,8:35 AM

## 2022-12-08 ENCOUNTER — OFFICE VISIT (OUTPATIENT)
Dept: ORTHOPEDIC SURGERY | Age: 82
End: 2022-12-08

## 2022-12-08 VITALS — HEIGHT: 69 IN | WEIGHT: 199 LBS | BODY MASS INDEX: 29.47 KG/M2

## 2022-12-08 DIAGNOSIS — M75.22 BICEPS TENDINITIS OF LEFT UPPER EXTREMITY: Primary | ICD-10-CM

## 2022-12-08 RX ORDER — TRIAMCINOLONE ACETONIDE 40 MG/ML
40 INJECTION, SUSPENSION INTRA-ARTICULAR; INTRAMUSCULAR ONCE
Status: COMPLETED | OUTPATIENT
Start: 2022-12-08 | End: 2022-12-08

## 2022-12-08 RX ORDER — BUPIVACAINE HYDROCHLORIDE 2.5 MG/ML
2 INJECTION, SOLUTION INFILTRATION; PERINEURAL ONCE
Status: COMPLETED | OUTPATIENT
Start: 2022-12-08 | End: 2022-12-08

## 2022-12-08 RX ADMIN — TRIAMCINOLONE ACETONIDE 40 MG: 40 INJECTION, SUSPENSION INTRA-ARTICULAR; INTRAMUSCULAR at 10:07

## 2022-12-08 RX ADMIN — BUPIVACAINE HYDROCHLORIDE 5 MG: 2.5 INJECTION, SOLUTION INFILTRATION; PERINEURAL at 10:07

## 2022-12-11 NOTE — PROGRESS NOTES
[facility]  Office Visit    Chief Complaint: Left shoulder pain    HPI:  Tory Alanis is a 81 y.o. who is here in follow-up of left shoulder pain. He was last seen by Elodia Felty in August 2022 for this issue. At that point, he underwent steroid injection of the left proximal biceps tendon and was given home physical therapy exercises. He reported good relief of symptoms for about 2 to 3 months until he reinjured his shoulder while helping a lady off the ground. He currently has anterior shoulder pain on a daily basis that is worse with elbow flexion activities. He denies neck pain, numbness, tingling, weakness, pain rating down the arm. Patient Active Problem List   Diagnosis    Chest pain on exertion    Essential hypertension    Hx of CABG    Hyperlipidemia LDL goal <70    Reactive depression    Prostate carcinoma (HCC)    Vitamin D deficiency    Left hip pain    Chronic left-sided low back pain without sciatica    Coronary artery disease    Edema of right lower extremity    Greater trochanteric bursitis of left hip    Primary osteoarthritis of right knee    Primary osteoarthritis of left knee    Chronic fatigue    Acquired hypothyroidism    Gastroesophageal reflux disease without esophagitis    History of total left knee replacement (TKR)    Intractable chronic migraine without aura and without status migrainosus    History of total right knee replacement (TKR)    Bilateral hearing loss    Stage 3a chronic kidney disease (HCC)       ROS:  Constitutional: denies fever, chills, weight loss  MSK: denies pain in other joints, muscle aches  Neurological: denies numbness, tingling, weakness    Exam:  Height 5' 9\" (1.753 m), weight 199 lb (90.3 kg).     Appearance: sitting in exam room chair, appears to be in no acute distress, awake and alert  Resp: unlabored breathing on room air  Skin: warm, dry and intact with out erythema or significant increased temperature  LUE: Examination of the shoulder shows no gross defects. Tender over proximal biceps tendon at the shoulder. Active shoulder forward flexion is 165 degrees, external rotation 25 degrees, internal rotation to lumbar spine. 4/5 strength in resisted adduction in the scapular plane and resisted external rotation at the neutral position. Sensation is intact light touch. Brisk capillary refill. 2+ radial pulse. Imaging:  Prior left shoulder radiographs were reviewed and significant for maintained joint space with no fractures or dislocations. Assessment:  Left shoulder biceps tendinitis    Plan:  We discussed the diagnosis and treatment options. He had good relief of symptoms with a steroid injection 4 months ago. He is interested in repeat injections today. This was performed as described below. He will otherwise continue with self-directed physical therapy exercises and follow-up here as needed should the symptoms persist or return. PROCEDURE NOTE:  After verbal consent was obtained, the patient's left shoulder was prepped with alcohol and anesthetized with ethyl chloride. The proximal biceps long head tendon sheath was then injected under sterile technique with 2mL of 0.25% marcaine and 1 mL of 40 mg/mL Kenalog. A bandage was applied. The patient tolerated procedure well and there were no complications. Total time spent on today's encounter was at least 23 minutes. This time included reviewing prior notes, radiographs, and lab results when available, reviewing history obtained by medical assistant, performing history and physical exam, reviewing tests/radiographs with the patient, counseling the patient, ordering medications or tests, documentation in the electronic health record, and coordination of care. This dictation was done with Dragon dictation and may contain mechanical errors related to translation.

## 2022-12-12 NOTE — PROGRESS NOTES
[facility] and [address] at [facility] Neurology Clinics at [address] [facility] [address] [place], [address] [facility] [address] [facility], [address] [place] [address]  [phone] Office  [phone]           Referring: Kesha Ozuna MD  [address],  10 [facility]     Chief Complaint   Patient presents with    New Patient    Seizure     Controlled for many years with phenobarbital but pcp would like pt to come off of this d/t risk of bone loss if she is able. Sz started at 25 y/o. Last known was 01/1997     Mrs. Bryan Hammond is a very pleasant 50-year-old lady who presents today accompanied by her  for neurologic consultation at the request of Dr. Alexander Jeffery regarding question of changing phenobarbital or coming off of phenobarbital in the setting of epilepsy and concern for decreased bone density. She tells me that she has had 3 seizures in her life being diagnosed with epilepsy at the age of 24. She follows with Dr. Froilan Grant. She was started on Dilantin and had anaphylaxis. She then went to phenobarbital.  At the age of 26 Dr. Froilan Grant did a sleep deprived EEG in the hopes of trying to wean her off of medication as she had been seizure-free. He saw something on the EEG that made him tell her that she will need to continue taking the Dilantin long-term/lifelong. Seizures have been witnessed by her  and by a friend. 1 was unwitnessed. She had tongue biting. They were described as her having a guttural sound and then being tense and flexed and having jerking movements consistent with a convulsion. She did not lose her bowel or bladder. She has not had a seizure since 25 years ago. She has tolerated the phenobarbital.  She does have some issue with going out of town and having hassle from the pharmacy and getting pills a couple of days early etc.  She exercises regularly.   She has had a low Preoperative Consultation      Jett Hameed  YOB: 1940    Date of Service:  11/14/2022    There were no vitals filed for this visit. Wt Readings from Last 2 Encounters:   09/28/22 198 lb 9.6 oz (90.1 kg)   08/15/22 199 lb (90.3 kg)     BP Readings from Last 3 Encounters:   09/28/22 104/60   06/29/22 110/60   03/30/22 138/68        No chief complaint on file. Allergies   Allergen Reactions    Pcn [Penicillins] Anaphylaxis     Unknown.  As child @ 2years old \"almost killed me\"    Demerol Hcl [Meperidine]      Uncontrollable shaking    Lyrica [Pregabalin] Other (See Comments)     hallucinations and double vision     Oxycodone     Tramadol Nausea And Vomiting     No outpatient medications have been marked as taking for the 11/14/22 encounter (Appointment) with Zeyad Ley MD.       This patient presents to the office today for a preoperative consultation at the request of surgeon, Dr. Stevens eye [facility], who plans on performing 12/6/2022 on at [facility].  T  Planned anesthesia: Local   Known anesthesia problems: None   Bleeding risk: No recent or remote history of abnormal bleeding  Personal or FH of DVT/PE: No    Patient objection to receiving blood products: No    Patient Active Problem List   Diagnosis    Chest pain on exertion    Essential hypertension    Hx of CABG    Hyperlipidemia LDL goal <70    Reactive depression    Prostate carcinoma ([address]    Vitamin D deficiency    Left hip pain    Chronic left-sided low back pain without sciatica    Coronary artery disease    Edema of right lower extremity    Greater trochanteric bursitis of left hip    Primary osteoarthritis of right knee    Primary osteoarthritis of left knee    Chronic fatigue    Acquired hypothyroidism    Gastroesophageal reflux disease without esophagitis    History of total left knee replacement (TKR)    Intractable chronic migraine without aura and without status migrainosus    History of total right knee replacement (TKR)    Bilateral hearing loss    Stage 3a chronic kidney disease ([facility]       Past Medical History:   Diagnosis Date    Anxiety     Cancer ([facility] 11/2016    Prostates CA    Coronary artery disease 12/28/15    multi vessel CAD    GERD (gastroesophageal reflux disease)     History of blood transfusion     s/p left nephrectomy age 2years old    Hyperlipidemia LDL goal <70     Hypertension     IBS (irritable bowel syndrome)     Migraine     Primary osteoarthritis of right knee 10/5/2018    Reactive depression 6/8/2016    Shingles 2012    torso, top of head    Thyroid disease     Wears dentures      Past Surgical History:   Procedure Laterality Date    APPENDECTOMY      CARPAL TUNNEL RELEASE Bilateral     COLONOSCOPY N/A 10/8/2019    COLONOSCOPY POLYPECTOMY SNARE/COLD BIOPSY performed by Zeyad Ley MD at [facility]  2015    x`s 4 vessel    ENDOSCOPY, COLON, DIAGNOSTIC      EGD with dilatation    EYE SURGERY Bilateral     catacts, bilateral and repeat    HEMORRHOID SURGERY      KIDNEY REMOVAL  @ 1944    pt thinks left kidney for disease    SHOULDER SURGERY Left     rotator cuff    TOTAL KNEE ARTHROPLASTY Left 3/11/2020    ROBOTIC ASSISTED LEFT TOTAL KNEE REPLACEMENT performed by Lizbet Mcneil MD at [address] [place] [address] Right 12/15/2021    ROBOTIC ASSITED RIGHT TOTAL KNEE REPLACEMENT performed by Momo Benjamin MD at [facility] History   Problem Relation Age of Onset    High Blood Pressure Mother     Other Mother     Heart Disease Father      Social History     Socioeconomic History    Marital status:      Spouse name: Not on file    Number of children: Not on file    Years of education: Not on file    Highest education level: Not on file   Occupational History    Not on file   Tobacco Use    Smoking status: Never    Smokeless tobacco: Never   Vaping Use    Vaping Use: Never used   Substance and Sexual Activity    Alcohol use:  No vitamin D and has been prescribed vitamin D. She again does weightbearing exercise. She has not had a DEXA scan. Emergency department visit September 19, 2022 where patient presented noting history of epilepsy controlled on phenobarbital with the last seizure being at age 24 when she awakened that morning feeling off. Symptoms resolved prior to arriving. She called her  and he thought her speech was slurred. She felt drugged and unlike her regular postictal state. Examination was unremarkable. It was thought that she perhaps had phenobarbital toxicity and recommendation was made to drop patient down to 97 mg daily. CT scan of the head dry unremarkable  CTA of the head and neck demonstrated a mildly beaded appearance of the cervical internal carotid suggestive of fibromuscular dysplasia and the film was discussed with Dr. Claudean Abt of neuro intervention who recommended the patient take aspirin daily. Laboratory studies from that date  CBC with a white count of 3.3 otherwise unremarkable  Metabolic panel unremarkable  Magnesium normal  Urinalysis normal  Alcohol level normal  Toxicology screen positive for barbiturate--she is on phenobarbital  Phenobarbital level 28.5  INR normal  Troponin normal    Past Medical History:   Diagnosis Date    Other ill-defined conditions(799.89)     anxiety    Seizures ([facility] [address] [facility]        Past Surgical History:   Procedure Laterality Date    HX BREAST AUGMENTATION Bilateral 12/2020    HX HYSTERECTOMY  2021    HX TONSIL AND ADENOIDECTOMY      HX WISDOM TEETH EXTRACTION         Current Outpatient Medications   Medication Sig Dispense Refill    aspirin 81 mg chewable tablet Take 81 mg by mouth daily. sertraline (ZOLOFT) 100 mg tablet Take 150 mg by mouth daily. ALPRAZolam (XANAX) 0.25 mg tablet Take 0.25 mg by mouth. PHENobarbitaL 60 mg tablet Take 120 mg by mouth nightly. buffered aspirin (BUFFERIN) 325 mg tablet Take 1 tablet by mouth daily.  20 tablet 0    albuterol (PROVENTIL HFA, VENTOLIN HFA) 90 mcg/actuation inhaler Take 2 Puffs by inhalation every four (4) hours as needed for Wheezing. (Patient not taking: Reported on 12/12/2022) 1 Inhaler none        Allergies   Allergen Reactions    Dilantin [Phenytoin Sodium Extended] Anaphylaxis       Social History     Tobacco Use    Smoking status: Former     Types: Cigarettes    Smokeless tobacco: Never   Vaping Use    Vaping Use: Never used   Substance Use Topics    Alcohol use: Yes     Comment: maybe 3 times per year    Drug use: No       Family History   Problem Relation Age of Onset    COPD Mother     Hypertension Mother     No Known Problems Father        Review of Systems  Pertinent positives and negatives as noted. Examination  Visit Vitals  BP (!) 110/58 (BP 1 Location: Left upper arm, BP Patient Position: Sitting, BP Cuff Size: Adult)   Pulse 65   Resp 16   Wt 58.4 kg (128 lb 12.8 oz)   LMP 10/14/2013   SpO2 98%   BMI 21.43 kg/m²     Neurologically, she is awake, alert, and oriented with normal speech and language. Her cognition is normal.    Intact cranial nerves 2-12. No nystagmus. She has normal bulk and tone. She has no abnormal movement. She has no pronation or drift. She generates full strength in the upper and lower extremities to direct confrontational testing. Reflexes are symmetrical in the upper and lower extremities bilaterally. No pathologic reflexes are elicited.   Finger nose finger and rapid alternating movements are normal.      Impression/Plan  71-year-old lady who has longstanding controlled epilepsy and just by the history I am suspicious that she has a generalized epilepsy given she has always had convulsions although only 3 and there was some abnormality that pointed to her using lifelong medication who has some concern regarding aging and the risk of bone loss associated with the first generation antiseizure medications  We discussed several options today Drug use: Never    Sexual activity: Not Currently   Other Topics Concern    Not on file   Social History Narrative    Not on file     Social Determinants of Health     Financial Resource Strain: Low Risk     Difficulty of Paying Living Expenses: Not hard at all   Food Insecurity: No Food Insecurity    Worried About Running Out of Food in the Last Year: Never true    [address] in the Last Year: Never true   Transportation Needs: No Transportation Needs    Lack of Transportation (Medical): No    Lack of Transportation (Non-Medical): No   Physical Activity: Sufficiently Active    Days of Exercise per Week: 6 days    Minutes of Exercise per Session: 30 min   Stress: Not on file   Social Connections: Not on file   Intimate Partner Violence: Not on file   Housing Stability: Not on file       Review of Systems  A comprehensive review of systems was negative except for what was noted in the HPI. Physical Exam   Constitutional: He is oriented to person, place, and time. He appears well-developed and well-nourished. No distress. HENT:   Head: Normocephalic and atraumatic. Mouth/Throat: Uvula is midline, oropharynx is clear and moist and mucous membranes are normal.   Eyes: Conjunctivae and EOM are normal. Pupils are equal, round, and reactive to light. Neck: Trachea normal and normal range of motion. Neck supple. No JVD present. Carotid bruit is not present. No mass and no thyromegaly present. Cardiovascular: Normal rate, regular rhythm, normal heart sounds and intact distal pulses. Exam reveals no gallop and no friction rub. No murmur heard. Pulmonary/Chest: Effort normal and breath sounds normal. No respiratory distress. He has no wheezes. He has no rales. Abdominal: Soft. Normal aorta and bowel sounds are normal. He exhibits no distension and no mass. There is no hepatosplenomegaly. No tenderness. Musculoskeletal: He exhibits no edema and no tenderness.    Neurological: He is alert and oriented to including:  Potentially trying to wean her off phenobarbital altogether and be on no antiseizure medicine given that she has been seizure-free x25 years    Converting her from phenobarbital to a newer medication with no known bone loss issue    Doing a DEXA scan to assess where her bone density is and starting calcium supplementation and if her bone density is minimally low or normal then continue on phenobarbital.  Certainly if she has osteoporosis or more significant bone loss then we could discuss other options/medications    To that end she will get a DEXA scan  She will also get sleep deprived EEG so that if we do see generalized discharges versus focal discharges that will help me in deciding an appropriate medication in case we do need to change her off phenobarbital.    We did discuss the risk of withdrawal seizure even with a very slow wean of phenobarbital    If the EEG does not demonstrate any epileptiform abnormalities and we do need to change her to another anticonvulsant then we would use broad-spectrum and aim for a mid range dose    Follow-up after the above has been completed    Carlee Kaminski MD          This note was created using voice recognition software. Despite editing, there may be syntax errors. person, place, and time. He has normal strength. No cranial nerve deficit or sensory deficit. Coordination and gait normal.   Skin: Skin is warm and dry. No rash noted. No erythema. Psychiatric: He has a normal mood and affect. His behavior is normal.     EKG Interpretation:  First degree AV block No acute ST-T wave changes    Lab Review   No visits with results within 2 Month(s) from this visit. Latest known visit with results is:   Office Visit on 06/29/2022   Component Date Value    TSH Reflex FT4 06/29/2022 0.53     Sodium 06/29/2022 142     Potassium 06/29/2022 4.4     Chloride 06/29/2022 108     CO2 06/29/2022 23     Anion Gap 06/29/2022 11     Glucose 06/29/2022 64 (A)     BUN 06/29/2022 22 (A)     Creatinine 06/29/2022 1.4 (A)     GFR Non- 06/29/2022 49 (A)     GFR  06/29/2022 59 (A)     Calcium 06/29/2022 8.9     Phosphorus 06/29/2022 4.4     Albumin 06/29/2022 4.2            Assessment:       81 y.o. patient with planned surgery as above. Known risk factors for perioperative complications: Coronary artery disease, Hypertension, Renal dysfunction  Current medications which may produce withdrawal symptoms if withheld perioperatively: none      Plan:     1. Preoperative workup as follows: ECG, hemoglobin, hematocrit, electrolytes  2. Change in medication regimen before surgery: Discontinue Lisinopril 1 days before surgery  3.  Prophylaxis for cardiac events with perioperative beta-blockers: Not indicated  ACC/AHA indications for pre-operative beta-blocker use:    Vascular surgery with history of postitive stress test  Intermediate or high risk surgery with history of CAD   Intermediate or high risk surgery with multiple clinical predictors of CAD- 2 of the following: history of compensated or prior heart failure, history of cerebrovascular disease, DM, or renal insufficiency    Routine administration of higher-dose, long-acting metoprolol in beta-blocker-naïve patients on the day of surgery, and in the absence of dose titration is associated with an overall increase in mortality. Beta-blockers should be started days to weeks prior to surgery and titrated to pulse < 70.  4. Deep vein thrombosis prophylaxis: regimen to be chosen by surgical team  5. No contraindications to planned surgery    Pre-Operative Risk assessment using 2014 ACC/AHA guidelines     Emergent procedure NO  Active Cardiac Condition No (decompensated HF, Arrhythmia, MI <3 weeks, severe valve disease)  Risk Level of Procedure Low Risk (endoscopy, superficial skin, breast, ambulatory, or cataract, etc.)  Revised Cardiac Risk Index Risk factors: History of ischemic heart disease  Measurement of Exercise Tolerance before Surgery >4 Yes    According to the 2014 ACC/AHA pre-operative risk assessment guidelines Juan José Rivera is a low risk for major cardiac complications during a low risk procedure and may continue as planned. Specific medication recommendations are listed below. Medications recommended to continue should be taken with a sip of water even when NPO.      Further recommendations from consultants: None

## 2022-12-19 DIAGNOSIS — E03.9 ACQUIRED HYPOTHYROIDISM: ICD-10-CM

## 2022-12-20 RX ORDER — LEVOTHYROXINE SODIUM 0.07 MG/1
TABLET ORAL
Qty: 90 TABLET | Refills: 3 | Status: SHIPPED | OUTPATIENT
Start: 2022-12-20

## 2022-12-20 NOTE — TELEPHONE ENCOUNTER
Last ov 12 08 22  Future Appointments   Date Time Provider Leonie Silva   3/16/2023 10:45 AM Riky Cabrera MD Vangie Batch [facility]   3/28/2023 10:15 AM Dina Christina MD [facility]

## 2023-01-18 NOTE — PROGRESS NOTES
[facility]  Cardiology progress Note        CC: \" I'm fine. \"       HPI: This is a 81 y.o. male with severe three-vessel disease with normal LV function. S/p CABG. He had possible CVA post CABG surgery. Also has a hx of Prostate CA diagnosed in 2016. Knee replacement surgery in December 2021. Presents for follow up. Has no new cardiac complaints. Breathing has been comfortable. Wife manages medications. Patient denies any chest pain, pressure, tightness, nausea, vomiting, diaphoresis, SOB at rest / SUE, palpitations, heart racing, dizziness/lightheadedness, cough, orthopnea, PND, LE edema or syncope.        Past Medical History:   Diagnosis Date    Anxiety     Cancer ([facility] [address] 11/2016    Prostates CA    Coronary artery disease 12/28/15    multi vessel CAD    GERD (gastroesophageal reflux disease)     History of blood transfusion     s/p left nephrectomy age 2years old    Hyperlipidemia LDL goal <70     Hypertension     IBS (irritable bowel syndrome)     Migraine     Primary osteoarthritis of right knee 10/5/2018    Reactive depression 6/8/2016    Shingles 2012    torso, top of head    Thyroid disease     Wears dentures       Past Surgical History:   Procedure Laterality Date    APPENDECTOMY      BLEPHAROPLASTY Bilateral 12/5/2022    BLEPHAROPLASTY - BILATERAL UPPER LIDS performed by Sabrina Kim MD at [facility] [address] Bilateral     COLONOSCOPY N/A 10/8/2019    COLONOSCOPY POLYPECTOMY SNARE/COLD BIOPSY performed by Alanis Hernandez MD at [facility]  2015    x`s 4 vessel    ENDOSCOPY, COLON, DIAGNOSTIC      EGD with dilatation    EYE SURGERY Bilateral     catacts, bilateral and repeat    [address]  @ 1943    pt thinks left kidney for disease    SHOULDER SURGERY Left     rotator cuff    TOTAL KNEE ARTHROPLASTY Left 3/11/2020    ROBOTIC ASSISTED LEFT TOTAL KNEE REPLACEMENT performed by Dalton Guardado MD at [place] [address] TOTAL KNEE ARTHROPLASTY Right 12/15/2021    ROBOTIC ASSITED RIGHT TOTAL KNEE REPLACEMENT performed by Misa Akhtar MD at [address] [facility] [address] History   Problem Relation Age of Onset    High Blood Pressure Mother     Other Mother     Heart Disease Father       Social History     Tobacco Use    Smoking status: Never    Smokeless tobacco: Never   Vaping Use    Vaping Use: Never used   Substance Use Topics    Alcohol use: No    Drug use: Never     Allergies   Allergen Reactions    Pcn [Penicillins] Anaphylaxis     Unknown. As child @ 2years old \"almost killed me\"    Demerol Hcl [Meperidine]      Uncontrollable shaking    Lyrica [Pregabalin] Other (See Comments)     hallucinations and double vision     Oxycodone     Tramadol Nausea And Vomiting     Review of Systems -   Constitutional: Negative for weight gain/loss, fever. Respiratory: Negative for Asthma;  cough and hemoptysis  Cardiovascular: Negative for palpitations,dizziness   Gastrointestinal: Negative for abd.pain; constipation/diarrhea;    Genitourinary: Negative for stones; hematuria; frequency hesitancy  Integumentt: Negative for rash or pruritis  Hematologic/lymphatic: Negative for blood dyscrasia; leukemia/lymphoma  Musculoskeletal: Negative for Connective tissue disease  Neurological:  Negative for Seizure   Behavioral/Psych:Negative for Bipolar disorder, Schizophrenia; Dementia  Endocrine: negative for thyroid, parathyroid disease      Physical Examination:    /70   Pulse 54   Ht 5' 9\" (1.753 m)   Wt 203 lb (92.1 kg)   SpO2 98%   BMI 29.98 kg/m²    HEENT:  Face: Atraumatic, Conjunctiva: Pink; non icteric, Mucous Memb:  Moist, No thyromegaly or Lymphadenopathy  Respiratory: Resp Assessment: normal, Resp Auscultation: clear   Cardiovascular: Auscultation: nl S1 & S2, Palpation:  Nl PMI;  No heaves or thrills, JVP:  normal  Abdomen: Soft, non-tender, Normal bowel sounds,  No organomegaly  Extremities: No Cyanosis or Clubbing; Edema none  Neurological: Oriented to time, place, and person, Non-anxious  Psychiatric: Normal mood and affect  Skin: Warm and dry,  No rash seen      EK/2/20: Sinus Bradycardia  2021 Sinus bradycardia  22 Sinus rhythm, rate 60  23 Sinus bradycardia     ECHO: 2015  Normal LV size and systolic function. Estimated ejection fraction is 60%. Trivial tricuspid regurgitation     Coronary angiography:2015  1. Multi-lesion, multi-vessel coronary artery disease. 2. Mid LAD  has long segment which has at least 60% stenosis throughout and at least one area of 80% to 90% stenosis. The diagonals have high-grade stenosis in the 80% to 90% range. 3. The circumflex has 2 high-grade 80% to 90% lesions   4. Rright coronary artery also has an 80% mid lesion. 3. Normal left ventricular size and systolic function. Ejection fraction is 60%. ASSESSMENT AND PLAN:      CAD S/P CABGX4 12/30/15  Remains asymptomatic  Continue risk factor modifications    Essential hypertension  Blood pressure well controlled   Continue medical management  Labs WNL    Hyperlipidemia  LDL goal <70   LDL 73 on 3/9/2022  Continue statin and Zetia    Prostate CA  Managed by Dr. Shanique Faustin    Follow up yearly    This note was scribed in the presence of Dr. Natividad Baxter MD by Fernanda Giles RN  Physician Attestation:  The scribes documentation has been prepared under my direction and personally reviewed by me in its entirety. I confirm that the note above accurately reflects all work, treatment, procedures, and medical decision making performed by me.

## 2023-01-19 ENCOUNTER — OFFICE VISIT (OUTPATIENT)
Dept: CARDIOLOGY CLINIC | Age: 83
End: 2023-01-19
Payer: MEDICARE

## 2023-01-19 VITALS
DIASTOLIC BLOOD PRESSURE: 70 MMHG | WEIGHT: 203 LBS | OXYGEN SATURATION: 98 % | BODY MASS INDEX: 30.07 KG/M2 | HEART RATE: 54 BPM | HEIGHT: 69 IN | SYSTOLIC BLOOD PRESSURE: 118 MMHG

## 2023-01-19 DIAGNOSIS — Z95.1 S/P CABG X 4: ICD-10-CM

## 2023-01-19 DIAGNOSIS — E78.5 HYPERLIPIDEMIA LDL GOAL <70: ICD-10-CM

## 2023-01-19 DIAGNOSIS — I10 ESSENTIAL HYPERTENSION: ICD-10-CM

## 2023-01-19 DIAGNOSIS — I25.10 CAD IN NATIVE ARTERY: Primary | ICD-10-CM

## 2023-01-19 PROCEDURE — G8484 FLU IMMUNIZE NO ADMIN: HCPCS | Performed by: INTERNAL MEDICINE

## 2023-01-19 PROCEDURE — 99213 OFFICE O/P EST LOW 20 MIN: CPT | Performed by: INTERNAL MEDICINE

## 2023-01-19 PROCEDURE — 93000 ELECTROCARDIOGRAM COMPLETE: CPT | Performed by: INTERNAL MEDICINE

## 2023-01-19 PROCEDURE — 3074F SYST BP LT 130 MM HG: CPT | Performed by: INTERNAL MEDICINE

## 2023-01-19 PROCEDURE — 1036F TOBACCO NON-USER: CPT | Performed by: INTERNAL MEDICINE

## 2023-01-19 PROCEDURE — G8427 DOCREV CUR MEDS BY ELIG CLIN: HCPCS | Performed by: INTERNAL MEDICINE

## 2023-01-19 PROCEDURE — 1123F ACP DISCUSS/DSCN MKR DOCD: CPT | Performed by: INTERNAL MEDICINE

## 2023-01-19 PROCEDURE — G8417 CALC BMI ABV UP PARAM F/U: HCPCS | Performed by: INTERNAL MEDICINE

## 2023-01-19 PROCEDURE — 3078F DIAST BP <80 MM HG: CPT | Performed by: INTERNAL MEDICINE

## 2023-01-19 RX ORDER — AMOXICILLIN 500 MG
CAPSULE ORAL
COMMUNITY

## 2023-03-16 ENCOUNTER — OFFICE VISIT (OUTPATIENT)
Dept: ORTHOPEDIC SURGERY | Age: 83
End: 2023-03-16

## 2023-03-16 VITALS — BODY MASS INDEX: 29.47 KG/M2 | HEIGHT: 69 IN | WEIGHT: 199 LBS

## 2023-03-16 DIAGNOSIS — Z96.651 STATUS POST TOTAL RIGHT KNEE REPLACEMENT: Primary | ICD-10-CM

## 2023-03-16 DIAGNOSIS — M75.82 ROTATOR CUFF TENDINITIS, LEFT: ICD-10-CM

## 2023-03-16 DIAGNOSIS — M75.22 BICEPS TENDINITIS OF LEFT UPPER EXTREMITY: ICD-10-CM

## 2023-03-16 RX ORDER — BUPIVACAINE HYDROCHLORIDE 2.5 MG/ML
2 INJECTION, SOLUTION INFILTRATION; PERINEURAL ONCE
Status: COMPLETED | OUTPATIENT
Start: 2023-03-16 | End: 2023-03-16

## 2023-03-16 RX ORDER — TRIAMCINOLONE ACETONIDE 40 MG/ML
40 INJECTION, SUSPENSION INTRA-ARTICULAR; INTRAMUSCULAR ONCE
Status: COMPLETED | OUTPATIENT
Start: 2023-03-16 | End: 2023-03-16

## 2023-03-16 RX ADMIN — TRIAMCINOLONE ACETONIDE 40 MG: 40 INJECTION, SUSPENSION INTRA-ARTICULAR; INTRAMUSCULAR at 11:13

## 2023-03-16 RX ADMIN — BUPIVACAINE HYDROCHLORIDE 5 MG: 2.5 INJECTION, SOLUTION INFILTRATION; PERINEURAL at 11:13
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
## 2023-03-24 ENCOUNTER — TELEPHONE (OUTPATIENT)
Dept: INTERNAL MEDICINE CLINIC | Age: 83
End: 2023-03-24

## 2023-03-24 RX ORDER — ESCITALOPRAM OXALATE 20 MG/1
TABLET ORAL
Qty: 90 TABLET | Refills: 3 | Status: SHIPPED | OUTPATIENT
Start: 2023-03-24

## 2023-03-24 RX ORDER — ESCITALOPRAM OXALATE 20 MG/1
20 TABLET ORAL DAILY
Qty: 90 TABLET | Refills: 3 | Status: SHIPPED | OUTPATIENT
Start: 2023-03-24

## 2023-03-24 NOTE — TELEPHONE ENCOUNTER
Last office visit :11/14/2022    Future Appointments   Date Time Provider Leonie Shira   3/28/2023 10:15 AM Roberta Hunt MD [facility] [address]   3/18/2024 10:45 AM MD Hayden Brooks

## 2023-03-29 ENCOUNTER — TELEPHONE (OUTPATIENT)
Dept: INTERNAL MEDICINE CLINIC | Age: 83
End: 2023-03-29

## 2023-03-29 NOTE — TELEPHONE ENCOUNTER
Pt started  new medication (Nurtec). He has nausea  and doesn't want to eat. Pt does not want to take this med. Pt wants to know if the doctor wants to prescribe something different or give a higher mg of Topiramate he was previously taking 50 mg.

## 2023-03-30 NOTE — TELEPHONE ENCOUNTER
Last ov 03 28 23  Future Appointments   Date Time Provider Leonie Silva   9/28/2023 11:00 AM Rafa Painter MD [address]   3/18/2024 10:45 AM MD Daja Mackenzie

## 2023-03-31 ENCOUNTER — TELEPHONE (OUTPATIENT)
Dept: INTERNAL MEDICINE CLINIC | Age: 83
End: 2023-03-31

## 2023-03-31 DIAGNOSIS — E03.9 ACQUIRED HYPOTHYROIDISM: ICD-10-CM

## 2023-05-15 NOTE — PROGRESS NOTES
Clinical Pharmacy Note  Medication Counseling    Reviewed new medications started during hospital admission: Oxycodone, ASA 81mg BID x 2 weeks. Indications and side effects were emphasized during counseling. All medication-related questions addressed. Patient verbalized understanding of education. Should the patient express any additional questions or concerns regarding their medications, please do not hesitate to contact the pharmacy department. Patient/caregiver aware they may refuse medications during hospital stay. 15 minutes spent educating patient regarding medications.   Lory Carranza RPh 3/12/2020 12:11 PM [Antalgic] : antalgic [Stooped] : stooped [de-identified] : Examination of the lumbar spine reveals no midline tenderness palpation, step-offs, or skin lesions. Decreased range of motion with respect to flexion, extension, lateral bending, and rotation. No tenderness to palpation of the sciatic notch. No tenderness palpation of the bilateral greater trochanters. No pain with passive internal/external rotation of the hips. No instability of bilateral lower extremities.  Negative FACUNDO. Negative straight leg raise bilaterally. No bowstring. Negative femoral stretch. 5 out of 5 iliopsoas, hip abductors, hips adductors, quadriceps, hamstrings, gastrocsoleus, tibialis anterior, extensor hallucis longus, peroneals. Grossly intact sensation to light touch bilateral lower extremities. 1+ patellar and Achilles reflexes. Downgoing Babinski. No clonus. Intact proprioception. Palpable pulses. No skin lesion and no edema on the right and left lower extremities. [de-identified] : AP lateral lumbar x-rays with some spondylosis without instability or aggressive lesions

## 2023-05-23 ENCOUNTER — TELEPHONE (OUTPATIENT)
Dept: INTERNAL MEDICINE CLINIC | Age: 83
End: 2023-05-23

## 2023-05-23 DIAGNOSIS — E03.9 ACQUIRED HYPOTHYROIDISM: ICD-10-CM

## 2023-05-23 NOTE — TELEPHONE ENCOUNTER
Leothyroxine he was taking 1 tablet of  .75 mcg and now he is suppose to be taking 1 1/2 tablets of the .75 mcg. And he is out of this medication.          [facility]        [phone]

## 2023-05-24 RX ORDER — LEVOTHYROXINE SODIUM 0.07 MG/1
112 TABLET ORAL DAILY
Qty: 90 TABLET | Refills: 3 | Status: SHIPPED | OUTPATIENT
Start: 2023-05-24 | End: 2023-05-26

## 2023-05-25 ENCOUNTER — TELEPHONE (OUTPATIENT)
Dept: INTERNAL MEDICINE CLINIC | Age: 83
End: 2023-05-25

## 2023-05-26 DIAGNOSIS — E03.9 ACQUIRED HYPOTHYROIDISM: ICD-10-CM

## 2023-05-26 RX ORDER — LEVOTHYROXINE SODIUM 0.07 MG/1
TABLET ORAL
Qty: 135 TABLET | Refills: 3 | Status: SHIPPED | OUTPATIENT
Start: 2023-05-26

## 2023-05-26 NOTE — TELEPHONE ENCOUNTER
Last office visit :03/28/2023    Future Appointments   Date Time Provider Leonie Hacketti   9/28/2023 11:00 AM Dionne Marmolejo MD [address]   3/18/2024 10:45 AM MD Kamryn Rhodes

## 2023-06-12 PROBLEM — J40 BRONCHITIS: Status: ACTIVE | Noted: 2023-06-12

## 2023-07-27 ENCOUNTER — TELEPHONE (OUTPATIENT)
Dept: PHARMACY | Facility: CLINIC | Age: 83
End: 2023-07-27

## 2023-07-27 NOTE — TELEPHONE ENCOUNTER
[facility] CLINICAL PHARMACY: ADHERENCE REVIEW  Identified care gap per [place] Per insurer report, LIS-0 - co-pays are based on tiers and patient is subject to coverage gap.    [facility]: ACE/ARB and Statin adherence    Patient also appears to be prescribed:No Others in the metric at this time      ASSESSMENT    ACE/ARB ADHERENCE    Insurance Records claims through 07/24/2023 (Prior Year [address] = not reported; YTD [address] [place] [address] Street = FIRST FILL; Potential Fail Date: 08.04.23):   Lisinopril last filled on 03.07.23 for 90 day supply. Next refill due: 06.05.23    Per Insurer Portal: last filled on (same as above). .     BP Readings from Last 3 Encounters:   06/12/23 (!) 142/65   03/28/23 126/74   01/19/23 118/70     Estimated Creatinine Clearance: 47 mL/min (A) (based on SCr of 1.4 mg/dL (H)). Lab Results   Component Value Date    CREATININE 1.4 (H) 03/28/2023     Lab Results   Component Value Date    K 4.9 03/28/2023        STATIN ADHERENCE    Insurance Records claims through 07/24/2023 (Prior Year [address] = not reported; [facility] [address] [place] [address] Street = 100%; Potential Fail Date: 10.24.23): Atorvastatin last filled on 05.22.23 for 90 day supply. Next refill due: 08.20.23    Per Insurer Portal: last filled on (same as above). .    Lab Results   Component Value Date    CHOL 163 03/28/2023    TRIG 112 03/28/2023    HDL 49 03/28/2023    LDLCALC 92 03/28/2023     ALT   Date Value Ref Range Status   03/28/2023 23 10 - 40 U/L Final     AST   Date Value Ref Range Status   03/28/2023 18 15 - 37 U/L Final     The ASCVD Risk score (Stan DK, et al., 2019) failed to calculate for the following reasons: The 2019 ASCVD risk score is only valid for ages 36 to 78        PLAN  The following are interventions that have been identified:   Patient overdue refilling lisinopril and active on home medication list.     Outreach:  Reached patient's spouse (on hippa) to review.    Spouse states pt is no longer taking the Lisinopril, claiming it was

## 2023-07-27 NOTE — TELEPHONE ENCOUNTER
Called and spoke with Amado Santos, wife on hippa. She states that they were told by provider to stop the lisinopril when verapamil was started this spring. He has not taken lisinopril in a few months now. Inquired if monitor blood pressure at home. She states they do occasionally and his readings have been \"good. \" States does not write them down so no numbers to share. Akila Nolasco for her time and clarification today.     Rina Muniz, PharmD, [address] [place] [address] Pharmacist  Department, toll free: [phone], option 1

## 2023-07-28 ENCOUNTER — OFFICE VISIT (OUTPATIENT)
Dept: ORTHOPEDIC SURGERY | Age: 83
End: 2023-07-28

## 2023-07-28 VITALS — WEIGHT: 207 LBS | HEIGHT: 69 IN | RESPIRATION RATE: 16 BRPM | BODY MASS INDEX: 30.66 KG/M2

## 2023-07-28 DIAGNOSIS — M25.511 RIGHT SHOULDER PAIN, UNSPECIFIED CHRONICITY: ICD-10-CM

## 2023-07-28 DIAGNOSIS — M75.82 ROTATOR CUFF TENDINITIS, LEFT: Primary | ICD-10-CM

## 2023-07-28 RX ORDER — TRIAMCINOLONE ACETONIDE 40 MG/ML
40 INJECTION, SUSPENSION INTRA-ARTICULAR; INTRAMUSCULAR ONCE
Status: COMPLETED | OUTPATIENT
Start: 2023-07-28 | End: 2023-07-28

## 2023-07-28 RX ORDER — BUPIVACAINE HYDROCHLORIDE 2.5 MG/ML
2 INJECTION, SOLUTION INFILTRATION; PERINEURAL ONCE
Status: COMPLETED | OUTPATIENT
Start: 2023-07-28 | End: 2023-07-28

## 2023-07-28 RX ADMIN — BUPIVACAINE HYDROCHLORIDE 5 MG: 2.5 INJECTION, SOLUTION INFILTRATION; PERINEURAL at 09:01

## 2023-07-28 RX ADMIN — TRIAMCINOLONE ACETONIDE 40 MG: 40 INJECTION, SUSPENSION INTRA-ARTICULAR; INTRAMUSCULAR at 09:01

## 2023-07-28 RX ADMIN — TRIAMCINOLONE ACETONIDE 40 MG: 40 INJECTION, SUSPENSION INTRA-ARTICULAR; INTRAMUSCULAR at 09:07

## 2023-07-28 RX ADMIN — BUPIVACAINE HYDROCHLORIDE 5 MG: 2.5 INJECTION, SOLUTION INFILTRATION; PERINEURAL at 09:00

## 2023-07-31 NOTE — TELEPHONE ENCOUNTER
For Pharmacy Admin Tracking Only    Program: Ester in place:  No  Gap Closed?: Yes   Time Spent (min): 15

## 2023-08-03 NOTE — PROGRESS NOTES
This dictation was done with PROnoise dictation and may contain mechanical errors related to translation. Resp. rate 16, height 5' 9\" (1.753 m), weight 207 lb (93.9 kg). This is a pleasant 43-year-old gentleman who recently right total knee replacement. He is coming in with both of his shoulders. He had a complete work-up for his shoulders at today's visit including x-rays. An AP transaxillary view and Y view x-rays taken both left and right shoulder. Surprisingly the radial joints showed little degenerative changes there is minimal superior migration there is deftly some acromial impingement present and degenerative changes to the acromioclavicular joint no other fractures or bone spur noted. His range of motion was about 170 degrees of passive forward flexion about 100 degrees of abduction and weakness supraspinous strength testing pain with crossover impingement testing. Negative for speeds test negative straight feree 66 degrees motion to the elbow. My pression is bilateral impingement syndrome and rotator cuff tendinitis. After a discussion of the multiple options, they consented to a cortisone shot. 1 ml of 40mg/ml Kenalog and 2 ml's of 0.25%Marcaine were injected into both the right and the left shoulder sub acromial spaces. This was done with sterile technique and they tolerated it well.      We reviewed band exercise program specific for rotator cuff and we will see how well this helps

## 2023-08-18 RX ORDER — ATORVASTATIN CALCIUM 80 MG/1
TABLET, FILM COATED ORAL
Qty: 90 TABLET | Refills: 3 | Status: SHIPPED | OUTPATIENT
Start: 2023-08-18

## 2023-08-18 NOTE — TELEPHONE ENCOUNTER
Future Appointments   Date Time Provider [address]   9/28/2023 11:00 AM MD Hakeem Carbajal   3/18/2024 10:45 AM MD Christian Puckett [facility]     Last appt on 6.12.2023

## 2023-09-28 ENCOUNTER — OFFICE VISIT (OUTPATIENT)
Dept: INTERNAL MEDICINE CLINIC | Age: 83
End: 2023-09-28

## 2023-09-28 VITALS
WEIGHT: 206.2 LBS | SYSTOLIC BLOOD PRESSURE: 130 MMHG | HEART RATE: 91 BPM | OXYGEN SATURATION: 97 % | TEMPERATURE: 98.6 F | DIASTOLIC BLOOD PRESSURE: 87 MMHG | BODY MASS INDEX: 30.45 KG/M2

## 2023-09-28 DIAGNOSIS — I10 ESSENTIAL HYPERTENSION: ICD-10-CM

## 2023-09-28 DIAGNOSIS — E03.9 ACQUIRED HYPOTHYROIDISM: Primary | ICD-10-CM

## 2023-09-28 LAB
T4 SERPL-MCNC: 7.8 UG/DL (ref 4.5–10.9)
TSH SERPL DL<=0.005 MIU/L-ACNC: 1.59 UIU/ML (ref 0.27–4.2)

## 2023-11-06 ENCOUNTER — OFFICE VISIT (OUTPATIENT)
Dept: ORTHOPEDIC SURGERY | Age: 83
End: 2023-11-06
Payer: MEDICARE

## 2023-11-06 VITALS — BODY MASS INDEX: 30.51 KG/M2 | HEIGHT: 69 IN | WEIGHT: 206 LBS

## 2023-11-06 DIAGNOSIS — M70.62 TROCHANTERIC BURSITIS OF LEFT HIP: ICD-10-CM

## 2023-11-06 DIAGNOSIS — M75.82 ROTATOR CUFF TENDINITIS, LEFT: Primary | ICD-10-CM

## 2023-11-06 PROCEDURE — 99213 OFFICE O/P EST LOW 20 MIN: CPT | Performed by: ORTHOPAEDIC SURGERY

## 2023-11-06 PROCEDURE — G8417 CALC BMI ABV UP PARAM F/U: HCPCS | Performed by: ORTHOPAEDIC SURGERY

## 2023-11-06 PROCEDURE — 1123F ACP DISCUSS/DSCN MKR DOCD: CPT | Performed by: ORTHOPAEDIC SURGERY

## 2023-11-06 PROCEDURE — G8427 DOCREV CUR MEDS BY ELIG CLIN: HCPCS | Performed by: ORTHOPAEDIC SURGERY

## 2023-11-06 PROCEDURE — 20610 DRAIN/INJ JOINT/BURSA W/O US: CPT | Performed by: ORTHOPAEDIC SURGERY

## 2023-11-06 PROCEDURE — G8484 FLU IMMUNIZE NO ADMIN: HCPCS | Performed by: ORTHOPAEDIC SURGERY

## 2023-11-06 PROCEDURE — 1036F TOBACCO NON-USER: CPT | Performed by: ORTHOPAEDIC SURGERY

## 2023-11-06 RX ORDER — BUPIVACAINE HYDROCHLORIDE 2.5 MG/ML
2 INJECTION, SOLUTION INFILTRATION; PERINEURAL ONCE
Status: CANCELLED | OUTPATIENT
Start: 2023-11-06 | End: 2023-11-06

## 2023-11-06 RX ORDER — BUPIVACAINE HYDROCHLORIDE 2.5 MG/ML
2 INJECTION, SOLUTION INFILTRATION; PERINEURAL ONCE
Status: COMPLETED | OUTPATIENT
Start: 2023-11-06 | End: 2023-11-06

## 2023-11-06 RX ORDER — BUPIVACAINE HYDROCHLORIDE 2.5 MG/ML
30 INJECTION, SOLUTION INFILTRATION; PERINEURAL ONCE
Status: CANCELLED | OUTPATIENT
Start: 2023-11-06 | End: 2023-11-06

## 2023-11-06 RX ORDER — TRIAMCINOLONE ACETONIDE 40 MG/ML
40 INJECTION, SUSPENSION INTRA-ARTICULAR; INTRAMUSCULAR ONCE
Status: COMPLETED | OUTPATIENT
Start: 2023-11-06 | End: 2023-11-06

## 2023-11-06 RX ORDER — LIDOCAINE HYDROCHLORIDE 10 MG/ML
2 INJECTION, SOLUTION INFILTRATION; PERINEURAL ONCE
Status: CANCELLED | OUTPATIENT
Start: 2023-11-06 | End: 2023-11-06

## 2023-11-06 RX ORDER — TRIAMCINOLONE ACETONIDE 40 MG/ML
40 INJECTION, SUSPENSION INTRA-ARTICULAR; INTRAMUSCULAR ONCE
Status: CANCELLED | OUTPATIENT
Start: 2023-11-06 | End: 2023-11-06

## 2023-11-06 RX ADMIN — BUPIVACAINE HYDROCHLORIDE 5 MG: 2.5 INJECTION, SOLUTION INFILTRATION; PERINEURAL at 08:44

## 2023-11-06 RX ADMIN — TRIAMCINOLONE ACETONIDE 40 MG: 40 INJECTION, SUSPENSION INTRA-ARTICULAR; INTRAMUSCULAR at 08:44

## 2023-11-06 NOTE — PROGRESS NOTES
2mL of 0.25% marcaine and 1 mL of 40 mg/mL Kenalog. A bandage was applied. The patient tolerated procedure well and there were no complications. After verbal consent was obtained, the patient's left hip was prepped with alcohol and anesthetized with ethyl chloride. The left hip greater trochanter was then injected under sterile technique with 2mL of 0.25% marcaine and 1 mL of 40 mg/mL Kenalog. It was dressed with a bandage. The patient tolerated procedure well. There were no complications. Total time spent on today's encounter was at least 25 minutes. This time included reviewing prior notes, radiographs, and lab results when available, reviewing history obtained by medical assistant, performing history and physical exam, reviewing tests/radiographs with the patient, counseling the patient, ordering medications or tests, documentation in the electronic health record, and coordination of care. This dictation was done with Dragon dictation and may contain mechanical errors related to translation.

## 2023-11-12 ASSESSMENT — ENCOUNTER SYMPTOMS
BLOOD IN STOOL: 0
ABDOMINAL PAIN: 0
ABDOMINAL DISTENTION: 0
SHORTNESS OF BREATH: 0
SORE THROAT: 0
BACK PAIN: 0
CHEST TIGHTNESS: 0
COUGH: 0

## 2023-11-13 DIAGNOSIS — H91.90 HEARING LOSS, UNSPECIFIED HEARING LOSS TYPE, UNSPECIFIED LATERALITY: Primary | ICD-10-CM

## 2023-11-21 RX ORDER — EZETIMIBE 10 MG/1
10 TABLET ORAL DAILY
Qty: 90 TABLET | Refills: 3 | Status: SHIPPED | OUTPATIENT
Start: 2023-11-21

## 2023-11-22 ENCOUNTER — PROCEDURE VISIT (OUTPATIENT)
Dept: AUDIOLOGY | Age: 83
End: 2023-11-22
Payer: MEDICARE

## 2023-11-22 ENCOUNTER — OFFICE VISIT (OUTPATIENT)
Dept: ENT CLINIC | Age: 83
End: 2023-11-22
Payer: MEDICARE

## 2023-11-22 VITALS
HEIGHT: 69 IN | SYSTOLIC BLOOD PRESSURE: 148 MMHG | HEART RATE: 69 BPM | BODY MASS INDEX: 29.62 KG/M2 | DIASTOLIC BLOOD PRESSURE: 78 MMHG | OXYGEN SATURATION: 98 % | WEIGHT: 200 LBS

## 2023-11-22 DIAGNOSIS — H90.3 SENSORINEURAL HEARING LOSS (SNHL) OF BOTH EARS: Primary | ICD-10-CM

## 2023-11-22 DIAGNOSIS — H93.13 TINNITUS OF BOTH EARS: Primary | ICD-10-CM

## 2023-11-22 DIAGNOSIS — H93.13 TINNITUS OF BOTH EARS: ICD-10-CM

## 2023-11-22 DIAGNOSIS — H91.13 PRESBYCUSIS OF BOTH EARS: ICD-10-CM

## 2023-11-22 DIAGNOSIS — H90.3 SENSORINEURAL HEARING LOSS (SNHL) OF BOTH EARS: ICD-10-CM

## 2023-11-22 DIAGNOSIS — R09.82 PND (POST-NASAL DRIP): ICD-10-CM

## 2023-11-22 DIAGNOSIS — R49.0 DYSPHONIA: ICD-10-CM

## 2023-11-22 PROCEDURE — G8417 CALC BMI ABV UP PARAM F/U: HCPCS | Performed by: STUDENT IN AN ORGANIZED HEALTH CARE EDUCATION/TRAINING PROGRAM

## 2023-11-22 PROCEDURE — 3078F DIAST BP <80 MM HG: CPT | Performed by: STUDENT IN AN ORGANIZED HEALTH CARE EDUCATION/TRAINING PROGRAM

## 2023-11-22 PROCEDURE — 1123F ACP DISCUSS/DSCN MKR DOCD: CPT | Performed by: STUDENT IN AN ORGANIZED HEALTH CARE EDUCATION/TRAINING PROGRAM

## 2023-11-22 PROCEDURE — G8484 FLU IMMUNIZE NO ADMIN: HCPCS | Performed by: STUDENT IN AN ORGANIZED HEALTH CARE EDUCATION/TRAINING PROGRAM

## 2023-11-22 PROCEDURE — 1036F TOBACCO NON-USER: CPT | Performed by: STUDENT IN AN ORGANIZED HEALTH CARE EDUCATION/TRAINING PROGRAM

## 2023-11-22 PROCEDURE — 99203 OFFICE O/P NEW LOW 30 MIN: CPT | Performed by: STUDENT IN AN ORGANIZED HEALTH CARE EDUCATION/TRAINING PROGRAM

## 2023-11-22 PROCEDURE — 3077F SYST BP >= 140 MM HG: CPT | Performed by: STUDENT IN AN ORGANIZED HEALTH CARE EDUCATION/TRAINING PROGRAM

## 2023-11-22 PROCEDURE — 92567 TYMPANOMETRY: CPT | Performed by: AUDIOLOGIST

## 2023-11-22 PROCEDURE — 92557 COMPREHENSIVE HEARING TEST: CPT | Performed by: AUDIOLOGIST

## 2023-11-22 PROCEDURE — G8427 DOCREV CUR MEDS BY ELIG CLIN: HCPCS | Performed by: STUDENT IN AN ORGANIZED HEALTH CARE EDUCATION/TRAINING PROGRAM

## 2023-11-22 NOTE — PROGRESS NOTES
consistent with normal middle ear function. Acoustic Reflexes: Ipsilateral: Could not maintain seal. Contralateral: Could not maintain seal.    LEFT EAR:  Hearing Sensitivity: Normal hearing sensitivity to Severe   Sensorineural hearing loss  Speech Recognition Threshold: 40 dB HL  Word Recognition:Good (88%), based on NU-6 25-word list at 85m dBHL using recorded speech stimuli. Tympanometry: Normal peak pressure and compliance, Type A tympanogram, consistent with normal middle ear function. Acoustic Reflexes: Ipsilateral: Could not maintain seal. Contralateral: Could not maintain seal.    Reliability: Good  Transducer: Inserts    See scanned audiogram dated 11/22/2023  for results. ***    PATIENT EDUCATION:     The following items were discussed with the patient:   - Good Communication Strategies  - Hearing Loss and Hearing Aids  - Tinnitus Management Strategies    - Noise-Induced Hearing Loss and use of Hearing Protection Devices (HPDs)     Educational information was shared in the After Visit Summary. RECOMMENDATIONS:                                                                                                                                                                                                                                                          The following items are recommended based on patient report and results from today's appointment:   - Continue medical follow-up with Emmaline Apley, MD.   - Retest hearing as medically indicated and/or sooner if a change in hearing is noted. - If desired, schedule a Hearing Aid Evaluation (HAE) appointment to discuss hearing aid options. - Utilize \"Good Communication Strategies\" as discussed to assist in speech understanding with communication partners.   - Maintain a sound enriched environment to assist in the management of tinnitus symptoms.  - Use hearing protection devices (HPDs), such as protective

## 2023-11-22 NOTE — PROGRESS NOTES
gait  Cardio:  no edema        PROCEDURE      This note was generated completely or in part utilizing Dragon dictation speech recognition software. Occasionally, words are mistranscribed and despite editing, the text may contain inaccuracies due to incorrect word recognition. If further clarification is needed please contact the office at [phone]. An electronic signature was used to authenticate this note.     --Vincent Whitt MD

## 2023-12-04 ENCOUNTER — OFFICE VISIT (OUTPATIENT)
Dept: INTERNAL MEDICINE CLINIC | Age: 83
End: 2023-12-04
Payer: MEDICARE

## 2023-12-04 VITALS
TEMPERATURE: 98.2 F | HEART RATE: 60 BPM | DIASTOLIC BLOOD PRESSURE: 80 MMHG | OXYGEN SATURATION: 98 % | SYSTOLIC BLOOD PRESSURE: 136 MMHG | WEIGHT: 206 LBS | BODY MASS INDEX: 30.42 KG/M2

## 2023-12-04 DIAGNOSIS — J02.9 PHARYNGITIS, UNSPECIFIED ETIOLOGY: ICD-10-CM

## 2023-12-04 DIAGNOSIS — J06.9 UPPER RESPIRATORY TRACT INFECTION, UNSPECIFIED TYPE: Primary | ICD-10-CM

## 2023-12-04 LAB
INFLUENZA A ANTIBODY: NEGATIVE
INFLUENZA B ANTIBODY: NEGATIVE
S PYO AG THROAT QL: NORMAL

## 2023-12-04 PROCEDURE — 87880 STREP A ASSAY W/OPTIC: CPT | Performed by: NURSE PRACTITIONER

## 2023-12-04 PROCEDURE — G8427 DOCREV CUR MEDS BY ELIG CLIN: HCPCS | Performed by: NURSE PRACTITIONER

## 2023-12-04 PROCEDURE — 3075F SYST BP GE 130 - 139MM HG: CPT | Performed by: NURSE PRACTITIONER

## 2023-12-04 PROCEDURE — 87804 INFLUENZA ASSAY W/OPTIC: CPT | Performed by: NURSE PRACTITIONER

## 2023-12-04 PROCEDURE — 99214 OFFICE O/P EST MOD 30 MIN: CPT | Performed by: NURSE PRACTITIONER

## 2023-12-04 PROCEDURE — G8417 CALC BMI ABV UP PARAM F/U: HCPCS | Performed by: NURSE PRACTITIONER

## 2023-12-04 PROCEDURE — 3079F DIAST BP 80-89 MM HG: CPT | Performed by: NURSE PRACTITIONER

## 2023-12-04 PROCEDURE — 1036F TOBACCO NON-USER: CPT | Performed by: NURSE PRACTITIONER

## 2023-12-04 PROCEDURE — G8484 FLU IMMUNIZE NO ADMIN: HCPCS | Performed by: NURSE PRACTITIONER

## 2023-12-04 PROCEDURE — 1123F ACP DISCUSS/DSCN MKR DOCD: CPT | Performed by: NURSE PRACTITIONER

## 2023-12-04 RX ORDER — BENZONATATE 100 MG/1
100 CAPSULE ORAL 3 TIMES DAILY PRN
Qty: 30 CAPSULE | Refills: 0 | Status: SHIPPED | OUTPATIENT
Start: 2023-12-04 | End: 2023-12-14

## 2023-12-04 ASSESSMENT — ENCOUNTER SYMPTOMS
SHORTNESS OF BREATH: 0
COUGH: 1
NAUSEA: 0
SORE THROAT: 1
VOMITING: 0
RHINORRHEA: 1
DIARRHEA: 0
WHEEZING: 0

## 2023-12-04 NOTE — PROGRESS NOTES
Date: 12/4/2023                                               Subjective/Objective:     Chief Complaint   Patient presents with    Pharyngitis    Cough     Cough and sore throat has been since yesterday       HPI    Diana Alvarado is a 82 yo male, visit today for c/o cough, sore throat. He is accompanied by his wife. Symptoms began yesterday and have been worsening since that time. He c/o associated fatigue, rhinorrhea, sneezing, chills, post nasal drainage. He denies associated fever, N/V/D. He denies known sick contacts. He has taken OTC medications for this-mucinex with some relief.           Patient Active Problem List    Diagnosis Date Noted    Bronchitis 06/12/2023    Stage 3a chronic kidney disease ([address]) 03/31/2022    History of total right knee replacement (TKR) 03/30/2022    Bilateral hearing loss 03/30/2022    Intractable chronic migraine without aura and without status migrainosus 06/21/2021    History of total left knee replacement (TKR) 06/18/2020    Acquired hypothyroidism 12/04/2019    Gastroesophageal reflux disease without esophagitis 12/04/2019    Chronic fatigue 05/17/2019    Primary osteoarthritis of right knee 10/05/2018    Primary osteoarthritis of left knee 10/05/2018    Greater trochanteric bursitis of left hip 01/26/2018    Edema of right lower extremity 01/10/2018    Left hip pain 06/26/2017    Chronic left-sided low back pain without sciatica 06/26/2017    Vitamin D deficiency 01/11/2017    Prostate carcinoma ([address]) 12/19/2016    Reactive depression 06/08/2016    Hx of CABG     Hyperlipidemia LDL goal <70     Essential hypertension     Coronary artery disease 12/28/2015    Chest pain on exertion 12/25/2015       Past Medical History:   Diagnosis Date    Anxiety     Cancer ([address]) 11/2016    Prostates CA    Coronary artery disease 12/28/15    multi vessel CAD    GERD (gastroesophageal reflux disease)     History of blood transfusion     s/p left nephrectomy age 2years old    Hyperlipidemia LDL

## 2023-12-05 DIAGNOSIS — U07.1 COVID-19: Primary | ICD-10-CM

## 2023-12-05 LAB — SARS-COV-2 RNA RESP QL NAA+PROBE: DETECTED

## 2023-12-05 RX ORDER — NIRMATRELVIR AND RITONAVIR 150-100 MG
KIT ORAL
Qty: 20 TABLET | Refills: 0 | Status: SHIPPED | OUTPATIENT
Start: 2023-12-05 | End: 2023-12-08

## 2023-12-07 DIAGNOSIS — B37.0 ORAL PHARYNGEAL CANDIDIASIS: Primary | ICD-10-CM

## 2023-12-07 LAB
BACTERIA THROAT AEROBE CULT: ABNORMAL
BACTERIA THROAT AEROBE CULT: ABNORMAL
ORGANISM: ABNORMAL

## 2023-12-07 RX ORDER — CLOTRIMAZOLE 10 MG/1
10 LOZENGE ORAL; TOPICAL
Qty: 50 TABLET | Refills: 0 | Status: SHIPPED | OUTPATIENT
Start: 2023-12-07 | End: 2023-12-08 | Stop reason: SDUPTHER

## 2023-12-08 DIAGNOSIS — B37.0 ORAL PHARYNGEAL CANDIDIASIS: ICD-10-CM

## 2023-12-08 RX ORDER — DOXYCYCLINE HYCLATE 100 MG
100 TABLET ORAL 2 TIMES DAILY
Qty: 14 TABLET | Refills: 0 | Status: SHIPPED | OUTPATIENT
Start: 2023-12-08 | End: 2023-12-15

## 2023-12-08 RX ORDER — CLOTRIMAZOLE 10 MG/1
10 LOZENGE ORAL; TOPICAL
Qty: 50 TABLET | Refills: 0 | Status: SHIPPED | OUTPATIENT
Start: 2023-12-08 | End: 2023-12-18

## 2023-12-08 RX ORDER — PREDNISONE 20 MG/1
40 TABLET ORAL DAILY
Qty: 10 TABLET | Refills: 0 | Status: SHIPPED | OUTPATIENT
Start: 2023-12-08 | End: 2023-12-13

## 2023-12-18 PROBLEM — B37.0 THRUSH: Status: ACTIVE | Noted: 2023-12-18

## 2024-01-05 ENCOUNTER — OFFICE VISIT (OUTPATIENT)
Dept: INTERNAL MEDICINE CLINIC | Age: 84
End: 2024-01-05

## 2024-01-05 VITALS
TEMPERATURE: 97.1 F | BODY MASS INDEX: 30.66 KG/M2 | OXYGEN SATURATION: 96 % | HEART RATE: 67 BPM | WEIGHT: 207 LBS | DIASTOLIC BLOOD PRESSURE: 73 MMHG | SYSTOLIC BLOOD PRESSURE: 145 MMHG | HEIGHT: 69 IN

## 2024-01-05 DIAGNOSIS — J02.9 PHARYNGITIS, UNSPECIFIED ETIOLOGY: Primary | ICD-10-CM

## 2024-01-05 RX ORDER — ASCORBIC ACID 500 MG
500 TABLET ORAL DAILY
COMMUNITY

## 2024-01-05 RX ORDER — LEVOFLOXACIN 750 MG/1
750 TABLET, FILM COATED ORAL DAILY
Qty: 7 TABLET | Refills: 0 | Status: SHIPPED | OUTPATIENT
Start: 2024-01-05 | End: 2024-01-12

## 2024-01-05 ASSESSMENT — PATIENT HEALTH QUESTIONNAIRE - PHQ9
SUM OF ALL RESPONSES TO PHQ QUESTIONS 1-9: 0
SUM OF ALL RESPONSES TO PHQ QUESTIONS 1-9: 0
9. THOUGHTS THAT YOU WOULD BE BETTER OFF DEAD, OR OF HURTING YOURSELF: 0
4. FEELING TIRED OR HAVING LITTLE ENERGY: 0
7. TROUBLE CONCENTRATING ON THINGS, SUCH AS READING THE NEWSPAPER OR WATCHING TELEVISION: 0
2. FEELING DOWN, DEPRESSED OR HOPELESS: 0
SUM OF ALL RESPONSES TO PHQ QUESTIONS 1-9: 0
SUM OF ALL RESPONSES TO PHQ QUESTIONS 1-9: 0
10. IF YOU CHECKED OFF ANY PROBLEMS, HOW DIFFICULT HAVE THESE PROBLEMS MADE IT FOR YOU TO DO YOUR WORK, TAKE CARE OF THINGS AT HOME, OR GET ALONG WITH OTHER PEOPLE: 0
6. FEELING BAD ABOUT YOURSELF - OR THAT YOU ARE A FAILURE OR HAVE LET YOURSELF OR YOUR FAMILY DOWN: 0
SUM OF ALL RESPONSES TO PHQ9 QUESTIONS 1 & 2: 0
5. POOR APPETITE OR OVEREATING: 0
8. MOVING OR SPEAKING SO SLOWLY THAT OTHER PEOPLE COULD HAVE NOTICED. OR THE OPPOSITE, BEING SO FIGETY OR RESTLESS THAT YOU HAVE BEEN MOVING AROUND A LOT MORE THAN USUAL: 0
1. LITTLE INTEREST OR PLEASURE IN DOING THINGS: 0
3. TROUBLE FALLING OR STAYING ASLEEP: 0

## 2024-01-05 NOTE — PROGRESS NOTES
atorvastatin (LIPITOR) 80 MG tablet, TAKE 1 TABLET BY MOUTH EVERY NIGHT, Disp: 90 tablet, Rfl: 3    verapamil (CALAN SR) 120 MG extended release tablet, TAKE 1 TABLET BY MOUTH DAILY, Disp: 90 tablet, Rfl: 3    escitalopram (LEXAPRO) 20 MG tablet, Take 1 tablet by mouth daily, Disp: 90 tablet, Rfl: 3    aspirin 81 MG EC tablet, Take 1 tablet by mouth 2 times daily for 14 days Take twice a day for 14 days after knee surgery then resume daily dosing, Disp: 28 tablet, Rfl: 3    Misc Natural Products (GLUCOSAMINE CHOND CMP TRIPLE) TABS, Take 1 tablet by mouth 2 times daily, Disp: , Rfl:     Lysine 500 MG CAPS, Take 1 capsule by mouth daily, Disp: , Rfl:     guaiFENesin (MUCINEX) 600 MG extended release tablet, Take 1 tablet by mouth every 6 hours as needed for Congestion, Disp: , Rfl:     esomeprazole (NEXIUM) 20 MG delayed release capsule, Take 1 capsule by mouth 2 times daily, Disp: , Rfl:     Cholecalciferol (VITAMIN D) 2000 units CAPS capsule, Take 1 capsule by mouth daily, Disp: , Rfl:     Magnesium 500 MG CAPS, Take 1 capsule by mouth nightly , Disp: , Rfl:     docusate sodium (COLACE, DULCOLAX) 100 MG CAPS, Take 100 mg by mouth 2 times daily as needed for Constipation, Disp: 60 capsule, Rfl: 0     Examination  Vitals:    01/05/24 1239 01/05/24 1247   BP: (!) 151/74 (!) 145/73   Site: Right Upper Arm Left Upper Arm   Position: Sitting Sitting   Cuff Size: Large Adult    Pulse: 67    Temp: 97.1 °F (36.2 °C)    TempSrc: Oral    SpO2: 96%    Weight: 93.9 kg (207 lb)    Height: 1.753 m (5' 9.02\")       Physical Exam  Constitutional:       General: He is not in acute distress.  HENT:      Mouth/Throat:      Mouth: Mucous membranes are moist.      Comments: Erythematous pharynx.  Eyes:      Pupils: Pupils are equal, round, and reactive to light.   Cardiovascular:      Rate and Rhythm: Normal rate and regular rhythm.      Pulses: Normal pulses.   Pulmonary:      Effort: Pulmonary effort is normal. No respiratory distress.

## 2024-01-07 PROBLEM — J02.9 PHARYNGITIS: Status: ACTIVE | Noted: 2024-01-07

## 2024-01-17 DIAGNOSIS — E03.9 ACQUIRED HYPOTHYROIDISM: ICD-10-CM

## 2024-01-18 RX ORDER — LEVOTHYROXINE SODIUM 0.07 MG/1
TABLET ORAL
Qty: 135 TABLET | Refills: 3 | Status: SHIPPED | OUTPATIENT
Start: 2024-01-18

## 2024-01-25 NOTE — PROGRESS NOTES
by mouth nightly       docusate sodium (COLACE, DULCOLAX) 100 MG CAPS Take 100 mg by mouth 2 times daily as needed for Constipation 60 capsule 0     No current facility-administered medications for this visit.         [id number]  Sinus bradycardia    ECHO: 2015  Normal LV size and systolic function. Estimated ejection fraction is 60%.  Trivial tricuspid regurgitation     Coronary Artery Bypass G raft x4 2015  1.  Urgent coronary artery bypass graft x4.  2.  Total cardiopulmonary bypass.  3.  Doppler verification of graft patency.  4.  Platelet gel application.   5.  Transesophageal echocardiogram.  VESSELS BYPASSED:  1.  LIMA to LAD.  2.  Reverse greater saphenous vein graft to PDA sequential.  3.  Reverse greater saphenous vein graft to OM sequential.  4.  Reverse greater saphenous vein graft to diagonal sequential.    Coronary angiography:2015  1. Multi-lesion, multi-vessel coronary artery disease.   2. Mid LAD  has long segment which has at least 60% stenosis throughout and at least one area of 80% to 90% stenosis. The diagonals have high-grade stenosis in the 80% to 90% range.   3. The circumflex has 2 high-grade 80% to 90% lesions   4. Rright coronary artery also has an 80% mid lesion.   3. Normal left ventricular size and systolic function. Ejection fraction is 60%.       ASSESSMENT AND PLAN:      CAD S/P CABGX4 12/30/15  Remains asymptomatic    Essential hypertension  148/88  Would like to replace verapamil with lisinopril hydrochlorothiazide 20    Hyperlipidemia  LDL 80  Switch atorvastatin to rosuvastatin, continue Zetia    Prostate CA  Managed by Dr. Acosta      Follow up in office in 1 year    Thank you very much for allowing me to participate in the care of your patient. Please do not hesitate to contact me if you have any questions.      Sincerely,      JEREMIAH Renteria M.D  [facility]

## 2024-01-26 ENCOUNTER — OFFICE VISIT (OUTPATIENT)
Dept: CARDIOLOGY CLINIC | Age: 84
End: 2024-01-26
Payer: MEDICARE

## 2024-01-26 VITALS
WEIGHT: 205 LBS | BODY MASS INDEX: 30.36 KG/M2 | OXYGEN SATURATION: 94 % | DIASTOLIC BLOOD PRESSURE: 88 MMHG | HEIGHT: 69 IN | SYSTOLIC BLOOD PRESSURE: 148 MMHG | HEART RATE: 57 BPM

## 2024-01-26 DIAGNOSIS — E78.5 HYPERLIPIDEMIA LDL GOAL <70: ICD-10-CM

## 2024-01-26 DIAGNOSIS — I10 ESSENTIAL HYPERTENSION: Primary | ICD-10-CM

## 2024-01-26 LAB
ALBUMIN SERPL-MCNC: 4.3 G/DL (ref 3.4–5)
ALBUMIN/GLOB SERPL: 1.9 {RATIO} (ref 1.1–2.2)
ALP SERPL-CCNC: 90 U/L (ref 40–129)
ALT SERPL-CCNC: 20 U/L (ref 10–40)
ANION GAP SERPL CALCULATED.3IONS-SCNC: 11 MMOL/L (ref 3–16)
AST SERPL-CCNC: 22 U/L (ref 15–37)
BILIRUB SERPL-MCNC: 0.5 MG/DL (ref 0–1)
BUN SERPL-MCNC: 18 MG/DL (ref 7–20)
CALCIUM SERPL-MCNC: 9 MG/DL (ref 8.3–10.6)
CHLORIDE SERPL-SCNC: 105 MMOL/L (ref 99–110)
CHOLEST SERPL-MCNC: 154 MG/DL (ref 0–199)
CO2 SERPL-SCNC: 26 MMOL/L (ref 21–32)
CREAT SERPL-MCNC: 1.1 MG/DL (ref 0.8–1.3)
GFR SERPLBLD CREATININE-BSD FMLA CKD-EPI: >60 ML/MIN/{1.73_M2}
GLUCOSE SERPL-MCNC: 79 MG/DL (ref 70–99)
HDLC SERPL-MCNC: 45 MG/DL (ref 40–60)
LDLC SERPL CALC-MCNC: 80 MG/DL
POTASSIUM SERPL-SCNC: 4.5 MMOL/L (ref 3.5–5.1)
PROT SERPL-MCNC: 6.6 G/DL (ref 6.4–8.2)
SODIUM SERPL-SCNC: 142 MMOL/L (ref 136–145)
TRIGL SERPL-MCNC: 147 MG/DL (ref 0–150)
VLDLC SERPL CALC-MCNC: 29 MG/DL

## 2024-01-26 PROCEDURE — 3079F DIAST BP 80-89 MM HG: CPT | Performed by: INTERNAL MEDICINE

## 2024-01-26 PROCEDURE — 1036F TOBACCO NON-USER: CPT | Performed by: INTERNAL MEDICINE

## 2024-01-26 PROCEDURE — 93000 ELECTROCARDIOGRAM COMPLETE: CPT | Performed by: INTERNAL MEDICINE

## 2024-01-26 PROCEDURE — 99214 OFFICE O/P EST MOD 30 MIN: CPT | Performed by: INTERNAL MEDICINE

## 2024-01-26 PROCEDURE — G8428 CUR MEDS NOT DOCUMENT: HCPCS | Performed by: INTERNAL MEDICINE

## 2024-01-26 PROCEDURE — 1123F ACP DISCUSS/DSCN MKR DOCD: CPT | Performed by: INTERNAL MEDICINE

## 2024-01-26 PROCEDURE — G8484 FLU IMMUNIZE NO ADMIN: HCPCS | Performed by: INTERNAL MEDICINE

## 2024-01-26 PROCEDURE — 3077F SYST BP >= 140 MM HG: CPT | Performed by: INTERNAL MEDICINE

## 2024-01-26 PROCEDURE — G8417 CALC BMI ABV UP PARAM F/U: HCPCS | Performed by: INTERNAL MEDICINE

## 2024-01-26 RX ORDER — LISINOPRIL AND HYDROCHLOROTHIAZIDE 25; 20 MG/1; MG/1
1 TABLET ORAL DAILY
Qty: 30 TABLET | Refills: 0 | Status: SHIPPED | OUTPATIENT
Start: 2024-01-26

## 2024-01-26 RX ORDER — LISINOPRIL AND HYDROCHLOROTHIAZIDE 25; 20 MG/1; MG/1
1 TABLET ORAL DAILY
Qty: 90 TABLET | OUTPATIENT
Start: 2024-01-26

## 2024-01-26 RX ORDER — ROSUVASTATIN CALCIUM 40 MG/1
40 TABLET, COATED ORAL DAILY
Qty: 90 TABLET | Refills: 5 | Status: SHIPPED | OUTPATIENT
Start: 2024-01-26

## 2024-01-26 NOTE — TELEPHONE ENCOUNTER
Juan José's wife Raven  called in this morning, they were in to see Dr. Renteria today, she states Dr. Renteria stopped his Verapamil, when they got home they realized that he takes the Verapamil because he has headaches so she wants to know what to do about that. She would also like to know because he is on rosuvastatin should he continue to take Zetia.      She can be reached at [phone].

## 2024-01-26 NOTE — PATIENT INSTRUCTIONS
Use up Atorvastatin. Change to Rosuvastatin 40 mg nightly  Stop verapamil    Start Lisinopril/hctz 20/25 mg daily

## 2024-02-19 ENCOUNTER — OFFICE VISIT (OUTPATIENT)
Dept: ORTHOPEDIC SURGERY | Age: 84
End: 2024-02-19
Payer: MEDICARE

## 2024-02-19 VITALS — WEIGHT: 205 LBS | HEIGHT: 69 IN | RESPIRATION RATE: 16 BRPM | BODY MASS INDEX: 30.36 KG/M2

## 2024-02-19 DIAGNOSIS — M19.012 ARTHRITIS OF LEFT SHOULDER REGION: Primary | ICD-10-CM

## 2024-02-19 DIAGNOSIS — M70.62 TROCHANTERIC BURSITIS OF LEFT HIP: ICD-10-CM

## 2024-02-19 PROCEDURE — G8427 DOCREV CUR MEDS BY ELIG CLIN: HCPCS | Performed by: ORTHOPAEDIC SURGERY

## 2024-02-19 PROCEDURE — G8484 FLU IMMUNIZE NO ADMIN: HCPCS | Performed by: ORTHOPAEDIC SURGERY

## 2024-02-19 PROCEDURE — 1036F TOBACCO NON-USER: CPT | Performed by: ORTHOPAEDIC SURGERY

## 2024-02-19 PROCEDURE — G8417 CALC BMI ABV UP PARAM F/U: HCPCS | Performed by: ORTHOPAEDIC SURGERY

## 2024-02-19 PROCEDURE — 20610 DRAIN/INJ JOINT/BURSA W/O US: CPT | Performed by: ORTHOPAEDIC SURGERY

## 2024-02-19 PROCEDURE — 99213 OFFICE O/P EST LOW 20 MIN: CPT | Performed by: ORTHOPAEDIC SURGERY

## 2024-02-19 PROCEDURE — 1123F ACP DISCUSS/DSCN MKR DOCD: CPT | Performed by: ORTHOPAEDIC SURGERY

## 2024-02-19 RX ORDER — BUPIVACAINE HYDROCHLORIDE 2.5 MG/ML
2 INJECTION, SOLUTION INFILTRATION; PERINEURAL ONCE
Status: COMPLETED | OUTPATIENT
Start: 2024-02-19 | End: 2024-02-19

## 2024-02-19 RX ORDER — TRIAMCINOLONE ACETONIDE 40 MG/ML
40 INJECTION, SUSPENSION INTRA-ARTICULAR; INTRAMUSCULAR ONCE
Status: COMPLETED | OUTPATIENT
Start: 2024-02-19 | End: 2024-02-19

## 2024-02-19 RX ADMIN — BUPIVACAINE HYDROCHLORIDE 5 MG: 2.5 INJECTION, SOLUTION INFILTRATION; PERINEURAL at 08:27

## 2024-02-19 RX ADMIN — TRIAMCINOLONE ACETONIDE 40 MG: 40 INJECTION, SUSPENSION INTRA-ARTICULAR; INTRAMUSCULAR at 08:27

## 2024-02-19 RX ADMIN — TRIAMCINOLONE ACETONIDE 40 MG: 40 INJECTION, SUSPENSION INTRA-ARTICULAR; INTRAMUSCULAR at 08:28

## 2024-02-19 NOTE — PROGRESS NOTES
[facility]  Office Visit    Chief Complaint: Left shoulder pain, left hip pain    HPI:  Juan José Rivera is a 83 y.o. who is here for assessment of left shoulder and left hip pain.  He was last seen for these issues in November 2023.  He had steroid injections done at that point and he reports this helped for 2 to 3 months.  He is now again starting to have pain.  The pain is mostly anterior and lateral around the shoulder.  Pain is worse with overhead motion will wake him up from sleep at night.  There is no recent injury.  He does report a remote history of frozen shoulder arthroscopic surgery years ago.  He denies neck pain, numbness, tingling, radiating pain.  He is right-hand dominant.  He also reports lateral left hip pain.  He has pain while laying on the left side.  There are no new injuries to the left hip.  He denies groin pain.  There is no numbness or tingling or radiating pain in the left leg.  He rates the pain in both joints as up to 9/10.      Patient Active Problem List   Diagnosis    Chest pain on exertion    Essential hypertension    Hx of CABG    Hyperlipidemia LDL goal <70    Reactive depression    Prostate carcinoma (HCC)    Vitamin D deficiency    Left hip pain    Chronic left-sided low back pain without sciatica    Coronary artery disease    Edema of right lower extremity    Greater trochanteric bursitis of left hip    Primary osteoarthritis of right knee    Primary osteoarthritis of left knee    Chronic fatigue    Acquired hypothyroidism    Gastroesophageal reflux disease without esophagitis    History of total left knee replacement (TKR)    Intractable chronic migraine without aura and without status migrainosus    History of total right knee replacement (TKR)    Bilateral hearing loss    Stage 3a chronic kidney disease (HCC)    Bronchitis    Thrush    Pharyngitis       ROS:  Constitutional: denies fever, chills, weight loss  MSK: denies pain in other joints,

## 2024-03-06 DIAGNOSIS — R19.00 GROIN FULLNESS: Primary | ICD-10-CM

## 2024-03-06 RX ORDER — MELOXICAM 15 MG/1
15 TABLET ORAL DAILY
Qty: 14 TABLET | Refills: 0 | Status: SHIPPED | OUTPATIENT
Start: 2024-03-06 | End: 2024-03-20

## 2024-03-07 ENCOUNTER — HOSPITAL ENCOUNTER (EMERGENCY)
Age: 84
Discharge: HOME | End: 2024-03-07
Payer: MEDICARE

## 2024-03-07 VITALS
RESPIRATION RATE: 16 BRPM | SYSTOLIC BLOOD PRESSURE: 144 MMHG | TEMPERATURE: 98.7 F | WEIGHT: 205.47 LBS | BODY MASS INDEX: 30.34 KG/M2 | DIASTOLIC BLOOD PRESSURE: 85 MMHG | OXYGEN SATURATION: 100 % | HEART RATE: 56 BPM

## 2024-03-07 DIAGNOSIS — S39.011A STRAIN OF MUSCLE OF RIGHT GROIN REGION: Primary | ICD-10-CM

## 2024-03-07 PROCEDURE — 99284 EMERGENCY DEPT VISIT MOD MDM: CPT

## 2024-03-07 PROCEDURE — 6370000000 HC RX 637 (ALT 250 FOR IP): Performed by: PHYSICIAN ASSISTANT

## 2024-03-07 PROCEDURE — 6360000002 HC RX W HCPCS: Performed by: PHYSICIAN ASSISTANT

## 2024-03-07 PROCEDURE — 96372 THER/PROPH/DIAG INJ SC/IM: CPT

## 2024-03-07 RX ORDER — DEXAMETHASONE SODIUM PHOSPHATE 4 MG/ML
4 INJECTION, SOLUTION INTRA-ARTICULAR; INTRALESIONAL; INTRAMUSCULAR; INTRAVENOUS; SOFT TISSUE ONCE
Status: COMPLETED | OUTPATIENT
Start: 2024-03-07 | End: 2024-03-07

## 2024-03-07 RX ORDER — BACLOFEN 10 MG/1
10 TABLET ORAL 3 TIMES DAILY PRN
Qty: 21 TABLET | Refills: 0 | Status: SHIPPED | OUTPATIENT
Start: 2024-03-07 | End: 2024-03-14

## 2024-03-07 RX ORDER — METHYLPREDNISOLONE 4 MG/1
TABLET ORAL
Qty: 1 KIT | Refills: 0 | Status: SHIPPED | OUTPATIENT
Start: 2024-03-07 | End: 2024-03-13

## 2024-03-07 RX ORDER — ACETAMINOPHEN 500 MG
1000 TABLET ORAL EVERY 8 HOURS PRN
Qty: 60 TABLET | Refills: 0 | Status: SHIPPED | OUTPATIENT
Start: 2024-03-07 | End: 2024-03-17

## 2024-03-07 RX ORDER — ACETAMINOPHEN 325 MG/1
650 TABLET ORAL ONCE
Status: COMPLETED | OUTPATIENT
Start: 2024-03-07 | End: 2024-03-07

## 2024-03-07 RX ADMIN — DEXAMETHASONE SODIUM PHOSPHATE 4 MG: 4 INJECTION INTRA-ARTICULAR; INTRALESIONAL; INTRAMUSCULAR; INTRAVENOUS; SOFT TISSUE at 06:22

## 2024-03-07 RX ADMIN — ACETAMINOPHEN 650 MG: 325 TABLET ORAL at 06:22

## 2024-03-07 ASSESSMENT — PAIN SCALES - GENERAL
PAINLEVEL_OUTOF10: 0
PAINLEVEL_OUTOF10: 10

## 2024-03-07 ASSESSMENT — PAIN DESCRIPTION - LOCATION: LOCATION: GROIN

## 2024-03-07 ASSESSMENT — PAIN - FUNCTIONAL ASSESSMENT: PAIN_FUNCTIONAL_ASSESSMENT: 0-10

## 2024-03-07 ASSESSMENT — PAIN DESCRIPTION - ORIENTATION: ORIENTATION: RIGHT

## 2024-03-07 NOTE — ED PROVIDER NOTES
methylPREDNISolone (MEDROL, ALEXANDRA,) 4 MG tablet Take by mouth., Disp-1 kit, R-0Normal      acetaminophen (TYLENOL) 500 MG tablet Take 2 tablets by mouth every 8 hours as needed for Pain, Disp-60 tablet, R-0Normal      baclofen (LIORESAL) 10 MG tablet Take 1 tablet by mouth 3 times daily as needed (muscle pain), Disp-21 tablet, R-0Normal             DISCONTINUED MEDICATIONS:  Discharge Medication List as of 3/7/2024  6:40 AM                 (Please note that portions of this note were completed with a voice recognition program.  Efforts were made to edit the dictations but occasionally words are mis-transcribed.)    Sammie Grayson PA-C (electronically signed)            Sammie Grayson PA-C  03/07/24 0654

## 2024-03-07 NOTE — ED NOTES
Pt present to the ED via self from home c/o of groin pain that started 3 days ago. Pt states \" I think I pulled a muscle in my groin .\"  Pt states he keep having spasm and can't sleep. Pt saw Dr villagran yesterday was prescribed muscle relaxer but nothing helped. Pt AxO 4, no distress noted at this time.

## 2024-03-18 ENCOUNTER — OFFICE VISIT (OUTPATIENT)
Dept: ORTHOPEDIC SURGERY | Age: 84
End: 2024-03-18
Payer: MEDICARE

## 2024-03-18 DIAGNOSIS — Z96.651 STATUS POST TOTAL RIGHT KNEE REPLACEMENT: Primary | ICD-10-CM

## 2024-03-18 PROCEDURE — G8428 CUR MEDS NOT DOCUMENT: HCPCS | Performed by: PHYSICIAN ASSISTANT

## 2024-03-18 PROCEDURE — G8417 CALC BMI ABV UP PARAM F/U: HCPCS | Performed by: PHYSICIAN ASSISTANT

## 2024-03-18 PROCEDURE — 1036F TOBACCO NON-USER: CPT | Performed by: PHYSICIAN ASSISTANT

## 2024-03-18 PROCEDURE — 1123F ACP DISCUSS/DSCN MKR DOCD: CPT | Performed by: PHYSICIAN ASSISTANT

## 2024-03-18 PROCEDURE — G8484 FLU IMMUNIZE NO ADMIN: HCPCS | Performed by: PHYSICIAN ASSISTANT

## 2024-03-18 PROCEDURE — 99213 OFFICE O/P EST LOW 20 MIN: CPT | Performed by: PHYSICIAN ASSISTANT

## 2024-03-18 RX ORDER — BACLOFEN 10 MG/1
10 TABLET ORAL DAILY
Qty: 30 TABLET | Refills: 0 | Status: SHIPPED | OUTPATIENT
Start: 2024-03-18

## 2024-03-18 RX ORDER — MELOXICAM 7.5 MG/1
15 TABLET ORAL DAILY
Qty: 30 TABLET | Refills: 3 | Status: SHIPPED | OUTPATIENT
Start: 2024-03-18

## 2024-03-18 NOTE — PROGRESS NOTES
This dictation was done with [employer] dictation and may contain mechanical errors related to translation.  There were no vitals taken for this visit.    This is a well-established patient with a stable ongoing right total knee replacement.  He was with his dog a few weeks ago walking and did sudden turn and had some pain but it got worse overnight and got so bad to within the next few days that he went to the emergency department and they put him on the steroid baclofen and ultimately Mobic and he is having some improvements he is here for evaluation and treatment I did review the x-rays taken of his pelvis showing no significant osteoarthritis or fracture.    For evaluation of his right knee replacement we took an AP lateral and sunrise view x-ray today. Xray three views of the total knee arthroplasty reveals satisfactory alignment of the prosthesis . No signs of significant polyethylene wear or failure. No progressive radiolucencies,fractures, tumors or dislocations.    On examination he is able to cross his leg but he has a hard time with a straight leg raise he does pretty well with the knee bent most of his tenderness is over the rectus consistent with a hip flexor strain.  He did not have significant pain with internal and external rotation to the hip joint.    Impression is a stable right total knee replacement with right hip flexor strain.    The plan at this point is we talked about specific exercises and I will refill his Mobic and baclofen.  If he does not get significant Permenter next 4 to 5 weeks he will come back in for further evaluation and possibly get an MRI at that point

## 2024-03-19 ENCOUNTER — TELEPHONE (OUTPATIENT)
Dept: PHARMACY | Facility: CLINIC | Age: 84
End: 2024-03-19

## 2024-03-19 NOTE — TELEPHONE ENCOUNTER
See 2/7/24 patient message with cardiology:  I called and spoke to his wife he is very sleepy.  The blood pressure is normal I personally do not think it is the result of the lisinopril hydrochlorothiazide but we decided to go back to taking just verapamil and if the blood pressure goes higher to take 1/2 tablet of the lisinopril hydrochlorothiazide     Appears plan is to only use lisinopril-HCTZ prn. It was marked as not taking 3/7/24. Will defer updated Rx for now. Pt has PCP OV 3/28/24    For Pharmacy Admin Tracking Only    Program: [employer]  CPA in place:  No  Gap Closed?: No   Time Spent (min): 10   
MD Liana Castaneda    Showing recent visits within past 540 days with a meds authorizing provider and meeting all other requirements  Future Appointments  Date Type Provider Dept   03/28/24 Appointment Leonel Valerio MD [facility]    Showing future appointments within next 150 days with a meds authorizing provider and meeting all other requirements    Future Appointments   Date Time Provider Department Center   3/28/2024  8:45 AM Leonel Valerio MD [place]  An[facility]  Population Health Clinical   [facility]  Department, toll free: [phone], option 1

## 2024-03-28 ENCOUNTER — OFFICE VISIT (OUTPATIENT)
Dept: INTERNAL MEDICINE CLINIC | Age: 84
End: 2024-03-28
Payer: MEDICARE

## 2024-03-28 VITALS
BODY MASS INDEX: 30.05 KG/M2 | WEIGHT: 203.5 LBS | TEMPERATURE: 98.7 F | OXYGEN SATURATION: 95 % | HEART RATE: 78 BPM | SYSTOLIC BLOOD PRESSURE: 142 MMHG | DIASTOLIC BLOOD PRESSURE: 76 MMHG

## 2024-03-28 DIAGNOSIS — I10 ESSENTIAL HYPERTENSION: ICD-10-CM

## 2024-03-28 DIAGNOSIS — E03.9 ACQUIRED HYPOTHYROIDISM: Primary | ICD-10-CM

## 2024-03-28 LAB
ALBUMIN SERPL-MCNC: 4.1 G/DL (ref 3.4–5)
ALBUMIN/GLOB SERPL: 2.1 {RATIO} (ref 1.1–2.2)
ALP SERPL-CCNC: 101 U/L (ref 40–129)
ALT SERPL-CCNC: 29 U/L (ref 10–40)
ANION GAP SERPL CALCULATED.3IONS-SCNC: 8 MMOL/L (ref 3–16)
AST SERPL-CCNC: 23 U/L (ref 15–37)
BASOPHILS # BLD: 0 K/UL (ref 0–0.2)
BASOPHILS NFR BLD: 0.4 %
BILIRUB SERPL-MCNC: 0.4 MG/DL (ref 0–1)
BUN SERPL-MCNC: 17 MG/DL (ref 7–20)
CALCIUM SERPL-MCNC: 9.3 MG/DL (ref 8.3–10.6)
CHLORIDE SERPL-SCNC: 107 MMOL/L (ref 99–110)
CHOLEST SERPL-MCNC: 162 MG/DL (ref 0–199)
CO2 SERPL-SCNC: 27 MMOL/L (ref 21–32)
CREAT SERPL-MCNC: 1.1 MG/DL (ref 0.8–1.3)
DEPRECATED RDW RBC AUTO: 15 % (ref 12.4–15.4)
EOSINOPHIL # BLD: 0.2 K/UL (ref 0–0.6)
EOSINOPHIL NFR BLD: 3.1 %
GFR SERPLBLD CREATININE-BSD FMLA CKD-EPI: 67 ML/MIN/{1.73_M2}
GLUCOSE SERPL-MCNC: 80 MG/DL (ref 70–99)
HCT VFR BLD AUTO: 37.7 % (ref 40.5–52.5)
HDLC SERPL-MCNC: 52 MG/DL (ref 40–60)
HGB BLD-MCNC: 13.1 G/DL (ref 13.5–17.5)
LDLC SERPL CALC-MCNC: 88 MG/DL
LYMPHOCYTES # BLD: 0.9 K/UL (ref 1–5.1)
LYMPHOCYTES NFR BLD: 15.5 %
MCH RBC QN AUTO: 31.4 PG (ref 26–34)
MCHC RBC AUTO-ENTMCNC: 34.9 G/DL (ref 31–36)
MCV RBC AUTO: 90.1 FL (ref 80–100)
MONOCYTES # BLD: 0.5 K/UL (ref 0–1.3)
MONOCYTES NFR BLD: 9.1 %
NEUTROPHILS # BLD: 4.2 K/UL (ref 1.7–7.7)
NEUTROPHILS NFR BLD: 71.9 %
PLATELET # BLD AUTO: 169 K/UL (ref 135–450)
PMV BLD AUTO: 7.4 FL (ref 5–10.5)
POTASSIUM SERPL-SCNC: 4.6 MMOL/L (ref 3.5–5.1)
PROT SERPL-MCNC: 6.1 G/DL (ref 6.4–8.2)
RBC # BLD AUTO: 4.18 M/UL (ref 4.2–5.9)
SODIUM SERPL-SCNC: 142 MMOL/L (ref 136–145)
TRIGL SERPL-MCNC: 111 MG/DL (ref 0–150)
TSH SERPL DL<=0.005 MIU/L-ACNC: 2.47 UIU/ML (ref 0.27–4.2)
VLDLC SERPL CALC-MCNC: 22 MG/DL
WBC # BLD AUTO: 5.8 K/UL (ref 4–11)

## 2024-03-28 PROCEDURE — 1036F TOBACCO NON-USER: CPT | Performed by: STUDENT IN AN ORGANIZED HEALTH CARE EDUCATION/TRAINING PROGRAM

## 2024-03-28 PROCEDURE — 1123F ACP DISCUSS/DSCN MKR DOCD: CPT | Performed by: STUDENT IN AN ORGANIZED HEALTH CARE EDUCATION/TRAINING PROGRAM

## 2024-03-28 PROCEDURE — 99213 OFFICE O/P EST LOW 20 MIN: CPT | Performed by: STUDENT IN AN ORGANIZED HEALTH CARE EDUCATION/TRAINING PROGRAM

## 2024-03-28 PROCEDURE — G8427 DOCREV CUR MEDS BY ELIG CLIN: HCPCS | Performed by: STUDENT IN AN ORGANIZED HEALTH CARE EDUCATION/TRAINING PROGRAM

## 2024-03-28 PROCEDURE — G8417 CALC BMI ABV UP PARAM F/U: HCPCS | Performed by: STUDENT IN AN ORGANIZED HEALTH CARE EDUCATION/TRAINING PROGRAM

## 2024-03-28 PROCEDURE — 36415 COLL VENOUS BLD VENIPUNCTURE: CPT | Performed by: STUDENT IN AN ORGANIZED HEALTH CARE EDUCATION/TRAINING PROGRAM

## 2024-03-28 PROCEDURE — G8484 FLU IMMUNIZE NO ADMIN: HCPCS | Performed by: STUDENT IN AN ORGANIZED HEALTH CARE EDUCATION/TRAINING PROGRAM

## 2024-03-28 PROCEDURE — 3077F SYST BP >= 140 MM HG: CPT | Performed by: STUDENT IN AN ORGANIZED HEALTH CARE EDUCATION/TRAINING PROGRAM

## 2024-03-28 PROCEDURE — 3078F DIAST BP <80 MM HG: CPT | Performed by: STUDENT IN AN ORGANIZED HEALTH CARE EDUCATION/TRAINING PROGRAM

## 2024-03-28 SDOH — ECONOMIC STABILITY: HOUSING INSECURITY
IN THE LAST 12 MONTHS, WAS THERE A TIME WHEN YOU DID NOT HAVE A STEADY PLACE TO SLEEP OR SLEPT IN A SHELTER (INCLUDING NOW)?: NO

## 2024-03-28 SDOH — ECONOMIC STABILITY: FOOD INSECURITY: WITHIN THE PAST 12 MONTHS, YOU WORRIED THAT YOUR FOOD WOULD RUN OUT BEFORE YOU GOT MONEY TO BUY MORE.: NEVER TRUE

## 2024-03-28 SDOH — ECONOMIC STABILITY: INCOME INSECURITY: HOW HARD IS IT FOR YOU TO PAY FOR THE VERY BASICS LIKE FOOD, HOUSING, MEDICAL CARE, AND HEATING?: NOT HARD AT ALL

## 2024-03-28 SDOH — ECONOMIC STABILITY: FOOD INSECURITY: WITHIN THE PAST 12 MONTHS, THE FOOD YOU BOUGHT JUST DIDN'T LAST AND YOU DIDN'T HAVE MONEY TO GET MORE.: NEVER TRUE

## 2024-03-28 NOTE — PROGRESS NOTES
[facility]  Clinic Progress note:    Juan José Rivera is a 83 y.o. male with the past medical history as listed below presenting to the clinic for follow up on chronic condition and discussion of preventative health care.    Chief Complaint   Patient presents with    Hypertension     6 month follow up    Hyperlipidemia    Hypothyroidism       Past Medical History:   Diagnosis Date    Anxiety     Cancer (HCC) 11/2016    Prostates CA    Coronary artery disease 12/28/15    multi vessel CAD    GERD (gastroesophageal reflux disease)     History of blood transfusion     s/p left nephrectomy age 4 years old    Hyperlipidemia LDL goal <70     Hypertension     IBS (irritable bowel syndrome)     Migraine     Primary osteoarthritis of right knee 10/5/2018    Reactive depression 6/8/2016    Shingles 2012    torso, top of head    Thyroid disease     Wears dentures        Past Surgical History:   Procedure Laterality Date    APPENDECTOMY      BLEPHAROPLASTY Bilateral 12/5/2022    BLEPHAROPLASTY - BILATERAL UPPER LIDS performed by Tara Jimenez MD at [facility]    CARPAL TUNNEL RELEASE Bilateral     COLONOSCOPY N/A 10/8/2019    COLONOSCOPY POLYPECTOMY SNARE/COLD BIOPSY performed by Bradley Julian MD at [facility] END[facility]    CORONARY ARTERY BYPASS GRAFT  2015    x`s 4 vessel    ENDOSCOPY, COLON, DIAGNOSTIC      EGD with dilatation    EYE SURGERY Bilateral     catacts, bilateral and repeat    HEMORRHOID SURGERY      KIDNEY REMOVAL  @ 1944    pt thinks left kidney for disease    SHOULDER SURGERY Left     rotator cuff    TOTAL KNEE ARTHROPLASTY Left 3/11/2020    ROBOTIC ASSISTED LEFT TOTAL KNEE REPLACEMENT performed by Hong Malik MD at [facility] OR    TOTAL KNEE ARTHROPLASTY Right 12/15/2021    ROBOTIC ASSITED RIGHT TOTAL KNEE REPLACEMENT performed by Matt Salmon MD at [facility] OR       Social History     Socioeconomic History    Marital status:      Spouse name: Not on file    Number of children: Not on

## 2024-04-12 RX ORDER — ESCITALOPRAM OXALATE 20 MG/1
20 TABLET ORAL DAILY
Qty: 90 TABLET | Refills: 3 | Status: SHIPPED | OUTPATIENT
Start: 2024-04-12

## 2024-04-12 NOTE — TELEPHONE ENCOUNTER
Future Appointments   Date Time Provider Department Center   9/30/2024 11:15 AM Leonel Valerio MD [facility] [place] [facility]       Last appt 3/28/24

## 2024-04-15 NOTE — TELEPHONE ENCOUNTER
Future Appointments   Date Time Provider Department Center   9/30/2024 11:15 AM Leonel Valerio MD [facility] [place] - [facility]       Last appt 3/28/24

## 2024-06-19 ENCOUNTER — TELEPHONE (OUTPATIENT)
Dept: ORTHOPEDIC SURGERY | Age: 84
End: 2024-06-19

## 2024-06-19 NOTE — TELEPHONE ENCOUNTER
Appointment Request     Patient requesting earlier appointment: Yes  Appointment offered to patient: YES  Patient Contact Number: [phone]     WIFE, LIZA CALLING IN TO REQUEST A SOONER APPOINTMENT FOR  WITH DR. CARTER OR TAMEKA.    WIFE STATED HE'S IN A LOT OF PAIN, AND HE'S SCHEDULED FOR 6/24/24 FOR HIS LT HIP @ 10:15.    PLEASE CALL BACK PATIENT AT THE ABOVE NUMBER.

## 2024-06-24 ENCOUNTER — OFFICE VISIT (OUTPATIENT)
Dept: ORTHOPEDIC SURGERY | Age: 84
End: 2024-06-24
Payer: MEDICARE

## 2024-06-24 VITALS — WEIGHT: 203 LBS | HEIGHT: 69 IN | RESPIRATION RATE: 16 BRPM | BODY MASS INDEX: 30.07 KG/M2

## 2024-06-24 DIAGNOSIS — M19.012 ARTHRITIS OF LEFT SHOULDER REGION: ICD-10-CM

## 2024-06-24 DIAGNOSIS — M70.62 TROCHANTERIC BURSITIS OF LEFT HIP: Primary | ICD-10-CM

## 2024-06-24 DIAGNOSIS — M75.82 ROTATOR CUFF TENDINITIS, LEFT: ICD-10-CM

## 2024-06-24 PROCEDURE — G8427 DOCREV CUR MEDS BY ELIG CLIN: HCPCS | Performed by: PHYSICIAN ASSISTANT

## 2024-06-24 PROCEDURE — 20610 DRAIN/INJ JOINT/BURSA W/O US: CPT | Performed by: PHYSICIAN ASSISTANT

## 2024-06-24 PROCEDURE — 99213 OFFICE O/P EST LOW 20 MIN: CPT | Performed by: PHYSICIAN ASSISTANT

## 2024-06-24 PROCEDURE — 1036F TOBACCO NON-USER: CPT | Performed by: PHYSICIAN ASSISTANT

## 2024-06-24 PROCEDURE — G8417 CALC BMI ABV UP PARAM F/U: HCPCS | Performed by: PHYSICIAN ASSISTANT

## 2024-06-24 PROCEDURE — 1123F ACP DISCUSS/DSCN MKR DOCD: CPT | Performed by: PHYSICIAN ASSISTANT

## 2024-06-24 RX ORDER — TRIAMCINOLONE ACETONIDE 40 MG/ML
40 INJECTION, SUSPENSION INTRA-ARTICULAR; INTRAMUSCULAR ONCE
Status: COMPLETED | OUTPATIENT
Start: 2024-06-24 | End: 2024-06-24

## 2024-06-24 RX ORDER — BUPIVACAINE HYDROCHLORIDE 2.5 MG/ML
2 INJECTION, SOLUTION INFILTRATION; PERINEURAL ONCE
Status: COMPLETED | OUTPATIENT
Start: 2024-06-24 | End: 2024-06-24

## 2024-06-24 RX ADMIN — BUPIVACAINE HYDROCHLORIDE 5 MG: 2.5 INJECTION, SOLUTION INFILTRATION; PERINEURAL at 10:28

## 2024-06-24 RX ADMIN — TRIAMCINOLONE ACETONIDE 40 MG: 40 INJECTION, SUSPENSION INTRA-ARTICULAR; INTRAMUSCULAR at 10:31

## 2024-06-24 RX ADMIN — TRIAMCINOLONE ACETONIDE 40 MG: 40 INJECTION, SUSPENSION INTRA-ARTICULAR; INTRAMUSCULAR at 10:30

## 2024-06-24 RX ADMIN — BUPIVACAINE HYDROCHLORIDE 5 MG: 2.5 INJECTION, SOLUTION INFILTRATION; PERINEURAL at 10:29

## 2024-06-24 RX ADMIN — TRIAMCINOLONE ACETONIDE 40 MG: 40 INJECTION, SUSPENSION INTRA-ARTICULAR; INTRAMUSCULAR at 10:29

## 2024-06-24 NOTE — PROGRESS NOTES
This dictation was done with [employer] dictation and may contain mechanical errors related to translation.  Resp. rate 16, height 1.753 m (5' 9\"), weight 92.1 kg (203 lb).    The is a very pleasant 83-year-old gentleman who has ongoing pain in both of his shoulders that he rates an 8 out of 10 pain he also has some pain in the lateral aspect of his right hip he has pain going up and down steps or laying on his side in bed.  It hurts both his shoulder and his right lateral hip.  He has not been seen since February had a cortisone shot at that point for his left shoulder and had some relief.    This is a pleasant, well-developed patient in no acute distress. They are alert and oriented ×3. They have had continued pain in both of thier shoulders that's aggravated with overhead activities or sleeping on them. They deny any significant radicular pain or neuropathy. They had injections in the past of cortisone and has helped with her symptoms. They noticed that there was some increasing pain and and swelling and disability over the last 7 to 10 days especially.  This increase led to them making an appointment.      Examination he has palpable tenderness over his right hip trochanteric area is weakness to hip abduction.  He has decreased range of motion but can walk without significant antalgia    On examination, there is a positive Riley and Smith's test. Impingement pain with crossover and weakness to supraspinatus strength testing. There is good distal pulses with good dorsiflexion and palmar flexion strength. There are no cutaneous lesions or lymphadenopathy present. There is approximately 170° of forward flexion and 100° of abduction.    My impression is bilateral rotator cuff tendinitis with impingement syndrome.  Right hip bursitis     After a discussion of the multiple options, they consented to a cortisone shot. 1 ml of 40mg/ml Kenalog and 2 ml's of 0.25%Marcaine were injected into both the right and the left

## 2024-08-03 ENCOUNTER — APPOINTMENT (OUTPATIENT)
Dept: GENERAL RADIOLOGY | Age: 84
DRG: 287 | End: 2024-08-03
Payer: MEDICARE

## 2024-08-03 ENCOUNTER — HOSPITAL ENCOUNTER (INPATIENT)
Age: 84
LOS: 3 days | Discharge: HOME | DRG: 287 | End: 2024-08-06
Attending: EMERGENCY MEDICINE | Admitting: STUDENT IN AN ORGANIZED HEALTH CARE EDUCATION/TRAINING PROGRAM
Payer: MEDICARE

## 2024-08-03 DIAGNOSIS — Z86.79 HISTORY OF CAD (CORONARY ARTERY DISEASE): ICD-10-CM

## 2024-08-03 DIAGNOSIS — I25.709 CORONARY ARTERY DISEASE INVOLVING CORONARY BYPASS GRAFT OF NATIVE HEART WITH ANGINA PECTORIS (HCC): ICD-10-CM

## 2024-08-03 DIAGNOSIS — R94.39 POSITIVE CARDIAC STRESS TEST: ICD-10-CM

## 2024-08-03 DIAGNOSIS — I20.9 ANGINA PECTORIS (HCC): ICD-10-CM

## 2024-08-03 DIAGNOSIS — R07.9 CHEST PAIN, UNSPECIFIED TYPE: Primary | ICD-10-CM

## 2024-08-03 LAB
ALBUMIN SERPL-MCNC: 4.3 G/DL (ref 3.4–5)
ALBUMIN/GLOB SERPL: 1.7 {RATIO} (ref 1.1–2.2)
ALP SERPL-CCNC: 94 U/L (ref 40–129)
ALT SERPL-CCNC: 27 U/L (ref 10–40)
ANION GAP SERPL CALCULATED.3IONS-SCNC: 11 MMOL/L (ref 3–16)
AST SERPL-CCNC: 22 U/L (ref 15–37)
BASOPHILS # BLD: 0.1 K/UL (ref 0–0.2)
BASOPHILS NFR BLD: 0.7 %
BILIRUB SERPL-MCNC: 0.3 MG/DL (ref 0–1)
BUN SERPL-MCNC: 19 MG/DL (ref 7–20)
CALCIUM SERPL-MCNC: 9.5 MG/DL (ref 8.3–10.6)
CHLORIDE SERPL-SCNC: 105 MMOL/L (ref 99–110)
CO2 SERPL-SCNC: 26 MMOL/L (ref 21–32)
CREAT SERPL-MCNC: 1.4 MG/DL (ref 0.8–1.3)
D-DIMER QUANTITATIVE: 0.5 UG/ML FEU (ref 0–0.6)
DEPRECATED RDW RBC AUTO: 15 % (ref 12.4–15.4)
EOSINOPHIL # BLD: 0.1 K/UL (ref 0–0.6)
EOSINOPHIL NFR BLD: 1.5 %
GFR SERPLBLD CREATININE-BSD FMLA CKD-EPI: 50 ML/MIN/{1.73_M2}
GLUCOSE SERPL-MCNC: 94 MG/DL (ref 70–99)
HCT VFR BLD AUTO: 40.5 % (ref 40.5–52.5)
HGB BLD-MCNC: 14.1 G/DL (ref 13.5–17.5)
LYMPHOCYTES # BLD: 1.3 K/UL (ref 1–5.1)
LYMPHOCYTES NFR BLD: 16.9 %
MCH RBC QN AUTO: 30.3 PG (ref 26–34)
MCHC RBC AUTO-ENTMCNC: 34.8 G/DL (ref 31–36)
MCV RBC AUTO: 87.1 FL (ref 80–100)
MONOCYTES # BLD: 0.7 K/UL (ref 0–1.3)
MONOCYTES NFR BLD: 9.4 %
NEUTROPHILS # BLD: 5.5 K/UL (ref 1.7–7.7)
NEUTROPHILS NFR BLD: 71.5 %
NT-PROBNP SERPL-MCNC: 259 PG/ML (ref 0–449)
PLATELET # BLD AUTO: 176 K/UL (ref 135–450)
PMV BLD AUTO: 7.5 FL (ref 5–10.5)
POTASSIUM SERPL-SCNC: 4.5 MMOL/L (ref 3.5–5.1)
PROT SERPL-MCNC: 6.9 G/DL (ref 6.4–8.2)
RBC # BLD AUTO: 4.65 M/UL (ref 4.2–5.9)
SODIUM SERPL-SCNC: 142 MMOL/L (ref 136–145)
TROPONIN, HIGH SENSITIVITY: 30 NG/L (ref 0–22)
TROPONIN, HIGH SENSITIVITY: 31 NG/L (ref 0–22)
WBC # BLD AUTO: 7.7 K/UL (ref 4–11)

## 2024-08-03 PROCEDURE — 93005 ELECTROCARDIOGRAM TRACING: CPT | Performed by: EMERGENCY MEDICINE

## 2024-08-03 PROCEDURE — 85025 COMPLETE CBC W/AUTO DIFF WBC: CPT

## 2024-08-03 PROCEDURE — 85379 FIBRIN DEGRADATION QUANT: CPT

## 2024-08-03 PROCEDURE — 80053 COMPREHEN METABOLIC PANEL: CPT

## 2024-08-03 PROCEDURE — 36415 COLL VENOUS BLD VENIPUNCTURE: CPT

## 2024-08-03 PROCEDURE — 83880 ASSAY OF NATRIURETIC PEPTIDE: CPT

## 2024-08-03 PROCEDURE — 84484 ASSAY OF TROPONIN QUANT: CPT

## 2024-08-03 PROCEDURE — 99285 EMERGENCY DEPT VISIT HI MDM: CPT

## 2024-08-03 PROCEDURE — 2060000000 HC ICU INTERMEDIATE R&B

## 2024-08-03 PROCEDURE — 71046 X-RAY EXAM CHEST 2 VIEWS: CPT

## 2024-08-03 PROCEDURE — 2580000003 HC RX 258: Performed by: STUDENT IN AN ORGANIZED HEALTH CARE EDUCATION/TRAINING PROGRAM

## 2024-08-03 PROCEDURE — 6370000000 HC RX 637 (ALT 250 FOR IP): Performed by: EMERGENCY MEDICINE

## 2024-08-03 RX ORDER — ASPIRIN 81 MG/1
81 TABLET, CHEWABLE ORAL DAILY
Status: DISCONTINUED | OUTPATIENT
Start: 2024-08-04 | End: 2024-08-06 | Stop reason: HOSPADM

## 2024-08-03 RX ORDER — MORPHINE SULFATE 4 MG/ML
4 INJECTION, SOLUTION INTRAMUSCULAR; INTRAVENOUS ONCE
Status: DISCONTINUED | OUTPATIENT
Start: 2024-08-03 | End: 2024-08-06 | Stop reason: HOSPADM

## 2024-08-03 RX ORDER — VERAPAMIL HYDROCHLORIDE 240 MG/1
120 TABLET, FILM COATED, EXTENDED RELEASE ORAL DAILY
Status: DISCONTINUED | OUTPATIENT
Start: 2024-08-04 | End: 2024-08-04

## 2024-08-03 RX ORDER — PANTOPRAZOLE SODIUM 40 MG/1
40 TABLET, DELAYED RELEASE ORAL
Status: DISCONTINUED | OUTPATIENT
Start: 2024-08-04 | End: 2024-08-06 | Stop reason: HOSPADM

## 2024-08-03 RX ORDER — POTASSIUM CHLORIDE 7.45 MG/ML
10 INJECTION INTRAVENOUS PRN
Status: DISCONTINUED | OUTPATIENT
Start: 2024-08-03 | End: 2024-08-06 | Stop reason: HOSPADM

## 2024-08-03 RX ORDER — ENOXAPARIN SODIUM 100 MG/ML
40 INJECTION SUBCUTANEOUS DAILY
Status: DISCONTINUED | OUTPATIENT
Start: 2024-08-04 | End: 2024-08-06 | Stop reason: HOSPADM

## 2024-08-03 RX ORDER — LISINOPRIL AND HYDROCHLOROTHIAZIDE 25; 20 MG/1; MG/1
1 TABLET ORAL DAILY
Status: DISCONTINUED | OUTPATIENT
Start: 2024-08-04 | End: 2024-08-03

## 2024-08-03 RX ORDER — SODIUM CHLORIDE 9 MG/ML
INJECTION, SOLUTION INTRAVENOUS PRN
Status: DISCONTINUED | OUTPATIENT
Start: 2024-08-03 | End: 2024-08-06 | Stop reason: HOSPADM

## 2024-08-03 RX ORDER — EZETIMIBE 10 MG/1
10 TABLET ORAL DAILY
Status: DISCONTINUED | OUTPATIENT
Start: 2024-08-04 | End: 2024-08-06 | Stop reason: HOSPADM

## 2024-08-03 RX ORDER — ASPIRIN 81 MG/1
324 TABLET, CHEWABLE ORAL ONCE
Status: COMPLETED | OUTPATIENT
Start: 2024-08-03 | End: 2024-08-03

## 2024-08-03 RX ORDER — HYDRALAZINE HYDROCHLORIDE 20 MG/ML
5 INJECTION INTRAMUSCULAR; INTRAVENOUS EVERY 6 HOURS PRN
Status: DISCONTINUED | OUTPATIENT
Start: 2024-08-03 | End: 2024-08-06 | Stop reason: HOSPADM

## 2024-08-03 RX ORDER — ESCITALOPRAM OXALATE 10 MG/1
20 TABLET ORAL DAILY
Status: DISCONTINUED | OUTPATIENT
Start: 2024-08-04 | End: 2024-08-06 | Stop reason: HOSPADM

## 2024-08-03 RX ORDER — POTASSIUM CHLORIDE 20 MEQ/1
40 TABLET, EXTENDED RELEASE ORAL PRN
Status: DISCONTINUED | OUTPATIENT
Start: 2024-08-03 | End: 2024-08-06 | Stop reason: HOSPADM

## 2024-08-03 RX ORDER — LEVOTHYROXINE SODIUM 0.07 MG/1
112.5 TABLET ORAL DAILY
Status: DISCONTINUED | OUTPATIENT
Start: 2024-08-04 | End: 2024-08-06 | Stop reason: HOSPADM

## 2024-08-03 RX ORDER — MAGNESIUM SULFATE IN WATER 40 MG/ML
2000 INJECTION, SOLUTION INTRAVENOUS PRN
Status: DISCONTINUED | OUTPATIENT
Start: 2024-08-03 | End: 2024-08-06 | Stop reason: HOSPADM

## 2024-08-03 RX ORDER — ACETAMINOPHEN 325 MG/1
650 TABLET ORAL EVERY 6 HOURS PRN
Status: DISCONTINUED | OUTPATIENT
Start: 2024-08-03 | End: 2024-08-06 | Stop reason: HOSPADM

## 2024-08-03 RX ORDER — ONDANSETRON 4 MG/1
4 TABLET, ORALLY DISINTEGRATING ORAL EVERY 8 HOURS PRN
Status: DISCONTINUED | OUTPATIENT
Start: 2024-08-03 | End: 2024-08-06 | Stop reason: HOSPADM

## 2024-08-03 RX ORDER — ACETAMINOPHEN 650 MG/1
650 SUPPOSITORY RECTAL EVERY 6 HOURS PRN
Status: DISCONTINUED | OUTPATIENT
Start: 2024-08-03 | End: 2024-08-06 | Stop reason: HOSPADM

## 2024-08-03 RX ORDER — ROSUVASTATIN CALCIUM 40 MG/1
40 TABLET, COATED ORAL DAILY
Status: DISCONTINUED | OUTPATIENT
Start: 2024-08-04 | End: 2024-08-06 | Stop reason: HOSPADM

## 2024-08-03 RX ORDER — POLYETHYLENE GLYCOL 3350 17 G/17G
17 POWDER, FOR SOLUTION ORAL DAILY PRN
Status: DISCONTINUED | OUTPATIENT
Start: 2024-08-03 | End: 2024-08-06 | Stop reason: HOSPADM

## 2024-08-03 RX ORDER — SODIUM CHLORIDE 0.9 % (FLUSH) 0.9 %
5-40 SYRINGE (ML) INJECTION EVERY 12 HOURS SCHEDULED
Status: DISCONTINUED | OUTPATIENT
Start: 2024-08-03 | End: 2024-08-06 | Stop reason: HOSPADM

## 2024-08-03 RX ORDER — ONDANSETRON 2 MG/ML
4 INJECTION INTRAMUSCULAR; INTRAVENOUS EVERY 6 HOURS PRN
Status: DISCONTINUED | OUTPATIENT
Start: 2024-08-03 | End: 2024-08-06 | Stop reason: HOSPADM

## 2024-08-03 RX ORDER — SODIUM CHLORIDE 0.9 % (FLUSH) 0.9 %
5-40 SYRINGE (ML) INJECTION PRN
Status: DISCONTINUED | OUTPATIENT
Start: 2024-08-03 | End: 2024-08-06 | Stop reason: HOSPADM

## 2024-08-03 RX ADMIN — ASPIRIN 81 MG 324 MG: 81 TABLET ORAL at 18:03

## 2024-08-03 RX ADMIN — SODIUM CHLORIDE, PRESERVATIVE FREE 10 ML: 5 INJECTION INTRAVENOUS at 23:59

## 2024-08-03 RX ADMIN — NITROGLYCERIN 0.5 INCH: 20 OINTMENT TOPICAL at 19:43

## 2024-08-03 ASSESSMENT — PAIN SCALES - GENERAL
PAINLEVEL_OUTOF10: 7
PAINLEVEL_OUTOF10: 8

## 2024-08-03 ASSESSMENT — PAIN DESCRIPTION - LOCATION: LOCATION: CHEST

## 2024-08-03 ASSESSMENT — HEART SCORE: ECG: NORMAL

## 2024-08-03 ASSESSMENT — PAIN DESCRIPTION - DESCRIPTORS: DESCRIPTORS: SQUEEZING;SHARP

## 2024-08-03 ASSESSMENT — LIFESTYLE VARIABLES
HOW OFTEN DO YOU HAVE A DRINK CONTAINING ALCOHOL: NEVER
HOW MANY STANDARD DRINKS CONTAINING ALCOHOL DO YOU HAVE ON A TYPICAL DAY: PATIENT DOES NOT DRINK

## 2024-08-03 NOTE — ED PROVIDER NOTES
4 mg (4 mg IntraVENous Not Given 8/3/24 1818)   aspirin chewable tablet 324 mg (324 mg Oral Given 8/3/24 1803)   nitroglycerin (NITRO-BID) 2 % ointment 0.5 inch (0.5 inches Topical Given 8/3/24 1943)          All questions were answered and the patient/family expressed understanding and agreement with the plan.     PROCEDURES  None    CRITICAL CARE  N/A    CLINICAL IMPRESSION  1. Chest pain, unspecified type    2. History of CAD (coronary artery disease)        DISPOSITION  Decision To Admit 08/03/2024 09:44:59 PM     Jessica Ross MD    Note: This chart was created using voice recognition dictation software. Efforts were made by me to ensure accuracy, however some errors may be present due to limitations of this technology and occasionally words are not transcribed correctly.        Jessica Ross MD  08/04/24 0120

## 2024-08-04 LAB
DEPRECATED RDW RBC AUTO: 15 % (ref 12.4–15.4)
EKG ATRIAL RATE: 56 BPM
EKG DIAGNOSIS: NORMAL
EKG P AXIS: 54 DEGREES
EKG P-R INTERVAL: 200 MS
EKG Q-T INTERVAL: 440 MS
EKG QRS DURATION: 102 MS
EKG QTC CALCULATION (BAZETT): 424 MS
EKG R AXIS: -41 DEGREES
EKG T AXIS: 94 DEGREES
EKG VENTRICULAR RATE: 56 BPM
HCT VFR BLD AUTO: 38.2 % (ref 40.5–52.5)
HGB BLD-MCNC: 13.3 G/DL (ref 13.5–17.5)
MCH RBC QN AUTO: 30.2 PG (ref 26–34)
MCHC RBC AUTO-ENTMCNC: 34.8 G/DL (ref 31–36)
MCV RBC AUTO: 86.8 FL (ref 80–100)
PLATELET # BLD AUTO: 170 K/UL (ref 135–450)
PMV BLD AUTO: 7.8 FL (ref 5–10.5)
RBC # BLD AUTO: 4.41 M/UL (ref 4.2–5.9)
TROPONIN, HIGH SENSITIVITY: 31 NG/L (ref 0–22)
TROPONIN, HIGH SENSITIVITY: 31 NG/L (ref 0–22)
WBC # BLD AUTO: 7.3 K/UL (ref 4–11)

## 2024-08-04 PROCEDURE — 85027 COMPLETE CBC AUTOMATED: CPT

## 2024-08-04 PROCEDURE — 99223 1ST HOSP IP/OBS HIGH 75: CPT | Performed by: STUDENT IN AN ORGANIZED HEALTH CARE EDUCATION/TRAINING PROGRAM

## 2024-08-04 PROCEDURE — 6370000000 HC RX 637 (ALT 250 FOR IP): Performed by: NURSE PRACTITIONER

## 2024-08-04 PROCEDURE — 6360000002 HC RX W HCPCS: Performed by: STUDENT IN AN ORGANIZED HEALTH CARE EDUCATION/TRAINING PROGRAM

## 2024-08-04 PROCEDURE — 93010 ELECTROCARDIOGRAM REPORT: CPT | Performed by: INTERNAL MEDICINE

## 2024-08-04 PROCEDURE — 36415 COLL VENOUS BLD VENIPUNCTURE: CPT

## 2024-08-04 PROCEDURE — 6370000000 HC RX 637 (ALT 250 FOR IP): Performed by: STUDENT IN AN ORGANIZED HEALTH CARE EDUCATION/TRAINING PROGRAM

## 2024-08-04 PROCEDURE — 2580000003 HC RX 258: Performed by: STUDENT IN AN ORGANIZED HEALTH CARE EDUCATION/TRAINING PROGRAM

## 2024-08-04 PROCEDURE — 94760 N-INVAS EAR/PLS OXIMETRY 1: CPT

## 2024-08-04 PROCEDURE — 99223 1ST HOSP IP/OBS HIGH 75: CPT | Performed by: INTERNAL MEDICINE

## 2024-08-04 PROCEDURE — 2060000000 HC ICU INTERMEDIATE R&B

## 2024-08-04 RX ORDER — LANOLIN ALCOHOL/MO/W.PET/CERES
6 CREAM (GRAM) TOPICAL NIGHTLY PRN
Status: DISCONTINUED | OUTPATIENT
Start: 2024-08-04 | End: 2024-08-06 | Stop reason: HOSPADM

## 2024-08-04 RX ORDER — ASPIRIN 81 MG/1
81 TABLET ORAL DAILY
COMMUNITY

## 2024-08-04 RX ORDER — LANOLIN ALCOHOL/MO/W.PET/CERES
3 CREAM (GRAM) TOPICAL NIGHTLY PRN
Status: DISCONTINUED | OUTPATIENT
Start: 2024-08-04 | End: 2024-08-04

## 2024-08-04 RX ADMIN — Medication 3 MG: at 01:01

## 2024-08-04 RX ADMIN — LEVOTHYROXINE SODIUM 112.5 MCG: 0.07 TABLET ORAL at 06:55

## 2024-08-04 RX ADMIN — Medication 6 MG: at 23:09

## 2024-08-04 RX ADMIN — VERAPAMIL HYDROCHLORIDE 120 MG: 240 TABLET, FILM COATED, EXTENDED RELEASE ORAL at 08:57

## 2024-08-04 RX ADMIN — ROSUVASTATIN CALCIUM 40 MG: 40 TABLET, FILM COATED ORAL at 08:57

## 2024-08-04 RX ADMIN — PANTOPRAZOLE SODIUM 40 MG: 40 TABLET, DELAYED RELEASE ORAL at 06:55

## 2024-08-04 RX ADMIN — SODIUM CHLORIDE, PRESERVATIVE FREE 10 ML: 5 INJECTION INTRAVENOUS at 08:56

## 2024-08-04 RX ADMIN — ESCITALOPRAM OXALATE 20 MG: 10 TABLET ORAL at 08:57

## 2024-08-04 RX ADMIN — ACETAMINOPHEN 325MG 650 MG: 325 TABLET ORAL at 03:54

## 2024-08-04 RX ADMIN — EZETIMIBE 10 MG: 10 TABLET ORAL at 08:56

## 2024-08-04 RX ADMIN — SODIUM CHLORIDE, PRESERVATIVE FREE 10 ML: 5 INJECTION INTRAVENOUS at 23:10

## 2024-08-04 RX ADMIN — ASPIRIN 81 MG 81 MG: 81 TABLET ORAL at 08:57

## 2024-08-04 RX ADMIN — ENOXAPARIN SODIUM 40 MG: 100 INJECTION SUBCUTANEOUS at 08:57

## 2024-08-04 ASSESSMENT — PAIN SCALES - GENERAL
PAINLEVEL_OUTOF10: 0
PAINLEVEL_OUTOF10: 0

## 2024-08-04 NOTE — PLAN OF CARE
Problem: Discharge Planning  Goal: Discharge to home or other facility with appropriate resources  8/4/2024 1505 by Sharon Frias RN  Outcome: Progressing     Problem: Cardiovascular - Adult  Goal: Maintains optimal cardiac output and hemodynamic stability  8/4/2024 1505 by Sharon Frias RN  Outcome: Progressing     Problem: Cardiovascular - Adult  Goal: Absence of cardiac dysrhythmias or at baseline  8/4/2024 1505 by Sharon Frias RN  Outcome: Progressing     Problem: Skin/Tissue Integrity - Adult  Goal: Skin integrity remains intact  8/4/2024 1505 by Sharon Frias RN  Outcome: Progressing     Problem: Metabolic/Fluid and Electrolytes - Adult  Goal: Electrolytes maintained within normal limits  8/4/2024 1505 by Sharon Frias RN  Outcome: Progressing

## 2024-08-04 NOTE — PLAN OF CARE
Problem: Discharge Planning  Goal: Discharge to home or other facility with appropriate resources  Outcome: Progressing     Problem: Cardiovascular - Adult  Goal: Maintains optimal cardiac output and hemodynamic stability  Outcome: Progressing     Problem: Cardiovascular - Adult  Goal: Absence of cardiac dysrhythmias or at baseline  Outcome: Progressing     Problem: Skin/Tissue Integrity - Adult  Goal: Skin integrity remains intact  Outcome: Progressing     Problem: Metabolic/Fluid and Electrolytes - Adult  Goal: Electrolytes maintained within normal limits  Outcome: Progressing

## 2024-08-04 NOTE — H&P
HCT 40.5 38.2*    170                                                                  Recent Labs     08/03/24  1755      K 4.5      CO2 26   BUN 19   CREATININE 1.4*   GLUCOSE 94     Recent Labs     08/03/24  1755   AST 22   ALT 27   BILITOT 0.3   ALKPHOS 94     No results for input(s): \"TROPONINI\" in the last 72 hours.  No results for input(s): \"BNP\" in the last 72 hours.  Lab Results   Component Value Date/Time    PHART 7.416 12/30/2015 05:24 PM    BEX5UJR 38 12/30/2015 05:24 PM    PO2ART 92 12/30/2015 05:24 PM     No results for input(s): \"INR\" in the last 72 hours.  No results for input(s): \"NITRITE\", \"COLORU\", \"PHUR\", \"LABCAST\", \"WBCUA\", \"RBCUA\", \"MUCUS\", \"TRICHOMONAS\", \"YEAST\", \"BACTERIA\", \"CLARITYU\", \"SPECGRAV\", \"LEUKOCYTESUR\", \"UROBILINOGEN\", \"BILIRUBINUR\", \"BLOODU\", \"GLUCOSEU\", \"AMORPHOUS\" in the last 72 hours.    Invalid input(s): \"KETONESU\"      U/A:    Lab Results   Component Value Date/Time    COLORU Yellow 03/30/2022 11:41 AM    WBCUA 1 12/06/2021 01:37 PM    RBCUA 1 12/06/2021 01:37 PM    CLARITYU Clear 03/30/2022 11:41 AM    LEUKOCYTESUR Negative 03/30/2022 11:41 AM    BLOODU Negative 03/30/2022 11:41 AM    GLUCOSEU Negative 03/30/2022 11:41 AM     ABG:    Lab Results   Component Value Date/Time    BQF3WRV 24.7 12/30/2015 05:24 PM    BEART 0 12/30/2015 05:24 PM    F8MUORBJ 97 12/30/2015 05:24 PM    PHART 7.416 12/30/2015 05:24 PM    ZXO1SYY 38 12/30/2015 05:24 PM    PO2ART 92 12/30/2015 05:24 PM    NLA2URO 26 12/30/2015 05:24 PM       CXR: IMPRESSION:  No acute cardiopulmonary disease.    EKG: sinus rhythm, LAD    Assessment & Plan:    Juan José Rivera is a 84 y.o. male who was admitted with Chest pain.  Principal Problem:    Chest pain  CAD s/p CABG  - NM stress test  - Cont aspirin and statin  - appreciate cardiology consultation    HLD  - recent lipid panel above goal, may need PCSK9 outpt      HTN  - poorly controlled initially, increase verapamil dosing hydralazine

## 2024-08-04 NOTE — CONSULTS
Referring Physician: * No referring provider recorded for this case *  Reason for Consultation: Chest pain with troponin elevation  Chief Complaint: I am having left-sided chest pain for last few weeks      Subjective:   History of Present Illness:  Juan José Rivera is a 84 y.o. patient who sees Dr. Haynes in our office with prior history of coronary artery disease status post CABG in 2015, hypertension, hyperlipidemia chronic kidney disease who presented to the hospital with complaints of left-sided chest pain which she has been noticing for last few weeks.  Pain is sharp in nature with no radiation to the jaws or to his arms.  He denies any diaphoresis.  He presented to the emergency room and his workup indicated mild troponin elevation and due to his chest pain he is admitted admitted for further workup.    I have been asked to provide consultation regarding further management and testing.    Review of Systems:   All 12 point review of symptoms completed. Pertinent positives identified in the HPI, all other review of symptoms negative as below.    Past Medical History:   has a past medical history of Anxiety, Cancer (HCC), Coronary artery disease, GERD (gastroesophageal reflux disease), History of blood transfusion, Hyperlipidemia LDL goal <70, Hypertension, IBS (irritable bowel syndrome), Migraine, Primary osteoarthritis of right knee, Reactive depression, Shingles, Thyroid disease, and Wears dentures.    Surgical History:   has a past surgical history that includes Kidney removal (@ 1944); Appendectomy; shoulder surgery (Left); Carpal tunnel release (Bilateral); Hemorrhoid surgery; Endoscopy, colon, diagnostic; Coronary artery bypass graft (2015); eye surgery (Bilateral); Colonoscopy (N/A, 10/8/2019); Total knee arthroplasty (Left, 3/11/2020); Total knee arthroplasty (Right, 12/15/2021); and blepharoplasty (Bilateral, 12/5/2022).     Social History:   reports that he has never smoked. He has never used smokeless

## 2024-08-04 NOTE — PROGRESS NOTES
4 Eyes Skin Assessment     NAME:  Juan José Rivera  YOB: 1940  MEDICAL RECORD NUMBER:  [id number]    The patient is being assessed for  Admission    I agree that at least one RN has performed a thorough Head to Toe Skin Assessment on the patient. ALL assessment sites listed below have been assessed.      Areas assessed by both nurses:    Head, Face, Ears, Shoulders, Back, Chest, Arms, Elbows, Hands, Sacrum. Buttock, Coccyx, Ischium, Legs. Feet and Heels, and Under Medical Devices         Does the Patient have a Wound? No noted wound(s)       Robin Prevention initiated by RN: Yes  Wound Care Orders initiated by RN: No    Pressure Injury (Stage 3,4, Unstageable, DTI, NWPT, and Complex wounds) if present, place Wound referral order by RN under : No    New Ostomies, if present place, Ostomy referral order under : No     Nurse 1 eSignature: Electronically signed by Gisel Martines RN on 8/4/24 at 5:17 AM EDT    **SHARE this note so that the co-signing nurse can place an eSignature**    Nurse 2 eSignature: Electronically signed by Meena Verdugo RN on 8/4/24 at 6:30 AM EDT

## 2024-08-05 ENCOUNTER — APPOINTMENT (OUTPATIENT)
Dept: NUCLEAR MEDICINE | Age: 84
DRG: 287 | End: 2024-08-05
Payer: MEDICARE

## 2024-08-05 ENCOUNTER — APPOINTMENT (OUTPATIENT)
Age: 84
DRG: 287 | End: 2024-08-05
Payer: MEDICARE

## 2024-08-05 LAB
ANION GAP SERPL CALCULATED.3IONS-SCNC: 10 MMOL/L (ref 3–16)
BUN SERPL-MCNC: 19 MG/DL (ref 7–20)
CALCIUM SERPL-MCNC: 9.4 MG/DL (ref 8.3–10.6)
CHLORIDE SERPL-SCNC: 105 MMOL/L (ref 99–110)
CO2 SERPL-SCNC: 27 MMOL/L (ref 21–32)
CREAT SERPL-MCNC: 1.3 MG/DL (ref 0.8–1.3)
ECHO BSA: 2.1 M2
GFR SERPLBLD CREATININE-BSD FMLA CKD-EPI: 55 ML/MIN/{1.73_M2}
GLUCOSE SERPL-MCNC: 91 MG/DL (ref 70–99)
POTASSIUM SERPL-SCNC: 4.5 MMOL/L (ref 3.5–5.1)
SODIUM SERPL-SCNC: 142 MMOL/L (ref 136–145)
STRESS BASELINE DIAS BP: 80 MMHG
STRESS BASELINE HR: 75 BPM
STRESS BASELINE SYS BP: 129 MMHG
STRESS ESTIMATED WORKLOAD: 1 METS
STRESS EXERCISE DUR MIN: 1 MIN
STRESS EXERCISE DUR SEC: 40 SEC
STRESS O2 SAT PEAK: 98 %
STRESS O2 SAT REST: 95 %
STRESS PEAK DIAS BP: 73 MMHG
STRESS PEAK SYS BP: 191 MMHG
STRESS PERCENT HR ACHIEVED: 55 %
STRESS POST PEAK HR: 75 BPM
STRESS RATE PRESSURE PRODUCT: NORMAL BPM*MMHG
STRESS TARGET HR: 137 BPM

## 2024-08-05 PROCEDURE — 6370000000 HC RX 637 (ALT 250 FOR IP): Performed by: STUDENT IN AN ORGANIZED HEALTH CARE EDUCATION/TRAINING PROGRAM

## 2024-08-05 PROCEDURE — 6360000002 HC RX W HCPCS: Performed by: STUDENT IN AN ORGANIZED HEALTH CARE EDUCATION/TRAINING PROGRAM

## 2024-08-05 PROCEDURE — 2060000000 HC ICU INTERMEDIATE R&B

## 2024-08-05 PROCEDURE — 36415 COLL VENOUS BLD VENIPUNCTURE: CPT

## 2024-08-05 PROCEDURE — 80048 BASIC METABOLIC PNL TOTAL CA: CPT

## 2024-08-05 PROCEDURE — 94760 N-INVAS EAR/PLS OXIMETRY 1: CPT

## 2024-08-05 PROCEDURE — 2580000003 HC RX 258: Performed by: STUDENT IN AN ORGANIZED HEALTH CARE EDUCATION/TRAINING PROGRAM

## 2024-08-05 PROCEDURE — 78452 HT MUSCLE IMAGE SPECT MULT: CPT | Performed by: INTERNAL MEDICINE

## 2024-08-05 PROCEDURE — 3430000000 HC RX DIAGNOSTIC RADIOPHARMACEUTICAL: Performed by: INTERNAL MEDICINE

## 2024-08-05 PROCEDURE — 6370000000 HC RX 637 (ALT 250 FOR IP): Performed by: NURSE PRACTITIONER

## 2024-08-05 PROCEDURE — 93018 CV STRESS TEST I&R ONLY: CPT | Performed by: INTERNAL MEDICINE

## 2024-08-05 PROCEDURE — 78452 HT MUSCLE IMAGE SPECT MULT: CPT

## 2024-08-05 PROCEDURE — 93017 CV STRESS TEST TRACING ONLY: CPT

## 2024-08-05 PROCEDURE — 99232 SBSQ HOSP IP/OBS MODERATE 35: CPT | Performed by: STUDENT IN AN ORGANIZED HEALTH CARE EDUCATION/TRAINING PROGRAM

## 2024-08-05 PROCEDURE — 6360000002 HC RX W HCPCS: Performed by: INTERNAL MEDICINE

## 2024-08-05 PROCEDURE — A9502 TC99M TETROFOSMIN: HCPCS | Performed by: INTERNAL MEDICINE

## 2024-08-05 PROCEDURE — 93016 CV STRESS TEST SUPVJ ONLY: CPT | Performed by: INTERNAL MEDICINE

## 2024-08-05 RX ORDER — REGADENOSON 0.08 MG/ML
0.4 INJECTION, SOLUTION INTRAVENOUS
Status: COMPLETED | OUTPATIENT
Start: 2024-08-05 | End: 2024-08-05

## 2024-08-05 RX ORDER — LISINOPRIL 10 MG/1
10 TABLET ORAL DAILY
Status: DISCONTINUED | OUTPATIENT
Start: 2024-08-05 | End: 2024-08-06 | Stop reason: HOSPADM

## 2024-08-05 RX ADMIN — SODIUM CHLORIDE, PRESERVATIVE FREE 10 ML: 5 INJECTION INTRAVENOUS at 21:01

## 2024-08-05 RX ADMIN — VERAPAMIL HYDROCHLORIDE 180 MG: 180 TABLET, FILM COATED, EXTENDED RELEASE ORAL at 11:26

## 2024-08-05 RX ADMIN — TETROFOSMIN 29.5 MILLICURIE: 1.38 INJECTION, POWDER, LYOPHILIZED, FOR SOLUTION INTRAVENOUS at 09:45

## 2024-08-05 RX ADMIN — LEVOTHYROXINE SODIUM 112.5 MCG: 0.07 TABLET ORAL at 06:04

## 2024-08-05 RX ADMIN — ENOXAPARIN SODIUM 40 MG: 100 INJECTION SUBCUTANEOUS at 08:50

## 2024-08-05 RX ADMIN — LISINOPRIL 10 MG: 10 TABLET ORAL at 08:50

## 2024-08-05 RX ADMIN — ASPIRIN 81 MG 81 MG: 81 TABLET ORAL at 08:50

## 2024-08-05 RX ADMIN — TETROFOSMIN 12 MILLICURIE: 1.38 INJECTION, POWDER, LYOPHILIZED, FOR SOLUTION INTRAVENOUS at 07:43

## 2024-08-05 RX ADMIN — SODIUM CHLORIDE, PRESERVATIVE FREE 10 ML: 5 INJECTION INTRAVENOUS at 07:51

## 2024-08-05 RX ADMIN — ROSUVASTATIN CALCIUM 40 MG: 40 TABLET, FILM COATED ORAL at 08:50

## 2024-08-05 RX ADMIN — EZETIMIBE 10 MG: 10 TABLET ORAL at 08:50

## 2024-08-05 RX ADMIN — REGADENOSON 0.4 MG: 0.08 INJECTION, SOLUTION INTRAVENOUS at 09:37

## 2024-08-05 RX ADMIN — ESCITALOPRAM OXALATE 20 MG: 10 TABLET ORAL at 08:50

## 2024-08-05 RX ADMIN — PANTOPRAZOLE SODIUM 40 MG: 40 TABLET, DELAYED RELEASE ORAL at 06:04

## 2024-08-05 RX ADMIN — Medication 6 MG: at 21:01

## 2024-08-05 ASSESSMENT — PAIN SCALES - GENERAL: PAINLEVEL_OUTOF10: 0

## 2024-08-05 NOTE — PROGRESS NOTES
Patient back from stress test. This nurse called [facility] and spoke to Yvette Sahu RN because it appeared to be positive. Yvette said Dr. Renteria is going to come speak to patient. Keep patient NPO if he can handle it. Pt made aware. Pt currently sitting up in chair. Call light within reach.

## 2024-08-05 NOTE — CARE COORDINATION
Case Management Assessment  Initial Evaluation    Date/Time of Evaluation: 8/5/2024 2:56 PM  Assessment Completed by: Marina Pérez RN    If patient is discharged prior to next notation, then this note serves as note for discharge by case management.    Patient Name: Juan José Rivera                   YOB: 1940  Diagnosis: Angina pectoris (HCC) [I20.9]  Chest pain [R07.9]  Chest pain, unspecified type [R07.9]  History of CAD (coronary artery disease) [Z86.79]                   Date / Time: 8/3/2024  5:29 PM    Patient Admission Status: Inpatient   Readmission Risk (Low < 19, Mod (19-27), High > 27): Readmission Risk Score: 8.3    Current PCP: Leonel Valerio MD  PCP verified by CM? Yes    Chart Reviewed: Yes      History Provided by: Patient  Patient Orientation: Alert and Oriented    Patient Cognition: Alert    Hospitalization in the last 30 days (Readmission):  No    If yes, Readmission Assessment in CM Navigator will be completed.    Advance Directives:      Code Status: Full Code   Patient's Primary Decision Maker is: Named in Scanned ACP Document    Primary Decision Maker: Raven Rivera - Spouse - [phone]    Discharge Planning:    Patient lives with: Spouse/Significant Other Type of Home: House  Primary Care Giver: Self  Patient Support Systems include: Spouse/Significant Other, Children, Family Members   Current Financial resources: Medicare  Current community resources: None  Current services prior to admission: None            Current DME:              Type of Home Care services:  None    ADLS  Prior functional level: Independent in ADLs/IADLs  Current functional level: Independent in ADLs/IADLs    No PT/OT orders    Family can provide assistance at DC: Yes  Would you like Case Management to discuss the discharge plan with any other family members/significant others, and if so, who? No  Plans to Return to Present Housing: Yes  Other Identified Issues/Barriers to RETURNING to current

## 2024-08-05 NOTE — ACP (ADVANCE CARE PLANNING)
Advance Care Planning     Advance Care Planning Activator (Inpatient)  Conversation Note      Date of ACP Conversation: 8/5/2024     Conversation Conducted with: Patient with Decision Making Capacity    ACP Activator: Marina Pérez RN    Health Care Decision Maker:     Current Designated Health Care Decision Maker:     Primary Decision Maker: NicoleRaven - Spouse - [phone]    Today we documented Decision Maker(s) consistent with ACP documents on file.    Care Preferences    Ventilation:  \"If you were in your present state of health and suddenly became very ill and were unable to breathe on your own, what would your preference be about the use of a ventilator (breathing machine) if it were available to you?\"      Would the patient desire the use of ventilator (breathing machine)?: yes    \"If your health worsens and it becomes clear that your chance of recovery is unlikely, what would your preference be about the use of a ventilator (breathing machine) if it were available to you?\"     Would the patient desire the use of ventilator (breathing machine)?: unsure      Resuscitation  \"CPR works best to restart the heart when there is a sudden event, like a heart attack, in someone who is otherwise healthy. Unfortunately, CPR does not typically restart the heart for people who have serious health conditions or who are very sick.\"    \"In the event your heart stopped as a result of an underlying serious health condition, would you want attempts to be made to restart your heart (answer \"yes\" for attempt to resuscitate) or would you prefer a natural death (answer \"no\" for do not attempt to resuscitate)?\" yes       [] Yes   [] No   Educated Patient / Decision Maker regarding differences between Advance Directives and portable DNR orders.    Length of ACP Conversation in minutes:      Conversation Outcomes:  ACP discussion completed    Follow-up plan:    [] Schedule follow-up conversation to continue planning  []

## 2024-08-05 NOTE — PLAN OF CARE
Problem: Discharge Planning  Goal: Discharge to home or other facility with appropriate resources  Outcome: Progressing  Note: Prior to admission pt was living at home with his wife and he wants to return to that environment. Pt is having and cardiac cath tomorrow.     Problem: Cardiovascular - Adult  Goal: Maintains optimal cardiac output and hemodynamic stability  Outcome: Progressing  Flowsheets (Taken 8/5/2024 1851)  Maintains optimal cardiac output and hemodynamic stability:   Monitor blood pressure and heart rate   Monitor urine output and notify Licensed Independent Practitioner for values outside of normal range   Assess for signs of decreased cardiac output     Problem: Cardiovascular - Adult  Goal: Absence of cardiac dysrhythmias or at baseline  Outcome: Progressing  Flowsheets (Taken 8/5/2024 1851)  Absence of cardiac dysrhythmias or at baseline:   Monitor cardiac rate and rhythm   Assess for signs of decreased cardiac output     Problem: Skin/Tissue Integrity - Adult  Goal: Skin integrity remains intact  Outcome: Progressing  Note: Skin assessment completed every shift. Pt assessed for incontinence, appropriate barrier cream applied prn.  Pt encouraged to turn/rotate every 2 hours. Assistance provided if pt unable to do so themselves.        Problem: Metabolic/Fluid and Electrolytes - Adult  Goal: Electrolytes maintained within normal limits  Outcome: Progressing  Flowsheets (Taken 8/5/2024 1851)  Electrolytes maintained within normal limits:   Monitor labs and assess patient for signs and symptoms of electrolyte imbalances   Monitor response to electrolyte replacements, including repeat lab results as appropriate   Instruct patient on fluid and nutrition restrictions as appropriate   Administer electrolyte replacement as ordered

## 2024-08-05 NOTE — PROGRESS NOTES
Yvette from [facility] called back and said that Dr. Renteria said pt can eat. Dietary representative made aware. Pt made aware.

## 2024-08-05 NOTE — PROGRESS NOTES
Internal Medicine Hospital Progress Note    Patient:  Juan José Rivera 84 y.o. male MRN: [id number]     Date of Service: 8/5/2024    Allergy: Pcn [penicillins], Demerol hcl [meperidine], Lyrica [pregabalin], Oxycodone, and Tramadol  CC: F/u:   Brief Course   Juan José Rivera was admitted on 8/3/2024 for Chest pain. He reports sharp pain in his upper chest. He reports nitro paste or patch in ED relieved his pain. He has not had chest pain since    24 hr interval hx:  Patient feels ok this morning     Objective     Physical Exam:  Vitals: BP (!) 162/78   Pulse 51   Temp 97.7 °F (36.5 °C) (Oral)   Resp 18   Ht 1.778 m (5' 10\")   Wt 89.4 kg (197 lb)   SpO2 97%   BMI 28.27 kg/m²     Physical Exam  Constitutional:       General: He is not in acute distress.  HENT:      Mouth/Throat:      Mouth: Mucous membranes are moist.   Eyes:      Pupils: Pupils are equal, round, and reactive to light.   Cardiovascular:      Rate and Rhythm: Normal rate and regular rhythm.      Pulses: Normal pulses.   Pulmonary:      Effort: Pulmonary effort is normal. No respiratory distress.      Breath sounds: Normal breath sounds. No wheezing, rhonchi or rales.   Abdominal:      General: Abdomen is flat. There is no distension.      Palpations: Abdomen is soft.      Tenderness: There is no abdominal tenderness.   Skin:     General: Skin is warm and dry.      Coloration: Skin is not jaundiced or pale.      Findings: No erythema.   Neurological:      General: No focal deficit present.      Mental Status: He is alert and oriented to person, place, and time.         Pertinent/ New Labs and Imaging Studies   Labs reviewed. Pertinent labs noted in assessment and plan      Assessment and Plan   Juan José Rivera was admitted to hospital for Chest pain    Chest pain  CAD s/p CABG  - NM stress test  - Cont aspirin and statin  - appreciate cardiology consultation     HLD  - recent lipid panel above goal, may need PCSK9 outpt        HTN  - poorly controlled

## 2024-08-06 VITALS
OXYGEN SATURATION: 97 % | TEMPERATURE: 98.2 F | BODY MASS INDEX: 28.15 KG/M2 | HEIGHT: 70 IN | WEIGHT: 196.65 LBS | DIASTOLIC BLOOD PRESSURE: 62 MMHG | HEART RATE: 53 BPM | SYSTOLIC BLOOD PRESSURE: 145 MMHG | RESPIRATION RATE: 16 BRPM

## 2024-08-06 LAB
ANION GAP SERPL CALCULATED.3IONS-SCNC: 12 MMOL/L (ref 3–16)
BUN SERPL-MCNC: 25 MG/DL (ref 7–20)
CALCIUM SERPL-MCNC: 9.7 MG/DL (ref 8.3–10.6)
CHLORIDE SERPL-SCNC: 107 MMOL/L (ref 99–110)
CO2 SERPL-SCNC: 26 MMOL/L (ref 21–32)
CREAT SERPL-MCNC: 1.7 MG/DL (ref 0.8–1.3)
ECHO BSA: 2.1 M2
GFR SERPLBLD CREATININE-BSD FMLA CKD-EPI: 40 ML/MIN/{1.73_M2}
GLUCOSE SERPL-MCNC: 102 MG/DL (ref 70–99)
POTASSIUM SERPL-SCNC: 4.5 MMOL/L (ref 3.5–5.1)
SODIUM SERPL-SCNC: 145 MMOL/L (ref 136–145)

## 2024-08-06 PROCEDURE — 2709999900 HC NON-CHARGEABLE SUPPLY: Performed by: STUDENT IN AN ORGANIZED HEALTH CARE EDUCATION/TRAINING PROGRAM

## 2024-08-06 PROCEDURE — 80048 BASIC METABOLIC PNL TOTAL CA: CPT

## 2024-08-06 PROCEDURE — B2151ZZ FLUOROSCOPY OF LEFT HEART USING LOW OSMOLAR CONTRAST: ICD-10-PCS | Performed by: STUDENT IN AN ORGANIZED HEALTH CARE EDUCATION/TRAINING PROGRAM

## 2024-08-06 PROCEDURE — 93459 L HRT ART/GRFT ANGIO: CPT | Performed by: STUDENT IN AN ORGANIZED HEALTH CARE EDUCATION/TRAINING PROGRAM

## 2024-08-06 PROCEDURE — 6370000000 HC RX 637 (ALT 250 FOR IP): Performed by: STUDENT IN AN ORGANIZED HEALTH CARE EDUCATION/TRAINING PROGRAM

## 2024-08-06 PROCEDURE — B2111ZZ FLUOROSCOPY OF MULTIPLE CORONARY ARTERIES USING LOW OSMOLAR CONTRAST: ICD-10-PCS | Performed by: STUDENT IN AN ORGANIZED HEALTH CARE EDUCATION/TRAINING PROGRAM

## 2024-08-06 PROCEDURE — 2500000003 HC RX 250 WO HCPCS: Performed by: STUDENT IN AN ORGANIZED HEALTH CARE EDUCATION/TRAINING PROGRAM

## 2024-08-06 PROCEDURE — 36415 COLL VENOUS BLD VENIPUNCTURE: CPT

## 2024-08-06 PROCEDURE — 4A023N7 MEASUREMENT OF CARDIAC SAMPLING AND PRESSURE, LEFT HEART, PERCUTANEOUS APPROACH: ICD-10-PCS | Performed by: STUDENT IN AN ORGANIZED HEALTH CARE EDUCATION/TRAINING PROGRAM

## 2024-08-06 PROCEDURE — B2131ZZ FLUOROSCOPY OF MULTIPLE CORONARY ARTERY BYPASS GRAFTS USING LOW OSMOLAR CONTRAST: ICD-10-PCS | Performed by: STUDENT IN AN ORGANIZED HEALTH CARE EDUCATION/TRAINING PROGRAM

## 2024-08-06 PROCEDURE — C1769 GUIDE WIRE: HCPCS | Performed by: STUDENT IN AN ORGANIZED HEALTH CARE EDUCATION/TRAINING PROGRAM

## 2024-08-06 PROCEDURE — 6360000002 HC RX W HCPCS: Performed by: STUDENT IN AN ORGANIZED HEALTH CARE EDUCATION/TRAINING PROGRAM

## 2024-08-06 PROCEDURE — 94760 N-INVAS EAR/PLS OXIMETRY 1: CPT

## 2024-08-06 PROCEDURE — 2580000003 HC RX 258: Performed by: STUDENT IN AN ORGANIZED HEALTH CARE EDUCATION/TRAINING PROGRAM

## 2024-08-06 PROCEDURE — 99153 MOD SED SAME PHYS/QHP EA: CPT | Performed by: STUDENT IN AN ORGANIZED HEALTH CARE EDUCATION/TRAINING PROGRAM

## 2024-08-06 PROCEDURE — C1760 CLOSURE DEV, VASC: HCPCS | Performed by: STUDENT IN AN ORGANIZED HEALTH CARE EDUCATION/TRAINING PROGRAM

## 2024-08-06 PROCEDURE — C1894 INTRO/SHEATH, NON-LASER: HCPCS | Performed by: STUDENT IN AN ORGANIZED HEALTH CARE EDUCATION/TRAINING PROGRAM

## 2024-08-06 PROCEDURE — 99232 SBSQ HOSP IP/OBS MODERATE 35: CPT | Performed by: STUDENT IN AN ORGANIZED HEALTH CARE EDUCATION/TRAINING PROGRAM

## 2024-08-06 PROCEDURE — 99152 MOD SED SAME PHYS/QHP 5/>YRS: CPT | Performed by: STUDENT IN AN ORGANIZED HEALTH CARE EDUCATION/TRAINING PROGRAM

## 2024-08-06 RX ORDER — LISINOPRIL 10 MG/1
10 TABLET ORAL DAILY
Qty: 30 TABLET | Refills: 3 | Status: SHIPPED | OUTPATIENT
Start: 2024-08-07

## 2024-08-06 RX ORDER — ASPIRIN 325 MG
325 TABLET ORAL ONCE
Status: COMPLETED | OUTPATIENT
Start: 2024-08-06 | End: 2024-08-06

## 2024-08-06 RX ORDER — SODIUM CHLORIDE 9 MG/ML
INJECTION, SOLUTION INTRAVENOUS CONTINUOUS
Status: DISCONTINUED | OUTPATIENT
Start: 2024-08-06 | End: 2024-08-06 | Stop reason: HOSPADM

## 2024-08-06 RX ORDER — MIDAZOLAM HYDROCHLORIDE 1 MG/ML
INJECTION INTRAMUSCULAR; INTRAVENOUS PRN
Status: DISCONTINUED | OUTPATIENT
Start: 2024-08-06 | End: 2024-08-06 | Stop reason: HOSPADM

## 2024-08-06 RX ORDER — LIDOCAINE HYDROCHLORIDE 10 MG/ML
INJECTION, SOLUTION INFILTRATION; PERINEURAL PRN
Status: DISCONTINUED | OUTPATIENT
Start: 2024-08-06 | End: 2024-08-06 | Stop reason: HOSPADM

## 2024-08-06 RX ORDER — REGADENOSON 0.08 MG/ML
0.4 INJECTION, SOLUTION INTRAVENOUS
Status: DISCONTINUED | OUTPATIENT
Start: 2024-08-06 | End: 2024-08-06 | Stop reason: HOSPADM

## 2024-08-06 RX ORDER — FENTANYL CITRATE 50 UG/ML
INJECTION, SOLUTION INTRAMUSCULAR; INTRAVENOUS PRN
Status: DISCONTINUED | OUTPATIENT
Start: 2024-08-06 | End: 2024-08-06 | Stop reason: HOSPADM

## 2024-08-06 RX ORDER — ISOSORBIDE MONONITRATE 60 MG/1
60 TABLET, EXTENDED RELEASE ORAL DAILY
Qty: 30 TABLET | Refills: 3 | Status: SHIPPED | OUTPATIENT
Start: 2024-08-06

## 2024-08-06 RX ADMIN — PANTOPRAZOLE SODIUM 40 MG: 40 TABLET, DELAYED RELEASE ORAL at 05:13

## 2024-08-06 RX ADMIN — EZETIMIBE 10 MG: 10 TABLET ORAL at 08:17

## 2024-08-06 RX ADMIN — ASPIRIN 81 MG 81 MG: 81 TABLET ORAL at 08:17

## 2024-08-06 RX ADMIN — LEVOTHYROXINE SODIUM 112.5 MCG: 0.07 TABLET ORAL at 05:13

## 2024-08-06 RX ADMIN — ROSUVASTATIN CALCIUM 40 MG: 40 TABLET, FILM COATED ORAL at 08:17

## 2024-08-06 RX ADMIN — ESCITALOPRAM OXALATE 20 MG: 10 TABLET ORAL at 08:17

## 2024-08-06 RX ADMIN — SODIUM CHLORIDE, PRESERVATIVE FREE 10 ML: 5 INJECTION INTRAVENOUS at 08:18

## 2024-08-06 RX ADMIN — VERAPAMIL HYDROCHLORIDE 180 MG: 180 TABLET, FILM COATED, EXTENDED RELEASE ORAL at 08:24

## 2024-08-06 RX ADMIN — LISINOPRIL 10 MG: 10 TABLET ORAL at 08:17

## 2024-08-06 RX ADMIN — ASPIRIN 325 MG: 325 TABLET ORAL at 08:43

## 2024-08-06 NOTE — PLAN OF CARE
Problem: Discharge Planning  Goal: Discharge to home or other facility with appropriate resources  Outcome: Adequate for Discharge     Problem: Cardiovascular - Adult  Goal: Maintains optimal cardiac output and hemodynamic stability  Outcome: Adequate for Discharge  Goal: Absence of cardiac dysrhythmias or at baseline  Outcome: Adequate for Discharge     Problem: Skin/Tissue Integrity - Adult  Goal: Skin integrity remains intact  Outcome: Adequate for Discharge     Problem: Metabolic/Fluid and Electrolytes - Adult  Goal: Electrolytes maintained within normal limits  Outcome: Adequate for Discharge     Problem: Pain  Goal: Verbalizes/displays adequate comfort level or baseline comfort level  Outcome: Adequate for Discharge

## 2024-08-06 NOTE — PROGRESS NOTES
Internal Medicine Hospital Progress Note    Patient:  Juan José Rivera 84 y.o. male MRN: [id number]     Date of Service: 8/6/2024    Allergy: Pcn [penicillins], Demerol hcl [meperidine], Lyrica [pregabalin], Oxycodone, and Tramadol  CC: F/u:   Brief Course   Juan José Rivera was admitted on 8/3/2024 for Chest pain. He reports sharp pain in his upper chest. He reports nitro paste or patch in ED relieved his pain. He has not had chest pain since    24 hr interval hx:  Patient feels ok this morning, Seen in cath pre op area    Objective     Physical Exam:  Vitals: BP (!) 150/64   Pulse 60   Temp 98.9 °F (37.2 °C) (Oral)   Resp 16   Ht 1.778 m (5' 10\")   Wt 89.2 kg (196 lb 10.4 oz)   SpO2 96%   BMI 28.22 kg/m²     Physical Exam  Constitutional:       General: He is not in acute distress.  HENT:      Mouth/Throat:      Mouth: Mucous membranes are moist.   Eyes:      Pupils: Pupils are equal, round, and reactive to light.   Cardiovascular:      Rate and Rhythm: Normal rate and regular rhythm.      Pulses: Normal pulses.   Pulmonary:      Effort: Pulmonary effort is normal. No respiratory distress.      Breath sounds: Normal breath sounds. No wheezing, rhonchi or rales.   Abdominal:      General: Abdomen is flat. There is no distension.      Palpations: Abdomen is soft.      Tenderness: There is no abdominal tenderness.   Skin:     General: Skin is warm and dry.      Coloration: Skin is not jaundiced or pale.      Findings: No erythema.   Neurological:      General: No focal deficit present.      Mental Status: He is alert and oriented to person, place, and time.       Pertinent/ New Labs and Imaging Studies   Labs reviewed. Pertinent labs noted in assessment and plan      Assessment and Plan   Juan José Rivera was admitted to hospital for Chest pain    Chest pain  CAD s/p CABG  - Cath today with potential PCI  - Cont aspirin and statin  - appreciate cardiology consultation     HLD  - recent lipid panel above goal, may need

## 2024-08-06 NOTE — PLAN OF CARE
Problem: Cardiovascular - Adult  Goal: Maintains optimal cardiac output and hemodynamic stability  8/5/2024 2345 by Agus Hong, RN  Outcome: Progressing  Flowsheets (Taken 8/5/2024 2345)  Maintains optimal cardiac output and hemodynamic stability:   Monitor blood pressure and heart rate   Monitor urine output and notify Licensed Independent Practitioner for values outside of normal range   Assess for signs of decreased cardiac output     Problem: Skin/Tissue Integrity - Adult  Goal: Skin integrity remains intact  8/5/2024 2345 by Agus Hong, RN  Flowsheets (Taken 8/5/2024 2345)  Skin Integrity Remains Intact: Monitor for areas of redness and/or skin breakdown     Problem: Pain  Goal: Verbalizes/displays adequate comfort level or baseline comfort level  Outcome: Progressing

## 2024-08-06 NOTE — DISCHARGE INSTR - COC
Continuity of Care Form    Patient Name: Juan José Rivera   :  1940  MRN:  [id number]    Admit date:  8/3/2024  Discharge date:  ***    Code Status Order: Full Code   Advance Directives:     Admitting Physician:  Arnie Desai MD  PCP: Leonel Valerio MD    Discharging Nurse: ***  Discharging Hospital Unit/Room#: O8Q-2917/5123-01  Discharging Unit Phone Number: ***    Emergency Contact:   Extended Emergency Contact Information  Primary Emergency Contact: Raven Rivera  Address: [address] [place] [address]  Home Phone: [phone]  Mobile Phone: [phone]  Relation: Spouse    Past Surgical History:  Past Surgical History:   Procedure Laterality Date    APPENDECTOMY      BLEPHAROPLASTY Bilateral 2022    BLEPHAROPLASTY - BILATERAL UPPER LIDS performed by Tara Jimenez MD at [facility] M[facility]    CARPAL TUNNEL RELEASE Bilateral     COLONOSCOPY N/A 10/8/2019    COLONOSCOPY POLYPECTOMY SNARE/COLD BIOPSY performed by Bradley Julian MD at [facility] ENDOSCOPY    CORONARY ARTERY BYPASS GRAFT  2015    x`s 4 vessel    ENDOSCOPY, COLON, DIAGNOSTIC      EGD with dilatation    EYE SURGERY Bilateral     catacts, bilateral and repeat    HEMORRHOID SURGERY      KIDNEY REMOVAL  @     pt thinks left kidney for disease    SHOULDER SURGERY Left     rotator cuff    TOTAL KNEE ARTHROPLASTY Left 3/11/2020    ROBOTIC ASSISTED LEFT TOTAL KNEE REPLACEMENT performed by Hong Malik MD at [facility] OR    TOTAL KNEE ARTHROPLASTY Right 12/15/2021    ROBOTIC ASSITED RIGHT TOTAL KNEE REPLACEMENT performed by Matt Salmon MD at [facility] OR       Immunization History:   Immunization History   Administered Date(s) Administered    COVID-19, PFIZER Bivalent, DO NOT Dilute, (age 12y+), IM, 30 mcg/0.3 mL 10/09/2022    COVID-19, PFIZER PURPLE top, DILUTE for use, (age 12 y+), 30mcg/0.3mL 2021, 2021, 2021    COVID-19, PFIZER, ( formula), (age 12y+), IM, 30mcg/0.3mL 10/21/2023 
Pressure injury stage 2 or greater

## 2024-08-06 NOTE — DISCHARGE INSTRUCTIONS
Post Angiogram/ Intervention Discharge Instructions    Activity:  You may drive in 24hrs unless instructed by your doctor   Resume normal activity in one week  Avoid lifting, pushing, or pulling more than 10 lbs. For one week. (A gallon of milk is 7 lbs)  Limit stair climbing to once a day for two weeks.  You may walk at an easy pace on ground level.  Plan rest periods during the day.  Avoid tension and stress.  Learn to manage your stress.  Avoid work that increases muscle tension.        (straining with bowel movements, moving furniture)    Wound Care:  You may shower, but no bathtubs, pools, or hot tubs for one week.  Inspect area daily.  Normal observations:  Soreness and tenderness that may last for one week, mild pink to red oozing from incision site for up to 24 hrs after discharge,    bruising that could last up to two weeks.  Clean with soap and water.  NO lotion or powder.  Dry area thoroughly.  Apply band    aide for 48 hrs.    Nutrition:   Low fat, low salt diet (guidelines for Heart Healthy eating)  Limit caffeine to 1-2 cups per day (coffee, tea, chocolate, soda)  Eat high fiber to avoid constipation and straining during bowel movements (fresh veggies and fruit, whole grain)  Limit alcohol to two servings a day ( 8 oz beer, 1 oz liquor, 4 oz wine)    Call your Doctor if you have:  Increased shortness of breath, weakness, or increased fatigue  A fast or a slow heartbeat  Problems or questions with your medications  Bleeding that is not stopped after holding pressure for 10 min  Feeling lightheaded or dizzy  Increased swelling or bruising of the groin or leg  Unusual pain, numbness or tingling at the groin or down the leg  Any signs of infection ( redness, yellow drainage, swelling, pain, fever)  Unusual swelling of lower legs/feet, chest discomfort, unusual weakness or fatigue

## 2024-08-06 NOTE — CARE COORDINATION
Case Management Discharge Note          Date / Time of Note: 8/6/2024 3:31 PM                  Patient Name: Juan José Rivera   YOB: 1940  Diagnosis: Angina pectoris (HCC) [I20.9]  Chest pain [R07.9]  Chest pain, unspecified type [R07.9]  History of CAD (coronary artery disease) [Z86.79]   Date / Time: 8/3/2024  5:29 PM    Financial:  Payor: [facility] MEDICARE / Plan: [facility] MEDICARE COMPLETE / Product Type: *No Product type* /      Pharmacy:    [id number] [place], [place] - [address] - P [phone] - F [phone]  [facility]  [facility] [address]  Phone: [phone] Fax: [phone]    [facility] - [facility] [address] [facility] - P [phone] - F [phone]  [address] [facility] [address]  Phone: [phone] Fax: [phone]      Assistance purchasing medications?: Potential Assistance Purchasing Medications: No  Assistance provided by Case Management: None at this time    DISCHARGE Disposition: Home- No Services Needed    Transportation:  Transportation PLAN for discharge: family   Mode of Transport: Private Car  Time of Transport: TBD    IMM Completed:   Yes, Case management has presented and reviewed IMM letter #2.       IMM Letter date given:: 08/06/24  IMM Letter time given:: 1127.   Patient and/or family/POA verbalized understanding of their medicare rights and appeal process if needed. Patient and/or family/POA has signed, initialed and placed the date and time on IMM letter #2 on the the appropriate lines. Copy of letter offered and they are aware that the original copy of IMM letter #2 is available prior to discharge from the paper chart on the unit.  Electronic documentation has been entered into epic for IMM letter #2 and original paper copy has been added to the paper chart at the nurses station.     Additional CM Notes:   DC order noted.  Spoke to patient at bedside.  Family will transport.  Denied any

## 2024-08-06 NOTE — PROGRESS NOTES
Prepared patient for discharge. Cath insertion site on right groin remains soft and free from redness or swelling. Dressing is clean, dry and intact. Reviewed discharge instructions, medications and appointments. Patient verbalized understanding. Patient left unit with wife on foot for discharge home.

## 2024-08-15 ENCOUNTER — OFFICE VISIT (OUTPATIENT)
Dept: ORTHOPEDIC SURGERY | Age: 84
End: 2024-08-15

## 2024-08-15 VITALS — HEIGHT: 70 IN | BODY MASS INDEX: 28.06 KG/M2 | WEIGHT: 196 LBS | RESPIRATION RATE: 16 BRPM

## 2024-08-15 DIAGNOSIS — M70.51 PES ANSERINUS BURSITIS OF RIGHT KNEE: Primary | ICD-10-CM

## 2024-08-15 RX ORDER — TRIAMCINOLONE ACETONIDE 40 MG/ML
40 INJECTION, SUSPENSION INTRA-ARTICULAR; INTRAMUSCULAR ONCE
Status: COMPLETED | OUTPATIENT
Start: 2024-08-15 | End: 2024-08-15

## 2024-08-15 RX ORDER — BUPIVACAINE HYDROCHLORIDE 2.5 MG/ML
2 INJECTION, SOLUTION INFILTRATION; PERINEURAL ONCE
Status: COMPLETED | OUTPATIENT
Start: 2024-08-15 | End: 2024-08-15

## 2024-08-15 RX ADMIN — TRIAMCINOLONE ACETONIDE 40 MG: 40 INJECTION, SUSPENSION INTRA-ARTICULAR; INTRAMUSCULAR at 08:36

## 2024-08-15 RX ADMIN — BUPIVACAINE HYDROCHLORIDE 5 MG: 2.5 INJECTION, SOLUTION INFILTRATION; PERINEURAL at 08:36

## 2024-08-15 NOTE — PROGRESS NOTES
This dictation was done with [employer] dictation and may contain mechanical errors related to translation.    I have today reviewed with Juan José Rivera the clinically relevant, past medical history, medications, allergies, family history, social history, and Review Of Systems form the patient’s most recent history form & I have documented any details relevant to today's presenting complaints in my history below. Mr. Juan José Rivera's self-reported past medical history, medications, allergies, family history, social history, and Review Of Systems form has been scanned into the chart under the \"Media\" tab.    Subjective:  Juan José Rivera is a 84 y.o. who is here almost 3 years after a right total knee replacement he has done extremely well for an 84-year-old gentleman he stays very active and he tends to be very healthy.  However he has started some anterior and medial pain this year and it is steadily getting worse he has tried Advil Aleve does remember specific injury and it continues to hurt so I sent him for an AP lateral and a sunrise view of his right knee      Patient Active Problem List   Diagnosis    Chest pain on exertion    Essential hypertension    Hx of CABG    Hyperlipidemia LDL goal <70    Reactive depression    Prostate carcinoma (HCC)    Vitamin D deficiency    Left hip pain    Chronic left-sided low back pain without sciatica    Coronary artery disease    Edema of right lower extremity    Greater trochanteric bursitis of left hip    Primary osteoarthritis of right knee    Primary osteoarthritis of left knee    Chronic fatigue    Acquired hypothyroidism    Gastroesophageal reflux disease without esophagitis    History of total left knee replacement (TKR)    Intractable chronic migraine without aura and without status migrainosus    History of total right knee replacement (TKR)    Bilateral hearing loss    Stage 3a chronic kidney disease (HCC)    Bronchitis    Thrush    Pharyngitis    Chest pain           Current

## 2024-08-15 NOTE — PROGRESS NOTES
Vital Sign     Days of Exercise per Week: 6 days     Minutes of Exercise per Session: 30 min    [facility]     Interpersonal Safety   Housing Stability: Low Risk  (8/4/2024)    Housing Stability Vital Sign     Unable to Pay for Housing in the Last Year: No     Number of Places Lived in the Last Year: 1     Unstable Housing in the Last Year: No        Allergies   Allergen Reactions    Pcn [Penicillins] Anaphylaxis     Unknown. As child @ 4 years old \"almost killed me\"    Demerol Hcl [Meperidine]      Uncontrollable shaking    Lyrica [Pregabalin] Other (See Comments)     hallucinations and double vision     Oxycodone     Tramadol Nausea And Vomiting     Review of Systems -   Constitutional: Negative for weight gain/loss, fever.  Respiratory: Negative for Asthma;  cough and hemoptysis  Cardiovascular: Negative for palpitations,dizziness   Gastrointestinal: Negative for abd.pain; constipation/diarrhea;    Genitourinary: Negative for stones; hematuria; frequency hesitancy  Integumentt: Negative for rash or pruritis  Hematologic/lymphatic: Negative for blood dyscrasia; leukemia/lymphoma  Musculoskeletal: Negative for Connective tissue disease  Neurological:  Negative for Seizure   Behavioral/Psych:Negative for Bipolar disorder, Schizophrenia; Dementia  Endocrine: negative for thyroid, parathyroid disease      Physical Examination:    There were no vitals taken for this visit.   HEENT:  Face: Atraumatic, Conjunctiva: Pink; non icteric, Mucous Memb:  Moist, No thyromegaly or Lymphadenopathy  Respiratory: Resp Assessment: normal, Resp Auscultation: clear   Cardiovascular:  Auscultation: nl S1 & S2, Palpation:  Nl PMI; No heaves or thrills, JVP:  normal  Abdomen: Soft, non-tender, Normal bowel sounds,  No organomegaly  Extremities: No Cyanosis or Clubbing; Edema none  Neurological: Oriented to time, place, and person, Non-anxious  Psychiatric: Normal mood and affect  Skin: Warm and dry,  No rash

## 2024-08-19 ENCOUNTER — OFFICE VISIT (OUTPATIENT)
Dept: CARDIOLOGY CLINIC | Age: 84
End: 2024-08-19
Payer: MEDICARE

## 2024-08-19 VITALS
BODY MASS INDEX: 28.75 KG/M2 | HEART RATE: 68 BPM | OXYGEN SATURATION: 94 % | DIASTOLIC BLOOD PRESSURE: 72 MMHG | WEIGHT: 200.4 LBS | SYSTOLIC BLOOD PRESSURE: 140 MMHG

## 2024-08-19 DIAGNOSIS — Z95.1 S/P CABG X 4: ICD-10-CM

## 2024-08-19 DIAGNOSIS — Z95.1 S/P CABG X 4: Primary | ICD-10-CM

## 2024-08-19 LAB
CHOLEST SERPL-MCNC: 166 MG/DL (ref 0–199)
HDLC SERPL-MCNC: 52 MG/DL (ref 40–60)
LDLC SERPL CALC-MCNC: 81 MG/DL
TRIGL SERPL-MCNC: 165 MG/DL (ref 0–150)
VLDLC SERPL CALC-MCNC: 33 MG/DL

## 2024-08-19 PROCEDURE — 1036F TOBACCO NON-USER: CPT | Performed by: INTERNAL MEDICINE

## 2024-08-19 PROCEDURE — G8427 DOCREV CUR MEDS BY ELIG CLIN: HCPCS | Performed by: INTERNAL MEDICINE

## 2024-08-19 PROCEDURE — 1111F DSCHRG MED/CURRENT MED MERGE: CPT | Performed by: INTERNAL MEDICINE

## 2024-08-19 PROCEDURE — 3078F DIAST BP <80 MM HG: CPT | Performed by: INTERNAL MEDICINE

## 2024-08-19 PROCEDURE — 99214 OFFICE O/P EST MOD 30 MIN: CPT | Performed by: INTERNAL MEDICINE

## 2024-08-19 PROCEDURE — 3077F SYST BP >= 140 MM HG: CPT | Performed by: INTERNAL MEDICINE

## 2024-08-19 PROCEDURE — G8417 CALC BMI ABV UP PARAM F/U: HCPCS | Performed by: INTERNAL MEDICINE

## 2024-08-19 PROCEDURE — 93000 ELECTROCARDIOGRAM COMPLETE: CPT | Performed by: INTERNAL MEDICINE

## 2024-08-19 PROCEDURE — 1123F ACP DISCUSS/DSCN MKR DOCD: CPT | Performed by: INTERNAL MEDICINE

## 2024-08-19 RX ORDER — LISINOPRIL 20 MG/1
20 TABLET ORAL DAILY
Qty: 90 TABLET | Refills: 5 | Status: SHIPPED | OUTPATIENT
Start: 2024-08-19

## 2024-08-26 RX ORDER — MELOXICAM 7.5 MG/1
15 TABLET ORAL DAILY
Qty: 30 TABLET | Refills: 3 | Status: SHIPPED | OUTPATIENT
Start: 2024-08-26

## 2024-08-30 RX ORDER — ISOSORBIDE MONONITRATE 60 MG/1
60 TABLET, EXTENDED RELEASE ORAL DAILY
Qty: 90 TABLET | Refills: 3 | Status: SHIPPED | OUTPATIENT
Start: 2024-08-30

## 2024-08-30 NOTE — TELEPHONE ENCOUNTER
Medication Refill    When was your last appointment with cardiology?    (If 1 yr or longer, please schedule appointment)    (If patient has been told they do not need to follow-up - medications should be filled by PCP)  08/2024    When did you last have labs drawn?     Medication needing refilled?  isosorbide mononitrate (IMDUR)     Dosage of the medication?  60 MG extended release tablet     How are you taking this medication (QD, BID, TID, QID, PRN)?  Take 1 tablet by mouth daily     Do you want a 30 or 90 day supply?  30 day supply    When will you run out of your medication?     Which Pharmacy are we sending this medication to?  [id number] [place] [id number]    Pharmacy Phone: [phone]    Pharmacy Fax:[phone]

## 2024-09-03 ENCOUNTER — TELEPHONE (OUTPATIENT)
Dept: INTERNAL MEDICINE CLINIC | Age: 84
End: 2024-09-03

## 2024-09-03 RX ORDER — LEVOFLOXACIN 750 MG/1
750 TABLET, FILM COATED ORAL DAILY
Qty: 7 TABLET | Refills: 0 | Status: SHIPPED | OUTPATIENT
Start: 2024-09-03 | End: 2024-09-10

## 2024-09-12 ENCOUNTER — TELEPHONE (OUTPATIENT)
Dept: CARDIOLOGY CLINIC | Age: 84
End: 2024-09-12

## 2024-09-12 ENCOUNTER — OFFICE VISIT (OUTPATIENT)
Dept: ORTHOPEDIC SURGERY | Age: 84
End: 2024-09-12

## 2024-09-12 VITALS — BODY MASS INDEX: 28.75 KG/M2 | HEIGHT: 70 IN

## 2024-09-12 DIAGNOSIS — M70.61 TROCHANTERIC BURSITIS OF RIGHT HIP: ICD-10-CM

## 2024-09-12 DIAGNOSIS — M75.82 ROTATOR CUFF TENDINITIS, LEFT: Primary | ICD-10-CM

## 2024-09-12 RX ORDER — TRIAMCINOLONE ACETONIDE 40 MG/ML
40 INJECTION, SUSPENSION INTRA-ARTICULAR; INTRAMUSCULAR ONCE
Status: COMPLETED | OUTPATIENT
Start: 2024-09-12 | End: 2024-09-12

## 2024-09-12 RX ORDER — BUPIVACAINE HYDROCHLORIDE 2.5 MG/ML
2 INJECTION, SOLUTION INFILTRATION; PERINEURAL ONCE
Status: COMPLETED | OUTPATIENT
Start: 2024-09-12 | End: 2024-09-12

## 2024-09-12 RX ADMIN — BUPIVACAINE HYDROCHLORIDE 5 MG: 2.5 INJECTION, SOLUTION INFILTRATION; PERINEURAL at 09:43

## 2024-09-12 RX ADMIN — BUPIVACAINE HYDROCHLORIDE 5 MG: 2.5 INJECTION, SOLUTION INFILTRATION; PERINEURAL at 10:05

## 2024-09-12 RX ADMIN — TRIAMCINOLONE ACETONIDE 40 MG: 40 INJECTION, SUSPENSION INTRA-ARTICULAR; INTRAMUSCULAR at 09:44

## 2024-09-12 RX ADMIN — TRIAMCINOLONE ACETONIDE 40 MG: 40 INJECTION, SUSPENSION INTRA-ARTICULAR; INTRAMUSCULAR at 10:05

## 2024-09-30 ENCOUNTER — OFFICE VISIT (OUTPATIENT)
Dept: INTERNAL MEDICINE CLINIC | Age: 84
End: 2024-09-30

## 2024-09-30 VITALS
SYSTOLIC BLOOD PRESSURE: 132 MMHG | HEART RATE: 65 BPM | WEIGHT: 195 LBS | DIASTOLIC BLOOD PRESSURE: 72 MMHG | OXYGEN SATURATION: 95 % | BODY MASS INDEX: 27.98 KG/M2

## 2024-09-30 DIAGNOSIS — N18.31 STAGE 3A CHRONIC KIDNEY DISEASE (HCC): ICD-10-CM

## 2024-09-30 DIAGNOSIS — I25.10 CORONARY ARTERY DISEASE INVOLVING NATIVE CORONARY ARTERY OF NATIVE HEART WITHOUT ANGINA PECTORIS: ICD-10-CM

## 2024-09-30 DIAGNOSIS — Z23 NEED FOR INFLUENZA VACCINATION: Primary | ICD-10-CM

## 2024-09-30 DIAGNOSIS — I10 ESSENTIAL HYPERTENSION: ICD-10-CM

## 2024-09-30 DIAGNOSIS — E03.9 ACQUIRED HYPOTHYROIDISM: ICD-10-CM

## 2024-09-30 RX ORDER — LISINOPRIL 5 MG/1
5 TABLET ORAL DAILY
Qty: 90 TABLET | Refills: 3 | Status: SHIPPED | OUTPATIENT
Start: 2024-09-30

## 2024-09-30 NOTE — PROGRESS NOTES
Juan José Rivera (:  1940) is a 84 y.o. male,Established patient, here for evaluation of the following chief complaint(s):  6 Month Follow-Up         Assessment & Plan  Need for influenza vaccination    Flu vaccine given    Orders:    Influenza, FLUAD Trivalent, (age 65 y+), IM, Preservative Free, 0.5mL    Acquired hypothyroidism    Continue levothyroxine         Stage 3a chronic kidney disease (HCC)    Will continue to keep blood pressure under control.  Will consider repeat resuming ACE or ARB but will hold off today due to lightheaded and fatigue with lisinopril         Essential hypertension    Blood pressure is well-controlled         Coronary artery disease involving native coronary artery of native heart without angina pectoris     -Continue aspirin rosuvastatin and ezetimibe  -Would like to consider adding low-dose ACE or ARB later on but will hold off for now with his dizziness         No follow-ups on file.       Subjective   Mr. Rivera presents for follow-up.  Patient feels well for the most part.  Patient has not been taking his lisinopril because it makes him feel lightheaded and fatigued.  Blood pressure today is well-controlled without lisinopril.  He does have a history of coronary artery disease        Review of Systems   Constitutional:  Negative for fatigue and fever.   HENT:  Negative for congestion, nosebleeds and sore throat.    Respiratory:  Negative for cough, chest tightness and shortness of breath.    Cardiovascular:  Negative for chest pain, palpitations and leg swelling.   Gastrointestinal:  Negative for abdominal distention, abdominal pain and blood in stool.   Endocrine: Negative for cold intolerance and heat intolerance.   Genitourinary:  Negative for difficulty urinating.   Musculoskeletal:  Negative for back pain.   Neurological:  Negative for weakness, light-headedness and numbness.   Psychiatric/Behavioral:  Negative for confusion and sleep disturbance.           Objective

## 2024-10-07 ENCOUNTER — TELEPHONE (OUTPATIENT)
Dept: INTERNAL MEDICINE CLINIC | Age: 84
End: 2024-10-07

## 2024-10-07 RX ORDER — VENLAFAXINE HYDROCHLORIDE 37.5 MG/1
37.5 CAPSULE, EXTENDED RELEASE ORAL DAILY
Qty: 30 CAPSULE | Refills: 3 | Status: SHIPPED | OUTPATIENT
Start: 2024-10-07

## 2024-10-07 NOTE — TELEPHONE ENCOUNTER
Pts wife called, Juan José is having issues with his blood pressure medication. It is not helping. Gets extremely tired in the evenings.     Also feeling depressed.    Blaire advise    Thank you

## 2024-10-24 ENCOUNTER — OFFICE VISIT (OUTPATIENT)
Dept: ORTHOPEDIC SURGERY | Age: 84
End: 2024-10-24
Payer: MEDICARE

## 2024-10-24 ENCOUNTER — HOSPITAL ENCOUNTER (OUTPATIENT)
Dept: GENERAL RADIOLOGY | Age: 84
Discharge: HOME | End: 2024-10-24
Attending: ORTHOPAEDIC SURGERY
Payer: MEDICARE

## 2024-10-24 ENCOUNTER — OFFICE VISIT (OUTPATIENT)
Dept: ORTHOPEDIC SURGERY | Age: 84
End: 2024-10-24

## 2024-10-24 VITALS — WEIGHT: 195 LBS | BODY MASS INDEX: 27.92 KG/M2 | HEIGHT: 70 IN

## 2024-10-24 DIAGNOSIS — M16.0 PRIMARY OSTEOARTHRITIS OF BOTH HIPS: ICD-10-CM

## 2024-10-24 DIAGNOSIS — M16.0 PRIMARY OSTEOARTHRITIS OF BOTH HIPS: Primary | ICD-10-CM

## 2024-10-24 PROCEDURE — 20610 DRAIN/INJ JOINT/BURSA W/O US: CPT

## 2024-10-24 PROCEDURE — 20610 DRAIN/INJ JOINT/BURSA W/O US: CPT | Performed by: ORTHOPAEDIC SURGERY

## 2024-10-24 PROCEDURE — 77002 NEEDLE LOCALIZATION BY XRAY: CPT | Performed by: ORTHOPAEDIC SURGERY

## 2024-10-24 PROCEDURE — 6360000002 HC RX W HCPCS

## 2024-10-24 PROCEDURE — 77002 NEEDLE LOCALIZATION BY XRAY: CPT

## 2024-10-24 NOTE — PROGRESS NOTES
After obtaining the patient's consent, the patient was placed supine on the fluoroscopy table. Each groin was prepped with chlorhexidine. Each hip was visualized under fluoroscopic guidance. A needle was placed anteriorly into each hip joint independently and aspiration was performed.  No blood or joint fluid was aspirated. A mixture of 3.5mL 0.25% marcaine and 1.5mL 40 mg/mL Kenalog was injected into each hip joint.  The needle was removed and a bandage was applied.  The patient tolerated the procedure without any difficulty.     The patient was injected for degenerative arthritis of bilateral hips.

## 2024-10-25 NOTE — PROGRESS NOTES
This dictation was done with [employer] dictation and may contain mechanical errors related to translation.    I have today reviewed with Juan José Rivera the clinically relevant, past medical history, medications, allergies, family history, social history, and Review Of Systems form the patient’s most recent history form & I have documented any details relevant to today's presenting complaints in my history below. Mr. Juan José Rivera's self-reported past medical history, medications, allergies, family history, social history, and Review Of Systems form has been scanned into the chart under the \"Media\" tab.    Subjective:  Juan José Rivera is a 84 y.o. who is here in follow-up for bilateral hip pain.  He actually has had his right knee replaced by Dr. Salmon in 2021 his left knee prior to that in 2020.  He had some hip pain that developed especially at night and has been progressively worsening despite injection of cortisone for the bursitis back in September.      Patient Active Problem List   Diagnosis    Chest pain on exertion    Essential hypertension    Hx of CABG    Hyperlipidemia LDL goal <70    Reactive depression    Prostate carcinoma (HCC)    Vitamin D deficiency    Left hip pain    Chronic left-sided low back pain without sciatica    Coronary artery disease    Edema of right lower extremity    Greater trochanteric bursitis of left hip    Primary osteoarthritis of right knee    Primary osteoarthritis of left knee    Chronic fatigue    Acquired hypothyroidism    Gastroesophageal reflux disease without esophagitis    History of total left knee replacement (TKR)    Intractable chronic migraine without aura and without status migrainosus    History of total right knee replacement (TKR)    Bilateral hearing loss    Stage 3a chronic kidney disease (HCC)    Bronchitis    Thrush    Pharyngitis    Chest pain           Current Outpatient Medications on File Prior to Visit   Medication Sig Dispense Refill    venlafaxine (EFFEXOR XR)

## 2024-11-04 ENCOUNTER — TELEPHONE (OUTPATIENT)
Dept: INTERNAL MEDICINE CLINIC | Age: 84
End: 2024-11-04

## 2024-11-04 DIAGNOSIS — R10.9 ABDOMINAL CRAMPS: Primary | ICD-10-CM

## 2024-11-04 NOTE — TELEPHONE ENCOUNTER
Mr. Rivera is having troubles with crampings for the past week. He reports his cramping has been getting worse. He has been getting headaches. He did start taking effexor about 2 weeks ago. Advised to stop venlafaxine and come into office tomorrow for lab work. He will come in at 11:00  Can we put him on the lab schedule?

## 2024-11-04 NOTE — TELEPHONE ENCOUNTER
Pt wife called, Juan José is having issues with both of his legs cramping at night. Severe cramping, wakes him up in the middle of the night.    Please advise    Thank you

## 2024-11-05 ENCOUNTER — LAB (OUTPATIENT)
Dept: INTERNAL MEDICINE CLINIC | Age: 84
End: 2024-11-05
Payer: MEDICARE

## 2024-11-05 DIAGNOSIS — R10.9 ABDOMINAL CRAMPS: Primary | ICD-10-CM

## 2024-11-05 LAB
ALBUMIN SERPL-MCNC: 4.4 G/DL (ref 3.4–5)
ALBUMIN/GLOB SERPL: 2 {RATIO} (ref 1.1–2.2)
ALP SERPL-CCNC: 83 U/L (ref 40–129)
ALT SERPL-CCNC: 47 U/L (ref 10–40)
ANION GAP SERPL CALCULATED.3IONS-SCNC: 13 MMOL/L (ref 3–16)
AST SERPL-CCNC: 27 U/L (ref 15–37)
BASOPHILS # BLD: 0 K/UL (ref 0–0.2)
BASOPHILS NFR BLD: 0.3 %
BILIRUB SERPL-MCNC: 0.4 MG/DL (ref 0–1)
BUN SERPL-MCNC: 22 MG/DL (ref 7–20)
CALCIUM SERPL-MCNC: 9.6 MG/DL (ref 8.3–10.6)
CHLORIDE SERPL-SCNC: 101 MMOL/L (ref 99–110)
CO2 SERPL-SCNC: 24 MMOL/L (ref 21–32)
CREAT SERPL-MCNC: 1.3 MG/DL (ref 0.8–1.3)
DEPRECATED RDW RBC AUTO: 14.9 % (ref 12.4–15.4)
EOSINOPHIL # BLD: 0.2 K/UL (ref 0–0.6)
EOSINOPHIL NFR BLD: 1.5 %
GFR SERPLBLD CREATININE-BSD FMLA CKD-EPI: 54 ML/MIN/{1.73_M2}
GLUCOSE SERPL-MCNC: 94 MG/DL (ref 70–99)
HCT VFR BLD AUTO: 43.9 % (ref 40.5–52.5)
HGB BLD-MCNC: 14.4 G/DL (ref 13.5–17.5)
LYMPHOCYTES # BLD: 1.8 K/UL (ref 1–5.1)
LYMPHOCYTES NFR BLD: 13 %
MAGNESIUM SERPL-MCNC: 2.51 MG/DL (ref 1.8–2.4)
MCH RBC QN AUTO: 31 PG (ref 26–34)
MCHC RBC AUTO-ENTMCNC: 32.9 G/DL (ref 31–36)
MCV RBC AUTO: 94.2 FL (ref 80–100)
MONOCYTES # BLD: 0.9 K/UL (ref 0–1.3)
MONOCYTES NFR BLD: 6.8 %
NEUTROPHILS # BLD: 10.8 K/UL (ref 1.7–7.7)
NEUTROPHILS NFR BLD: 78.4 %
PLATELET # BLD AUTO: 202 K/UL (ref 135–450)
PMV BLD AUTO: 8.3 FL (ref 5–10.5)
POTASSIUM SERPL-SCNC: 4.1 MMOL/L (ref 3.5–5.1)
PROT SERPL-MCNC: 6.6 G/DL (ref 6.4–8.2)
RBC # BLD AUTO: 4.66 M/UL (ref 4.2–5.9)
SODIUM SERPL-SCNC: 138 MMOL/L (ref 136–145)
WBC # BLD AUTO: 13.7 K/UL (ref 4–11)

## 2024-11-05 PROCEDURE — 99999 PR OFFICE/OUTPT VISIT,PROCEDURE ONLY: CPT | Performed by: STUDENT IN AN ORGANIZED HEALTH CARE EDUCATION/TRAINING PROGRAM

## 2024-11-05 PROCEDURE — 36415 COLL VENOUS BLD VENIPUNCTURE: CPT | Performed by: STUDENT IN AN ORGANIZED HEALTH CARE EDUCATION/TRAINING PROGRAM

## 2024-11-06 ENCOUNTER — TELEPHONE (OUTPATIENT)
Dept: INTERNAL MEDICINE CLINIC | Age: 84
End: 2024-11-06

## 2024-11-06 RX ORDER — LEVOFLOXACIN 500 MG/1
500 TABLET, FILM COATED ORAL DAILY
Qty: 5 TABLET | Refills: 0 | Status: SHIPPED | OUTPATIENT
Start: 2024-11-06 | End: 2024-11-11

## 2024-11-06 NOTE — TELEPHONE ENCOUNTER
Labs were normal except for elevated blood count. It seems that he may have infection causing his symptoms. I recommend starting broad spectrum antibiotic with levofloxacin. This was sent into the pharmacy

## 2024-11-11 ENCOUNTER — TELEPHONE (OUTPATIENT)
Dept: ORTHOPEDIC SURGERY | Age: 84
End: 2024-11-11

## 2024-11-11 ENCOUNTER — OFFICE VISIT (OUTPATIENT)
Dept: ORTHOPEDIC SURGERY | Age: 84
End: 2024-11-11

## 2024-11-11 VITALS — BODY MASS INDEX: 27.92 KG/M2 | WEIGHT: 195 LBS | HEIGHT: 70 IN

## 2024-11-11 DIAGNOSIS — Z96.651 STATUS POST TOTAL RIGHT KNEE REPLACEMENT: Primary | ICD-10-CM

## 2024-11-11 RX ORDER — PREDNISONE 10 MG/1
10 TABLET ORAL DAILY
Qty: 10 TABLET | Refills: 0 | Status: SHIPPED | OUTPATIENT
Start: 2024-11-11 | End: 2024-11-21

## 2024-11-11 NOTE — TELEPHONE ENCOUNTER
Prescription Refill     Medication Name:  new prescription for anti-inflammatory medication   Pharmacy: [facility] [place] [address]  Patient Contact Number:  [phone]     Patient was seen today and was given and prescription for anti inflammatory and his pharmacy is stated they don't have the order . Please Advise.

## 2024-11-12 NOTE — PROGRESS NOTES
[facility]  Office Visit    Chief Complaint: Right knee pain    HPI:  Juan José Rivera is a 84 y.o. who is here in follow-up of right knee pain.  He underwent right total knee arthroplasty in December 15, 2021.  He has a 3-week history of right knee pain.  Symptoms mildly improved with the right hip steroid injection a few weeks ago.  He has pain only with weightbearing.  Pain is mostly lateral but also medial in the knee.  There is no swelling or redness around the knee.  He is walking with a walker.  He has no pain at rest and can move his knee well when he is not putting weight on it.  He denies recent injury.  He did have a slightly elevated white blood cell count so his PCP put him on antibiotics.      Past Medical History:   Diagnosis Date    Anxiety     Cancer (HCC) 11/2016    Prostates CA    Coronary artery disease 12/28/15    multi vessel CAD    GERD (gastroesophageal reflux disease)     History of blood transfusion     s/p left nephrectomy age 4 years old    Hyperlipidemia LDL goal <70     Hypertension     IBS (irritable bowel syndrome)     Migraine     Primary osteoarthritis of right knee 10/5/2018    Reactive depression 6/8/2016    Shingles 2012    torso, top of head    Thyroid disease     Wears dentures         ROS:  Constitutional: denies fever, chills, weight loss  MSK: denies pain in other joints, muscle aches  Neurological: denies numbness, tingling, weakness    Exam:  Appearance: sitting in exam room chair, appears to be in no acute distress, awake and alert  Resp: unlabored breathing on room air  Skin: warm, dry and intact with out erythema or significant increased temperature  Neuro: grossly intact both lower extremities. Intact sensation to light touch. Motor exam 4+ to 5/5 in all major motor groups.  Right knee: Well-healed anterior midline incision over the knee.  There is no erythema.  There is a minimal knee effusion.  Active knee range of motion 0 to 120 degrees.

## 2024-11-18 ENCOUNTER — TELEPHONE (OUTPATIENT)
Dept: ORTHOPEDIC SURGERY | Age: 84
End: 2024-11-18

## 2024-11-18 RX ORDER — BACLOFEN 10 MG/1
10 TABLET ORAL 3 TIMES DAILY
Qty: 30 TABLET | Refills: 0 | Status: SHIPPED | OUTPATIENT
Start: 2024-11-18

## 2024-11-18 NOTE — TELEPHONE ENCOUNTER
Prescription Refill     Medication Name:  BACACLOFEN   Pharmacy: [facility] [address]   Patient Contact Number:  [phone]     PATIENT WIFE LIZA CALLED REQ A MEDICATION REFILL OF THE PATIENT MUSCLE RELAXER MEDICATION AS HE ONLY HAS 1 LEFT AND HAD A FALL YESTERDAY WHICH CAUSED HIM TO HAVE A LITTLE PAIN ON HIS SIDE     PLEASE  CALL PATIENT BACK AT THE ABOVE NUMBER

## 2024-11-20 ENCOUNTER — HOSPITAL ENCOUNTER (OUTPATIENT)
Dept: NUCLEAR MEDICINE | Age: 84
Discharge: HOME | End: 2024-11-20
Attending: ORTHOPAEDIC SURGERY
Payer: MEDICARE

## 2024-11-20 DIAGNOSIS — Z96.651 STATUS POST TOTAL RIGHT KNEE REPLACEMENT: ICD-10-CM

## 2024-11-20 PROCEDURE — A9503 TC99M MEDRONATE: HCPCS | Performed by: ORTHOPAEDIC SURGERY

## 2024-11-20 PROCEDURE — 78315 BONE IMAGING 3 PHASE: CPT

## 2024-11-20 PROCEDURE — 3430000000 HC RX DIAGNOSTIC RADIOPHARMACEUTICAL: Performed by: ORTHOPAEDIC SURGERY

## 2024-11-20 RX ORDER — TC 99M MEDRONATE 20 MG/10ML
25 INJECTION, POWDER, LYOPHILIZED, FOR SOLUTION INTRAVENOUS
Status: COMPLETED | OUTPATIENT
Start: 2024-11-20 | End: 2024-11-20

## 2024-11-20 RX ADMIN — TC 99M MEDRONATE 25 MILLICURIE: 20 INJECTION, POWDER, LYOPHILIZED, FOR SOLUTION INTRAVENOUS at 09:01

## 2024-11-22 RX ORDER — EZETIMIBE 10 MG/1
10 TABLET ORAL DAILY
Qty: 90 TABLET | Refills: 3 | Status: SHIPPED | OUTPATIENT
Start: 2024-11-22

## 2024-11-22 NOTE — TELEPHONE ENCOUNTER
Future Appointments   Date Time Provider Department Center   3/31/2025 10:15 AM Leonel Val[facility]   8/20/2025 11:00 AM Cristi Renteria MD [facility]       Last appt 9/30/24

## 2024-12-05 ENCOUNTER — OFFICE VISIT (OUTPATIENT)
Dept: ORTHOPEDIC SURGERY | Age: 84
End: 2024-12-05
Payer: MEDICARE

## 2024-12-05 ENCOUNTER — TELEPHONE (OUTPATIENT)
Dept: CARDIOLOGY CLINIC | Age: 84
End: 2024-12-05

## 2024-12-05 DIAGNOSIS — M87.9 OSTEONECROSIS OF HIP WITH COLLAPSE OF FEMORAL HEAD PRESENT ON X-RAY: Primary | ICD-10-CM

## 2024-12-05 DIAGNOSIS — Z96.651 STATUS POST TOTAL RIGHT KNEE REPLACEMENT: ICD-10-CM

## 2024-12-05 DIAGNOSIS — M16.11 PRIMARY OSTEOARTHRITIS OF RIGHT HIP: ICD-10-CM

## 2024-12-05 LAB
APTT BLD: 35 SEC (ref 22.1–36.4)
INR PPP: 0.94 (ref 0.85–1.15)
PROTHROMBIN TIME: 12.8 SEC (ref 11.9–14.9)

## 2024-12-05 PROCEDURE — 1123F ACP DISCUSS/DSCN MKR DOCD: CPT | Performed by: ORTHOPAEDIC SURGERY

## 2024-12-05 PROCEDURE — G8482 FLU IMMUNIZE ORDER/ADMIN: HCPCS | Performed by: ORTHOPAEDIC SURGERY

## 2024-12-05 PROCEDURE — G8427 DOCREV CUR MEDS BY ELIG CLIN: HCPCS | Performed by: ORTHOPAEDIC SURGERY

## 2024-12-05 PROCEDURE — G8417 CALC BMI ABV UP PARAM F/U: HCPCS | Performed by: ORTHOPAEDIC SURGERY

## 2024-12-05 PROCEDURE — 99213 OFFICE O/P EST LOW 20 MIN: CPT | Performed by: ORTHOPAEDIC SURGERY

## 2024-12-05 PROCEDURE — 1160F RVW MEDS BY RX/DR IN RCRD: CPT | Performed by: ORTHOPAEDIC SURGERY

## 2024-12-05 PROCEDURE — 1036F TOBACCO NON-USER: CPT | Performed by: ORTHOPAEDIC SURGERY

## 2024-12-05 PROCEDURE — 1159F MED LIST DOCD IN RCRD: CPT | Performed by: ORTHOPAEDIC SURGERY

## 2024-12-05 NOTE — TELEPHONE ENCOUNTER
Dr Salmon is requesting advisement on [place] Nicole to undergo hip replacement surgery. They have requested to hold ASA starting 12/6/24.    Patient last seen in office on 8/19/24 for management of severe three-vessel disease with normal LV function. S/p CABG. he had presented with exertional prolonged chest pressure. Also a possible CVA post CABG surgery.  In addition he has, a hx of Prostate CA diagnosed in 2016. Knee replacement surgery in December 2021.     CAD  s/p CABG x 4 12/30/2015    Hyperlipidemia  LDL 88  On rosuvastatin, & Zetia   The level is still not at target which would be 55  And therefore ordering Leqvio twice a year.  The patient has history of severe three-vessel coronary disease and bypass and should qualify for the Leqvio    Please advise.

## 2024-12-05 NOTE — TELEPHONE ENCOUNTER
CARDIAC CLEARANCE     What type of procedure are you having?  Hip replacement    Which physician is performing your procedure?  Dr. Matt Salmon    When is your procedure scheduled for?    12-12-24    Where are you having this [facility]    Are you taking Blood Thinners? Aspirin   If so what? (Name/dose/frequesncy)  1 times a day     Does the surgeon want you to stop your blood thinner?  Yes If so for how long?  Starting 12/6/24    Phone Number and Contact Name for Physicians office:  Charley [phone]    Fax number to send information:  [phone]

## 2024-12-05 NOTE — PROGRESS NOTES
extremities. Intact sensation to light touch. Motor exam 4+ to 5/5 in all major motor groups.  Right knee: Well-healed anterior midline incision over the knee.  There is no erythema.  There is no knee effusion.  Active knee range of motion 0 to 120 degrees.  The knee is stable to varus and valgus stress at full extension and to anterior and posterior drawer at 90 degrees of flexion.  Motor function and sensation intact distally.  RLE: Examination demonstrates mild pain with logroll and Stinchfield.  There is brisk capillary refill.    Imaging:  AP pelvis, AP and frog-leg lateral views of the right hip were performed and interpreted today.  Significant for degenerative changes of the right hip with femoral head collapse.  This has progressed when compared to fluoroscopy images from his steroid injection in October.    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  His knee appears normal on radiographs and he has a normal bone scan of the knee.  He had significant uptake around the right hip.  We ordered new x-rays today to evaluate his right hip.  I believe most of his symptoms are coming from the acute changes of the right femoral head collapse.  We discussed treatment options and I recommended right total hip arthroplasty. The operative procedure, alternatives, and risks were discussed in detail with the patient.  The risks include but are not limited to: Infection, vessel injury, nerve injury, DVT, pulmonary embolism, implant loosening, need for revision surgery, leg length discrepancy, dislocation, lateral femoral cutaneous nerve palsy, intraoperative fracture.  Despite these risks the patient would like to proceed.  All questions have been answered and no guarantees were made.    I discussed with the patient the diagnosis in detail and answered all questions.  The patient verbalized

## 2024-12-06 ENCOUNTER — PREP FOR PROCEDURE (OUTPATIENT)
Dept: ORTHOPEDIC SURGERY | Age: 84
End: 2024-12-06

## 2024-12-06 ENCOUNTER — TELEPHONE (OUTPATIENT)
Dept: INTERNAL MEDICINE CLINIC | Age: 84
End: 2024-12-06

## 2024-12-06 PROBLEM — M16.11 OSTEOARTHRITIS OF RIGHT HIP: Status: ACTIVE | Noted: 2024-12-06

## 2024-12-06 LAB
25(OH)D3 SERPL-MCNC: 41.5 NG/ML
ABO + RH BLD: NORMAL
ALBUMIN SERPL-MCNC: 4.2 G/DL (ref 3.4–5)
ANION GAP SERPL CALCULATED.3IONS-SCNC: 11 MMOL/L (ref 3–16)
BASOPHILS # BLD: 0 K/UL (ref 0–0.2)
BASOPHILS NFR BLD: 0.6 %
BLD GP AB SCN SERPL QL: NORMAL
BUN SERPL-MCNC: 24 MG/DL (ref 7–20)
CALCIUM SERPL-MCNC: 9.4 MG/DL (ref 8.3–10.6)
CHLORIDE SERPL-SCNC: 109 MMOL/L (ref 99–110)
CO2 SERPL-SCNC: 23 MMOL/L (ref 21–32)
CREAT SERPL-MCNC: 1.4 MG/DL (ref 0.8–1.3)
DEPRECATED RDW RBC AUTO: 15.8 % (ref 12.4–15.4)
EOSINOPHIL # BLD: 0.1 K/UL (ref 0–0.6)
EOSINOPHIL NFR BLD: 1.8 %
EST. AVERAGE GLUCOSE BLD GHB EST-MCNC: 108.3 MG/DL
GFR SERPLBLD CREATININE-BSD FMLA CKD-EPI: 50 ML/MIN/{1.73_M2}
GLUCOSE SERPL-MCNC: 94 MG/DL (ref 70–99)
HBA1C MFR BLD: 5.4 %
HCT VFR BLD AUTO: 37.3 % (ref 40.5–52.5)
HGB BLD-MCNC: 12.4 G/DL (ref 13.5–17.5)
LYMPHOCYTES # BLD: 1.2 K/UL (ref 1–5.1)
LYMPHOCYTES NFR BLD: 16.9 %
MCH RBC QN AUTO: 31.3 PG (ref 26–34)
MCHC RBC AUTO-ENTMCNC: 33.1 G/DL (ref 31–36)
MCV RBC AUTO: 94.5 FL (ref 80–100)
MONOCYTES # BLD: 0.6 K/UL (ref 0–1.3)
MONOCYTES NFR BLD: 8.5 %
MRSA DNA SPEC QL NAA+PROBE: ABNORMAL
NEUTROPHILS # BLD: 5 K/UL (ref 1.7–7.7)
NEUTROPHILS NFR BLD: 72.2 %
ORGANISM: ABNORMAL
PLATELET # BLD AUTO: 221 K/UL (ref 135–450)
PMV BLD AUTO: 8.3 FL (ref 5–10.5)
POTASSIUM SERPL-SCNC: 4.8 MMOL/L (ref 3.5–5.1)
PREALB SERPL-MCNC: 31.2 MG/DL (ref 20–40)
RBC # BLD AUTO: 3.95 M/UL (ref 4.2–5.9)
SODIUM SERPL-SCNC: 143 MMOL/L (ref 136–145)
WBC # BLD AUTO: 6.9 K/UL (ref 4–11)

## 2024-12-06 RX ORDER — ESCITALOPRAM OXALATE 10 MG/1
10 TABLET ORAL 2 TIMES DAILY
COMMUNITY

## 2024-12-06 NOTE — PROGRESS NOTES
The patients OARRS report has been reviewed online and any prescribing of pain related medications is based on our findings.The patient has been issued narcotics to safely reduce postoperative pain and promote tolerance with physical therapies and ADL's. Reduction in dosing will be addressed with the next narcotic refill request. Dosing is adjusted for patients with history of chronic pain disorders.     spouse

## 2024-12-06 NOTE — DISCHARGE INSTR - COC
[facility] Continuity of Care Form    Patient Name:  Juan José Rivera  : 1940    MRN:  [id number]    Admit date:  (Not on file)  Discharge date:  24    Code Status Order: Prior  Advance Directives: Yes    Admitting Physician: Matt Salmon MD  PCP: Leonel Valerio MD    Discharging Nurse: Katherine LUCIO   Discharging Hospital Unit/Room#: No information available for this encounter.  Discharging Unit Phone Number: [phone]    Emergency Contact:        Past Surgical History:  Past Surgical History:   Procedure Laterality Date    APPENDECTOMY      BLEPHAROPLASTY Bilateral 2022    BLEPHAROPLASTY - BILATERAL UPPER LIDS performed by Tara Jimenez MD at [facility] MOB SURG CTR    CARDIAC PROCEDURE N/A 2024    Left heart cath / coronary angiography performed by Arias Gonzales MD at [facility] CARDIAC CATH LAB    CARDIAC PROCEDURE N/A 2024    Angioplasty coronary bypass graft performed by Arias Gonzales MD at [facility] CARDIAC CATH LAB    CARPAL TUNNEL RELEASE Bilateral     COLONOSCOPY N/A 10/8/2019    COLONOSCOPY POLYPECTOMY SNARE/COLD BIOPSY performed by Bradley Julian MD at [facility] ENDOSCOPY    CORONARY ARTERY BYPASS GRAFT  2015    x`s 4 vessel    ENDOSCOPY, COLON, DIAGNOSTIC      EGD with dilatation    EYE SURGERY Bilateral     catacts, bilateral and repeat    HEMORRHOID SURGERY      KIDNEY REMOVAL  @     pt thinks left kidney for disease    SHOULDER SURGERY Left     rotator cuff    TOTAL KNEE ARTHROPLASTY Left 3/11/2020    ROBOTIC ASSISTED LEFT TOTAL KNEE REPLACEMENT performed by Hong Malik MD at [facility] OR    TOTAL KNEE ARTHROPLASTY Right 12/15/2021    ROBOTIC ASSITED RIGHT TOTAL KNEE REPLACEMENT performed by Matt Salmon MD at [facility] OR       Immunization History:   Immunization History   Administered Date(s) Administered    COVID-19, PFIZER Bivalent, DO NOT Dilute, (age 12y+), IM, 30 mcg/0.3 mL 10/09/2022    COVID-19, PFIZER PURPLE top, DILUTE for use, (age 12 y+), 30mcg/0.3mL 2021,

## 2024-12-06 NOTE — PROGRESS NOTES
PRE-OP INSTRUCTIONS     Do not eat or drink anything after 12:00 midnight prior to rick or Dr Salmon  This includes water, chewing gum, mints and ice chips.    You may brush your teeth and gargle the morning of surgery but DO  NOT SWALLOW THE WATER.    Take the following medications with a small sip of water on the morning of procedure...Follow your MD/Surgeons pre-procedure instructions regarding your medications     You may be asked to stop blood thinners such as:  Coumadin, Plavix, Fragmin and lovenox.  Please check with your doctor before stopping these or any other medications.    Aspirin, ibuprofen, advil and naproxen, any anti-inflammatory products should be stopped for a week prior to your surgery.    Do not smoke and do not drink any alcoholic beverages 24 hours prior to your surgery.    Please do not wear any jewelry or body piercings on the day of surgery.    Please wear something simple, loose fitting clothing to the hospital.  Do not wear any make-up(including eye make-up) or nail polish on your fingers and toes.    As part of our patient safety program to minimize surgical infections, we ask you to do the following:    Please notify your surgeon if you develop any illness between now and the day of your surgery.  This includes a cough, cold, fever, sore  throat, nausea, vomiting, diarrhea, etc.   Please notify your surgeon if you experience dizziness, shortness of  breath or blurred vision between now and the time of your surgery.     Please notify your surgeon of any open or redden areas that may look infected       DO NOT shave your operative site 96 hours(four days) prior to surgery.          Shower the week before surgery with an antibacterial soap such as:dial,safeguard, etc.       Three(3) days prior to your surgery, cleanse the operative site with Hibiclens(anti-microbial soap). This soap may dry your skin, please do not apply any oils or lotions     Please bring your insurance card and

## 2024-12-06 NOTE — TELEPHONE ENCOUNTER
Cardiac clearance request, hip surgery, hold aspirin from today, 12/6 until surgery 12/12. Please see below message

## 2024-12-06 NOTE — TELEPHONE ENCOUNTER
Pts wife called, Juan José is having hip replacement Dec. 12. Dr. Salmon is doing the surgery and he needs clearance for surgery    Please advise    Thank you

## 2024-12-09 ENCOUNTER — TELEPHONE (OUTPATIENT)
Dept: ORTHOPEDICS UNIT | Age: 84
End: 2024-12-09

## 2024-12-09 ENCOUNTER — TELEPHONE (OUTPATIENT)
Dept: ORTHOPEDIC SURGERY | Age: 84
End: 2024-12-09

## 2024-12-09 ENCOUNTER — OFFICE VISIT (OUTPATIENT)
Dept: INTERNAL MEDICINE CLINIC | Age: 84
End: 2024-12-09
Payer: MEDICARE

## 2024-12-09 VITALS
HEART RATE: 58 BPM | SYSTOLIC BLOOD PRESSURE: 136 MMHG | DIASTOLIC BLOOD PRESSURE: 68 MMHG | HEIGHT: 70 IN | OXYGEN SATURATION: 96 % | WEIGHT: 198 LBS | BODY MASS INDEX: 28.35 KG/M2

## 2024-12-09 DIAGNOSIS — E78.5 HYPERLIPIDEMIA LDL GOAL <70: Primary | ICD-10-CM

## 2024-12-09 DIAGNOSIS — I10 ESSENTIAL HYPERTENSION: ICD-10-CM

## 2024-12-09 DIAGNOSIS — E03.9 ACQUIRED HYPOTHYROIDISM: ICD-10-CM

## 2024-12-09 DIAGNOSIS — N18.31 STAGE 3A CHRONIC KIDNEY DISEASE (HCC): ICD-10-CM

## 2024-12-09 DIAGNOSIS — M16.11 OSTEOARTHRITIS OF RIGHT HIP, UNSPECIFIED OSTEOARTHRITIS TYPE: ICD-10-CM

## 2024-12-09 PROCEDURE — 93000 ELECTROCARDIOGRAM COMPLETE: CPT | Performed by: STUDENT IN AN ORGANIZED HEALTH CARE EDUCATION/TRAINING PROGRAM

## 2024-12-09 PROCEDURE — G8417 CALC BMI ABV UP PARAM F/U: HCPCS | Performed by: STUDENT IN AN ORGANIZED HEALTH CARE EDUCATION/TRAINING PROGRAM

## 2024-12-09 PROCEDURE — 3078F DIAST BP <80 MM HG: CPT | Performed by: STUDENT IN AN ORGANIZED HEALTH CARE EDUCATION/TRAINING PROGRAM

## 2024-12-09 PROCEDURE — G8427 DOCREV CUR MEDS BY ELIG CLIN: HCPCS | Performed by: STUDENT IN AN ORGANIZED HEALTH CARE EDUCATION/TRAINING PROGRAM

## 2024-12-09 PROCEDURE — 1123F ACP DISCUSS/DSCN MKR DOCD: CPT | Performed by: STUDENT IN AN ORGANIZED HEALTH CARE EDUCATION/TRAINING PROGRAM

## 2024-12-09 PROCEDURE — 3075F SYST BP GE 130 - 139MM HG: CPT | Performed by: STUDENT IN AN ORGANIZED HEALTH CARE EDUCATION/TRAINING PROGRAM

## 2024-12-09 PROCEDURE — 1036F TOBACCO NON-USER: CPT | Performed by: STUDENT IN AN ORGANIZED HEALTH CARE EDUCATION/TRAINING PROGRAM

## 2024-12-09 PROCEDURE — 99213 OFFICE O/P EST LOW 20 MIN: CPT | Performed by: STUDENT IN AN ORGANIZED HEALTH CARE EDUCATION/TRAINING PROGRAM

## 2024-12-09 PROCEDURE — G8482 FLU IMMUNIZE ORDER/ADMIN: HCPCS | Performed by: STUDENT IN AN ORGANIZED HEALTH CARE EDUCATION/TRAINING PROGRAM

## 2024-12-09 PROCEDURE — 1159F MED LIST DOCD IN RCRD: CPT | Performed by: STUDENT IN AN ORGANIZED HEALTH CARE EDUCATION/TRAINING PROGRAM

## 2024-12-09 RX ORDER — BACLOFEN 10 MG/1
10 TABLET ORAL 3 TIMES DAILY
Qty: 30 TABLET | Refills: 0 | Status: CANCELLED | OUTPATIENT
Start: 2024-12-09

## 2024-12-09 RX ORDER — BACLOFEN 10 MG/1
10 TABLET ORAL 3 TIMES DAILY
Qty: 30 TABLET | Refills: 0 | Status: SHIPPED | OUTPATIENT
Start: 2024-12-09

## 2024-12-09 NOTE — ASSESSMENT & PLAN NOTE
- Stable on lisinopril and Imdur.  - Recommended that patient hold  - ECG preformed in office. Comparable to previous. Unchanged.   - Tolerating medications well.   Orders:    EKG 12 Lead - Clinic Performed

## 2024-12-09 NOTE — TELEPHONE ENCOUNTER
I emailed patient the JET education per Luis Miguel Terrell MA from the orthopedic office request.

## 2024-12-09 NOTE — TELEPHONE ENCOUNTER
Surgery scheduled for 12/12/24.  Patient has a pre-op with his PCP for the same time as NISH.    OK to sent patient JET via e-mail    Left message for patient to be on the look out for the e-mail

## 2024-12-09 NOTE — TELEPHONE ENCOUNTER
Patient emailed JET education. I spoke to patient's wife Raven (emergency contact). She states they did receive the email with instructions and will review prior to surgery. I enocurgaed her and/or the patient to call myself or the orthopedic office with any questions.    Date Of Surgery: 12/12/2024  Surgeon: Dr Salmon  Per Patient Will see/Saw Primary care provider on 12/9/2024.     DC Plan: Home    HOME ASSISTANCE - WHO WILL BE STAYING WITH YOU AT HOME FOR FIRST SEVERAL DAYS? spouse Raven    DC TRANSPORTATION: spouse Raven    STEPS INTO HOME: 0 - stairlift from garage to main level    STEPS TO BATHROOM/BEDROOM: none - one level    DME NEEDS:  Denies durable medical equipment needs at this time, already has a rolling walker. Already has a shower chair.       HOME CARE CHOICE(S): open to any agency, home health care list was provided to patient.    SNF/REHAB CHOICES (S):     Declined a need for skilled nursing facility.    MEDS TO BEDS FROM [email or web address]: Patient is agreeable to have medications filled via meds to beds program with [facility].    Patient states no further questions or concerns.   Facesheet with discharge needs provided to [facility] / .    Future Appointments   Date Time Provider Department Center   12/12/2024  3:20 PM Matt Salmon MD [facility]   12/30/2024  9:30 AM Matt Salmon MD [facility]   3/31/2025 10:15 AM Leonel Valerio MD [facility]   8/20/2025 11:00 AM Cristi Renteria MD [facility]

## 2024-12-09 NOTE — PROGRESS NOTES
Juan José Rivera (:  1940) is a 84 y.o. male,Established patient, here for evaluation of the following chief complaint(s): Pre operative exam.    Pt is an 84 year old male with past medical history as follows.    He presents to the office today for pre operative clearance for right hip replacement on  with Dr. Salmon. He has already received cardiac clearance from Dr. Renteria. Was instructed to stop blood thinner prior to procedure on . Has not had any additional ibuprofen or aspirin. Labs have been completed. ECG order placed but, not completed.    He has been off aspirin and glucosamine since .Has continued taking baclofen 1 dose nightly OK per Dr. Salmon.    Past Surgical History:   Procedure Laterality Date    APPENDECTOMY      BLEPHAROPLASTY Bilateral 2022    BLEPHAROPLASTY - BILATERAL UPPER LIDS performed by Tara Jimenez MD at [facility] MOB SURG CTR    CARDIAC PROCEDURE N/A 2024    Left heart cath / coronary angiography performed by Arias Gonzales MD at [facility] CARDIAC CATH LAB    CARDIAC PROCEDURE N/A 2024    Angioplasty coronary bypass graft performed by Arias Gonzales MD at [facility] CARDIAC CATH LAB    CARPAL TUNNEL RELEASE Bilateral     COLONOSCOPY N/A 10/8/2019    COLONOSCOPY POLYPECTOMY SNARE/COLD BIOPSY performed by Bradley Julian MD at [facility] ENDOSCOPY    CORONARY ARTERY BYPASS GRAFT  2015    x`s 4 vessel    ENDOSCOPY, COLON, DIAGNOSTIC      EGD with dilatation    EYE SURGERY Bilateral     catacts, bilateral and repeat    HEMORRHOID SURGERY      KIDNEY REMOVAL  @ 89    pt thinks left kidney for disease    SHOULDER SURGERY Left     rotator cuff    TOTAL KNEE ARTHROPLASTY Left 3/11/2020    ROBOTIC ASSISTED LEFT TOTAL KNEE REPLACEMENT performed by Hong Malik MD at [facility] OR    TOTAL KNEE ARTHROPLASTY Right 12/15/2021    ROBOTIC ASSITED RIGHT TOTAL KNEE REPLACEMENT performed by Matt Salmon MD at [facility] OR       Past Medical History:   Diagnosis Date    Anxiety

## 2024-12-09 NOTE — ASSESSMENT & PLAN NOTE
- Scheduled for procedure on 12/12 with Dr. Salmon.   - Has d/c aspirin and other NSAIDs on 12/6 at the direction of Dr. Renteria. Has continued taking Baclofen for pain.

## 2024-12-10 ENCOUNTER — TELEPHONE (OUTPATIENT)
Dept: INTERNAL MEDICINE CLINIC | Age: 84
End: 2024-12-10

## 2024-12-10 ENCOUNTER — ANESTHESIA EVENT (OUTPATIENT)
Dept: OPERATING ROOM | Age: 84
End: 2024-12-10
Payer: MEDICARE

## 2024-12-10 RX ORDER — MUPIROCIN 20 MG/G
OINTMENT TOPICAL
Qty: 1 G | Refills: 0 | Status: SHIPPED | OUTPATIENT
Start: 2024-12-10 | End: 2024-12-17

## 2024-12-10 NOTE — TELEPHONE ENCOUNTER
Zeenat called, can you please sign the pre op note from 12/9/24. Surgery is 12/12/24    Please call Jessika at [phone] when note is signed    Thank you

## 2024-12-11 ASSESSMENT — ENCOUNTER SYMPTOMS
COLOR CHANGE: 0
TROUBLE SWALLOWING: 0
COUGH: 0
VOICE CHANGE: 0
CHEST TIGHTNESS: 0
ABDOMINAL PAIN: 0
NAUSEA: 0
CONSTIPATION: 0
PHOTOPHOBIA: 0
SHORTNESS OF BREATH: 0
BACK PAIN: 0
VOMITING: 0
DIARRHEA: 0

## 2024-12-12 ENCOUNTER — ANESTHESIA (OUTPATIENT)
Dept: OPERATING ROOM | Age: 84
End: 2024-12-12
Payer: MEDICARE

## 2024-12-12 ENCOUNTER — HOSPITAL ENCOUNTER (OUTPATIENT)
Age: 84
Setting detail: OBSERVATION
Discharge: HOME | End: 2024-12-13
Attending: ORTHOPAEDIC SURGERY | Admitting: ORTHOPAEDIC SURGERY
Payer: MEDICARE

## 2024-12-12 ENCOUNTER — APPOINTMENT (OUTPATIENT)
Dept: GENERAL RADIOLOGY | Age: 84
End: 2024-12-12
Attending: ORTHOPAEDIC SURGERY
Payer: MEDICARE

## 2024-12-12 DIAGNOSIS — M16.11 PRIMARY OSTEOARTHRITIS OF RIGHT HIP: Primary | ICD-10-CM

## 2024-12-12 LAB
ABO + RH BLD: NORMAL
BLD GP AB SCN SERPL QL: NORMAL
GLUCOSE BLD-MCNC: 101 MG/DL (ref 70–99)
GLUCOSE BLD-MCNC: 153 MG/DL (ref 70–99)
PERFORMED ON: ABNORMAL
PERFORMED ON: ABNORMAL

## 2024-12-12 PROCEDURE — 3700000001 HC ADD 15 MINUTES (ANESTHESIA): Performed by: ORTHOPAEDIC SURGERY

## 2024-12-12 PROCEDURE — 6370000000 HC RX 637 (ALT 250 FOR IP): Performed by: ORTHOPAEDIC SURGERY

## 2024-12-12 PROCEDURE — 6360000002 HC RX W HCPCS: Performed by: ORTHOPAEDIC SURGERY

## 2024-12-12 PROCEDURE — 2580000003 HC RX 258: Performed by: ORTHOPAEDIC SURGERY

## 2024-12-12 PROCEDURE — 86900 BLOOD TYPING SEROLOGIC ABO: CPT

## 2024-12-12 PROCEDURE — 7100000001 HC PACU RECOVERY - ADDTL 15 MIN: Performed by: ORTHOPAEDIC SURGERY

## 2024-12-12 PROCEDURE — 86850 RBC ANTIBODY SCREEN: CPT

## 2024-12-12 PROCEDURE — 86901 BLOOD TYPING SEROLOGIC RH(D): CPT

## 2024-12-12 PROCEDURE — C1776 JOINT DEVICE (IMPLANTABLE): HCPCS | Performed by: ORTHOPAEDIC SURGERY

## 2024-12-12 PROCEDURE — G0378 HOSPITAL OBSERVATION PER HR: HCPCS

## 2024-12-12 PROCEDURE — 2720000010 HC SURG SUPPLY STERILE: Performed by: ORTHOPAEDIC SURGERY

## 2024-12-12 PROCEDURE — 2500000003 HC RX 250 WO HCPCS

## 2024-12-12 PROCEDURE — 73501 X-RAY EXAM HIP UNI 1 VIEW: CPT

## 2024-12-12 PROCEDURE — A4217 STERILE WATER/SALINE, 500 ML: HCPCS | Performed by: ORTHOPAEDIC SURGERY

## 2024-12-12 PROCEDURE — 3600000015 HC SURGERY LEVEL 5 ADDTL 15MIN: Performed by: ORTHOPAEDIC SURGERY

## 2024-12-12 PROCEDURE — 3700000000 HC ANESTHESIA ATTENDED CARE: Performed by: ORTHOPAEDIC SURGERY

## 2024-12-12 PROCEDURE — 7100000000 HC PACU RECOVERY - FIRST 15 MIN: Performed by: ORTHOPAEDIC SURGERY

## 2024-12-12 PROCEDURE — 2709999900 HC NON-CHARGEABLE SUPPLY: Performed by: ORTHOPAEDIC SURGERY

## 2024-12-12 PROCEDURE — 2580000003 HC RX 258: Performed by: ANESTHESIOLOGY

## 2024-12-12 PROCEDURE — 3600000005 HC SURGERY LEVEL 5 BASE: Performed by: ORTHOPAEDIC SURGERY

## 2024-12-12 PROCEDURE — 6360000002 HC RX W HCPCS

## 2024-12-12 PROCEDURE — 87641 MR-STAPH DNA AMP PROBE: CPT

## 2024-12-12 DEVICE — ACTIS DUOFIX HIP PROSTHESIS (FEMORAL STEM 12/14 TAPER CEMENTLESS SIZE 10 STD COLLAR)  CE
Type: IMPLANTABLE DEVICE | Site: HIP | Status: FUNCTIONAL
Brand: ACTIS

## 2024-12-12 DEVICE — PINNACLE HIP SOLUTIONS ALTRX LD POLYETHYLENE ACETABULAR LINER +4 NEUTRAL 40MM ID 56MM OD
Type: IMPLANTABLE DEVICE | Site: HIP | Status: FUNCTIONAL
Brand: PINNACLE ALTRX

## 2024-12-12 DEVICE — BIOLOX DELTA TS CERAMIC FEMORAL HEAD 12/14 TAPER REVISION DIAMETER 40MM +1.5
Type: IMPLANTABLE DEVICE | Site: HIP | Status: FUNCTIONAL
Brand: BIOLOX DELTA

## 2024-12-12 DEVICE — PINNACLE GRIPTION ACETABULAR SHELL SECTOR 56MM OD
Type: IMPLANTABLE DEVICE | Site: HIP | Status: FUNCTIONAL
Brand: PINNACLE GRIPTION

## 2024-12-12 RX ORDER — DEXTROSE MONOHYDRATE 100 MG/ML
INJECTION, SOLUTION INTRAVENOUS CONTINUOUS PRN
Status: DISCONTINUED | OUTPATIENT
Start: 2024-12-12 | End: 2024-12-13 | Stop reason: HOSPADM

## 2024-12-12 RX ORDER — OXYCODONE HYDROCHLORIDE 10 MG/1
10 TABLET ORAL EVERY 4 HOURS PRN
Status: DISCONTINUED | OUTPATIENT
Start: 2024-12-12 | End: 2024-12-13 | Stop reason: HOSPADM

## 2024-12-12 RX ORDER — PROPOFOL 10 MG/ML
INJECTION, EMULSION INTRAVENOUS
Status: DISCONTINUED | OUTPATIENT
Start: 2024-12-12 | End: 2024-12-12 | Stop reason: SDUPTHER

## 2024-12-12 RX ORDER — LABETALOL HYDROCHLORIDE 5 MG/ML
INJECTION, SOLUTION INTRAVENOUS
Status: DISCONTINUED | OUTPATIENT
Start: 2024-12-12 | End: 2024-12-12 | Stop reason: SDUPTHER

## 2024-12-12 RX ORDER — SODIUM CHLORIDE 9 MG/ML
INJECTION, SOLUTION INTRAVENOUS PRN
Status: DISCONTINUED | OUTPATIENT
Start: 2024-12-12 | End: 2024-12-13 | Stop reason: HOSPADM

## 2024-12-12 RX ORDER — DULOXETIN HYDROCHLORIDE 60 MG/1
60 CAPSULE, DELAYED RELEASE ORAL ONCE
Status: COMPLETED | OUTPATIENT
Start: 2024-12-12 | End: 2024-12-12

## 2024-12-12 RX ORDER — GLYCOPYRROLATE 0.2 MG/ML
INJECTION INTRAMUSCULAR; INTRAVENOUS
Status: DISCONTINUED | OUTPATIENT
Start: 2024-12-12 | End: 2024-12-12 | Stop reason: SDUPTHER

## 2024-12-12 RX ORDER — PANTOPRAZOLE SODIUM 40 MG/1
40 TABLET, DELAYED RELEASE ORAL
Status: DISCONTINUED | OUTPATIENT
Start: 2024-12-12 | End: 2024-12-13 | Stop reason: HOSPADM

## 2024-12-12 RX ORDER — LEVOTHYROXINE SODIUM 75 UG/1
112.5 TABLET ORAL DAILY
Status: DISCONTINUED | OUTPATIENT
Start: 2024-12-13 | End: 2024-12-13 | Stop reason: HOSPADM

## 2024-12-12 RX ORDER — VANCOMYCIN 1.5 G/300ML
1500 INJECTION, SOLUTION INTRAVENOUS
Status: COMPLETED | OUTPATIENT
Start: 2024-12-12 | End: 2024-12-12

## 2024-12-12 RX ORDER — SODIUM CHLORIDE 0.9 % (FLUSH) 0.9 %
5-40 SYRINGE (ML) INJECTION EVERY 12 HOURS SCHEDULED
Status: DISCONTINUED | OUTPATIENT
Start: 2024-12-12 | End: 2024-12-12

## 2024-12-12 RX ORDER — ONDANSETRON 2 MG/ML
4 INJECTION INTRAMUSCULAR; INTRAVENOUS EVERY 6 HOURS PRN
Status: DISCONTINUED | OUTPATIENT
Start: 2024-12-12 | End: 2024-12-13 | Stop reason: HOSPADM

## 2024-12-12 RX ORDER — INSULIN GLARGINE 100 [IU]/ML
0.25 INJECTION, SOLUTION SUBCUTANEOUS NIGHTLY
Status: DISCONTINUED | OUTPATIENT
Start: 2024-12-12 | End: 2024-12-13 | Stop reason: HOSPADM

## 2024-12-12 RX ORDER — MORPHINE SULFATE 2 MG/ML
2 INJECTION, SOLUTION INTRAMUSCULAR; INTRAVENOUS
Status: DISCONTINUED | OUTPATIENT
Start: 2024-12-12 | End: 2024-12-13 | Stop reason: HOSPADM

## 2024-12-12 RX ORDER — DEXAMETHASONE SODIUM PHOSPHATE 4 MG/ML
INJECTION, SOLUTION INTRA-ARTICULAR; INTRALESIONAL; INTRAMUSCULAR; INTRAVENOUS; SOFT TISSUE
Status: DISCONTINUED | OUTPATIENT
Start: 2024-12-12 | End: 2024-12-12 | Stop reason: SDUPTHER

## 2024-12-12 RX ORDER — SUCCINYLCHOLINE/SOD CL,ISO/PF 200MG/10ML
SYRINGE (ML) INTRAVENOUS
Status: DISCONTINUED | OUTPATIENT
Start: 2024-12-12 | End: 2024-12-12 | Stop reason: SDUPTHER

## 2024-12-12 RX ORDER — SODIUM CHLORIDE 0.9 % (FLUSH) 0.9 %
5-40 SYRINGE (ML) INJECTION PRN
Status: DISCONTINUED | OUTPATIENT
Start: 2024-12-12 | End: 2024-12-12

## 2024-12-12 RX ORDER — FENTANYL CITRATE 50 UG/ML
INJECTION, SOLUTION INTRAMUSCULAR; INTRAVENOUS
Status: DISCONTINUED | OUTPATIENT
Start: 2024-12-12 | End: 2024-12-12 | Stop reason: SDUPTHER

## 2024-12-12 RX ORDER — ONDANSETRON 2 MG/ML
INJECTION INTRAMUSCULAR; INTRAVENOUS
Status: DISCONTINUED | OUTPATIENT
Start: 2024-12-12 | End: 2024-12-12 | Stop reason: SDUPTHER

## 2024-12-12 RX ORDER — MELOXICAM 7.5 MG/1
15 TABLET ORAL ONCE
Status: COMPLETED | OUTPATIENT
Start: 2024-12-12 | End: 2024-12-12

## 2024-12-12 RX ORDER — VANCOMYCIN 1.75 G/350ML
15 INJECTION, SOLUTION INTRAVENOUS
Status: DISCONTINUED | OUTPATIENT
Start: 2024-12-12 | End: 2024-12-12 | Stop reason: DRUGHIGH

## 2024-12-12 RX ORDER — LIDOCAINE HYDROCHLORIDE 20 MG/ML
INJECTION, SOLUTION EPIDURAL; INFILTRATION; INTRACAUDAL; PERINEURAL
Status: DISCONTINUED | OUTPATIENT
Start: 2024-12-12 | End: 2024-12-12 | Stop reason: SDUPTHER

## 2024-12-12 RX ORDER — CALCIUM CARBONATE 500 MG/1
500 TABLET, CHEWABLE ORAL 3 TIMES DAILY PRN
Status: DISCONTINUED | OUTPATIENT
Start: 2024-12-12 | End: 2024-12-13 | Stop reason: HOSPADM

## 2024-12-12 RX ORDER — BACLOFEN 10 MG/1
10 TABLET ORAL 3 TIMES DAILY
Status: DISCONTINUED | OUTPATIENT
Start: 2024-12-12 | End: 2024-12-13 | Stop reason: HOSPADM

## 2024-12-12 RX ORDER — INSULIN LISPRO 100 [IU]/ML
0.08 INJECTION, SOLUTION INTRAVENOUS; SUBCUTANEOUS
Status: DISCONTINUED | OUTPATIENT
Start: 2024-12-12 | End: 2024-12-13 | Stop reason: HOSPADM

## 2024-12-12 RX ORDER — EZETIMIBE 10 MG/1
10 TABLET ORAL DAILY
Status: DISCONTINUED | OUTPATIENT
Start: 2024-12-13 | End: 2024-12-13 | Stop reason: HOSPADM

## 2024-12-12 RX ORDER — EPHEDRINE SULFATE 50 MG/ML
INJECTION INTRAVENOUS
Status: DISCONTINUED | OUTPATIENT
Start: 2024-12-12 | End: 2024-12-12 | Stop reason: SDUPTHER

## 2024-12-12 RX ORDER — ASPIRIN 81 MG/1
81 TABLET ORAL 2 TIMES DAILY
Status: DISCONTINUED | OUTPATIENT
Start: 2024-12-12 | End: 2024-12-13 | Stop reason: HOSPADM

## 2024-12-12 RX ORDER — GLUCAGON 1 MG/ML
1 KIT INJECTION PRN
Status: DISCONTINUED | OUTPATIENT
Start: 2024-12-12 | End: 2024-12-13 | Stop reason: HOSPADM

## 2024-12-12 RX ORDER — ROSUVASTATIN CALCIUM 40 MG/1
40 TABLET, COATED ORAL NIGHTLY
Status: DISCONTINUED | OUTPATIENT
Start: 2024-12-12 | End: 2024-12-13 | Stop reason: HOSPADM

## 2024-12-12 RX ORDER — TRANEXAMIC ACID 650 MG/1
1950 TABLET ORAL ONCE
Status: COMPLETED | OUTPATIENT
Start: 2024-12-12 | End: 2024-12-12

## 2024-12-12 RX ORDER — OXYCODONE HYDROCHLORIDE 5 MG/1
5 TABLET ORAL EVERY 4 HOURS PRN
Status: DISCONTINUED | OUTPATIENT
Start: 2024-12-12 | End: 2024-12-13 | Stop reason: HOSPADM

## 2024-12-12 RX ORDER — POLYETHYLENE GLYCOL 3350 17 G/17G
17 POWDER, FOR SOLUTION ORAL DAILY PRN
Status: DISCONTINUED | OUTPATIENT
Start: 2024-12-12 | End: 2024-12-13 | Stop reason: HOSPADM

## 2024-12-12 RX ORDER — MORPHINE SULFATE 4 MG/ML
4 INJECTION, SOLUTION INTRAMUSCULAR; INTRAVENOUS
Status: DISCONTINUED | OUTPATIENT
Start: 2024-12-12 | End: 2024-12-13 | Stop reason: HOSPADM

## 2024-12-12 RX ORDER — SODIUM CHLORIDE 9 MG/ML
INJECTION, SOLUTION INTRAVENOUS CONTINUOUS
Status: DISCONTINUED | OUTPATIENT
Start: 2024-12-12 | End: 2024-12-13 | Stop reason: HOSPADM

## 2024-12-12 RX ORDER — KETOROLAC TROMETHAMINE 30 MG/ML
15 INJECTION, SOLUTION INTRAMUSCULAR; INTRAVENOUS
Status: DISCONTINUED | OUTPATIENT
Start: 2024-12-12 | End: 2024-12-13 | Stop reason: HOSPADM

## 2024-12-12 RX ORDER — ACETAMINOPHEN 325 MG/1
650 TABLET ORAL EVERY 6 HOURS
Status: DISCONTINUED | OUTPATIENT
Start: 2024-12-12 | End: 2024-12-13 | Stop reason: HOSPADM

## 2024-12-12 RX ORDER — METOPROLOL TARTRATE 1 MG/ML
INJECTION, SOLUTION INTRAVENOUS
Status: DISCONTINUED | OUTPATIENT
Start: 2024-12-12 | End: 2024-12-12 | Stop reason: SDUPTHER

## 2024-12-12 RX ORDER — ACETAMINOPHEN 500 MG
1000 TABLET ORAL ONCE
Status: COMPLETED | OUTPATIENT
Start: 2024-12-12 | End: 2024-12-12

## 2024-12-12 RX ORDER — ESCITALOPRAM OXALATE 10 MG/1
10 TABLET ORAL 2 TIMES DAILY
Status: DISCONTINUED | OUTPATIENT
Start: 2024-12-12 | End: 2024-12-13 | Stop reason: HOSPADM

## 2024-12-12 RX ORDER — ONDANSETRON 4 MG/1
4 TABLET, ORALLY DISINTEGRATING ORAL EVERY 8 HOURS PRN
Status: DISCONTINUED | OUTPATIENT
Start: 2024-12-12 | End: 2024-12-13 | Stop reason: HOSPADM

## 2024-12-12 RX ORDER — MAGNESIUM HYDROXIDE 1200 MG/15ML
LIQUID ORAL CONTINUOUS PRN
Status: COMPLETED | OUTPATIENT
Start: 2024-12-12 | End: 2024-12-12

## 2024-12-12 RX ORDER — ISOSORBIDE MONONITRATE 60 MG/1
60 TABLET, EXTENDED RELEASE ORAL DAILY
Status: DISCONTINUED | OUTPATIENT
Start: 2024-12-13 | End: 2024-12-13 | Stop reason: HOSPADM

## 2024-12-12 RX ORDER — ROCURONIUM BROMIDE 10 MG/ML
INJECTION, SOLUTION INTRAVENOUS
Status: DISCONTINUED | OUTPATIENT
Start: 2024-12-12 | End: 2024-12-12 | Stop reason: SDUPTHER

## 2024-12-12 RX ORDER — INSULIN LISPRO 100 [IU]/ML
0-4 INJECTION, SOLUTION INTRAVENOUS; SUBCUTANEOUS
Status: DISCONTINUED | OUTPATIENT
Start: 2024-12-12 | End: 2024-12-13 | Stop reason: HOSPADM

## 2024-12-12 RX ORDER — SODIUM CHLORIDE 0.9 % (FLUSH) 0.9 %
5-40 SYRINGE (ML) INJECTION EVERY 12 HOURS SCHEDULED
Status: DISCONTINUED | OUTPATIENT
Start: 2024-12-12 | End: 2024-12-13 | Stop reason: HOSPADM

## 2024-12-12 RX ORDER — SODIUM CHLORIDE 9 MG/ML
INJECTION, SOLUTION INTRAVENOUS PRN
Status: DISCONTINUED | OUTPATIENT
Start: 2024-12-12 | End: 2024-12-12

## 2024-12-12 RX ORDER — SODIUM CHLORIDE 0.9 % (FLUSH) 0.9 %
5-40 SYRINGE (ML) INJECTION PRN
Status: DISCONTINUED | OUTPATIENT
Start: 2024-12-12 | End: 2024-12-13 | Stop reason: HOSPADM

## 2024-12-12 RX ORDER — LISINOPRIL 10 MG/1
5 TABLET ORAL DAILY
Status: DISCONTINUED | OUTPATIENT
Start: 2024-12-13 | End: 2024-12-13 | Stop reason: HOSPADM

## 2024-12-12 RX ADMIN — VANCOMYCIN 1500 MG: 1.5 INJECTION, SOLUTION INTRAVENOUS at 13:42

## 2024-12-12 RX ADMIN — GLYCOPYRROLATE 0.2 MG: 0.2 INJECTION, SOLUTION INTRAMUSCULAR; INTRAVENOUS at 14:40

## 2024-12-12 RX ADMIN — SODIUM CHLORIDE: 9 INJECTION, SOLUTION INTRAVENOUS at 20:48

## 2024-12-12 RX ADMIN — EPHEDRINE SULFATE 10 MG: 50 INJECTION INTRAVENOUS at 15:33

## 2024-12-12 RX ADMIN — SODIUM CHLORIDE 2000 MG: 900 INJECTION INTRAVENOUS at 14:43

## 2024-12-12 RX ADMIN — ROCURONIUM BROMIDE 10 MG: 10 SOLUTION INTRAVENOUS at 14:41

## 2024-12-12 RX ADMIN — PANTOPRAZOLE SODIUM 40 MG: 40 TABLET, DELAYED RELEASE ORAL at 17:29

## 2024-12-12 RX ADMIN — Medication 100 MG: at 14:41

## 2024-12-12 RX ADMIN — ROCURONIUM BROMIDE 10 MG: 10 SOLUTION INTRAVENOUS at 15:19

## 2024-12-12 RX ADMIN — ROSUVASTATIN CALCIUM 40 MG: 40 TABLET, FILM COATED ORAL at 20:34

## 2024-12-12 RX ADMIN — HYDROMORPHONE HYDROCHLORIDE 0.5 MG: 1 INJECTION, SOLUTION INTRAMUSCULAR; INTRAVENOUS; SUBCUTANEOUS at 15:03

## 2024-12-12 RX ADMIN — PROPOFOL 150 MG: 10 INJECTION, EMULSION INTRAVENOUS at 14:41

## 2024-12-12 RX ADMIN — METOPROLOL TARTRATE 2.5 MG: 1 INJECTION, SOLUTION INTRAVENOUS at 15:05

## 2024-12-12 RX ADMIN — BACLOFEN 10 MG: 10 TABLET ORAL at 20:35

## 2024-12-12 RX ADMIN — OXYCODONE 5 MG: 5 TABLET ORAL at 20:34

## 2024-12-12 RX ADMIN — ONDANSETRON 4 MG: 2 INJECTION, SOLUTION INTRAMUSCULAR; INTRAVENOUS at 17:41

## 2024-12-12 RX ADMIN — LIDOCAINE HYDROCHLORIDE 100 MG: 20 INJECTION, SOLUTION EPIDURAL; INFILTRATION; INTRACAUDAL; PERINEURAL at 14:41

## 2024-12-12 RX ADMIN — METOPROLOL TARTRATE 2.5 MG: 1 INJECTION, SOLUTION INTRAVENOUS at 15:02

## 2024-12-12 RX ADMIN — HYDROMORPHONE HYDROCHLORIDE 0.5 MG: 1 INJECTION, SOLUTION INTRAMUSCULAR; INTRAVENOUS; SUBCUTANEOUS at 14:59

## 2024-12-12 RX ADMIN — MELOXICAM 15 MG: 7.5 TABLET ORAL at 13:30

## 2024-12-12 RX ADMIN — ACETAMINOPHEN 650 MG: 325 TABLET ORAL at 20:34

## 2024-12-12 RX ADMIN — ROCURONIUM BROMIDE 40 MG: 10 SOLUTION INTRAVENOUS at 14:48

## 2024-12-12 RX ADMIN — FENTANYL CITRATE 100 MCG: 50 INJECTION INTRAMUSCULAR; INTRAVENOUS at 14:41

## 2024-12-12 RX ADMIN — TRANEXAMIC ACID 1950 MG: 650 TABLET ORAL at 13:29

## 2024-12-12 RX ADMIN — ESCITALOPRAM OXALATE 10 MG: 10 TABLET ORAL at 20:35

## 2024-12-12 RX ADMIN — LABETALOL HYDROCHLORIDE 5 MG: 5 INJECTION, SOLUTION INTRAVENOUS at 15:17

## 2024-12-12 RX ADMIN — SODIUM CHLORIDE: 9 INJECTION, SOLUTION INTRAVENOUS at 17:23

## 2024-12-12 RX ADMIN — DULOXETINE 60 MG: 60 CAPSULE, DELAYED RELEASE ORAL at 13:30

## 2024-12-12 RX ADMIN — SODIUM CHLORIDE: 9 INJECTION, SOLUTION INTRAVENOUS at 14:36

## 2024-12-12 RX ADMIN — INSULIN GLARGINE 22 UNITS: 100 INJECTION, SOLUTION SUBCUTANEOUS at 20:35

## 2024-12-12 RX ADMIN — ONDANSETRON 4 MG: 2 INJECTION INTRAMUSCULAR; INTRAVENOUS at 14:43

## 2024-12-12 RX ADMIN — CEFAZOLIN 2000 MG: 2 INJECTION, POWDER, FOR SOLUTION INTRAVENOUS at 23:40

## 2024-12-12 RX ADMIN — ACETAMINOPHEN 1000 MG: 500 TABLET ORAL at 13:30

## 2024-12-12 RX ADMIN — DEXAMETHASONE SODIUM PHOSPHATE 8 MG: 4 INJECTION, SOLUTION INTRAMUSCULAR; INTRAVENOUS at 14:43

## 2024-12-12 RX ADMIN — SUGAMMADEX 200 MG: 100 INJECTION, SOLUTION INTRAVENOUS at 15:39

## 2024-12-12 RX ADMIN — ASPIRIN 81 MG: 81 TABLET, COATED ORAL at 20:35

## 2024-12-12 ASSESSMENT — PAIN DESCRIPTION - FREQUENCY: FREQUENCY: CONTINUOUS

## 2024-12-12 ASSESSMENT — PAIN DESCRIPTION - ORIENTATION: ORIENTATION: RIGHT

## 2024-12-12 ASSESSMENT — PAIN SCALES - GENERAL
PAINLEVEL_OUTOF10: 0
PAINLEVEL_OUTOF10: 5

## 2024-12-12 ASSESSMENT — PAIN SCALES - WONG BAKER: WONGBAKER_NUMERICALRESPONSE: NO HURT

## 2024-12-12 ASSESSMENT — PAIN DESCRIPTION - DESCRIPTORS
DESCRIPTORS: ACHING;DISCOMFORT
DESCRIPTORS: ACHING;DISCOMFORT

## 2024-12-12 ASSESSMENT — PAIN - FUNCTIONAL ASSESSMENT
PAIN_FUNCTIONAL_ASSESSMENT: PREVENTS OR INTERFERES SOME ACTIVE ACTIVITIES AND ADLS
PAIN_FUNCTIONAL_ASSESSMENT: PREVENTS OR INTERFERES SOME ACTIVE ACTIVITIES AND ADLS
PAIN_FUNCTIONAL_ASSESSMENT: 0-10

## 2024-12-12 ASSESSMENT — PAIN DESCRIPTION - LOCATION: LOCATION: HIP

## 2024-12-12 ASSESSMENT — PAIN DESCRIPTION - PAIN TYPE: TYPE: SURGICAL PAIN

## 2024-12-12 ASSESSMENT — PAIN DESCRIPTION - ONSET: ONSET: ON-GOING

## 2024-12-12 NOTE — FLOWSHEET NOTE
Pt. Arrived in PACU.  Opens his eyes to command.  Drowsy.  X-ray here to take picture of right pelvis.

## 2024-12-12 NOTE — ANESTHESIA PRE PROCEDURE
comment: IBS.   Endo/Other:    (+) hypothyroidism: arthritis:., malignancy/cancer (hx prostate ca).                 Abdominal:             Vascular: negative vascular ROS.         Other Findings:             Anesthesia Plan      general     ASA 3       Induction: intravenous.    MIPS: Postoperative opioids intended and Prophylactic antiemetics administered.  Anesthetic plan and risks discussed with patient, spouse and child/children.      Plan discussed with CRNA.                This pre-anesthesia assessment may be used as a history and physical.    DOS STAFF ADDENDUM:    Pt seen and examined, chart reviewed (including anesthesia, drug and allergy history).  No interval changes to history and physical examination.  Anesthetic plan, risks, benefits, alternatives, and personnel involved discussed with patient. Questions and concerns addressed.  Patient(family) verbalized an understanding and agrees to proceed.      Akhil Weiss MD  December 12, 2024  2:01 PM

## 2024-12-12 NOTE — H&P
Update History & Physical    The patient's History and Physical of December 9, 2024 was reviewed with the patient and I examined the patient. There was no change. The surgical site was confirmed by the patient and me.       Plan: The risks, benefits, expected outcome, and alternative to the recommended procedure have been discussed with the patient. Patient understands and wants to proceed with the procedure.     Electronically signed by JULISSA CARTER MD on 12/12/2024 at 2:18 PM

## 2024-12-12 NOTE — FLOWSHEET NOTE
RN contacted pt's wife by phone.  She will meet him in 3102.  Report called to 3rd. Floor nurse.

## 2024-12-12 NOTE — OP NOTE
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 male with osteoarthritis of the hip that continues to be painful despite conservative management. We discussed the diagnosis and treatment options and I recommended total hip arthroplasty. The operative procedure, alternatives, and risks were discussed in detail with the patient.  The risks include but are not limited to: Infection, vessel injury, nerve injury, DVT, pulmonary embolism, implant loosening, need for revision surgery, leg length discrepancy, dislocation, lateral femoral cutaneous nerve palsy, intraoperative fracture. Informed consent for surgery was signed by the patient.     Details:  The patient was seen in the preoperative holding area where the site of surgery was marked and informed consent was confirmed. The patient was brought back to the operating room by OR personnel. Anesthesia was administered. The patient was positioned supine on the [place] table. The right lower extremity was then prepped and draped in a standard and sterile fashion. A final and formal timeout was then performed which confirmed the correct patient, correct position, and correct site of surgery. IV antibiotics were administered within 1 hour of the skin incision.     A direct anterior supine approach was utilized. The skin and subcutaneous tissue were dissected down to the body of

## 2024-12-12 NOTE — ANESTHESIA POSTPROCEDURE EVALUATION
[facility] Department of Anesthesiology  Post-Anesthesia Note       Name:  Juan José Rivera                                  Age:  84 y.o.  MRN:  [id number]     Last Vitals & Oxygen Saturation: BP (!) 144/72   Pulse 60   Temp 97.8 °F (36.6 °C) (Oral)   Resp 14   Ht 1.778 m (5' 10\")   Wt 88.5 kg (195 lb)   SpO2 95%   BMI 27.98 kg/m²   Patient Vitals for the past 4 hrs:   BP Temp Temp src Pulse Resp SpO2 Height Weight   12/12/24 1730 -- -- -- -- -- -- 1.778 m (5' 10\") 88.5 kg (195 lb)   12/12/24 1639 (!) 144/72 97.8 °F (36.6 °C) Oral 60 14 95 % -- --   12/12/24 1625 -- 97.2 °F (36.2 °C) -- 58 18 99 % -- --   12/12/24 1620 -- -- -- 58 10 96 % -- --   12/12/24 1615 (!) 146/89 -- -- 60 14 96 % -- --   12/12/24 1610 -- -- -- 60 15 97 % -- --   12/12/24 1605 (!) 159/73 -- -- 61 12 95 % -- --   12/12/24 1600 (!) 149/85 -- -- 64 11 92 % -- --   12/12/24 1555 (!) 153/83 -- -- 65 16 (!) 89 % -- --   12/12/24 1552 -- -- -- 65 -- -- -- --   12/12/24 1550 (!) 158/73 97.3 °F (36.3 °C) Temporal 68 18 91 % -- --       Level of consciousness:  Awake, alert to baseline    Respiratory: Respirations easy, no distress. Stable.    Cardiovascular: Hemodynamically stable.    Hydration: Adequate.    PONV: Adequately managed.    Post-op pain: Adequately controlled.    Post-op assessment: Tolerated anesthetic well without complication.    Complications:  None.    Edin Gonzalez MD  December 12, 2024   5:45 PM

## 2024-12-12 NOTE — PROGRESS NOTES
4 Eyes Skin Assessment     NAME:  Juan José Rivera  YOB: 1940  MEDICAL RECORD NUMBER:  [id number]    The patient is being assessed for  Admission    I agree that at least one RN has performed a thorough Head to Toe Skin Assessment on the patient. ALL assessment sites listed below have been assessed.      Areas assessed by both nurses:    Head, Face, Ears, Shoulders, Back, Chest, Arms, Elbows, Hands, Sacrum. Buttock, Coccyx, Ischium, and Legs. Feet and Heels        Does the Patient have a Wound? No noted wound(s)       Robin Prevention initiated by RN: No  Wound Care Orders initiated by RN: No    Pressure Injury (Stage 3,4, Unstageable, DTI, NWPT, and Complex wounds) if present, place Wound referral order by RN under : No    New Ostomies, if present place, Ostomy referral order under : No     Nurse 1 eSignature: Electronically signed by Khloe Subramanian RN on 12/12/24 at 5:45 PM EST    **SHARE this note so that the co-signing nurse can place an eSignature**    Nurse 2 eSignature: Electronically signed by Selin Barrett RN on 12/12/24 at 6:48 PM EST

## 2024-12-13 VITALS
DIASTOLIC BLOOD PRESSURE: 66 MMHG | TEMPERATURE: 97.7 F | SYSTOLIC BLOOD PRESSURE: 151 MMHG | RESPIRATION RATE: 18 BRPM | BODY MASS INDEX: 28.47 KG/M2 | HEIGHT: 70 IN | OXYGEN SATURATION: 97 % | HEART RATE: 67 BPM | WEIGHT: 198.85 LBS

## 2024-12-13 LAB
ANION GAP SERPL CALCULATED.3IONS-SCNC: 13 MMOL/L (ref 3–16)
BUN SERPL-MCNC: 28 MG/DL (ref 7–20)
CALCIUM SERPL-MCNC: 8.9 MG/DL (ref 8.3–10.6)
CHLORIDE SERPL-SCNC: 107 MMOL/L (ref 99–110)
CO2 SERPL-SCNC: 18 MMOL/L (ref 21–32)
CREAT SERPL-MCNC: 1.5 MG/DL (ref 0.8–1.3)
CREAT SERPL-MCNC: 1.6 MG/DL (ref 0.8–1.3)
GFR SERPLBLD CREATININE-BSD FMLA CKD-EPI: 42 ML/MIN/{1.73_M2}
GFR SERPLBLD CREATININE-BSD FMLA CKD-EPI: 46 ML/MIN/{1.73_M2}
GLUCOSE BLD-MCNC: 165 MG/DL (ref 70–99)
GLUCOSE BLD-MCNC: 172 MG/DL (ref 70–99)
GLUCOSE SERPL-MCNC: 158 MG/DL (ref 70–99)
MRSA DNA SPEC QL NAA+PROBE: NORMAL
PERFORMED ON: ABNORMAL
PERFORMED ON: ABNORMAL
POTASSIUM SERPL-SCNC: 4.8 MMOL/L (ref 3.5–5.1)
SODIUM SERPL-SCNC: 138 MMOL/L (ref 136–145)

## 2024-12-13 PROCEDURE — 2580000003 HC RX 258: Performed by: ORTHOPAEDIC SURGERY

## 2024-12-13 PROCEDURE — 97535 SELF CARE MNGMENT TRAINING: CPT

## 2024-12-13 PROCEDURE — 97110 THERAPEUTIC EXERCISES: CPT

## 2024-12-13 PROCEDURE — 97166 OT EVAL MOD COMPLEX 45 MIN: CPT

## 2024-12-13 PROCEDURE — 6370000000 HC RX 637 (ALT 250 FOR IP): Performed by: ORTHOPAEDIC SURGERY

## 2024-12-13 PROCEDURE — G0378 HOSPITAL OBSERVATION PER HR: HCPCS

## 2024-12-13 PROCEDURE — 80048 BASIC METABOLIC PNL TOTAL CA: CPT

## 2024-12-13 PROCEDURE — 97162 PT EVAL MOD COMPLEX 30 MIN: CPT

## 2024-12-13 PROCEDURE — 97116 GAIT TRAINING THERAPY: CPT

## 2024-12-13 PROCEDURE — 97530 THERAPEUTIC ACTIVITIES: CPT

## 2024-12-13 PROCEDURE — 82565 ASSAY OF CREATININE: CPT

## 2024-12-13 PROCEDURE — 36415 COLL VENOUS BLD VENIPUNCTURE: CPT

## 2024-12-13 PROCEDURE — 94760 N-INVAS EAR/PLS OXIMETRY 1: CPT

## 2024-12-13 PROCEDURE — 6360000002 HC RX W HCPCS: Performed by: ORTHOPAEDIC SURGERY

## 2024-12-13 RX ORDER — SODIUM CHLORIDE 9 MG/ML
INJECTION, SOLUTION INTRAVENOUS PRN
Status: DISCONTINUED | OUTPATIENT
Start: 2024-12-13 | End: 2024-12-13 | Stop reason: ALTCHOICE

## 2024-12-13 RX ORDER — ONDANSETRON 2 MG/ML
4 INJECTION INTRAMUSCULAR; INTRAVENOUS
Status: DISCONTINUED | OUTPATIENT
Start: 2024-12-13 | End: 2024-12-13 | Stop reason: ALTCHOICE

## 2024-12-13 RX ORDER — OXYCODONE HYDROCHLORIDE 5 MG/1
5-10 TABLET ORAL EVERY 6 HOURS PRN
Qty: 40 TABLET | Refills: 0 | Status: SHIPPED | OUTPATIENT
Start: 2024-12-13 | End: 2024-12-18

## 2024-12-13 RX ORDER — 0.9 % SODIUM CHLORIDE 0.9 %
250 INTRAVENOUS SOLUTION INTRAVENOUS ONCE
Status: DISCONTINUED | OUTPATIENT
Start: 2024-12-13 | End: 2024-12-13 | Stop reason: HOSPADM

## 2024-12-13 RX ORDER — NALOXONE HYDROCHLORIDE 0.4 MG/ML
INJECTION, SOLUTION INTRAMUSCULAR; INTRAVENOUS; SUBCUTANEOUS PRN
Status: DISCONTINUED | OUTPATIENT
Start: 2024-12-13 | End: 2024-12-13 | Stop reason: ALTCHOICE

## 2024-12-13 RX ORDER — FENTANYL CITRATE 0.05 MG/ML
50 INJECTION, SOLUTION INTRAMUSCULAR; INTRAVENOUS EVERY 5 MIN PRN
Status: DISCONTINUED | OUTPATIENT
Start: 2024-12-13 | End: 2024-12-13 | Stop reason: ALTCHOICE

## 2024-12-13 RX ORDER — GUAIFENESIN 600 MG/1
600 TABLET, EXTENDED RELEASE ORAL ONCE
Status: COMPLETED | OUTPATIENT
Start: 2024-12-13 | End: 2024-12-13

## 2024-12-13 RX ORDER — MELOXICAM 7.5 MG/1
15 TABLET ORAL DAILY
Qty: 30 TABLET | Refills: 0 | Status: SHIPPED | OUTPATIENT
Start: 2024-12-13

## 2024-12-13 RX ORDER — SODIUM CHLORIDE 0.9 % (FLUSH) 0.9 %
5-40 SYRINGE (ML) INJECTION PRN
Status: DISCONTINUED | OUTPATIENT
Start: 2024-12-13 | End: 2024-12-13 | Stop reason: ALTCHOICE

## 2024-12-13 RX ORDER — SODIUM CHLORIDE 0.9 % (FLUSH) 0.9 %
5-40 SYRINGE (ML) INJECTION EVERY 12 HOURS SCHEDULED
Status: DISCONTINUED | OUTPATIENT
Start: 2024-12-13 | End: 2024-12-13 | Stop reason: ALTCHOICE

## 2024-12-13 RX ORDER — ASPIRIN 81 MG/1
81 TABLET ORAL 2 TIMES DAILY
Qty: 60 TABLET | Refills: 0 | Status: SHIPPED | OUTPATIENT
Start: 2024-12-13 | End: 2025-01-12

## 2024-12-13 RX ADMIN — MORPHINE SULFATE 4 MG: 4 INJECTION, SOLUTION INTRAMUSCULAR; INTRAVENOUS at 06:47

## 2024-12-13 RX ADMIN — ISOSORBIDE MONONITRATE 60 MG: 60 TABLET, EXTENDED RELEASE ORAL at 08:09

## 2024-12-13 RX ADMIN — PANTOPRAZOLE SODIUM 40 MG: 40 TABLET, DELAYED RELEASE ORAL at 06:17

## 2024-12-13 RX ADMIN — EZETIMIBE 10 MG: 10 TABLET ORAL at 08:19

## 2024-12-13 RX ADMIN — ASPIRIN 81 MG: 81 TABLET, COATED ORAL at 08:08

## 2024-12-13 RX ADMIN — ACETAMINOPHEN 650 MG: 325 TABLET ORAL at 08:09

## 2024-12-13 RX ADMIN — BACLOFEN 10 MG: 10 TABLET ORAL at 08:10

## 2024-12-13 RX ADMIN — ONDANSETRON 4 MG: 4 TABLET, ORALLY DISINTEGRATING ORAL at 08:49

## 2024-12-13 RX ADMIN — ACETAMINOPHEN 650 MG: 325 TABLET ORAL at 03:42

## 2024-12-13 RX ADMIN — ESCITALOPRAM OXALATE 10 MG: 10 TABLET ORAL at 08:09

## 2024-12-13 RX ADMIN — GUAIFENESIN 600 MG: 600 TABLET ORAL at 04:09

## 2024-12-13 RX ADMIN — LEVOTHYROXINE SODIUM 112.5 MCG: 0.07 TABLET ORAL at 06:17

## 2024-12-13 RX ADMIN — CEFAZOLIN 2000 MG: 2 INJECTION, POWDER, FOR SOLUTION INTRAVENOUS at 06:18

## 2024-12-13 RX ADMIN — OXYCODONE HYDROCHLORIDE 10 MG: 10 TABLET ORAL at 08:24

## 2024-12-13 RX ADMIN — LISINOPRIL 5 MG: 10 TABLET ORAL at 08:10

## 2024-12-13 ASSESSMENT — ENCOUNTER SYMPTOMS
SHORTNESS OF BREATH: 0
BACK PAIN: 0
SORE THROAT: 0
COUGH: 0
ABDOMINAL DISTENTION: 0
NAUSEA: 1
BLOOD IN STOOL: 0
CHEST TIGHTNESS: 0
ABDOMINAL PAIN: 0

## 2024-12-13 ASSESSMENT — PAIN DESCRIPTION - PAIN TYPE: TYPE: ACUTE PAIN;SURGICAL PAIN

## 2024-12-13 ASSESSMENT — PAIN DESCRIPTION - DESCRIPTORS
DESCRIPTORS: SPASM
DESCRIPTORS: DISCOMFORT;ACHING

## 2024-12-13 ASSESSMENT — PAIN SCALES - GENERAL
PAINLEVEL_OUTOF10: 10
PAINLEVEL_OUTOF10: 10

## 2024-12-13 ASSESSMENT — PAIN DESCRIPTION - ORIENTATION
ORIENTATION: RIGHT
ORIENTATION: RIGHT

## 2024-12-13 ASSESSMENT — PAIN - FUNCTIONAL ASSESSMENT: PAIN_FUNCTIONAL_ASSESSMENT: ACTIVITIES ARE NOT PREVENTED

## 2024-12-13 ASSESSMENT — PAIN DESCRIPTION - LOCATION
LOCATION: HIP
LOCATION: HIP

## 2024-12-13 NOTE — PROGRESS NOTES
Discharge paperwork was discussed with patient and patient denied further questions.  Patient was wheeled down to retail pharmacy to pickup medications and then wheeled to discharge bridge where ride was waiting. Electronically signed by Veronica Knapp RN on 12/13/2024 at 2:57 PM

## 2024-12-13 NOTE — PROGRESS NOTES
Creatinine increased from 1.5 to 1.6 despite increase water intake. Pt also with vomiting intermittenly. Pt insisting to go home today. Consulted Dr Valerio for evaluation and plan. May DC home if Dr Valerio feels pt is stable.

## 2024-12-13 NOTE — PROGRESS NOTES
Orders completed this shift:      [x] Surgical site and Neurovascular checks assessed with head to toe assessment and Q4Hr this shift per orders. See flowsheets for documentation.   [x] Insulin protocol implemented per orders, see eMAR for documentation.  [x] Ice pack/pad in place to surgical site. Barrier in place between patient skin and ice pack/pad.   [x]Pain medication administered per orders for management of pain. See eMAR for documentation.   [x] Incentive spirometer performed by patient. Volume achieved 2500. Encouraged patient to perform Q2hr while awake per order.  [x] IV fluids infusing per orders, see eMAR. IVF continued d/t small output from pt since surgery.  [x] Shift intake and output documented per shift, see flowsheet.  [x] Customized care plan and education charted on Qshift.

## 2024-12-13 NOTE — PROGRESS NOTES
MD Zeng messaged via [employer] per pt request. Pt asking for mucinex related to \"hairball feeling\" in his throat. Pt says he takes mucinex at home when this happens.    One time dose mucinex ordered and given per orders.

## 2024-12-13 NOTE — CARE COORDINATION
12/13/24 1029   IMM Letter   Observation Status Letter date given: 12/13/24   Observation Status Letter time given: 1000   Observation Status Letter given to Patient/Family/Significant other/Guardian/POA/by: letter given to patient  hs       
RETURNING to current housing: none  Potential Assistance needed at discharge: Home Care            Potential DME: no   Patient expects to discharge to: House  Plan for transportation at discharge: Family    Financial    Payor: [facility] MEDICARE / Plan: [facility] MEDICARE COMPLETE / Product Type: *No Product type* /     Does insurance require precert for SNF: Yes    Potential assistance Purchasing Medications: No  Meds-to-Beds request: Yes      [id number] - [place], [place] - [address] - P [phone] - F [phone]  [address]  [facility] [address]  Phone: [phone] Fax: [phone]    [facility] [address] [facility] - P [phone] - F [phone]  [address] [facility] [address]  Phone: [phone] Fax: [phone]      Notes:    Factors facilitating achievement of predicted outcomes: Family support, Motivated, and Cooperative    Barriers to discharge: none    Additional Case Management Notes: Spoke with patient and confirmed plan to return home with support of spouse and [facility]. Confirmed address and phone number.   Left message for Katherine and the office at [facility] regarding patient's discharge today.     The Plan for Transition of Care is related to the following treatment goals of Osteoarthritis of right hip [M16.11]  Primary osteoarthritis of right hip [M16.11]    IF APPLICABLE: The Patient and/or patient representative Juan José and his family were provided with a choice of provider and agrees with the discharge plan. Freedom of choice list with basic dialogue that supports the patient's individualized plan of care/goals and shares the quality data associated with the providers was provided to: Patient        The Patient and/or Patient Representative Agree with the Discharge Plan? Yes    ELTON French  Case Management Department  Ph: [phone]

## 2024-12-13 NOTE — PROGRESS NOTES
Upon walking in patients room, patient was throwing up.  Patient requesting discharge because he stated he will not feel better being here.  This RN spoke to NP Khadijah Eugene and reached out to hospitalist Teodoro for further instructions regarding discharge. Electronically signed by Veronica Knapp RN on 12/13/2024 at 11:50 AM

## 2024-12-13 NOTE — PROGRESS NOTES
Physical Therapy    Facility/Department: [address] ORTHOPEDICS    Physical Therapy Initial Assessment    Name: Juan José Rivera    : 1940    MRN: [id number]    Date of Service: 2024    Assessment / Discharge Recommendations:    -right THR  wbat  anterior precautions   -managed well mobilizing from bed to walker to ambulate in room as needed for basic household distances   -he is well practiced with using the walker from prior TKRs and had started using the walker 3 weeks ago due to severity of right hip pain prior to this surgery  -his wife able to assist him at home   -he is following the anterior hip precautions properly           Patient Diagnosis(es): The encounter diagnosis was Primary osteoarthritis of right hip.  Past Medical History:  has a past medical history of Anxiety, Cancer (HCC), Coronary artery disease, GERD (gastroesophageal reflux disease), History of blood transfusion, Hyperlipidemia LDL goal <70, Hypertension, IBS (irritable bowel syndrome), Migraine, Primary osteoarthritis of right knee, Reactive depression, Shingles, Thyroid disease, Wears dentures, and Wears hearing aid in both ears.  Past Surgical History:  has a past surgical history that includes Kidney removal (@ ); Appendectomy; shoulder surgery (Left); Carpal tunnel release (Bilateral); Hemorrhoid surgery; Endoscopy, colon, diagnostic; Coronary artery bypass graft (2015); eye surgery (Bilateral); Colonoscopy (N/A, 10/8/2019); Total knee arthroplasty (Left, 3/11/2020); Total knee arthroplasty (Right, 12/15/2021); blepharoplasty (Bilateral, 2022); Cardiac procedure (N/A, 2024); and Cardiac procedure (N/A, 2024).      Body Structures, Functions, Activity Limitations Requiring Skilled Therapeutic Intervention: Decreased functional mobility ;Decreased strength;Increased pain;Decreased ADL status  Therapy Prognosis: Good  Decision Making: Medium Complexity  Requires PT Follow-Up: Yes  Activity Tolerance  Activity

## 2024-12-13 NOTE — PROGRESS NOTES
Occupational Therapy  Facility/Department: [address] ORTHOPEDICS  Occupational Therapy Initial Assessment    Name: Juan José Rivera  : 1940  MRN: [id number]  Date of Service: 2024    Discharge Recommendations:  Patient would benefit from continued therapy after discharge, 2-3 sessions per week, 24 hour supervision or assist  OT Equipment Recommendations  Equipment Needed: No     Juan José Rivera scored a 19/24 on the AM-PAC ADL Inpatient form. Current research shows that an AM-PAC score of 18 or greater is typically associated with a discharge to the patient's home setting. Based on the patient's AM-PAC score, and their current ADL deficits, it is recommended that the patient have 2-3 sessions per week of Occupational Therapy at d/c to increase the patient's independence.  At this time, this patient demonstrates the endurance and safety to discharge home with initial 24/7 assist and a follow up treatment frequency of 2-3x/wk.   Please see assessment section for further patient specific details.    If patient discharges prior to next session this note will serve as a discharge summary.  Please see below for the latest assessment towards goals.      Patient Diagnosis(es): The encounter diagnosis was Primary osteoarthritis of right hip.  Past Medical History:  has a past medical history of Anxiety, Cancer (HCC), Coronary artery disease, GERD (gastroesophageal reflux disease), History of blood transfusion, Hyperlipidemia LDL goal <70, Hypertension, IBS (irritable bowel syndrome), Migraine, Primary osteoarthritis of right knee, Reactive depression, Shingles, Thyroid disease, Wears dentures, and Wears hearing aid in both ears.  Past Surgical History:  has a past surgical history that includes Kidney removal (@ ); Appendectomy; shoulder surgery (Left); Carpal tunnel release (Bilateral); Hemorrhoid surgery; Endoscopy, colon, diagnostic; Coronary artery bypass graft (2015); eye surgery (Bilateral); Colonoscopy (N/A,

## 2024-12-13 NOTE — CONSULTS
[facility]  History and Physical    Patient's PCP: Leonel Valerio MD  Code Status: Prior  History Obtained From:  patient    CC:\"Elevated creatinine\"    HPI:Juan José Rivera is a 84 y.o. male presents with past medical history of CKD who presented to hospital for hip surgery. He is post op day 1. He is having some nausea and vomiting. Creatinine has increased slightly. 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    Coronary artery disease 12/28/2015    multi vessel CAD    GERD (gastroesophageal reflux disease)     History of blood transfusion     s/p left nephrectomy age 4 years old    Hyperlipidemia LDL goal <70     Hypertension     IBS (irritable bowel syndrome)     Migraine     Primary osteoarthritis of right knee 10/05/2018    Reactive depression 06/08/2016    Shingles 2012    torso, top of head    Thyroid disease     Wears dentures     Wears hearing aid in both ears      Past Surgical History:   Procedure Laterality Date    APPENDECTOMY      BLEPHAROPLASTY Bilateral 12/5/2022    BLEPHAROPLASTY - BILATERAL UPPER LIDS performed by Tara Jimenez MD at [facility]    CARDIAC PROCEDURE N/A 8/6/2024    Left heart cath / coronary angiography performed by Arias Gonzales MD at [facility] CARDIAC CATH LAB    CARDIAC PROCEDURE N/A 8/6/2024    Angioplasty coronary bypass graft performed by Arias Gonzales MD at [facility] CARDIAC CATH LAB    CARPAL TUNNEL RELEASE Bilateral     COLONOSCOPY N/A 10/8/2019    COLONOSCOPY POLYPECTOMY SNARE/COLD BIOPSY performed by Bradley Julian MD at [facility] ENDOSCOPY    CORONARY ARTERY BYPASS GRAFT  2015    x`s 4 vessel    ENDOSCOPY, COLON, DIAGNOSTIC      EGD with dilatation    EYE SURGERY Bilateral     catacts, bilateral and repeat    HEMORRHOID SURGERY      KIDNEY REMOVAL  @ 1944    pt thinks left kidney for

## 2024-12-13 NOTE — PLAN OF CARE
Problem: Discharge Planning  Goal: Discharge to home or other facility with appropriate resources  12/12/2024 1934 by Deborah Workman RN  Outcome: Progressing  Flowsheets (Taken 12/12/2024 1934)  Discharge to home or other facility with appropriate resources: Identify barriers to discharge with patient and caregiver  12/12/2024 1743 by Khloe Subramanian RN  Outcome: Progressing     Problem: Safety - Adult  Goal: Free from fall injury  12/12/2024 1934 by Deborah Workman RN  Outcome: Progressing  Flowsheets (Taken 12/12/2024 1934)  Free From Fall Injury: Instruct family/caregiver on patient safety  12/12/2024 1743 by Khloe Subramanian RN  Outcome: Progressing     Problem: Pain  Goal: Verbalizes/displays adequate comfort level or baseline comfort level  12/12/2024 1934 by Deborah Workman RN  Outcome: Progressing  Flowsheets (Taken 12/12/2024 1934)  Verbalizes/displays adequate comfort level or baseline comfort level: Encourage patient to monitor pain and request assistance  12/12/2024 1743 by Khloe Subramanian RN  Outcome: Progressing     Problem: ABCDS Injury Assessment  Goal: Absence of physical injury  12/12/2024 1934 by Deborah Workman RN  Outcome: Progressing  Flowsheets (Taken 12/12/2024 1934)  Absence of Physical Injury: Implement safety measures based on patient assessment  12/12/2024 1743 by Khloe Subramanian RN  Outcome: Progressing     
  Problem: Discharge Planning  Goal: Discharge to home or other facility with appropriate resources  Outcome: Progressing     Problem: Safety - Adult  Goal: Free from fall injury  Outcome: Progressing     Problem: Pain  Goal: Verbalizes/displays adequate comfort level or baseline comfort level  Outcome: Progressing     Problem: ABCDS Injury Assessment  Goal: Absence of physical injury  Outcome: Progressing     
of pain and evaluate response   Implement non-pharmacological measures as appropriate and evaluate response   Consider cultural and social influences on pain and pain management   Notify Licensed Independent Practitioner if interventions unsuccessful or patient reports new pain  Taken 12/12/2024 1934 by Deborah Workman RN  Verbalizes/displays adequate comfort level or baseline comfort level: Encourage patient to monitor pain and request assistance  12/12/2024 1934 by Deborah Workman RN  Outcome: Progressing  Flowsheets (Taken 12/12/2024 1934)  Verbalizes/displays adequate comfort level or baseline comfort level: Encourage patient to monitor pain and request assistance     Problem: ABCDS Injury Assessment  Goal: Absence of physical injury  12/13/2024 0748 by Veronica Knapp RN  Flowsheets  Taken 12/13/2024 0748 by Veronica Knapp RN  Absence of Physical Injury: Implement safety measures based on patient assessment  Taken 12/12/2024 1934 by Deborah Workman RN  Absence of Physical Injury: Implement safety measures based on patient assessment  12/12/2024 1934 by Deborah Workman RN  Outcome: Progressing  Flowsheets (Taken 12/12/2024 1934)  Absence of Physical Injury: Implement safety measures based on patient assessment

## 2024-12-13 NOTE — PROGRESS NOTES
[facility] Orthopedic Surgery   Progress Note      S/P :  SUBJECTIVE  Up in room with therapies. Alert and oriented. Moves well with walker. . Pain is   described in right hip and with the intensity of moderate. Pain is described as aching.       OBJECTIVE              Physical                      VITALS:  BP (!) 159/69   Pulse 75   Temp 98 °F (36.7 °C) (Oral)   Resp 18   Ht 1.778 m (5' 10\")   Wt 90.2 kg (198 lb 13.7 oz)   SpO2 98%   BMI 28.53 kg/m²                     MUSCULOSKELETAL:  right foot NVI. Wiggles toes to command. Able to plantarflex and dorsiflex ankle Pedal pulses are palpable.                    NEUROLOGIC:                                  Sensory:  Touch:  Right Lower Extremity:  normal                                                 Surgical wound appears clean and dry right hip with Prineo dressing . JANICE hose on.     Data       CBC:   Lab Results   Component Value Date/Time    WBC 6.9 12/05/2024 04:20 PM    RBC 3.95 12/05/2024 04:20 PM    HGB 12.4 12/05/2024 04:20 PM    HCT 37.3 12/05/2024 04:20 PM    MCV 94.5 12/05/2024 04:20 PM    MCH 31.3 12/05/2024 04:20 PM    MCHC 33.1 12/05/2024 04:20 PM    RDW 15.8 12/05/2024 04:20 PM     12/05/2024 04:20 PM    MPV 8.3 12/05/2024 04:20 PM        WBC:    Lab Results   Component Value Date/Time    WBC 6.9 12/05/2024 04:20 PM        Hemoglobin/Hematocrit:    Lab Results   Component Value Date/Time    HGB 12.4 12/05/2024 04:20 PM    HCT 37.3 12/05/2024 04:20 PM        PT/INR:    Lab Results   Component Value Date/Time    PROTIME 12.8 12/05/2024 04:20 PM    INR 0.94 12/05/2024 04:20 PM              Current Inpatient Medications             Current Facility-Administered Medications: sodium chloride 0.9 % bolus 250 mL, 250 mL, IntraVENous, Once  baclofen (LIORESAL) tablet 10 mg, 10 mg, Oral, TID  escitalopram (LEXAPRO) tablet 10 mg, 10 mg, Oral, BID  pantoprazole (PROTONIX) tablet 40 mg, 40 mg, Oral, BID AC  ezetimibe (ZETIA) tablet 10 mg, 10 mg, Oral,

## 2024-12-16 ENCOUNTER — CARE COORDINATION (OUTPATIENT)
Dept: CASE MANAGEMENT | Age: 84
End: 2024-12-16

## 2024-12-16 DIAGNOSIS — M16.11 PRIMARY OSTEOARTHRITIS OF RIGHT HIP: Primary | ICD-10-CM

## 2024-12-16 PROCEDURE — 1111F DSCHRG MED/CURRENT MED MERGE: CPT

## 2024-12-16 NOTE — CARE COORDINATION
with patient. The patient was given an opportunity to ask questions; all questions answered at this time.. The patient verbalized understanding.   Were discharge instructions available to patient? Yes.   Reviewed appropriate site of care based on symptoms and resources available to patient including: PCP  Specialist. The patient agrees to contact the primary care provider and/or specialist office for questions related to their healthcare.      Advance Care Planning:   Does patient have an Advance Directive: reviewed and current.    Medication Reconciliation:  Medication reconciliation was performed with patient,1111F entered: yes.     Remote Patient Monitoring:  Offered patient enrollment in the Remote Patient Monitoring (RPM) program for in-home monitoring: Patient is not eligible for RPM program because: insurance coverage.    Assessments:  Care Transitions 24 Hour Call    Care Transitions Interventions          Follow Up Appointment:   Discussed follow up appointments. Patient has hospital follow up appointment scheduled greater than 14 days after discharge; 12/30/24.   Future Appointments         Provider Specialty Dept Phone    12/30/2024 9:30 AM Matt Salmon MD Orthopedic Surgery [phone]    3/31/2025 10:15 AM Leonel Valerio MD Internal Medicine [phone]    8/20/2025 11:00 AM Cristi Renteria MD Cardiology [phone]            Care Transition Nurse provided contact information.  Plan for follow-up call in 2-5 days based on severity of symptoms and risk factors.  Plan for next call:  FU with incision/dressing status, pain, b/b and any swelling issues. RTHR.      Danielle Mueller RN

## 2024-12-18 NOTE — TELEPHONE ENCOUNTER
Medication:   Requested Prescriptions     Pending Prescriptions Disp Refills    escitalopram (LEXAPRO) 20 MG tablet 90 tablet 3     Sig: Take 1 tablet by mouth daily       Last Filled:      Patient Phone Number: [phone] (home)     Last appt: 12/9/2024   Next appt: 3/31/2025  Future Appointments   Date Time Provider Department Center   12/30/2024  9:30 AM Matt Salmon MD [facility]   3/31/2025 10:15 AM Leonel Valerio MD [facility]   8/20/2025 11:00 AM Cristi Renteria MD [facility]

## 2024-12-19 ENCOUNTER — CARE COORDINATION (OUTPATIENT)
Dept: CASE MANAGEMENT | Age: 84
End: 2024-12-19

## 2024-12-19 NOTE — CARE COORDINATION
Care Transitions Note    Follow Up Call     Patient: Juan José Rivera                                   Patient : 1940   MRN: [id number]                             Reason for Admission: West x 25h obs -> OA R hip s/p GLORIA  Discharge Date: 24     RURS: Readmission Risk Score: 10.5    Patient Current Location:  Home: [address] [place] [address]    Care Transition Nurse contacted the spouse/partner  by telephone. Verified name and  as identifiers.    Additional needs identified to be addressed with provider   No needs identified                 Method of communication with provider: none.    Care Summary Note:   Wife Raven reports Juan José is resting. He is doing well. Continues to work with home care therapy. Pain is well controlled. Bowels are moving baseline. Appetite still not yet to baseline. She is encouraging snacks frequently. He is staying hydrated. He doesn't like the protein shakes and meal replacements so not an option. Overall doing well she feels. Denies needs. Reviewed upcoming HFU with surgeon. She is aware and agreeable to CTN outreach after that appointment.     Plan of care updates since last contact:  Review of patient management of conditions/medications:       Advance Care Planning:   Does patient have an Advance Directive:   Primary Decision Maker: Raven Rivera - Spouse - [phone] .    Medication Review:  No changes since last call.     Remote Patient Monitoring:  Offered patient enrollment in the Remote Patient Monitoring (RPM) program for in-home monitoring: Patient is not eligible for RPM program because: patient does not have qualifying diagnosis.    Assessments:  No changes since last call    Follow Up Appointment:   Reviewed upcoming appointment(s).  Future Appointments         Provider Specialty Dept Phone    2024 9:30 AM Matt Salmon MD Orthopedic Surgery [phone]    3/31/2025 10:15 AM Leonel Valerio MD Internal Medicine [phone]    2025

## 2024-12-20 RX ORDER — ESCITALOPRAM OXALATE 20 MG/1
20 TABLET ORAL DAILY
Qty: 90 TABLET | Refills: 3 | Status: SHIPPED | OUTPATIENT
Start: 2024-12-20

## 2024-12-23 RX ORDER — BACLOFEN 10 MG/1
10 TABLET ORAL 3 TIMES DAILY
Qty: 90 TABLET | Refills: 3 | Status: SHIPPED | OUTPATIENT
Start: 2024-12-23

## 2024-12-27 ENCOUNTER — TELEPHONE (OUTPATIENT)
Dept: ORTHOPEDIC SURGERY | Age: 84
End: 2024-12-27

## 2024-12-27 NOTE — TELEPHONE ENCOUNTER
Medical Facility Question     Facility Name: [facility]   Contact Name: MADISON   Contact Number: [phone] ext [phone]  Request or Information: PLAN OF CARE       [facility] CALLING TO GET THE PLAN OF CARE FOR PATIENT     PLEASE FAX [phone]

## 2024-12-27 NOTE — TELEPHONE ENCOUNTER
S/P R GLORIA; DOS 12/12/24. [facility], right hip.

## 2024-12-30 ENCOUNTER — OFFICE VISIT (OUTPATIENT)
Dept: ORTHOPEDIC SURGERY | Age: 84
End: 2024-12-30

## 2024-12-30 DIAGNOSIS — Z96.641 STATUS POST TOTAL HIP REPLACEMENT, RIGHT: Primary | ICD-10-CM

## 2024-12-30 PROCEDURE — 99024 POSTOP FOLLOW-UP VISIT: CPT | Performed by: ORTHOPAEDIC SURGERY

## 2024-12-30 NOTE — PROGRESS NOTES
[facility]  Office Visit    Chief Complaint: Follow-up s/p right total hip arthroplasty    HPI:  Juan José Rivera is a 84 y.o. who is here in follow-up of right total hip arthroplasty performed on December 12, 2024.  He is walking with use of a cane and working on self-directed therapy exercises.  He takes Tylenol as needed.  He avoids narcotics due to issues with itching.  He also takes baclofen.  He rates pain as 2/10 and it is improved compared to before surgery.    Exam:  Appearance: sitting in exam room chair, appears to be in no acute distress, awake and alert  Resp: unlabored breathing on room air  Skin: warm, dry and intact with out erythema or significant increased temperature  Neuro: grossly intact both lower extremities. Intact sensation to light touch. Motor exam 4+ to 5/5 in all major motor groups.  Right hip: Incision is healing as expected.  Examination demonstrates negative logroll and negative Stinchfield.  There is brisk capillary refill.  He demonstrates active hip flexion.    Imaging:  3 views of the right hip were performed and reviewed today. Significant for total hip arthroplasty prosthesis in place with no signs of osteolysis, loosening, fracture, or dislocation.    Assessment:  S/p right total hip arthroplasty    Plan:  He is recovering well postoperatively.  He will continue self-directed physical therapy exercises and using Tylenol as needed for pain control.  Follow-up for repeat assessment in 4 weeks.    This dictation was done with Dragon dictation and may contain mechanical errors related to translation.

## 2025-01-02 ENCOUNTER — CARE COORDINATION (OUTPATIENT)
Dept: CASE MANAGEMENT | Age: 85
End: 2025-01-02

## 2025-01-02 NOTE — CARE COORDINATION
Care Transitions Note    Follow Up Call     Patient: Juan José Rivera                                   Patient : 1940   MRN: [id number]                             Reason for Admission: West x 25h obs -> OA right hip s/p GLORIA  Discharge Date: 24     RURS: Readmission Risk Score: 10.5    Patient Current Location:  Home: [address] [place] [address]    Care Transition Nurse contacted the patient by telephone. Verified name and  as identifiers.    Additional needs identified to be addressed with provider   No needs identified         Method of communication with provider: none.    Care Summary Note:   Completed OV with Dr Salmon. Doing home exercises and does not need to continue formal therapy. Back to driving. Pain well controlled. Denies needs/concerns.     Plan of care updates since last contact:  Review of patient management of conditions/medications:       Advance Care Planning:   Does patient have an Advance Directive: reviewed during previous call, see note. .    Medication Review:  No changes since last call.     Remote Patient Monitoring:  Offered patient enrollment in the Remote Patient Monitoring (RPM) program for in-home monitoring: did not enroll.    Assessments:  No changes since last call    Follow Up Appointment:   Reviewed upcoming appointment(s).  Future Appointments         Provider Specialty Dept Phone    2025 11:00 AM Matt Salmon MD Orthopedic Surgery [phone]    3/31/2025 10:15 AM Leonel Valerio MD Internal Medicine [phone]    2025 11:00 AM Cristi Renteria MD Cardiology [phone]          Patient graduated from the Care Transitions program on 25.    Handoff:   Patient was not referred to the ACM team due to no additional needs identified.       Patient has agreed to contact primary care provider and/or specialist for any further questions, concerns, or needs.    Yvette Mueller RN

## 2025-01-08 ENCOUNTER — LAB (OUTPATIENT)
Dept: INTERNAL MEDICINE CLINIC | Age: 85
End: 2025-01-08
Payer: MEDICARE

## 2025-01-08 DIAGNOSIS — E78.5 HYPERLIPIDEMIA LDL GOAL <70: Primary | ICD-10-CM

## 2025-01-08 DIAGNOSIS — N18.31 STAGE 3A CHRONIC KIDNEY DISEASE (HCC): Primary | ICD-10-CM

## 2025-01-08 LAB
ALBUMIN SERPL-MCNC: 3.9 G/DL (ref 3.4–5)
ALBUMIN/GLOB SERPL: 1.9 {RATIO} (ref 1.1–2.2)
ALP SERPL-CCNC: 93 U/L (ref 40–129)
ALT SERPL-CCNC: 18 U/L (ref 10–40)
ANION GAP SERPL CALCULATED.3IONS-SCNC: 12 MMOL/L (ref 3–16)
AST SERPL-CCNC: 25 U/L (ref 15–37)
BILIRUB SERPL-MCNC: <0.2 MG/DL (ref 0–1)
BUN SERPL-MCNC: 14 MG/DL (ref 7–20)
CALCIUM SERPL-MCNC: 9.1 MG/DL (ref 8.3–10.6)
CHLORIDE SERPL-SCNC: 106 MMOL/L (ref 99–110)
CO2 SERPL-SCNC: 24 MMOL/L (ref 21–32)
CREAT SERPL-MCNC: 1.2 MG/DL (ref 0.8–1.3)
GFR SERPLBLD CREATININE-BSD FMLA CKD-EPI: 60 ML/MIN/{1.73_M2}
GLUCOSE SERPL-MCNC: 92 MG/DL (ref 70–99)
POTASSIUM SERPL-SCNC: 4.2 MMOL/L (ref 3.5–5.1)
PROT SERPL-MCNC: 6 G/DL (ref 6.4–8.2)
SODIUM SERPL-SCNC: 142 MMOL/L (ref 136–145)

## 2025-01-08 PROCEDURE — 36415 COLL VENOUS BLD VENIPUNCTURE: CPT | Performed by: STUDENT IN AN ORGANIZED HEALTH CARE EDUCATION/TRAINING PROGRAM

## 2025-01-15 DIAGNOSIS — E03.9 ACQUIRED HYPOTHYROIDISM: ICD-10-CM

## 2025-01-15 RX ORDER — LEVOTHYROXINE SODIUM 75 UG/1
112.5 TABLET ORAL DAILY
Qty: 135 TABLET | Refills: 3 | Status: SHIPPED | OUTPATIENT
Start: 2025-01-15

## 2025-01-15 NOTE — TELEPHONE ENCOUNTER
Future Appointments   Date Time Provider Department Center   1/30/2025 11:00 AM Matt Salmon MD [facility]   3/31/2025 10:15 AM Leonel Valerio MD [facility]   8/20/2025 11:00 AM Cristi Renteria MD [facility]       Last appt 12/9/24

## 2025-01-30 ENCOUNTER — OFFICE VISIT (OUTPATIENT)
Dept: ORTHOPEDIC SURGERY | Age: 85
End: 2025-01-30

## 2025-01-30 DIAGNOSIS — Z96.641 STATUS POST TOTAL HIP REPLACEMENT, RIGHT: Primary | ICD-10-CM

## 2025-01-30 PROCEDURE — 99024 POSTOP FOLLOW-UP VISIT: CPT | Performed by: PHYSICIAN ASSISTANT

## 2025-01-30 NOTE — PROGRESS NOTES
This dictation was done with [employer] dictation and may contain mechanical errors related to translation.  There were no vitals taken for this visit.    This is a very pleasant 84-year-old gentleman who recently had a right total hip replacement 12/12/2024.  From the [facility] he was able to walk on it for long peers of time and has very little pain except when he went to put his shoe on a few days ago and I think what he did was a pulled muscle in the back he is getting some spasms and I am going to call in a muscle relaxer for that.    At today's visit he shows good hip strength with hip flexion abduction he is walking well without antalgia has no neurologic deficits he is got some dependent edema in the right calf is always been larger than the left calf but it is negative for Daksha or Bradley's test.    Stable healing right total hip replacement    Will follow-up in 5 weeks for repeat examination I did suggest talking to his family doctor for some peripheral edema that he has bilaterally

## 2025-01-31 ENCOUNTER — PATIENT MESSAGE (OUTPATIENT)
Dept: INTERNAL MEDICINE CLINIC | Age: 85
End: 2025-01-31

## 2025-02-06 RX ORDER — HYDROCHLOROTHIAZIDE 25 MG/1
25 TABLET ORAL EVERY MORNING
Qty: 90 TABLET | Refills: 1 | Status: SHIPPED | OUTPATIENT
Start: 2025-02-06

## 2025-02-13 ENCOUNTER — OFFICE VISIT (OUTPATIENT)
Dept: ORTHOPEDIC SURGERY | Age: 85
End: 2025-02-13

## 2025-02-13 VITALS — BODY MASS INDEX: 28.35 KG/M2 | HEIGHT: 70 IN | RESPIRATION RATE: 16 BRPM | WEIGHT: 198 LBS

## 2025-02-13 DIAGNOSIS — M19.012 ARTHRITIS OF LEFT SHOULDER REGION: ICD-10-CM

## 2025-02-13 DIAGNOSIS — M25.522 LEFT ELBOW PAIN: ICD-10-CM

## 2025-02-13 DIAGNOSIS — M75.82 ROTATOR CUFF TENDINITIS, LEFT: Primary | ICD-10-CM

## 2025-02-13 RX ORDER — BUPIVACAINE HYDROCHLORIDE 2.5 MG/ML
2 INJECTION, SOLUTION INFILTRATION; PERINEURAL ONCE
Status: COMPLETED | OUTPATIENT
Start: 2025-02-13 | End: 2025-02-13

## 2025-02-13 RX ORDER — TRIAMCINOLONE ACETONIDE 40 MG/ML
40 INJECTION, SUSPENSION INTRA-ARTICULAR; INTRAMUSCULAR ONCE
Status: COMPLETED | OUTPATIENT
Start: 2025-02-13 | End: 2025-02-13

## 2025-02-13 RX ORDER — METHYLPREDNISOLONE 4 MG/1
TABLET ORAL
Qty: 1 KIT | Refills: 0 | Status: SHIPPED | OUTPATIENT
Start: 2025-02-13 | End: 2025-02-19

## 2025-02-13 RX ADMIN — BUPIVACAINE HYDROCHLORIDE 5 MG: 2.5 INJECTION, SOLUTION INFILTRATION; PERINEURAL at 11:10

## 2025-02-13 RX ADMIN — TRIAMCINOLONE ACETONIDE 40 MG: 40 INJECTION, SUSPENSION INTRA-ARTICULAR; INTRAMUSCULAR at 11:10

## 2025-02-13 NOTE — PROGRESS NOTES
This dictation was done with [employer] dictation and may contain mechanical errors related to translation.    I have today reviewed with Juan José Rivera the clinically relevant, past medical history, medications, allergies, family history, social history, and Review Of Systems form the patient’s most recent history form & I have documented any details relevant to today's presenting complaints in my history below. Mr. Juan José Rivera's self-reported past medical history, medications, allergies, family history, social history, and Review Of Systems form has been scanned into the chart under the \"Media\" tab.    Subjective:  Juan José Rivera is a 84 y.o. who is here as well established patient complaining of a return of pain in his left shoulder which was successfully treated with cortisone shot back in September 2024.  Now with doing his blood work he is developing left lateral elbow pain.  I sent in for up-to-date x-rays including an AP transaxillary view and Y view of his left shoulder as well as a left elbow AP and lateral      Patient Active Problem List   Diagnosis    Chest pain on exertion    Essential hypertension    Hx of CABG    Hyperlipidemia LDL goal <70    Reactive depression    Prostate carcinoma (HCC)    Vitamin D deficiency    Left hip pain    Chronic left-sided low back pain without sciatica    Coronary artery disease    Edema of right lower extremity    Greater trochanteric bursitis of left hip    Primary osteoarthritis of right knee    Primary osteoarthritis of left knee    Chronic fatigue    Acquired hypothyroidism    Gastroesophageal reflux disease without esophagitis    History of total left knee replacement (TKR)    Intractable chronic migraine without aura and without status migrainosus    History of total right knee replacement (TKR)    Bilateral hearing loss    Stage 3a chronic kidney disease (HCC)    Bronchitis    Thrush    Pharyngitis    Chest pain    Osteoarthritis of right hip    Primary osteoarthritis

## 2025-02-17 RX ORDER — ROSUVASTATIN CALCIUM 40 MG/1
40 TABLET, COATED ORAL DAILY
Qty: 90 TABLET | Refills: 3 | Status: SHIPPED | OUTPATIENT
Start: 2025-02-17

## 2025-02-17 NOTE — TELEPHONE ENCOUNTER
Last OV: 8/19/24  Next OV: 8/20/25  Last refill: 1/26/24 #90 5 R/F  Most recent Labs: 1/8/25  Last EKG (if needed): 12/9/24

## 2025-03-06 ENCOUNTER — OFFICE VISIT (OUTPATIENT)
Dept: ORTHOPEDIC SURGERY | Age: 85
End: 2025-03-06

## 2025-03-06 DIAGNOSIS — Z96.641 STATUS POST TOTAL HIP REPLACEMENT, RIGHT: Primary | ICD-10-CM

## 2025-03-06 PROCEDURE — 99024 POSTOP FOLLOW-UP VISIT: CPT | Performed by: ORTHOPAEDIC SURGERY

## 2025-03-06 RX ORDER — METHYLPREDNISOLONE 4 MG/1
TABLET ORAL
Qty: 1 KIT | Refills: 0 | Status: SHIPPED | OUTPATIENT
Start: 2025-03-06

## 2025-03-07 NOTE — PROGRESS NOTES
[facility]  Office Visit    Chief Complaint: Follow-up s/p right total hip arthroplasty    HPI:  Juan José Rivera is a 84 y.o. who is here in follow-up of right total hip arthroplasty performed on December 12, 2024.  He walks without assistive device and is doing very well overall.  He has essentially no issue with the right hip and is doing much better than he was before surgery.  He denies right knee pain.  He does report a 2-week history of low back pain that radiates down his right buttock.  This began after a long drive..  The pain is nonradiating and symptoms are worse in the mornings.  He takes ibuprofen to help relieve his symptoms.    Exam:  Appearance: sitting in exam room chair, appears to be in no acute distress, awake and alert  Resp: unlabored breathing on room air  Skin: warm, dry and intact with out erythema or significant increased temperature  Neuro: grossly intact both lower extremities. Intact sensation to light touch. Motor exam 4+ to 5/5 in all major motor groups.  Right hip: Incision is healed.  Examination demonstrates negative logroll and negative Stinchfield.  There is brisk capillary refill.  He demonstrates active hip flexion.    Imaging:  3 views of the right hip were performed and reviewed today. Significant for total hip arthroplasty prosthesis in place with no signs of osteolysis, loosening, fracture, or dislocation.    Assessment:  S/p right total hip arthroplasty    Plan:  His right hip is doing very well 3 months postoperatively and he may continue activity as tolerated. We discussed with the patient today the use of antibiotic prophylaxis prior to any dental procedures.  We discussed that the antibiotic prophylaxis would be used to help prevent periprosthetic joint infections.  If they were not offered antibiotics by the physician performing the procedure, they should contact our office that we may prescribe them antibiotics.     We discussed his low

## 2025-03-31 ENCOUNTER — OFFICE VISIT (OUTPATIENT)
Dept: INTERNAL MEDICINE CLINIC | Age: 85
End: 2025-03-31
Payer: MEDICARE

## 2025-03-31 ENCOUNTER — TELEPHONE (OUTPATIENT)
Dept: INTERNAL MEDICINE CLINIC | Age: 85
End: 2025-03-31

## 2025-03-31 VITALS
WEIGHT: 200 LBS | HEART RATE: 54 BPM | DIASTOLIC BLOOD PRESSURE: 72 MMHG | BODY MASS INDEX: 28.7 KG/M2 | OXYGEN SATURATION: 98 % | SYSTOLIC BLOOD PRESSURE: 120 MMHG

## 2025-03-31 DIAGNOSIS — C61 MALIGNANT NEOPLASM OF PROSTATE (HCC): ICD-10-CM

## 2025-03-31 DIAGNOSIS — I10 ESSENTIAL HYPERTENSION: ICD-10-CM

## 2025-03-31 DIAGNOSIS — I25.10 CORONARY ARTERY DISEASE INVOLVING NATIVE CORONARY ARTERY OF NATIVE HEART WITHOUT ANGINA PECTORIS: ICD-10-CM

## 2025-03-31 DIAGNOSIS — E03.9 ACQUIRED HYPOTHYROIDISM: Primary | ICD-10-CM

## 2025-03-31 DIAGNOSIS — R79.9 ABNORMAL FINDING OF BLOOD CHEMISTRY, UNSPECIFIED: ICD-10-CM

## 2025-03-31 DIAGNOSIS — Z12.5 ENCOUNTER FOR SCREENING FOR MALIGNANT NEOPLASM OF PROSTATE: ICD-10-CM

## 2025-03-31 DIAGNOSIS — E55.9 VITAMIN D DEFICIENCY, UNSPECIFIED: ICD-10-CM

## 2025-03-31 DIAGNOSIS — N18.31 STAGE 3A CHRONIC KIDNEY DISEASE (HCC): ICD-10-CM

## 2025-03-31 LAB
ALBUMIN SERPL-MCNC: 4.2 G/DL (ref 3.4–5)
ALBUMIN/GLOB SERPL: 1.9 {RATIO} (ref 1.1–2.2)
ALP SERPL-CCNC: 78 U/L (ref 40–129)
ALT SERPL-CCNC: 23 U/L (ref 10–40)
ANION GAP SERPL CALCULATED.3IONS-SCNC: 10 MMOL/L (ref 3–16)
AST SERPL-CCNC: 24 U/L (ref 15–37)
BASOPHILS # BLD: 0 K/UL (ref 0–0.2)
BASOPHILS NFR BLD: 0.7 %
BILIRUB SERPL-MCNC: 0.4 MG/DL (ref 0–1)
BUN SERPL-MCNC: 22 MG/DL (ref 7–20)
CALCIUM SERPL-MCNC: 9.7 MG/DL (ref 8.3–10.6)
CHLORIDE SERPL-SCNC: 106 MMOL/L (ref 99–110)
CHOLEST SERPL-MCNC: 171 MG/DL (ref 0–199)
CO2 SERPL-SCNC: 27 MMOL/L (ref 21–32)
CREAT SERPL-MCNC: 1.3 MG/DL (ref 0.8–1.3)
DEPRECATED RDW RBC AUTO: 15.4 % (ref 12.4–15.4)
EOSINOPHIL # BLD: 0.2 K/UL (ref 0–0.6)
EOSINOPHIL NFR BLD: 3.3 %
GFR SERPLBLD CREATININE-BSD FMLA CKD-EPI: 54 ML/MIN/{1.73_M2}
GLUCOSE SERPL-MCNC: 88 MG/DL (ref 70–99)
HCT VFR BLD AUTO: 40.6 % (ref 40.5–52.5)
HDLC SERPL-MCNC: 49 MG/DL (ref 40–60)
HGB BLD-MCNC: 13.5 G/DL (ref 13.5–17.5)
LDLC SERPL CALC-MCNC: 89 MG/DL
LYMPHOCYTES # BLD: 0.9 K/UL (ref 1–5.1)
LYMPHOCYTES NFR BLD: 16.5 %
MCH RBC QN AUTO: 29.2 PG (ref 26–34)
MCHC RBC AUTO-ENTMCNC: 33.1 G/DL (ref 31–36)
MCV RBC AUTO: 88.2 FL (ref 80–100)
MONOCYTES # BLD: 0.6 K/UL (ref 0–1.3)
MONOCYTES NFR BLD: 10 %
NEUTROPHILS # BLD: 3.9 K/UL (ref 1.7–7.7)
NEUTROPHILS NFR BLD: 69.5 %
PLATELET # BLD AUTO: 146 K/UL (ref 135–450)
PMV BLD AUTO: 8.6 FL (ref 5–10.5)
POTASSIUM SERPL-SCNC: 5 MMOL/L (ref 3.5–5.1)
PROT SERPL-MCNC: 6.4 G/DL (ref 6.4–8.2)
PSA SERPL DL<=0.01 NG/ML-MCNC: 0.11 NG/ML (ref 0–4)
RBC # BLD AUTO: 4.61 M/UL (ref 4.2–5.9)
SODIUM SERPL-SCNC: 143 MMOL/L (ref 136–145)
TRIGL SERPL-MCNC: 164 MG/DL (ref 0–150)
VLDLC SERPL CALC-MCNC: 33 MG/DL
WBC # BLD AUTO: 5.6 K/UL (ref 4–11)

## 2025-03-31 PROCEDURE — 3078F DIAST BP <80 MM HG: CPT | Performed by: STUDENT IN AN ORGANIZED HEALTH CARE EDUCATION/TRAINING PROGRAM

## 2025-03-31 PROCEDURE — G8427 DOCREV CUR MEDS BY ELIG CLIN: HCPCS | Performed by: STUDENT IN AN ORGANIZED HEALTH CARE EDUCATION/TRAINING PROGRAM

## 2025-03-31 PROCEDURE — 1123F ACP DISCUSS/DSCN MKR DOCD: CPT | Performed by: STUDENT IN AN ORGANIZED HEALTH CARE EDUCATION/TRAINING PROGRAM

## 2025-03-31 PROCEDURE — G8417 CALC BMI ABV UP PARAM F/U: HCPCS | Performed by: STUDENT IN AN ORGANIZED HEALTH CARE EDUCATION/TRAINING PROGRAM

## 2025-03-31 PROCEDURE — 3074F SYST BP LT 130 MM HG: CPT | Performed by: STUDENT IN AN ORGANIZED HEALTH CARE EDUCATION/TRAINING PROGRAM

## 2025-03-31 PROCEDURE — 1159F MED LIST DOCD IN RCRD: CPT | Performed by: STUDENT IN AN ORGANIZED HEALTH CARE EDUCATION/TRAINING PROGRAM

## 2025-03-31 PROCEDURE — 1036F TOBACCO NON-USER: CPT | Performed by: STUDENT IN AN ORGANIZED HEALTH CARE EDUCATION/TRAINING PROGRAM

## 2025-03-31 PROCEDURE — 36415 COLL VENOUS BLD VENIPUNCTURE: CPT | Performed by: STUDENT IN AN ORGANIZED HEALTH CARE EDUCATION/TRAINING PROGRAM

## 2025-03-31 PROCEDURE — 99213 OFFICE O/P EST LOW 20 MIN: CPT | Performed by: STUDENT IN AN ORGANIZED HEALTH CARE EDUCATION/TRAINING PROGRAM

## 2025-03-31 ASSESSMENT — PATIENT HEALTH QUESTIONNAIRE - PHQ9
10. IF YOU CHECKED OFF ANY PROBLEMS, HOW DIFFICULT HAVE THESE PROBLEMS MADE IT FOR YOU TO DO YOUR WORK, TAKE CARE OF THINGS AT HOME, OR GET ALONG WITH OTHER PEOPLE: NOT DIFFICULT AT ALL
SUM OF ALL RESPONSES TO PHQ QUESTIONS 1-9: 2
SUM OF ALL RESPONSES TO PHQ QUESTIONS 1-9: 2
2. FEELING DOWN, DEPRESSED OR HOPELESS: NOT AT ALL
1. LITTLE INTEREST OR PLEASURE IN DOING THINGS: NOT AT ALL
9. THOUGHTS THAT YOU WOULD BE BETTER OFF DEAD, OR OF HURTING YOURSELF: NOT AT ALL
SUM OF ALL RESPONSES TO PHQ QUESTIONS 1-9: 2
8. MOVING OR SPEAKING SO SLOWLY THAT OTHER PEOPLE COULD HAVE NOTICED. OR THE OPPOSITE, BEING SO FIGETY OR RESTLESS THAT YOU HAVE BEEN MOVING AROUND A LOT MORE THAN USUAL: NOT AT ALL
SUM OF ALL RESPONSES TO PHQ QUESTIONS 1-9: 2
4. FEELING TIRED OR HAVING LITTLE ENERGY: SEVERAL DAYS
5. POOR APPETITE OR OVEREATING: SEVERAL DAYS
3. TROUBLE FALLING OR STAYING ASLEEP: NOT AT ALL
7. TROUBLE CONCENTRATING ON THINGS, SUCH AS READING THE NEWSPAPER OR WATCHING TELEVISION: NOT AT ALL
6. FEELING BAD ABOUT YOURSELF - OR THAT YOU ARE A FAILURE OR HAVE LET YOURSELF OR YOUR FAMILY DOWN: NOT AT ALL

## 2025-03-31 NOTE — PROGRESS NOTES
conjunctiva bilaterally  Ears: Normal TM and external canal bilaterally.  Oral cavity/Throat: No pharyngeal erythema. No oral lesions.  Cardiovascular: Heart is regular rate and rhythm. No murmurs noted. Radial pulses 2+. Posterior tibial pulses 2+. No lower extremity edema noted  Pulmonary: Lungs clear to auscultation bilaterally  Skin: Dry, warm. No obvious skin lesions or rashes noted.  Neuro: PEERL. EOM intact. No facial droop. Normal sensation in bilateral upper and lower extremities. Gait is normal    Assessment and Plan  Juan José E Mainspresents to clinic for follow-up on chronic conditions listed above below and discussion of preventative care recommendations.       Assessment & Plan  Acquired hypothyroidism       Orders:    Vitamin D 25 Hydroxy    Lipid Panel    Hemoglobin A1C    Comprehensive Metabolic Panel    CBC with Auto Differential    PSA Screening    Malignant neoplasm of prostate (HCC)       Orders:    Vitamin D 25 Hydroxy    Lipid Panel    Hemoglobin A1C    Comprehensive Metabolic Panel    CBC with Auto Differential    PSA Screening    Testosterone ([place] Only)    Stage 3a chronic kidney disease (HCC)            Essential hypertension   Blood pressure is well controlled         Coronary artery disease involving native coronary artery of native heart without angina pectoris   No chest pain today    Orders:    Vitamin D 25 Hydroxy    Lipid Panel    Hemoglobin A1C    Comprehensive Metabolic Panel    CBC with Auto Differential    PSA Screening    Vitamin D deficiency, unspecified       Orders:    Vitamin D 25 Hydroxy    Abnormal finding of blood chemistry, unspecified       Orders:    Hemoglobin A1C    Encounter for screening for malignant neoplasm of prostate       Orders:    PSA Screening

## 2025-03-31 NOTE — ASSESSMENT & PLAN NOTE
No chest pain today    Orders:    Vitamin D 25 Hydroxy    Lipid Panel    Hemoglobin A1C    Comprehensive Metabolic Panel    CBC with Auto Differential    PSA Screening

## 2025-03-31 NOTE — ASSESSMENT & PLAN NOTE
Orders:    Vitamin D 25 Hydroxy    Lipid Panel    Hemoglobin A1C    Comprehensive Metabolic Panel    CBC with Auto Differential    PSA Screening

## 2025-03-31 NOTE — TELEPHONE ENCOUNTER
He is taking Potas   550 mg a day and vit B12 1,000 mg  and they are both taking hair skin & nails over the counter .    Any question please call them back ......

## 2025-04-01 LAB
25(OH)D3 SERPL-MCNC: 50.3 NG/ML
EST. AVERAGE GLUCOSE BLD GHB EST-MCNC: 122.6 MG/DL
HBA1C MFR BLD: 5.9 %

## 2025-04-02 LAB — TESTOST SERPL-MCNC: 183 NG/DL (ref 193–740)

## 2025-04-07 ENCOUNTER — RESULTS FOLLOW-UP (OUTPATIENT)
Dept: INTERNAL MEDICINE CLINIC | Age: 85
End: 2025-04-07

## 2025-06-06 ENCOUNTER — OFFICE VISIT (OUTPATIENT)
Dept: INTERNAL MEDICINE CLINIC | Age: 85
End: 2025-06-06
Payer: MEDICARE

## 2025-06-06 VITALS
OXYGEN SATURATION: 97 % | DIASTOLIC BLOOD PRESSURE: 56 MMHG | HEART RATE: 67 BPM | SYSTOLIC BLOOD PRESSURE: 102 MMHG | BODY MASS INDEX: 29.24 KG/M2 | WEIGHT: 203.8 LBS

## 2025-06-06 DIAGNOSIS — R60.0 PERIPHERAL EDEMA: ICD-10-CM

## 2025-06-06 DIAGNOSIS — N18.31 STAGE 3A CHRONIC KIDNEY DISEASE (HCC): Primary | ICD-10-CM

## 2025-06-06 LAB
ALBUMIN SERPL-MCNC: 4.2 G/DL (ref 3.4–5)
ALBUMIN/GLOB SERPL: 2.1 {RATIO} (ref 1.1–2.2)
ALP SERPL-CCNC: 77 U/L (ref 40–129)
ALT SERPL-CCNC: 17 U/L (ref 10–40)
ANION GAP SERPL CALCULATED.3IONS-SCNC: 11 MMOL/L (ref 3–16)
AST SERPL-CCNC: 24 U/L (ref 15–37)
BILIRUB SERPL-MCNC: 0.3 MG/DL (ref 0–1)
BUN SERPL-MCNC: 25 MG/DL (ref 7–20)
CALCIUM SERPL-MCNC: 9.2 MG/DL (ref 8.3–10.6)
CHLORIDE SERPL-SCNC: 108 MMOL/L (ref 99–110)
CO2 SERPL-SCNC: 22 MMOL/L (ref 21–32)
CREAT SERPL-MCNC: 1.5 MG/DL (ref 0.8–1.3)
GFR SERPLBLD CREATININE-BSD FMLA CKD-EPI: 46 ML/MIN/{1.73_M2}
GLUCOSE P FAST SERPL-MCNC: 94 MG/DL (ref 70–99)
NT-PROBNP SERPL-MCNC: 276 PG/ML (ref 0–449)
POTASSIUM SERPL-SCNC: 4.4 MMOL/L (ref 3.5–5.1)
PROT SERPL-MCNC: 6.2 G/DL (ref 6.4–8.2)
SODIUM SERPL-SCNC: 141 MMOL/L (ref 136–145)

## 2025-06-06 PROCEDURE — 1036F TOBACCO NON-USER: CPT | Performed by: STUDENT IN AN ORGANIZED HEALTH CARE EDUCATION/TRAINING PROGRAM

## 2025-06-06 PROCEDURE — G8427 DOCREV CUR MEDS BY ELIG CLIN: HCPCS | Performed by: STUDENT IN AN ORGANIZED HEALTH CARE EDUCATION/TRAINING PROGRAM

## 2025-06-06 PROCEDURE — 3074F SYST BP LT 130 MM HG: CPT | Performed by: STUDENT IN AN ORGANIZED HEALTH CARE EDUCATION/TRAINING PROGRAM

## 2025-06-06 PROCEDURE — 1159F MED LIST DOCD IN RCRD: CPT | Performed by: STUDENT IN AN ORGANIZED HEALTH CARE EDUCATION/TRAINING PROGRAM

## 2025-06-06 PROCEDURE — G8417 CALC BMI ABV UP PARAM F/U: HCPCS | Performed by: STUDENT IN AN ORGANIZED HEALTH CARE EDUCATION/TRAINING PROGRAM

## 2025-06-06 PROCEDURE — 3078F DIAST BP <80 MM HG: CPT | Performed by: STUDENT IN AN ORGANIZED HEALTH CARE EDUCATION/TRAINING PROGRAM

## 2025-06-06 PROCEDURE — 99213 OFFICE O/P EST LOW 20 MIN: CPT | Performed by: STUDENT IN AN ORGANIZED HEALTH CARE EDUCATION/TRAINING PROGRAM

## 2025-06-06 PROCEDURE — 1123F ACP DISCUSS/DSCN MKR DOCD: CPT | Performed by: STUDENT IN AN ORGANIZED HEALTH CARE EDUCATION/TRAINING PROGRAM

## 2025-06-06 PROCEDURE — 36415 COLL VENOUS BLD VENIPUNCTURE: CPT | Performed by: STUDENT IN AN ORGANIZED HEALTH CARE EDUCATION/TRAINING PROGRAM

## 2025-06-06 ASSESSMENT — ENCOUNTER SYMPTOMS
COLOR CHANGE: 0
WHEEZING: 0
PHOTOPHOBIA: 0
SHORTNESS OF BREATH: 0
COUGH: 0
DIARRHEA: 0
NAUSEA: 0
VOMITING: 0

## 2025-06-06 NOTE — PROGRESS NOTES
Juan José Rivera (:  1940) is a 84 y.o. male,Established patient, here for evaluation of the following chief complaint(s): Lower extremity swelling.    Juan José is an 84 year old male with past medical history as noted below.    Active Ambulatory Problems     Diagnosis Date Noted    Chest pain on exertion 2015    Essential hypertension     Hx of CABG     Hyperlipidemia LDL goal <70     Reactive depression 2016    Prostate carcinoma (HCC) 2016    Vitamin D deficiency 2017    Left hip pain 2017    Chronic left-sided low back pain without sciatica 2017    Coronary artery disease 2015    Edema of right lower extremity 01/10/2018    Greater trochanteric bursitis of left hip 2018    Primary osteoarthritis of right knee 10/05/2018    Primary osteoarthritis of left knee 10/05/2018    Chronic fatigue 2019    Acquired hypothyroidism 2019    Gastroesophageal reflux disease without esophagitis 2019    History of total left knee replacement (TKR) 2020    Intractable chronic migraine without aura and without status migrainosus 2021    History of total right knee replacement (TKR) 2022    Bilateral hearing loss 2022    Stage 3a chronic kidney disease (HCC) 2022    Bronchitis 2023    Thrush 2023    Pharyngitis 2024    Chest pain 2024    Osteoarthritis of right hip 2024    Primary osteoarthritis of right hip 2024     Resolved Ambulatory Problems     Diagnosis Date Noted    Chest pain 2020    Unstable angina (HCC)     Encounter for antineoplastic radiation therapy 2017    Acute pain of right knee 2018    Preop examination 2020    Arthritis of left knee 2020    Acute bronchitis due to other specified organisms 2021    Acute otitis media 2021    Foreign body (FB) in soft tissue-left ear 2021    Preop examination 2021    Arthritis of right knee

## 2025-06-06 NOTE — ASSESSMENT & PLAN NOTE
Chronic, not at goal (unstable), continue current plan pending work up below  - Chronic - stable pending lab work.  Orders:    Brain Natriuretic Peptide    Comprehensive Metabolic Panel, Fasting

## 2025-06-06 NOTE — PATIENT INSTRUCTIONS
Cut your lisinopril in half until lab work is returned. Monitor blood pressure at home. Continue water pill pending lab work. Compression and elevate the legs.

## 2025-06-09 ENCOUNTER — RESULTS FOLLOW-UP (OUTPATIENT)
Dept: INTERNAL MEDICINE CLINIC | Age: 85
End: 2025-06-09

## 2025-07-02 ENCOUNTER — TELEPHONE (OUTPATIENT)
Dept: INTERNAL MEDICINE CLINIC | Age: 85
End: 2025-07-02

## 2025-08-07 ENCOUNTER — TELEMEDICINE (OUTPATIENT)
Dept: INTERNAL MEDICINE CLINIC | Age: 85
End: 2025-08-07
Payer: MEDICARE

## 2025-08-07 DIAGNOSIS — Z00.00 MEDICARE ANNUAL WELLNESS VISIT, SUBSEQUENT: Primary | ICD-10-CM

## 2025-08-07 PROCEDURE — 1123F ACP DISCUSS/DSCN MKR DOCD: CPT | Performed by: STUDENT IN AN ORGANIZED HEALTH CARE EDUCATION/TRAINING PROGRAM

## 2025-08-07 PROCEDURE — G0439 PPPS, SUBSEQ VISIT: HCPCS | Performed by: STUDENT IN AN ORGANIZED HEALTH CARE EDUCATION/TRAINING PROGRAM

## 2025-08-07 ASSESSMENT — PATIENT HEALTH QUESTIONNAIRE - PHQ9
4. FEELING TIRED OR HAVING LITTLE ENERGY: SEVERAL DAYS
3. TROUBLE FALLING OR STAYING ASLEEP: NOT AT ALL
SUM OF ALL RESPONSES TO PHQ QUESTIONS 1-9: 1
10. IF YOU CHECKED OFF ANY PROBLEMS, HOW DIFFICULT HAVE THESE PROBLEMS MADE IT FOR YOU TO DO YOUR WORK, TAKE CARE OF THINGS AT HOME, OR GET ALONG WITH OTHER PEOPLE: NOT DIFFICULT AT ALL
1. LITTLE INTEREST OR PLEASURE IN DOING THINGS: NOT AT ALL
SUM OF ALL RESPONSES TO PHQ QUESTIONS 1-9: 1
9. THOUGHTS THAT YOU WOULD BE BETTER OFF DEAD, OR OF HURTING YOURSELF: NOT AT ALL
5. POOR APPETITE OR OVEREATING: NOT AT ALL
2. FEELING DOWN, DEPRESSED OR HOPELESS: NOT AT ALL
8. MOVING OR SPEAKING SO SLOWLY THAT OTHER PEOPLE COULD HAVE NOTICED. OR THE OPPOSITE, BEING SO FIGETY OR RESTLESS THAT YOU HAVE BEEN MOVING AROUND A LOT MORE THAN USUAL: NOT AT ALL
SUM OF ALL RESPONSES TO PHQ QUESTIONS 1-9: 1
6. FEELING BAD ABOUT YOURSELF - OR THAT YOU ARE A FAILURE OR HAVE LET YOURSELF OR YOUR FAMILY DOWN: NOT AT ALL
7. TROUBLE CONCENTRATING ON THINGS, SUCH AS READING THE NEWSPAPER OR WATCHING TELEVISION: NOT AT ALL
SUM OF ALL RESPONSES TO PHQ QUESTIONS 1-9: 1

## 2025-08-07 ASSESSMENT — LIFESTYLE VARIABLES
HOW MANY STANDARD DRINKS CONTAINING ALCOHOL DO YOU HAVE ON A TYPICAL DAY: PATIENT DOES NOT DRINK
HOW OFTEN DO YOU HAVE A DRINK CONTAINING ALCOHOL: NEVER

## 2025-08-20 ENCOUNTER — OFFICE VISIT (OUTPATIENT)
Dept: CARDIOLOGY CLINIC | Age: 85
End: 2025-08-20
Payer: MEDICARE

## 2025-08-20 VITALS
BODY MASS INDEX: 29.06 KG/M2 | HEIGHT: 70 IN | DIASTOLIC BLOOD PRESSURE: 48 MMHG | OXYGEN SATURATION: 95 % | HEART RATE: 54 BPM | RESPIRATION RATE: 18 BRPM | SYSTOLIC BLOOD PRESSURE: 122 MMHG | WEIGHT: 203 LBS

## 2025-08-20 DIAGNOSIS — I10 ESSENTIAL HYPERTENSION: ICD-10-CM

## 2025-08-20 DIAGNOSIS — Z98.61 CAD S/P PERCUTANEOUS CORONARY ANGIOPLASTY: Primary | ICD-10-CM

## 2025-08-20 DIAGNOSIS — E78.2 MIXED HYPERLIPIDEMIA: ICD-10-CM

## 2025-08-20 DIAGNOSIS — I25.10 CAD S/P PERCUTANEOUS CORONARY ANGIOPLASTY: Primary | ICD-10-CM

## 2025-08-20 DIAGNOSIS — C61 PROSTATE CARCINOMA (HCC): ICD-10-CM

## 2025-08-20 DIAGNOSIS — Z95.1 S/P CABG X 4: ICD-10-CM

## 2025-08-20 PROCEDURE — 1159F MED LIST DOCD IN RCRD: CPT | Performed by: INTERNAL MEDICINE

## 2025-08-20 PROCEDURE — 3074F SYST BP LT 130 MM HG: CPT | Performed by: INTERNAL MEDICINE

## 2025-08-20 PROCEDURE — 93000 ELECTROCARDIOGRAM COMPLETE: CPT | Performed by: INTERNAL MEDICINE

## 2025-08-20 PROCEDURE — G8417 CALC BMI ABV UP PARAM F/U: HCPCS | Performed by: INTERNAL MEDICINE

## 2025-08-20 PROCEDURE — 99214 OFFICE O/P EST MOD 30 MIN: CPT | Performed by: INTERNAL MEDICINE

## 2025-08-20 PROCEDURE — G8427 DOCREV CUR MEDS BY ELIG CLIN: HCPCS | Performed by: INTERNAL MEDICINE

## 2025-08-20 PROCEDURE — 1036F TOBACCO NON-USER: CPT | Performed by: INTERNAL MEDICINE

## 2025-08-20 PROCEDURE — 3078F DIAST BP <80 MM HG: CPT | Performed by: INTERNAL MEDICINE

## 2025-08-20 PROCEDURE — 1123F ACP DISCUSS/DSCN MKR DOCD: CPT | Performed by: INTERNAL MEDICINE

## 2025-08-25 ENCOUNTER — TELEPHONE (OUTPATIENT)
Dept: CARDIOLOGY CLINIC | Age: 85
End: 2025-08-25

## 2025-09-02 DIAGNOSIS — E66.3 OVERWEIGHT: Primary | ICD-10-CM

## 2025-09-02 RX ORDER — PHENTERMINE HYDROCHLORIDE 37.5 MG/1
37.5 TABLET ORAL
Qty: 30 TABLET | Refills: 1 | Status: SHIPPED | OUTPATIENT
Start: 2025-09-02 | End: 2025-11-01

## 2025-09-02 RX ORDER — ISOSORBIDE MONONITRATE 60 MG/1
60 TABLET, EXTENDED RELEASE ORAL DAILY
Qty: 90 TABLET | Refills: 3 | Status: SHIPPED | OUTPATIENT
Start: 2025-09-02

## (undated) DEVICE — NEEDLE HYPO 30GA L0.5IN BGE POLYPR HUB S STL REG BVL STR

## (undated) DEVICE — Device

## (undated) DEVICE — GLOVE ORANGE PI 8   MSG9080

## (undated) DEVICE — SOLUTION WND IRRIGATION 450 ML 0.5 PVP-I 0.9 NACL

## (undated) DEVICE — SOLUTION IV IRRIG POUR BRL 0.9% SODIUM CHL 2F7124

## (undated) DEVICE — BANDAGE COMPR W6INXL12FT SMOOTH FOR LIMB EXSANG ESMARCH

## (undated) DEVICE — STERILE TOTAL KNEE DRAPE PACK: Brand: CARDINAL HEALTH

## (undated) DEVICE — SYRINGE IRRIG 60ML SFT PLIABLE BLB EZ TO GRP 1 HND USE W/

## (undated) DEVICE — KIT DRP FOR RIO ROBOTIC ARM ASST SYS

## (undated) DEVICE — GLOVE SURG SZ 85 L12IN FNGR THK13MIL WHT ISOLEX POLYISOPRENE

## (undated) DEVICE — HYPODERMIC SAFETY NEEDLE: Brand: MAGELLAN

## (undated) DEVICE — SOLUTION PREP POVIDONE IOD FOR SKIN MUCOUS MEM PRIOR TO

## (undated) DEVICE — 3M™ COBAN™ NL STERILE NON-LATEX SELF-ADHERENT WRAP, 2083S, 3 IN X 5 YD (7,5 CM X 4,5 M), 24 ROLLS/CASE: Brand: 3M™ COBAN™

## (undated) DEVICE — 1010 S-DRAPE TOWEL DRAPE 10/BX: Brand: STERI-DRAPE™

## (undated) DEVICE — SNARE VASC L240CM LOOP W10MM SHTH DIA2.4MM RND STIFF CLD

## (undated) DEVICE — CATHETER KIT JL4 JR4 5FR 100CM 145 PGTL DXTERITY

## (undated) DEVICE — GLOVE SURG SZ 8 L12IN FNGR THK79MIL GRN LTX FREE

## (undated) DEVICE — CORD RETRCT SIL

## (undated) DEVICE — TOTAL BASIC: Brand: MEDLINE INDUSTRIES, INC.

## (undated) DEVICE — COVER,MAYO STAND,STERILE: Brand: MEDLINE

## (undated) DEVICE — HOLDER RESTRAINT LIMB UNIV FOAM PR D RNG SINGLE STRP LF

## (undated) DEVICE — KIT TRK KNEE PROC VIZADISC

## (undated) DEVICE — PIN BNE FIX L110MM DIA32MM

## (undated) DEVICE — MERCY HEALTH WEST TURNOVER: Brand: MEDLINE INDUSTRIES, INC.

## (undated) DEVICE — SUTURE ABSORBABLE MONOFILAMENT 1 OS-8 36 CM 40 MM VIO PDS +

## (undated) DEVICE — SUTURE STRATAFIX SPRL SZ 2 0 L14IN ABSRB UD MH L36MM 1 2 CIR SXMD2B401

## (undated) DEVICE — INSTRUMENT COVER: Brand: CONVERTORS

## (undated) DEVICE — 150CM STANDARD JWIRE

## (undated) DEVICE — COTTON UNDERCAST PADDING,CRIMPED FINISH: Brand: WEBRIL

## (undated) DEVICE — MAT FLR W32XL58IN

## (undated) DEVICE — SYRINGE MED 5ML STD CLR PLAS LUERLOCK TIP N CTRL DISP

## (undated) DEVICE — COVER LT HNDL BLU PLAS

## (undated) DEVICE — TRAP POLYP ETRAP

## (undated) DEVICE — SUTURE MONOCRYL STRATAFIX SPRL SZ 3-0 L12IN ABSRB UD FS-1 L30X30CM SXMP2B410

## (undated) DEVICE — CHLORAPREP 26ML ORANGE

## (undated) DEVICE — ENT I-LF: Brand: MEDLINE INDUSTRIES, INC.

## (undated) DEVICE — RETRACTOR SURG WIDE FLAT LO PROF LTWT PHOTONGUIDE

## (undated) DEVICE — Device: Brand: POWER-FLO®

## (undated) DEVICE — Z DISCONTINUED USE 2131664 WIPE INSTR W3XL3IN NONLINTING

## (undated) DEVICE — SYRINGE MED 30ML STD CLR PLAS LUERLOCK TIP N CTRL DISP

## (undated) DEVICE — SYRINGE,TOOMEY,IRRIGATION,70CC,STERILE: Brand: MEDLINE

## (undated) DEVICE — GLOVE ORTHO 8   MSG9480

## (undated) DEVICE — GOWN,REINF,POLY,AURORA,XLNG/XXL,STRL: Brand: MEDLINE

## (undated) DEVICE — PIN BNE FIX TEMP L140MM DIA4MM MAKO

## (undated) DEVICE — ADHESIVE SKIN CLOSURE WND 8.661X1.5 IN 22 CM LIQUIBAND SECUR

## (undated) DEVICE — 3 BONE CEMENT MIXER WITH SMALL SPATULA: Brand: MIXEVAC

## (undated) DEVICE — BASIC SINGLE BASIN 1-LF: Brand: MEDLINE INDUSTRIES, INC.

## (undated) DEVICE — CORD ES L12FT BPLR FRCP

## (undated) DEVICE — Z DISCONTINUED USE 2716304 SUTURE STRATAFIX SPRL SZ 3-0 L12IN ABSRB UD FS-1 L24MM 3/8

## (undated) DEVICE — SPONGE GZ W4XL4IN COT 12 PLY TYP VII WVN C FLD DSGN

## (undated) DEVICE — TOTAL KNEE: Brand: MEDLINE INDUSTRIES, INC.

## (undated) DEVICE — ELECTRODE PT RET AD L9FT HI MOIST COND ADH HYDRGEL CORDED

## (undated) DEVICE — BANDAGE COMPR W4INXL15FT BGE E SGL LAYERED CLP CLSR

## (undated) DEVICE — SOLUTION IV 1000ML 0.9% SOD CHL FOR IRRIG PLAS CONT

## (undated) DEVICE — CUTTER SURG OD46MM PAT KNEE DISP FOR RM SYS XCELERATE

## (undated) DEVICE — ENDOSCOPY KIT: Brand: MEDLINE INDUSTRIES, INC.

## (undated) DEVICE — 260 CM J TIP WIRE .035

## (undated) DEVICE — HYPODERMIC SAFETY NEEDLE: Brand: MONOJECT

## (undated) DEVICE — SUTURE STRATAFIX SPRL SZ 2 0 L14IN ABSRB UD MH L36MM 1 2 CIR SXMP2B401

## (undated) DEVICE — SUTURE ABSORBABLE MONOFILAMENT 6-0 G-1 18 IN PLN GUT 770G

## (undated) DEVICE — GLOVE SURG SZ 85 L12IN FNGR ORTHO 126MIL CRM LTX FREE

## (undated) DEVICE — SOLUTION IV IRRIG 250ML ST LF 0.9% SODIUM 2F7122

## (undated) DEVICE — DUAL CUT SAGITTAL BLADE

## (undated) DEVICE — CATHETER 5FR IM CORDIS 100CM

## (undated) DEVICE — BOOT POS LEG DEMAYO

## (undated) DEVICE — BIPOLAR SEALER 23-112-1 AQM 6.0: Brand: AQUAMANTYS ®

## (undated) DEVICE — Z DUP USE 2701075 SYSTEM SKIN CLSR 42CM DERMBND PRINEO

## (undated) DEVICE — BLADE SURG SAW STD S STL OSC W/ SERR EDGE DISP

## (undated) DEVICE — PAD,NON-ADHERENT,3X8,STERILE,LF,1/PK: Brand: MEDLINE

## (undated) DEVICE — KIT POS FOAM HANA TBL

## (undated) DEVICE — HANDPIECE SET WITH HIGH FLOW TIP AND SUCTION TUBE: Brand: INTERPULSE

## (undated) DEVICE — SUTURE VCRL SZ 1 L18IN ABSRB UD L36MM CT-1 1/2 CIR J841D

## (undated) DEVICE — CONTAINER SPEC 480ML CLR POLYSTYR 10% NEUT BUFF FRMLN ZN

## (undated) DEVICE — GLOVE ORANGE PI 8 1/2   MSG9085

## (undated) DEVICE — TOWEL,OR,DSP,ST,BLUE,STD,4/PK,20PK/CS: Brand: MEDLINE

## (undated) DEVICE — SUTURE ABSORBABLE MONOFILAMENT 1-0 OS8 14 IN STRATAFIX SPRL SXPD2B202

## (undated) DEVICE — KIT INT FIX FEM TIB CKPT MAKOPLASTY

## (undated) DEVICE — GOWN SIRUS NONREIN XL W/TWL: Brand: MEDLINE INDUSTRIES, INC.

## (undated) DEVICE — TOTAL BASIC PK

## (undated) DEVICE — DECANTER BAG 9": Brand: MEDLINE INDUSTRIES, INC.

## (undated) DEVICE — MATTRESS MAXI AIR TRNSF SGL PT USE DCS 37 45 48 52 75

## (undated) DEVICE — COVER,TABLE,77X90,STERILE: Brand: MEDLINE

## (undated) DEVICE — GLOVE SURG SZ 65 CRM LTX FREE POLYISOPRENE POLYMER BEAD ANTI

## (undated) DEVICE — C-ARM: Brand: UNBRANDED

## (undated) DEVICE — APPLICATOR,COTTON-TIP,WOOD,3,STRL: Brand: MEDLINE

## (undated) DEVICE — 6619 2 PTNT ISO SYS INCISE AREA&LT;(&GT;&&LT;)&GT;P: Brand: STERI-DRAPE™ IOBAN™ 2

## (undated) DEVICE — DRAPE,REIN 53X77,STERILE: Brand: MEDLINE

## (undated) DEVICE — ANGIO-SEAL VIP VASCULAR CLOSURE DEVICE: Brand: ANGIO-SEAL

## (undated) DEVICE — INTENDED FOR TISSUE SEPARATION, AND OTHER PROCEDURES THAT REQUIRE A SHARP SURGICAL BLADE TO PUNCTURE OR CUT.: Brand: BARD-PARKER ® STAINLESS STEEL BLADES

## (undated) DEVICE — ZIMMER® STERILE DISPOSABLE TOURNIQUET CUFF WITH PLC, DUAL PORT, SINGLE BLADDER, 34 IN. (86 CM)

## (undated) DEVICE — PINNACLE INTRODUCER SHEATH: Brand: PINNACLE

## (undated) DEVICE — SHEET,DRAPE,53X77,STERILE: Brand: MEDLINE

## (undated) DEVICE — 4-PORT MANIFOLD: Brand: NEPTUNE 2

## (undated) DEVICE — FORCEPS BX 240CM 2.4MM L NDL RAD JAW 4 M00513334